# Patient Record
Sex: FEMALE | Race: WHITE | NOT HISPANIC OR LATINO | Employment: FULL TIME | URBAN - METROPOLITAN AREA
[De-identification: names, ages, dates, MRNs, and addresses within clinical notes are randomized per-mention and may not be internally consistent; named-entity substitution may affect disease eponyms.]

---
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
## 2021-01-05 ENCOUNTER — TELEMEDICINE (OUTPATIENT)
Dept: FAMILY MEDICINE CLINIC | Facility: CLINIC | Age: 21
End: 2021-01-05
Payer: COMMERCIAL

## 2021-01-05 DIAGNOSIS — B34.9 VIRAL INFECTION, UNSPECIFIED: Primary | ICD-10-CM

## 2021-01-05 PROCEDURE — 99214 OFFICE O/P EST MOD 30 MIN: CPT | Performed by: FAMILY MEDICINE

## 2021-01-05 PROCEDURE — 3725F SCREEN DEPRESSION PERFORMED: CPT | Performed by: FAMILY MEDICINE

## 2021-01-05 PROCEDURE — U0003 INFECTIOUS AGENT DETECTION BY NUCLEIC ACID (DNA OR RNA); SEVERE ACUTE RESPIRATORY SYNDROME CORONAVIRUS 2 (SARS-COV-2) (CORONAVIRUS DISEASE [COVID-19]), AMPLIFIED PROBE TECHNIQUE, MAKING USE OF HIGH THROUGHPUT TECHNOLOGIES AS DESCRIBED BY CMS-2020-01-R: HCPCS | Performed by: FAMILY MEDICINE

## 2021-01-05 PROCEDURE — 1036F TOBACCO NON-USER: CPT | Performed by: FAMILY MEDICINE

## 2021-01-05 RX ORDER — MONTELUKAST SODIUM 10 MG/1
10 TABLET ORAL
COMMUNITY

## 2021-01-05 RX ORDER — FLUTICASONE PROPIONATE 50 MCG
1 SPRAY, SUSPENSION (ML) NASAL DAILY
COMMUNITY

## 2021-01-05 NOTE — PROGRESS NOTES
COVID-19 Virtual Visit     Assessment/Plan:    Problem List Items Addressed This Visit     None      Visit Diagnoses     Viral infection, unspecified    -  Primary    Relevant Orders    Novel Coronavirus (COVID-19), PCR [facility] - Collected in Office         Disposition:     I recommended the patient to come to our office to perform PCR testing for COVID-19  I have spent 6 minutes directly with the patient  Encounter provider David Guzman DO    Provider located at [facility] [address] [facility]  [address] [facility]  [facility] [address] [place] [address]  [phone]    Recent Visits  No visits were found meeting these conditions  Showing recent visits within past 7 days and meeting all other requirements     Today's Visits  Date Type Provider Dept   01/05/21 Telemedicine David Guzman DO [facility] [address] [facility]   Showing today's visits and meeting all other requirements     Future Appointments  No visits were found meeting these conditions  Showing future appointments within next 150 days and meeting all other requirements      This virtual check-in was done via for[MD] and patient was informed that this is not a secure, HIPAA-compliant platform  She agrees to proceed  Patient agrees to participate in a virtual check in via telephone or video visit instead of presenting to the office to address urgent/immediate medical needs  Patient is aware this is a billable service  After connecting through [facility], the patient was identified by name and date of birth  Carmen Sanchez was informed that this was a telemedicine visit and that the exam was being conducted confidentially over secure lines  My office door was closed  No one else was in the room  Carmen Sanchez acknowledged consent and understanding of privacy and security of the telemedicine visit   I informed the patient that I have reviewed her record in Epic and presented the opportunity for her to ask any questions regarding the visit today  The patient agreed to participate  Subjective:   Joseph Pierre is a 24 y o  female who is concerned about COVID-19  Date of symptom onset: 1/3/2021    Patient's symptoms include sore throat, anosmia, loss of taste, cough, diarrhea, myalgias and headache  Patient denies fever, chills, fatigue, malaise, congestion, rhinorrhea, shortness of breath, chest tightness, abdominal pain, nausea and vomiting  Exposure:   Contact with a person who is under investigation (PUI) for or who is positive for COVID-19 within the last 14 days?: No    Hospitalized recently for fever and/or lower respiratory symptoms?: No      Currently a healthcare worker that is involved in direct patient care?: No      Works in a special setting where the risk of COVID-19 transmission may be high? (this may include long-term care, correctional and shelter facilities; homeless shelters; assisted-living facilities and group homes ): No      Resident in a special setting where the risk of COVID-19 transmission may be high? (this may include long-term care, correctional and shelter facilities; homeless shelters; assisted-living facilities and group homes ): No      Patient received her flu shot this year  No results found for: DALE Peres Mardee Pence  Past Medical History:   Diagnosis Date    Asthma     Ear infection      History reviewed  No pertinent surgical history    Current Outpatient Medications   Medication Sig Dispense Refill    diphenhydrAMINE (BENADRYL) 25 mg capsule Take 1 capsule (25 mg total) by mouth every 6 (six) hours as needed for itching 20 capsule 0    fluticasone (FLONASE) 50 mcg/act nasal spray 1 spray into each nostril daily      montelukast (Singulair) 10 mg tablet Take 10 mg by mouth daily at bedtime      naproxen (NAPROSYN) 500 mg tablet Take 1 tablet by mouth every 12 (twelve) hours as needed for mild pain or moderate pain 20 tablet 0    ibuprofen (MOTRIN) 400 mg tablet Take 1 tablet (400 mg total) by mouth every 6 (six) hours as needed for mild pain (Patient not taking: Reported on 1/5/2021) 30 tablet 0    ondansetron (ZOFRAN-ODT) 4 mg disintegrating tablet Take 1 tablet by mouth every 6 (six) hours as needed for nausea or vomiting (Patient not taking: Reported on 1/5/2021) 14 tablet 0     No current facility-administered medications for this visit  Allergies   Allergen Reactions    Pollen Extract     Shrimp Extract Allergy Skin Test Hives       Review of Systems   Constitutional: Negative for chills, fatigue and fever  HENT: Positive for sore throat  Negative for congestion and rhinorrhea  Respiratory: Positive for cough  Negative for chest tightness and shortness of breath  Gastrointestinal: Positive for diarrhea  Negative for abdominal pain, nausea and vomiting  Musculoskeletal: Positive for myalgias  Neurological: Positive for headaches  Objective:    Vitals:       Physical Exam  Vitals signs reviewed  Constitutional:       General: She is not in acute distress  Appearance: Normal appearance  HENT:      Head: Normocephalic and atraumatic  Right Ear: External ear normal       Left Ear: External ear normal       Nose: Congestion present  Mouth/Throat:      Mouth: Mucous membranes are moist       Pharynx: No oropharyngeal exudate  Eyes:      Extraocular Movements: Extraocular movements intact  Conjunctiva/sclera: Conjunctivae normal    Pulmonary:      Effort: Pulmonary effort is normal  No respiratory distress  Breath sounds: No wheezing  Neurological:      General: No focal deficit present  Mental Status: She is alert and oriented to person, place, and time  Mental status is at baseline  VIRTUAL VISIT DISCLAIMER    Jennifer Moore acknowledges that she has consented to an online visit or consultation   She understands that the online visit is based solely on information provided by her, and that, in the absence of a face-to-face physical evaluation by the physician, the diagnosis she receives is both limited and provisional in terms of accuracy and completeness  This is not intended to replace a full medical face-to-face evaluation by the physician  Gil Pickett understands and accepts these terms      Callie Nicole DO  [facility]  1/5/2021 2:26 PM

## 2021-01-06 DIAGNOSIS — U07.1 COVID-19: Primary | ICD-10-CM

## 2021-01-06 LAB — SARS-COV-2 RNA RESP QL NAA+PROBE: NEGATIVE

## 2021-02-03 ENCOUNTER — TELEPHONE (OUTPATIENT)
Dept: FAMILY MEDICINE CLINIC | Facility: CLINIC | Age: 21
End: 2021-02-03

## 2021-02-03 NOTE — TELEPHONE ENCOUNTER
Pt's mom called to [phone] requesting pt to be tested for Civid-19  Cristina notified us and jason pt's mother requested a call back to schedule an appt since pt was exposed to Covid

## 2021-02-04 ENCOUNTER — TELEMEDICINE (OUTPATIENT)
Dept: FAMILY MEDICINE CLINIC | Facility: CLINIC | Age: 21
End: 2021-02-04
Payer: COMMERCIAL

## 2021-02-04 DIAGNOSIS — Z20.822 EXPOSURE TO COVID-19 VIRUS: ICD-10-CM

## 2021-02-04 DIAGNOSIS — B34.9 VIRAL INFECTION, UNSPECIFIED: ICD-10-CM

## 2021-02-04 PROCEDURE — U0003 INFECTIOUS AGENT DETECTION BY NUCLEIC ACID (DNA OR RNA); SEVERE ACUTE RESPIRATORY SYNDROME CORONAVIRUS 2 (SARS-COV-2) (CORONAVIRUS DISEASE [COVID-19]), AMPLIFIED PROBE TECHNIQUE, MAKING USE OF HIGH THROUGHPUT TECHNOLOGIES AS DESCRIBED BY CMS-2020-01-R: HCPCS | Performed by: FAMILY MEDICINE

## 2021-02-04 PROCEDURE — 99213 OFFICE O/P EST LOW 20 MIN: CPT | Performed by: FAMILY MEDICINE

## 2021-02-04 PROCEDURE — U0005 INFEC AGEN DETEC AMPLI PROBE: HCPCS | Performed by: FAMILY MEDICINE

## 2021-02-04 NOTE — PROGRESS NOTES
COVID-19 Virtual Visit     Assessment/Plan:    Problem List Items Addressed This Visit     None      Visit Diagnoses     Exposure to COVID-19 virus        Relevant Orders    Novel Coronavirus (Covid-19),PCR [facility] - Collected in Office    Viral infection, unspecified        Relevant Orders    Novel Coronavirus (Covid-19),PCR [facility] - Collected in Office         Disposition:     I recommended the patient to come to our office to perform PCR testing for COVID-19  I have spent 7 minutes directly with the patient  Encounter provider Rodrigo Saleem DO    Provider located at [facility] [address]  [address] [facility] [address]  [facility] [address]  [phone]    Recent Visits  Date Type Provider Dept   02/03/21 Telephone Delisa Summers [facility] [phone] [place] [address] recent visits within past 7 days and meeting all other requirements     Today's Visits  Date Type Provider Dept   02/04/21 Telemedicine DO Ramiro Peng [facility] [phone] [facility]   Showing today's visits and meeting all other requirements     Future Appointments  No visits were found meeting these conditions  Showing future appointments within next 150 days and meeting all other requirements      This virtual check-in was done via [employer] and patient was informed that this is not a secure, HIPAA-compliant platform  She agrees to proceed  Patient agrees to participate in a virtual check in via telephone or video visit instead of presenting to the office to address urgent/immediate medical needs  Patient is aware this is a billable service  After connecting through [facility], the patient was identified by name and date of birth  Maribel Briseno was informed that this was a telemedicine visit and that the exam was being conducted confidentially over secure lines  My office door was closed  No one else was in the room   Maribel Briseno acknowledged consent and understanding of privacy and security of the telemedicine visit  I informed the patient that I have reviewed her record in Epic and presented the opportunity for her to ask any questions regarding the visit today  The patient agreed to participate  Subjective:   Canelo Redman is a 24 y o  female who is concerned about COVID-19  Patient's symptoms include fatigue, nasal congestion, sore throat, cough, chest tightness, myalgias and headache  Patient denies fever, chills, malaise, rhinorrhea, anosmia, loss of taste, shortness of breath, abdominal pain, nausea, vomiting and diarrhea  Date of symptom onset: 2/1/2021  Date of exposure: 1/30/2021    Exposure:   Contact with a person who is under investigation (PUI) for or who is positive for COVID-19 within the last 14 days?: Yes    Hospitalized recently for fever and/or lower respiratory symptoms?: No      Currently a healthcare worker that is involved in direct patient care?: No      Works in a special setting where the risk of COVID-19 transmission may be high? (this may include long-term care, correctional and [facility] facilities; homeless shelters; assisted-living facilities and group homes ): No      Resident in a special setting where the risk of COVID-19 transmission may be high? (this may include long-term care, correctional and [facility] facilities; homeless shelters; assisted-living facilities and group homes ): No      Ibuprofen OTC for body aches  Flonase nasal spray for congestion  Lab Results   Component Value Date    SARSCOV2 Negative 01/05/2021     Past Medical History:   Diagnosis Date    Asthma     Ear infection      History reviewed  No pertinent surgical history    Current Outpatient Medications   Medication Sig Dispense Refill    diphenhydrAMINE (BENADRYL) 25 mg capsule Take 1 capsule (25 mg total) by mouth every 6 (six) hours as needed for itching 20 capsule 0    fluticasone (FLONASE) 50 mcg/act nasal spray 1 spray into each nostril daily      ibuprofen (MOTRIN) 400 mg tablet Take 1 tablet (400 mg total) by mouth every 6 (six) hours as needed for mild pain 30 tablet 0    montelukast (Singulair) 10 mg tablet Take 10 mg by mouth daily at bedtime      naproxen (NAPROSYN) 500 mg tablet Take 1 tablet by mouth every 12 (twelve) hours as needed for mild pain or moderate pain 20 tablet 0    ondansetron (ZOFRAN-ODT) 4 mg disintegrating tablet Take 1 tablet by mouth every 6 (six) hours as needed for nausea or vomiting 14 tablet 0     No current facility-administered medications for this visit  Allergies   Allergen Reactions    Pollen Extract     Shrimp Extract Allergy Skin Test Hives       Review of Systems   Constitutional: Positive for fatigue  Negative for chills and fever  HENT: Positive for congestion and sore throat  Negative for rhinorrhea  Respiratory: Positive for cough and chest tightness  Negative for shortness of breath  Gastrointestinal: Negative for abdominal pain, diarrhea, nausea and vomiting  Musculoskeletal: Positive for myalgias  Neurological: Positive for headaches  Objective: There were no vitals filed for this visit  Physical Exam  Vitals signs reviewed  Constitutional:       General: She is not in acute distress  Appearance: Normal appearance  She is ill-appearing  HENT:      Head: Normocephalic and atraumatic  Right Ear: External ear normal       Left Ear: External ear normal       Nose: Congestion present  Mouth/Throat:      Mouth: Mucous membranes are moist    Eyes:      Extraocular Movements: Extraocular movements intact  Conjunctiva/sclera: Conjunctivae normal    Pulmonary:      Effort: Pulmonary effort is normal  No respiratory distress  Breath sounds: No wheezing  Neurological:      General: No focal deficit present  Mental Status: She is alert and oriented to person, place, and time  Mental status is at baseline         VIRTUAL VISIT DISCLAIMER    Catymaeve Shaffer acknowledges that she has consented to an online visit or consultation  She understands that the online visit is based solely on information provided by her, and that, in the absence of a face-to-face physical evaluation by the physician, the diagnosis she receives is both limited and provisional in terms of accuracy and completeness  This is not intended to replace a full medical face-to-face evaluation by the physician  Caty Shaffer understands and accepts these terms      Frances Pablo DO  [facility]  2/4/2021 9:09 AM

## 2021-02-05 LAB — SARS-COV-2 RNA RESP QL NAA+PROBE: POSITIVE

## 2021-02-15 ENCOUNTER — TELEMEDICINE (OUTPATIENT)
Dept: FAMILY MEDICINE CLINIC | Facility: CLINIC | Age: 21
End: 2021-02-15
Payer: COMMERCIAL

## 2021-02-15 ENCOUNTER — TELEPHONE (OUTPATIENT)
Dept: FAMILY MEDICINE CLINIC | Facility: CLINIC | Age: 21
End: 2021-02-15

## 2021-02-15 DIAGNOSIS — U07.1 COVID-19: Primary | ICD-10-CM

## 2021-02-15 PROCEDURE — 99212 OFFICE O/P EST SF 10 MIN: CPT | Performed by: FAMILY MEDICINE

## 2021-02-15 NOTE — LETTER
Dear Dr Mini Del Toro is interested in participating in the [facility] COVID-19 convalescent plasma collection program  Please see my attached note verifying eligibility  Sincerely,      Florence Deleon DO    Diaz Vickers 24 y o  female   MRN: [id number]  2000    COVID-19 Convalescent Plasma Donor Attestation Verification of Eligibility     Diaz Vickers is a 24 y o  female who was diagnosed with COVID-19 infection, has recovered from the illness and wishes to participate in the [facility] convalescent plasma donation program to treat critically ill COVID-19 patients  Donor understands that they will be screened by [facility] [address] and if deemed a suitable candidate, donation appointment time will be arranged directly through [facility] [address]  They were also informed that their plasma will be in a donor pool and cannot be directed to a specific patient  Patient tested positive for 2019 Novel Coronavirus 2019 (SARS-CoV-2 RNA) on 2/4/2021  Patient is now recovered from COVID-19 and has been or will be symptom free for at least 28 days, effective 3/15/2021  To facilitate donation, eligibility form sent directly to [address] [address]  Patients preferred contact number: [phone]    All of the patient's questions were answered via telephone  She is aware to call our office with any further questions or concerns as needed

## 2021-02-15 NOTE — PROGRESS NOTES
COVID-19 Virtual Visit     Assessment/Plan:    Problem List Items Addressed This Visit     None      Visit Diagnoses     COVID-19    -  Primary         Disposition:     Patient has completed 10 day quarantine, is feeling well  She is interested in donating plasma, letter sent to [facility]  Advised patient to follow up for her annual physical as she is due  Discussed plasma donation program with patient and they are interested  Verification of eligibility was sent to [facility]  I have spent 4 minutes directly with the patient  Encounter provider Cedric Padilla DO    Provider located at [facility] [address]  [address] [facility]  [address]  [facility] [id number] [place] [address]  [phone]    Recent Visits  No visits were found meeting these conditions  Showing recent visits within past 7 days and meeting all other requirements     Today's Visits  Date Type Provider Dept   02/15/21 Telemedicine DO Ramiro Steele [facility] [address] [facility]   Showing today's visits and meeting all other requirements     Future Appointments  No visits were found meeting these conditions  Showing future appointments within next 150 days and meeting all other requirements        Patient agrees to participate in a virtual check in via telephone or video visit instead of presenting to the office to address urgent/immediate medical needs  Patient is aware this is a billable service  After connecting through [facility], the patient was identified by name and date of birth  Jennifer Moore was informed that this was a telemedicine visit and that the exam was being conducted confidentially over secure lines  My office door was closed  No one else was in the room  Jennifer Moore acknowledged consent and understanding of privacy and security of the telemedicine visit   I informed the patient that I have reviewed her record in Epic and presented the opportunity for her to ask any questions regarding the visit today  The patient agreed to participate  Subjective:   Tam Brown is a 24 y o  female who has been screened for COVID-19  Symptom change since last report: resolving  Patient's symptoms include anosmia and loss of taste  Patient denies fever, chills, fatigue, malaise, congestion, rhinorrhea, sore throat, cough, shortness of breath, chest tightness, abdominal pain, nausea, vomiting, diarrhea, myalgias and headaches  Sidra Wilks has been staying home and has isolated themselves in her home  She is taking care to not share personal items and is cleaning all surfaces that are touched often, like counters, tabletops, and doorknobs using household cleaning sprays or wipes  She is wearing a mask when she leaves her room  Date of symptom onset: 2/1/2021  Date of exposure: 1/30/2021  Date of positive COVID-19 PCR: 2/4/2021    Brother tested positive too  Lab Results   Component Value Date    SARSCOV2 Positive (A) 02/04/2021     Past Medical History:   Diagnosis Date    Asthma     Ear infection      History reviewed  No pertinent surgical history    Current Outpatient Medications   Medication Sig Dispense Refill    diphenhydrAMINE (BENADRYL) 25 mg capsule Take 1 capsule (25 mg total) by mouth every 6 (six) hours as needed for itching 20 capsule 0    fluticasone (FLONASE) 50 mcg/act nasal spray 1 spray into each nostril daily      ibuprofen (MOTRIN) 400 mg tablet Take 1 tablet (400 mg total) by mouth every 6 (six) hours as needed for mild pain 30 tablet 0    montelukast (Singulair) 10 mg tablet Take 10 mg by mouth daily at bedtime      naproxen (NAPROSYN) 500 mg tablet Take 1 tablet by mouth every 12 (twelve) hours as needed for mild pain or moderate pain 20 tablet 0    ondansetron (ZOFRAN-ODT) 4 mg disintegrating tablet Take 1 tablet by mouth every 6 (six) hours as needed for nausea or vomiting 14 tablet 0     No current facility-administered medications for this visit  Allergies   Allergen Reactions    Pollen Extract     Shrimp Extract Allergy Skin Test Hives       Review of Systems   Constitutional: Negative for chills, fatigue and fever  HENT: Negative for congestion, rhinorrhea and sore throat  Respiratory: Negative for cough, chest tightness and shortness of breath  Gastrointestinal: Negative for abdominal pain, diarrhea, nausea and vomiting  Musculoskeletal: Negative for myalgias  Neurological: Negative for headaches  Objective: There were no vitals filed for this visit  Physical Exam  Vitals signs reviewed  Constitutional:       General: She is not in acute distress  Appearance: Normal appearance  She is not ill-appearing  HENT:      Head: Normocephalic and atraumatic  Right Ear: External ear normal       Left Ear: External ear normal       Nose: Nose normal       Mouth/Throat:      Mouth: Mucous membranes are moist    Eyes:      Extraocular Movements: Extraocular movements intact  Conjunctiva/sclera: Conjunctivae normal    Pulmonary:      Effort: Pulmonary effort is normal  No respiratory distress  Breath sounds: No wheezing  Skin:     Findings: No rash  Neurological:      General: No focal deficit present  Mental Status: She is alert and oriented to person, place, and time  Mental status is at baseline  VIRTUAL VISIT DISCLAIMER    Tam Brown acknowledges that she has consented to an online visit or consultation  She understands that the online visit is based solely on information provided by her, and that, in the absence of a face-to-face physical evaluation by the physician, the diagnosis she receives is both limited and provisional in terms of accuracy and completeness  This is not intended to replace a full medical face-to-face evaluation by the physician  Tam Brown understands and accepts these terms        Adali Perales DO  [facility]  2/15/2021 1:33 PM

## 2021-02-15 NOTE — TELEPHONE ENCOUNTER
Pt called back and needs a work note from 02/04/21 to 02/18/21  Please advise if ok to give note  [phone]

## 2021-02-16 NOTE — TELEPHONE ENCOUNTER
Patient called requesting note  Advised note completed  Emailed work note to patient at [email or web address] per patient request

## 2021-07-16 ENCOUNTER — IMMUNIZATIONS (OUTPATIENT)
Dept: FAMILY MEDICINE CLINIC | Facility: HOSPITAL | Age: 21
End: 2021-07-16

## 2021-07-16 DIAGNOSIS — Z23 ENCOUNTER FOR IMMUNIZATION: Primary | ICD-10-CM

## 2021-07-16 PROCEDURE — 91300 SARS-COV-2 / COVID-19 MRNA VACCINE (PFIZER-BIONTECH) 30 MCG: CPT

## 2021-07-16 PROCEDURE — 0001A SARS-COV-2 / COVID-19 MRNA VACCINE (PFIZER-BIONTECH) 30 MCG: CPT

## 2021-08-06 ENCOUNTER — IMMUNIZATIONS (OUTPATIENT)
Dept: FAMILY MEDICINE CLINIC | Facility: HOSPITAL | Age: 21
End: 2021-08-06

## 2021-08-06 DIAGNOSIS — Z23 ENCOUNTER FOR IMMUNIZATION: Primary | ICD-10-CM

## 2021-08-06 PROCEDURE — 0002A SARS-COV-2 / COVID-19 MRNA VACCINE (PFIZER-BIONTECH) 30 MCG: CPT

## 2021-08-06 PROCEDURE — 91300 SARS-COV-2 / COVID-19 MRNA VACCINE (PFIZER-BIONTECH) 30 MCG: CPT

## 2021-08-25 ENCOUNTER — OFFICE VISIT (OUTPATIENT)
Dept: FAMILY MEDICINE CLINIC | Facility: CLINIC | Age: 21
End: 2021-08-25
Payer: COMMERCIAL

## 2021-08-25 VITALS
WEIGHT: 236 LBS | SYSTOLIC BLOOD PRESSURE: 118 MMHG | OXYGEN SATURATION: 98 % | DIASTOLIC BLOOD PRESSURE: 82 MMHG | HEIGHT: 63 IN | TEMPERATURE: 96.6 F | BODY MASS INDEX: 41.82 KG/M2 | HEART RATE: 92 BPM

## 2021-08-25 DIAGNOSIS — Z00.00 ANNUAL PHYSICAL EXAM: Primary | ICD-10-CM

## 2021-08-25 DIAGNOSIS — N94.6 PAINFUL MENSTRUAL PERIODS: ICD-10-CM

## 2021-08-25 DIAGNOSIS — E66.01 MORBID OBESITY WITH BMI OF 40.0-44.9, ADULT (HCC): ICD-10-CM

## 2021-08-25 DIAGNOSIS — K52.9 GASTROENTERITIS: ICD-10-CM

## 2021-08-25 PROCEDURE — 99395 PREV VISIT EST AGE 18-39: CPT | Performed by: STUDENT IN AN ORGANIZED HEALTH CARE EDUCATION/TRAINING PROGRAM

## 2021-08-25 NOTE — PROGRESS NOTES
[place] [address] [address]    NAME: Adelaida Russell  AGE: 22 y o  SEX: female  : 2000     DATE: 2021     Assessment and Plan:     Problem List Items Addressed This Visit     None      Visit Diagnoses     Annual physical exam    -  Primary    Morbid obesity with BMI of 40 0-44 9, adult ([facility] [address] )        Gastroenteritis        Painful menstrual periods            Recommend nsaids for her periods  May return to work as gastroenteritis has resolved    Immunizations and preventive care screenings were discussed with patient today  Appropriate education was printed on patient's after visit summary  Counseling:  Sexual health: discussed sexually transmitted diseases, partner selection, use of condoms, avoidance of unintended pregnancy, and contraceptive alternatives  · Exercise: the importance of regular exercise/physical activity was discussed  Recommend exercise 3-5 times per week for at least 30 minutes  BMI Counseling: Body mass index is 41 81 kg/m²  The BMI is above normal  Nutrition recommendations include decreasing portion sizes, encouraging healthy choices of fruits and vegetables, decreasing fast food intake, consuming healthier snacks, limiting drinks that contain sugar and moderation in carbohydrate intake  Exercise recommendations include moderate physical activity 150 minutes/week, exercising 3-5 times per week and strength training exercises  No pharmacotherapy was ordered  No follow-ups on file  Chief Complaint:     Chief Complaint   Patient presents with    Diarrhea     Diarhea 2 days ago  Resolved    Abdominal Pain     Onset 2 days ago  Resolved    Annual Exam     Annual Physical Due and pt would like to complete this today  History of Present Illness:     Adult Annual Physical   Patient here for a comprehensive physical exam  The patient reports no problems   Needs a note for work    Diet and Physical Activity  · Diet/Nutrition: well balanced diet  · Exercise: no formal exercise  Depression Screening  PHQ-9 Depression Screening    PHQ-9:   Frequency of the following problems over the past two weeks:      Little interest or pleasure in doing things: 0 - not at all  Feeling down, depressed, or hopeless: 0 - not at all  PHQ-2 Score: 0       General Health  · Sleep: sleeps well  · Hearing: normal - bilateral   · Vision: no vision problems  · Dental: regular dental visits  /GYN Health  · Last menstrual period: 2 weeks  · Contraceptive method: barrier methods  · History of STDs?: no      Review of Systems:     Review of Systems   Constitutional: Negative for chills, fatigue and fever  HENT: Negative for rhinorrhea and sore throat  Eyes: Negative for visual disturbance  Respiratory: Negative for cough and shortness of breath  Cardiovascular: Negative for chest pain and palpitations  Gastrointestinal: Negative for abdominal pain, constipation, diarrhea, nausea and vomiting  Genitourinary: Positive for menstrual problem (periods can be painful for the first and second day)  Negative for difficulty urinating, dysuria and frequency  Musculoskeletal: Negative for arthralgias and myalgias  Skin: Negative for color change and rash  Neurological: Negative for weakness and headaches  Past Medical History:     Past Medical History:   Diagnosis Date    Asthma     Ear infection       Past Surgical History:     History reviewed  No pertinent surgical history     Social History:     Social History     Socioeconomic History    Marital status: Single     Spouse name: None    Number of children: None    Years of education: None    Highest education level: None   Occupational History    None   Tobacco Use    Smoking status: Never Smoker    Smokeless tobacco: Never Used   Vaping Use    Vaping Use: Never used   Substance and Sexual Activity    Alcohol use: No    Drug use: No    Sexual activity: None   Other Topics Concern    None   Social History Narrative    None     Social Determinants of Health     Financial Resource Strain:     Difficulty of Paying Living Expenses:    Food Insecurity:     Worried About Running Out of Food in the Last Year:     [address] in the Last Year:    Transportation Needs:     Lack of Transportation (Medical):  Lack of Transportation (Non-Medical):    Physical Activity:     Days of Exercise per Week:     Minutes of Exercise per Session:    Stress:     Feeling of Stress :    Social Connections:     Frequency of Communication with Friends and Family:     Frequency of Social Gatherings with Friends and Family:     Attends Synagogue Services:     Active Member of Clubs or Organizations:     Attends Club or Organization Meetings:     Marital Status:    Intimate Partner Violence:     Fear of Current or Ex-Partner:     Emotionally Abused:     Physically Abused:     Sexually Abused:       Family History:     History reviewed  No pertinent family history  Current Medications:     Current Outpatient Medications   Medication Sig Dispense Refill    diphenhydrAMINE (BENADRYL) 25 mg capsule Take 1 capsule (25 mg total) by mouth every 6 (six) hours as needed for itching 20 capsule 0    fluticasone (FLONASE) 50 mcg/act nasal spray 1 spray into each nostril daily      ibuprofen (MOTRIN) 400 mg tablet Take 1 tablet (400 mg total) by mouth every 6 (six) hours as needed for mild pain 30 tablet 0    montelukast (Singulair) 10 mg tablet Take 10 mg by mouth daily at bedtime      naproxen (NAPROSYN) 500 mg tablet Take 1 tablet by mouth every 12 (twelve) hours as needed for mild pain or moderate pain 20 tablet 0    ondansetron (ZOFRAN-ODT) 4 mg disintegrating tablet Take 1 tablet by mouth every 6 (six) hours as needed for nausea or vomiting 14 tablet 0     No current facility-administered medications for this visit  Allergies: Allergies   Allergen Reactions    Pollen Extract     Shrimp Extract Allergy Skin Test - Food Allergy Hives      Physical Exam:     /82 (BP Location: Left arm, Patient Position: Sitting, Cuff Size: Adult)   Pulse 92   Temp (!) 96 6 °F (35 9 °C) (Tympanic)   Ht 5' 3" (1 6 m)   Wt 107 kg (236 lb)   SpO2 98%   BMI 41 81 kg/m²     Physical Exam  Constitutional:       General: She is not in acute distress  Appearance: Normal appearance  She is well-developed  She is obese  She is not ill-appearing  HENT:      Head: Normocephalic and atraumatic  Right Ear: Tympanic membrane, ear canal and external ear normal       Left Ear: Tympanic membrane, ear canal and external ear normal       Nose: Nose normal       Mouth/Throat:      Mouth: Mucous membranes are moist       Pharynx: Oropharynx is clear  No oropharyngeal exudate or posterior oropharyngeal erythema  Eyes:      General: No scleral icterus  Right eye: No discharge  Left eye: No discharge  Extraocular Movements: Extraocular movements intact  Conjunctiva/sclera: Conjunctivae normal       Pupils: Pupils are equal, round, and reactive to light  Cardiovascular:      Rate and Rhythm: Normal rate and regular rhythm  Pulses: Normal pulses  Heart sounds: Normal heart sounds  No murmur heard  Pulmonary:      Effort: Pulmonary effort is normal  No respiratory distress  Breath sounds: Normal breath sounds  Abdominal:      General: Abdomen is flat  Bowel sounds are normal  There is no distension or abdominal bruit  There are no signs of injury  Palpations: Abdomen is soft  Tenderness: There is no abdominal tenderness  Musculoskeletal:         General: Normal range of motion  Cervical back: Normal range of motion and neck supple  Lymphadenopathy:      Cervical: No cervical adenopathy  Skin:     General: Skin is warm and dry        Capillary Refill: Capillary refill takes less than 2 seconds  Neurological:      General: No focal deficit present  Mental Status: She is alert and oriented to person, place, and time  Mental status is at baseline  Cranial Nerves: No cranial nerve deficit  Psychiatric:         Mood and Affect: Mood normal           Jess Winston MD   [address]  BMI Counseling: Body mass index is 41 81 kg/m²  The BMI is above normal  Nutrition recommendations include reducing portion sizes, decreasing overall calorie intake, consuming healthier snacks and decreasing soda and/or juice intake  Exercise recommendations include moderate aerobic physical activity for 150 minutes/week, exercising 3-5 times per week and strength training exercises

## 2021-08-25 NOTE — PATIENT INSTRUCTIONS
Wellness Visit for Adults   AMBULATORY CARE:   A wellness visit  is when you see your healthcare provider to get screened for health problems  Your healthcare provider will also give you advice on how to stay healthy  Write down your questions so you remember to ask them  Ask your healthcare provider how often you should have a wellness visit  What happens at a wellness visit:  Your healthcare provider will ask about your health, and your family history of health problems  This includes high blood pressure, heart disease, and cancer  He or she will ask if you have symptoms that concern you, if you smoke, and about your mood  You may also be asked about your intake of medicines, supplements, food, and alcohol  Any of the following may be done:  · Your weight  will be checked  Your height may also be checked so your body mass index (BMI) can be calculated  Your BMI shows if you are at a healthy weight  · Your blood pressure  and heart rate will be checked  Your temperature may also be checked  · Blood and urine tests  may be done  Blood tests may be done to check your cholesterol levels  Abnormal cholesterol levels increase your risk for heart disease and stroke  You may also need a blood or urine test to check for diabetes if you are at increased risk  Urine tests may be done to look for signs of an infection or kidney disease  · A physical exam  includes checking your heartbeat and lungs with a stethoscope  Your healthcare provider may also check your skin to look for sun damage  · Screening tests  may be recommended  A screening test is done to check for diseases that may not cause symptoms  The screening tests you may need depend on your age, gender, family history, and lifestyle habits  For example, colorectal screening may be recommended if you are 48years old or older  Screening tests you need if you are a woman:   · A Pap smear  is used to screen for cervical cancer   Pap smears are usually done every 3 to 5 years depending on your age  You may need them more often if you have had abnormal Pap smear test results in the past  Ask your healthcare provider how often you should have a Pap smear  · A mammogram  is an x-ray of your breasts to screen for breast cancer  Experts recommend mammograms every 2 years starting at age 48 years  You may need a mammogram at age 52 years or younger if you have an increased risk for breast cancer  Talk to your healthcare provider about when you should start having mammograms and how often you need them  Vaccines you may need:   · Get an influenza vaccine  every year  The influenza vaccine protects you from the flu  Several types of viruses cause the flu  The viruses change over time, so new vaccines are made each year  · Get a tetanus-diphtheria (Td) booster vaccine  every 10 years  This vaccine protects you against tetanus and diphtheria  Tetanus is a severe infection that may cause painful muscle spasms and lockjaw  Diphtheria is a severe bacterial infection that causes a thick covering in the back of your mouth and throat  · Get a human papillomavirus (HPV) vaccine  if you are female and aged 23 to 32 or male 23 to 24 and never received it  This vaccine protects you from HPV infection  HPV is the most common infection spread by sexual contact  HPV may also cause vaginal, penile, and anal cancers  · Get a pneumococcal vaccine  if you are aged 72 years or older  The pneumococcal vaccine is an injection given to protect you from pneumococcal disease  Pneumococcal disease is an infection caused by pneumococcal bacteria  The infection may cause pneumonia, meningitis, or an ear infection  · Get a shingles vaccine  if you are 60 or older, even if you have had shingles before  The shingles vaccine is an injection to protect you from the varicella-zoster virus  This is the same virus that causes chickenpox   Shingles is a painful rash that develops in people who had chickenpox or have been exposed to the virus  How to eat healthy:  My Plate is a model for planning healthy meals  It shows the types and amounts of foods that should go on your plate  Fruits and vegetables make up about half of your plate, and grains and protein make up the other half  A serving of dairy is included on the side of your plate  The amount of calories and serving sizes you need depends on your age, gender, weight, and height  Examples of healthy foods are listed below:  · Eat a variety of vegetables  such as dark green, red, and orange vegetables  You can also include canned vegetables low in sodium (salt) and frozen vegetables without added butter or sauces  · Eat a variety of fresh fruits , canned fruit in 100% juice, frozen fruit, and dried fruit  · Include whole grains  At least half of the grains you eat should be whole grains  Examples include whole-wheat bread, wheat pasta, brown rice, and whole-grain cereals such as oatmeal     · Eat a variety of protein foods such as seafood (fish and shellfish), lean meat, and poultry without skin (turkey and chicken)  Examples of lean meats include pork leg, shoulder, or tenderloin, and beef round, sirloin, tenderloin, and extra lean ground beef  Other protein foods include eggs and egg substitutes, beans, peas, soy products, nuts, and seeds  · Choose low-fat dairy products such as skim or 1% milk or low-fat yogurt, cheese, and cottage cheese  · Limit unhealthy fats  such as butter, hard margarine, and shortening  Exercise:  Exercise at least 30 minutes per day on most days of the week  Some examples of exercise include walking, biking, dancing, and swimming  You can also fit in more physical activity by taking the stairs instead of the elevator or parking farther away from stores  Include muscle strengthening activities 2 days each week  Regular exercise provides many health benefits   It helps you manage your weight, and decreases your risk for type 2 diabetes, heart disease, stroke, and high blood pressure  Exercise can also help improve your mood  Ask your healthcare provider about the best exercise plan for you  General health and safety guidelines:   · Do not smoke  Nicotine and other chemicals in cigarettes and cigars can cause lung damage  Ask your healthcare provider for information if you currently smoke and need help to quit  E-cigarettes or smokeless tobacco still contain nicotine  Talk to your healthcare provider before you use these products  · Limit alcohol  A drink of alcohol is 12 ounces of beer, 5 ounces of wine, or 1½ ounces of liquor  · Lose weight, if needed  Being overweight increases your risk of certain health conditions  These include heart disease, high blood pressure, type 2 diabetes, and certain types of cancer  · Protect your skin  Do not sunbathe or use tanning beds  Use sunscreen with a SPF 15 or higher  Apply sunscreen at least 15 minutes before you go outside  Reapply sunscreen every 2 hours  Wear protective clothing, hats, and sunglasses when you are outside  · Drive safely  Always wear your seatbelt  Make sure everyone in your car wears a seatbelt  A seatbelt can save your life if you are in an accident  Do not use your cell phone when you are driving  This could distract you and cause an accident  Pull over if you need to make a call or send a text message  · Practice safe sex  Use latex condoms if are sexually active and have more than one partner  Your healthcare provider may recommend screening tests for sexually transmitted infections (STIs)  · Wear helmets, lifejackets, and protective gear  Always wear a helmet when you ride a bike or motorcycle, go skiing, or play sports that could cause a head injury  Wear protective equipment when you play sports  Wear a lifejacket when you are on a boat or doing water sports      © Copyright [employer] 2021 Information is for End User's use only and may not be sold, redistributed or otherwise used for commercial purposes  All illustrations and images included in CareNotes® are the copyrighted property of [employer] D A M [employer] Inc  or Jackie Boston  The above information is an  only  It is not intended as medical advice for individual conditions or treatments  Talk to your doctor, nurse or pharmacist before following any medical regimen to see if it is safe and effective for you  Cholesterol and Your Health   AMBULATORY CARE:   Cholesterol  is a waxy, fat-like substance  Your body uses cholesterol to make hormones and new cells, and to protect nerves  Cholesterol is made by your body  It also comes from certain foods you eat, such as meat and dairy products  Your healthcare provider can help you set goals for your cholesterol levels  He or she can help you create a plan to meet your goals  Cholesterol level goals: Your cholesterol level goals depend on your risk for heart disease, your age, and your other health conditions  The following are general guidelines:  · Total cholesterol  includes low-density lipoprotein (LDL), high-density lipoprotein (HDL), and triglyceride levels  The total cholesterol level should be lower than 200 mg/dL and is best at about 150 mg/dL  · LDL cholesterol  is called bad cholesterol  because it forms plaque in your arteries  As plaque builds up, your arteries become narrow, and less blood flows through  When plaque decreases blood flow to your heart, you may have chest pain  If plaque completely blocks an artery that brings blood to your heart, you may have a heart attack  Plaque can break off and form blood clots  Blood clots may block arteries in your brain and cause a stroke  The level should be less than 130 mg/dL and is best at about 100 mg/dL  · HDL cholesterol  is called good cholesterol  because it helps remove LDL cholesterol from your arteries   It does this by attaching to LDL cholesterol and carrying it to your liver  Your liver breaks down LDL cholesterol so your body can get rid of it  High levels of HDL cholesterol can help prevent a heart attack and stroke  Low levels of HDL cholesterol can increase your risk for heart disease, heart attack, and stroke  The level should be 60 mg/dL or higher  · Triglycerides  are a type of fat that store energy from foods you eat  High levels of triglycerides also cause plaque buildup  This can increase your risk for a heart attack or stroke  If your triglyceride level is high, your LDL cholesterol level may also be high  The level should be less than 150 mg/dL  Any of the following can increase your risk for high cholesterol:   · Smoking cigarettes    · Being overweight or obese, or not getting enough exercise    · Drinking large amounts of alcohol    · A medical condition such as hypertension (high blood pressure) or diabetes    · Certain genes passed from your parents to you    · Age older than 65 years    What you need to know about having your cholesterol levels checked: Adults 21to 39years of age should have their cholesterol levels checked every 4 to 6 years  Adults 45 years or older should have their cholesterol checked every 1 to 2 years  You may need your cholesterol checked more often, or at a younger age, if you have risk factors for heart disease  You may also need to have your cholesterol checked more often if you have other health conditions, such as diabetes  Blood tests are used to check cholesterol levels  Blood tests measure your levels of triglycerides, LDL cholesterol, and HDL cholesterol  How healthy fats affect your cholesterol levels:  Healthy fats, also called unsaturated fats, help lower LDL cholesterol and triglyceride levels  Healthy fats include the following:  · Monounsaturated fats  are found in foods such as olive oil, canola oil, avocado, nuts, and olives      · Polyunsaturated fats,  such as omega 3 fats, are found in fish, such as salmon, trout, and tuna  They can also be found in plant foods such as flaxseed, walnuts, and soybeans  How unhealthy fats affect your cholesterol levels:  Unhealthy fats increase LDL cholesterol and triglyceride levels  They are found in foods high in cholesterol, saturated fat, and trans fat:  · Cholesterol  is found in eggs, dairy, and meat  · Saturated fat  is found in butter, cheese, ice cream, whole milk, and coconut oil  Saturated fat is also found in meat, such as sausage, hot dogs, and bologna  · Trans fat  is found in liquid oils and is used in fried and baked foods  Foods that contain trans fats include chips, crackers, muffins, sweet rolls, microwave popcorn, and cookies  Treatment  for high cholesterol will also decrease your risk of heart disease, heart attack, and stroke  Treatment may include any of the following:  · Lifestyle changes  may include food, exercise, weight loss, and quitting smoking  You may also need to decrease the amount of alcohol you drink  Your healthcare provider will want you to start with lifestyle changes  Other treatment may be added if lifestyle changes are not enough  Your healthcare provider may recommend you work with a team to manage hyperlipidemia  The team may include medical experts such as a dietitian, an exercise or physical therapist, and a behavior therapist  Your family members may be included in helping you create lifestyle changes  · Medicines  may be given to lower your LDL cholesterol, triglyceride levels, or total cholesterol level  You may need medicines to lower your cholesterol if any of the following is true:    ? You have a history of stroke, TIA, unstable angina, or a heart attack  ? Your LDL cholesterol level is 190 mg/dL or higher  ? You are age 36 to 76 years, have diabetes or heart disease risk factors, and your LDL cholesterol is 70 mg/dL or higher      · Supplements  include fish oil, red yeast rice, and garlic  Fish oil may help lower your triglyceride and LDL cholesterol levels  It may also increase your HDL cholesterol level  Red yeast rice may help decrease your total cholesterol level and LDL cholesterol level  Garlic may help lower your total cholesterol level  Do not take any supplements without talking to your healthcare provider  Food changes you can make to lower your cholesterol levels:  A dietitian can help you create a healthy eating plan  He or she can show you how to read food labels and choose foods low in saturated fat, trans fats, and cholesterol  · Decrease the total amount of fat you eat  Choose lean meats, fat-free or 1% fat milk, and low-fat dairy products, such as yogurt and cheese  Try to limit or avoid red meats  Limit or do not eat fried foods or baked goods, such as cookies  · Replace unhealthy fats with healthy fats  Cook foods in olive oil or canola oil  Choose soft margarines that are low in saturated fat and trans fat  Seeds, nuts, and avocados are other examples of healthy fats  · Eat foods with omega-3 fats  Examples include salmon, tuna, mackerel, walnuts, and flaxseed  Eat fish 2 times per week  Pregnant women should not eat fish that have high levels of mercury, such as shark, swordfish, and angie mackerel  · Increase the amount of high-fiber foods you eat  High-fiber foods can help lower your LDL cholesterol  Aim to get between 20 and 30 grams of fiber each day  Fruits and vegetables are high in fiber  Eat at least 5 servings each day  Other high-fiber foods are whole-grain or whole-wheat breads, pastas, or cereals, and brown rice  Eat 3 ounces of whole-grain foods each day  Increase fiber slowly  You may have abdominal discomfort, bloating, and gas if you add fiber to your diet too quickly  · Eat healthy protein foods  Examples include low-fat dairy products, skinless chicken and turkey, fish, and nuts      · Limit foods and drinks that are high in sugar  Your dietitian or healthcare provider can help you create daily limits for high-sugar foods and drinks  The limit may be lower if you have diabetes or another health condition  Limits can also help you lose weight if needed  Lifestyle changes you can make to lower your cholesterol levels:   · Maintain a healthy weight  Ask your healthcare provider what a healthy weight is for you  Ask him or her to help you create a weight loss plan if needed  Weight loss can decrease your total cholesterol and triglyceride levels  Weight loss may also help keep your blood pressure at a healthy level  · Be physically active throughout the day  Physical activity, such as exercise, can help lower your total cholesterol level and maintain a healthy weight  Physical activity can also help increase your HDL cholesterol level  Work with your healthcare provider to create an program that is right for you  Get at least 30 to 40 minutes of moderate physical activity most days of the week  Examples of exercise include brisk walking, swimming, or biking  Also include strength training at least 2 times each week  Your healthcare providers can help you create a physical activity plan  · Do not smoke  Nicotine and other chemicals in cigarettes and cigars can raise your cholesterol levels  Ask your healthcare provider for information if you currently smoke and need help to quit  E-cigarettes or smokeless tobacco still contain nicotine  Talk to your healthcare provider before you use these products  · Limit or do not drink alcohol  Alcohol can increase your triglyceride levels  Ask your healthcare provider before you drink alcohol  Ask how much is okay for you to drink in 24 hours or 1 week  Follow up with your doctor as directed:  Write down your questions so you remember to ask them during your visits    © Copyright [employer] 2021 Information is for End User's use only and may not be sold, redistributed or otherwise used for commercial purposes  All illustrations and images included in CareNotes® are the copyrighted property of A D A M [employer] Inc  or Jackie Carcamo   The above information is an  only  It is not intended as medical advice for individual conditions or treatments  Talk to your doctor, nurse or pharmacist before following any medical regimen to see if it is safe and effective for you  Calorie Counting Diet   WHAT YOU NEED TO KNOW:   What is a calorie counting diet? It is a meal plan based on counting calories each day to reach a healthy body weight  You will need to eat fewer calories if you are trying to lose weight  Weight loss may decrease your risk for certain health problems or improve your health if you have health problems  Some of these health problems include heart disease, high blood pressure, and diabetes  What foods should I avoid? Your dietitian will tell you if you need to avoid certain foods based on your body weight and health condition  You may need to avoid high-fat foods if you are at risk for or have heart disease  You may need to eat fewer foods from the breads and starches food group if you have diabetes  How many calories are in foods? The following is a list of foods and drinks with the approximate number of calories in each  Check the food label to find the exact number of calories  A dietitian can tell you how many calories you should have from each food group each day  · Carbohydrate:      ? ½ of a 3-inch bagel, 1 slice of bread, or ½ of a hamburger bun or hot dog bun (80)    ? 1 (8-inch) flour tortilla or ½ cup of cooked rice (100)    ? 1 (6-inch) corn tortilla (80)    ? 1 (6-inch) pancake or 1 cup of bran flakes cereal (110)    ? ½ cup of cooked cereal (80)    ? ½ cup of cooked pasta (85)    ? 1 ounce of pretzels (100)    ? 3 cups of air-popped popcorn without butter or oil (80)    · Dairy:      ? 1 cup of skim or 1% milk (90)    ?  1 cup of 2% milk (120)    ? 1 cup of whole milk (160)    ? 1 cup of 2% chocolate milk (220)    ? 1 ounce of low-fat cheese with 3 grams of fat per ounce (70)    ? 1 ounce of cheddar cheese (114)    ? ½ cup of 1% fat cottage cheese (80)    ? 1 cup of plain or sugar-free, fat-free yogurt (90)    · Protein foods:      ? 3 ounces of fish (not breaded or fried) (95)    ? 3 ounces of breaded, fried fish (195)    ? ¾ cup of tuna canned in water (105)    ? 3 ounces of chicken breast without skin (105)    ? 1 fried chicken breast with skin (350)    ? ¼ cup of fat free egg substitute (40)    ? 1 large egg (75)    ? 3 ounces of lean beef or pork (165)    ? 3 ounces of fried pork chop or ham (185)    ? ½ cup of cooked dried beans, such as kidney, bhatia, lentils, or navy (115)    ? 3 ounces of bologna or lunch meat (225)    ? 2 links of breakfast sausage (140)    · Vegetables:      ? ½ cup of sliced mushrooms (10)    ? 1 cup of salad greens, such as lettuce, spinach, or christina (15)    ? ½ cup of steamed asparagus (20)    ? ½ cup of cooked summer squash, zucchini squash, or green or wax beans (25)    ? 1 cup of broccoli or cauliflower florets, or 1 medium tomato (25)    ? 1 large raw carrot or ½ cup of cooked carrots (40)    ? ? of a medium cucumber or 1 stalk of celery (5)    ? 1 small baked potato (160)    ? 1 cup of breaded, fried vegetables (230)    · Fruit:      ? 1 (6-inch) banana (55)     ? ½ of a 4-inch grapefruit (55)    ? 15 grapes (60)    ? 1 medium orange or apple (70)    ? 1 large peach (65)    ? 1 cup of fresh pineapple chunks (75)    ? 1 cup of melon cubes (50)    ? 1¼ cups of whole strawberries (45)    ? ½ cup of fruit canned in juice (55)    ? ½ cup of fruit canned in heavy syrup (110)    ? ? cup of raisins (130)    ? ½ cup of unsweetened fruit juice (60)    ? ½ cup of grape, cranberry, or prune juice (90)    · Fat:      ? 10 peanuts or 2 teaspoons of peanut butter (55)    ?  2 tablespoons of avocado or 1 tablespoon of regular salad dressing (45)    ? 2 slices of valladares (90)    ? 1 teaspoon of oil, such as safflower, canola, corn, or olive oil (45)    ? 2 teaspoons of low-fat margarine, or 1 tablespoon of low-fat mayonnaise (50)    ? 1 teaspoon of regular margarine (40)    ? 1 tablespoon of regular mayonnaise (135)    ? 1 tablespoon of cream cheese or 2 tablespoons of low-fat cream cheese (45)    ? 2 tablespoons of vegetable shortening (215)    · Dessert and sweets:      ? 8 animal crackers or 5 vanilla wafers (80)    ? 1 frozen fruit juice bar (80)    ? ½ cup of ice milk or low-fat frozen yogurt (90)    ? ½ cup of sherbet or sorbet (125)    ? ½ cup of sugar-free pudding or custard (60)    ? ½ cup of ice cream (140)    ? ½ cup of pudding or custard (175)    ? 1 (2-inch) square chocolate brownie (185)    · Combination foods:      ? Bean burrito made with an 8-inch tortilla, without cheese (275)    ? Chicken breast sandwich with lettuce and tomato (325)    ? 1 cup of chicken noodle soup (60)    ? 1 beef taco (175)    ? Regular hamburger with lettuce and tomato (310)    ? Regular cheeseburger with lettuce and tomato (410)     ? ¼ of a 12-inch cheese pizza (280)    ? Fried fish sandwich with lettuce and tomato (425)    ? Hot dog and bun (275)    ? 1½ cups of macaroni and cheese (310)    ? Taco salad with a fried tortilla shell (870)    · Low-calorie foods:      ? 1 tablespoon of ketchup or 1 tablespoon of fat free sour cream (15)    ? 1 teaspoon of mustard (5)    ? ¼ cup of salsa (20)    ? 1 large dill pickle (15)    ? 1 tablespoon of fat free salad dressing (10)    ? 2 teaspoons of low-sugar, light jam or jelly, or 1 tablespoon of sugar-free syrup (15)    ? 1 sugar-free popsicle (15)    ? 1 cup of club soda, seltzer water, or diet soda (0)    CARE AGREEMENT:   You have the right to help plan your care  Discuss treatment options with your healthcare provider to decide what care you want to receive   You always have the right to refuse treatment  The above information is an  only  It is not intended as medical advice for individual conditions or treatments  Talk to your doctor, nurse or pharmacist before following any medical regimen to see if it is safe and effective for you  © Copyright [employer] 2021 Information is for End User's use only and may not be sold, redistributed or otherwise used for commercial purposes  All illustrations and images included in CareNotes® are the copyrighted property of A D A M , Inc  or [facility] Jostin Carcamo     Obesity   AMBULATORY CARE:   Obesity  is when your body mass index (BMI) is greater than 30  Your healthcare provider will use your height and weight to measure your BMI  The risks of obesity include  many health problems, such as injuries or physical disability  You may need tests to check for the following:  · Diabetes    · High blood pressure or high cholesterol    · Heart disease    · Gallbladder or liver disease    · Cancer of the colon, breast, prostate, liver, or kidney    · Sleep apnea    · Arthritis or gout    Seek care immediately if:   · You have a severe headache, confusion, or difficulty speaking  · You have weakness on one side of your body  · You have chest pain, sweating, or shortness of breath  Contact your healthcare provider if:   · You have symptoms of gallbladder or liver disease, such as pain in your upper abdomen  · You have knee or hip pain and discomfort while walking  · You have symptoms of diabetes, such as intense hunger and thirst, and frequent urination  · You have symptoms of sleep apnea, such as snoring or daytime sleepiness  · You have questions or concerns about your condition or care  Treatment for obesity  focuses on helping you lose weight to improve your health  Even a small decrease in BMI can reduce the risk for many health problems   Your healthcare provider will help you set a weight-loss goal   · Lifestyle changes  are the first step in treating obesity  These include making healthy food choices and getting regular physical activity  Your healthcare provider may suggest a weight-loss program that involves coaching, education, and therapy  · Medicine  may help you lose weight when it is used with a healthy diet and physical activity  · Surgery  can help you lose weight if you are very obese and have other health problems  There are several types of weight-loss surgery  Ask your healthcare provider for more information  Be successful losing weight:   · Set small, realistic goals  An example of a small goal is to walk for 20 minutes 5 days a week  Anther goal is to lose 5% of your body weight  · Tell friends, family members, and coworkers about your goals  and ask for their support  Ask a friend to lose weight with you, or join a weight-loss support group  · Identify foods or triggers that may cause you to overeat , and find ways to avoid them  Remove tempting high-calorie foods from your home and workplace  Place a bowl of fresh fruit on your kitchen counter  If stress causes you to eat, then find other ways to cope with stress  · Keep a diary to track what you eat and drink  Also write down how many minutes of physical activity you do each day  Weigh yourself once a week and record it in your diary  Eating changes: You will need to eat 500 to 1,000 fewer calories each day than you currently eat to lose 1 to 2 pounds a week  The following changes will help you cut calories:  · Eat smaller portions  Use small plates, no larger than 9 inches in diameter  Fill your plate half full of fruits and vegetables  Measure your food using measuring cups until you know what a serving size looks like  · Eat 3 meals and 1 or 2 snacks each day  Plan your meals in advance  Radha Duggan and eat at home most of the time  Eat slowly  Do not skip meals  Skipping meals can lead to overeating later in the day   This can make it harder for you to lose weight  Talk with a dietitian to help you make a meal plan and schedule that is right for you  · Eat fruits and vegetables at every meal   They are low in calories and high in fiber, which makes you feel full  Do not add butter, margarine, or cream sauce to vegetables  Use herbs to season steamed vegetables  · Eat less fat and fewer fried foods  Eat more baked or grilled chicken and fish  These protein sources are lower in calories and fat than red meat  Limit fast food  Dress your salads with olive oil and vinegar instead of bottled dressing  · Limit the amount of sugar you eat  Do not drink sugary beverages  Limit alcohol  Activity changes:  Physical activity is good for your body in many ways  It helps you burn calories and build strong muscles  It decreases stress and depression, and improves your mood  It can also help you sleep better  Talk to your healthcare provider before you begin an exercise program   · Exercise for at least 30 minutes 5 days a week  Start slowly  Set aside time each day for physical activity that you enjoy and that is convenient for you  It is best to do both weight training and an activity that increases your heart rate, such as walking, bicycling, or swimming  · Find ways to be more active  Do yard work and housecleaning  Walk up the stairs instead of using elevators  Spend your leisure time going to events that require walking, such as outdoor festivals or fairs  This extra physical activity can help you lose weight and keep it off  Follow up with your healthcare provider as directed: You may need to meet with a dietitian  Write down your questions so you remember to ask them during your visits  © Copyright [employer] 2021 Information is for End User's use only and may not be sold, redistributed or otherwise used for commercial purposes   All illustrations and images included in CareNotes® are the copyrighted property of [employer]  or Jackie Boston  The above information is an  only  It is not intended as medical advice for individual conditions or treatments  Talk to your doctor, nurse or pharmacist before following any medical regimen to see if it is safe and effective for you

## 2021-08-25 NOTE — LETTER
August 25, 2021     Patient: Lazarus Nash   YOB: 2000   Date of Visit: 8/25/2021       To Whom it May Concern:    Lazarus Nash is under my professional care  She was seen in my office on 8/25/2021  She may return to work on 8/26/2021       If you have any questions or concerns, please don't hesitate to call           Sincerely,        Bong Dale MD

## 2021-10-05 ENCOUNTER — TELEPHONE (OUTPATIENT)
Dept: FAMILY MEDICINE CLINIC | Facility: CLINIC | Age: 21
End: 2021-10-05

## 2021-10-14 ENCOUNTER — TELEPHONE (OUTPATIENT)
Dept: FAMILY MEDICINE CLINIC | Facility: CLINIC | Age: 21
End: 2021-10-14

## 2021-10-14 DIAGNOSIS — B34.9 VIRAL INFECTION: Primary | ICD-10-CM

## 2021-10-14 PROCEDURE — U0003 INFECTIOUS AGENT DETECTION BY NUCLEIC ACID (DNA OR RNA); SEVERE ACUTE RESPIRATORY SYNDROME CORONAVIRUS 2 (SARS-COV-2) (CORONAVIRUS DISEASE [COVID-19]), AMPLIFIED PROBE TECHNIQUE, MAKING USE OF HIGH THROUGHPUT TECHNOLOGIES AS DESCRIBED BY CMS-2020-01-R: HCPCS | Performed by: FAMILY MEDICINE

## 2021-10-14 PROCEDURE — U0005 INFEC AGEN DETEC AMPLI PROBE: HCPCS | Performed by: FAMILY MEDICINE

## 2021-10-15 ENCOUNTER — TELEMEDICINE (OUTPATIENT)
Dept: FAMILY MEDICINE CLINIC | Facility: CLINIC | Age: 21
End: 2021-10-15
Payer: COMMERCIAL

## 2021-10-15 VITALS — WEIGHT: 235.89 LBS | BODY MASS INDEX: 41.8 KG/M2 | HEIGHT: 63 IN

## 2021-10-15 DIAGNOSIS — B34.9 VIRAL INFECTION, UNSPECIFIED: Primary | ICD-10-CM

## 2021-10-15 LAB — SARS-COV-2 RNA RESP QL NAA+PROBE: NEGATIVE

## 2021-10-15 PROCEDURE — 99213 OFFICE O/P EST LOW 20 MIN: CPT | Performed by: FAMILY MEDICINE

## 2021-11-04 ENCOUNTER — TELEMEDICINE (OUTPATIENT)
Dept: FAMILY MEDICINE CLINIC | Facility: CLINIC | Age: 21
End: 2021-11-04
Payer: COMMERCIAL

## 2021-11-04 DIAGNOSIS — Z91.89 AT INCREASED RISK OF EXPOSURE TO COVID-19 VIRUS: Primary | ICD-10-CM

## 2021-11-04 DIAGNOSIS — B34.9 VIRAL SYNDROME: ICD-10-CM

## 2021-11-04 PROCEDURE — U0003 INFECTIOUS AGENT DETECTION BY NUCLEIC ACID (DNA OR RNA); SEVERE ACUTE RESPIRATORY SYNDROME CORONAVIRUS 2 (SARS-COV-2) (CORONAVIRUS DISEASE [COVID-19]), AMPLIFIED PROBE TECHNIQUE, MAKING USE OF HIGH THROUGHPUT TECHNOLOGIES AS DESCRIBED BY CMS-2020-01-R: HCPCS | Performed by: STUDENT IN AN ORGANIZED HEALTH CARE EDUCATION/TRAINING PROGRAM

## 2021-11-04 PROCEDURE — U0005 INFEC AGEN DETEC AMPLI PROBE: HCPCS | Performed by: STUDENT IN AN ORGANIZED HEALTH CARE EDUCATION/TRAINING PROGRAM

## 2021-11-04 PROCEDURE — 99213 OFFICE O/P EST LOW 20 MIN: CPT | Performed by: STUDENT IN AN ORGANIZED HEALTH CARE EDUCATION/TRAINING PROGRAM

## 2021-11-05 LAB — SARS-COV-2 RNA RESP QL NAA+PROBE: NEGATIVE

## 2021-11-07 ENCOUNTER — NURSE TRIAGE (OUTPATIENT)
Dept: OTHER | Facility: OTHER | Age: 21
End: 2021-11-07

## 2022-01-04 ENCOUNTER — TELEMEDICINE (OUTPATIENT)
Dept: FAMILY MEDICINE CLINIC | Facility: CLINIC | Age: 22
End: 2022-01-04
Payer: COMMERCIAL

## 2022-01-04 DIAGNOSIS — B34.9 VIRAL INFECTION, UNSPECIFIED: ICD-10-CM

## 2022-01-04 DIAGNOSIS — Z20.822 EXPOSURE TO COVID-19 VIRUS: ICD-10-CM

## 2022-01-04 PROCEDURE — 99213 OFFICE O/P EST LOW 20 MIN: CPT | Performed by: FAMILY MEDICINE

## 2022-01-04 PROCEDURE — 87636 SARSCOV2 & INF A&B AMP PRB: CPT | Performed by: FAMILY MEDICINE

## 2022-01-04 NOTE — PROGRESS NOTES
COVID-19 Outpatient Progress Note    Assessment/Plan:    Problem List Items Addressed This Visit     None      Visit Diagnoses     Exposure to COVID-19 virus        Relevant Orders    COVID/FLU-Office Collect    Viral infection, unspecified        Relevant Orders    COVID/FLU-Office Collect         Disposition:     Recommended patient to come to the office to test for COVID-19/Influenza  I have spent 7 minutes directly with the patient  Encounter provider River Sutton DO    Provider located at [facility] [address] [facility]  [address]  [phone]    Recent Visits  No visits were found meeting these conditions  Showing recent visits within past 7 days and meeting all other requirements  Today's Visits  Date Type Provider Dept   01/04/22 Telemedicine River Sutton DO [facility] [address] [facility]   Showing today's visits and meeting all other requirements  Future Appointments  No visits were found meeting these conditions  Showing future appointments within next 150 days and meeting all other requirements     This virtual check-in was done via [employer] and patient was informed that this is a secure, HIPAA-compliant platform  She agrees to proceed  Patient agrees to participate in a virtual check in via telephone or video visit instead of presenting to the office to address urgent/immediate medical needs  Patient is aware this is a billable service  After connecting through [facility], the patient was identified by name and date of birth  Diane Jessica was informed that this was a telemedicine visit and that the exam was being conducted confidentially over secure lines  My office door was closed  No one else was in the room  Diane Jessica acknowledged consent and understanding of privacy and security of the telemedicine visit   I informed the patient that I have reviewed her record in Epic and presented the opportunity for her to ask any questions regarding the visit today  The patient agreed to participate  Verification of patient location:  Patient is located in the following state in which I hold an active license: [place]    Subjective:   Sam Ndiaye is a 22 y o  female who is concerned about COVID-19  Patient's symptoms include chills, fatigue, nasal congestion, sore throat, anosmia, loss of taste, cough, shortness of breath, myalgias and headache  Patient denies fever, malaise, rhinorrhea, chest tightness, abdominal pain, nausea, vomiting and diarrhea  Date of symptom onset: 12/31/2021  Date of exposure: 12/29/2021  COVID-19 vaccination status: Fully vaccinated (primary series)    Exposure:   Contact with a person who is under investigation (PUI) for or who is positive for COVID-19 within the last 14 days?: Yes    Hospitalized recently for fever and/or lower respiratory symptoms?: No      Currently a healthcare worker that is involved in direct patient care?: No      Works in a special setting where the risk of COVID-19 transmission may be high? (this may include long-term care, correctional and long-term facilities; homeless shelters; assisted-living facilities and group homes ): No      Resident in a special setting where the risk of COVID-19 transmission may be high? (this may include long-term care, correctional and long-term facilities; homeless shelters; assisted-living facilities and group homes ): No      Lab Results   Component Value Date    SARSCOV2 Negative 11/04/2021    [address] [facility] [facility] [address] [facility] Not Detected 11/08/2021     Past Medical History:   Diagnosis Date    Asthma     Ear infection      History reviewed  No pertinent surgical history    Current Outpatient Medications   Medication Sig Dispense Refill    montelukast (Singulair) 10 mg tablet Take 10 mg by mouth daily at bedtime      diphenhydrAMINE (BENADRYL) 25 mg capsule Take 1 capsule (25 mg total) by mouth every 6 (six) hours as needed for itching (Patient not taking: Reported on 1/4/2022 ) 20 capsule 0    fluticasone (FLONASE) 50 mcg/act nasal spray 1 spray into each nostril daily      ibuprofen (MOTRIN) 400 mg tablet Take 1 tablet (400 mg total) by mouth every 6 (six) hours as needed for mild pain (Patient not taking: Reported on 1/4/2022 ) 30 tablet 0    naproxen (NAPROSYN) 500 mg tablet Take 1 tablet by mouth every 12 (twelve) hours as needed for mild pain or moderate pain (Patient not taking: Reported on 1/4/2022 ) 20 tablet 0    ondansetron (ZOFRAN-ODT) 4 mg disintegrating tablet Take 1 tablet by mouth every 6 (six) hours as needed for nausea or vomiting (Patient not taking: Reported on 1/4/2022 ) 14 tablet 0     No current facility-administered medications for this visit  Allergies   Allergen Reactions    Pollen Extract     Shrimp Extract Allergy Skin Test - Food Allergy Hives       Review of Systems   Constitutional: Positive for chills and fatigue  Negative for fever  HENT: Positive for congestion and sore throat  Negative for rhinorrhea  Respiratory: Positive for cough and shortness of breath  Negative for chest tightness  Gastrointestinal: Negative for abdominal pain, diarrhea, nausea and vomiting  Musculoskeletal: Positive for myalgias  Neurological: Positive for headaches  Objective: There were no vitals filed for this visit  Physical Exam  Vitals reviewed  Constitutional:       General: She is not in acute distress  Appearance: Normal appearance  She is obese  She is ill-appearing  HENT:      Head: Normocephalic and atraumatic  Right Ear: External ear normal       Left Ear: External ear normal       Nose: Congestion present  Eyes:      Extraocular Movements: Extraocular movements intact  Conjunctiva/sclera: Conjunctivae normal    Pulmonary:      Effort: Pulmonary effort is normal  No respiratory distress  Neurological:      Mental Status: She is alert   Mental status is at baseline  VIRTUAL VISIT [facility] verbally agrees to participate in [employer]  Pt is aware that [employer] could be limited without vital signs or the ability to perform a full hands-on physical Cy Needham understands she or the provider may request at any time to terminate the video visit and request the patient to seek care or treatment in person      Stefan Andrade DO  [facility]  1/4/2022 10:47 AM

## 2022-01-08 LAB
FLUAV RNA RESP QL NAA+PROBE: NEGATIVE
FLUBV RNA RESP QL NAA+PROBE: NEGATIVE
SARS-COV-2 RNA RESP QL NAA+PROBE: POSITIVE

## 2022-03-10 ENCOUNTER — TELEMEDICINE (OUTPATIENT)
Dept: FAMILY MEDICINE CLINIC | Facility: CLINIC | Age: 22
End: 2022-03-10
Payer: COMMERCIAL

## 2022-03-10 VITALS
WEIGHT: 250 LBS | HEART RATE: 100 BPM | DIASTOLIC BLOOD PRESSURE: 70 MMHG | TEMPERATURE: 101.3 F | BODY MASS INDEX: 44.3 KG/M2 | SYSTOLIC BLOOD PRESSURE: 122 MMHG | HEIGHT: 63 IN | OXYGEN SATURATION: 98 %

## 2022-03-10 DIAGNOSIS — J02.9 SORE THROAT: ICD-10-CM

## 2022-03-10 DIAGNOSIS — B34.9 VIRAL INFECTION, UNSPECIFIED: Primary | ICD-10-CM

## 2022-03-10 LAB — S PYO AG THROAT QL: NEGATIVE

## 2022-03-10 PROCEDURE — 87880 STREP A ASSAY W/OPTIC: CPT | Performed by: FAMILY MEDICINE

## 2022-03-10 PROCEDURE — 99214 OFFICE O/P EST MOD 30 MIN: CPT | Performed by: FAMILY MEDICINE

## 2022-03-10 PROCEDURE — 87070 CULTURE OTHR SPECIMN AEROBIC: CPT | Performed by: FAMILY MEDICINE

## 2022-03-10 NOTE — PROGRESS NOTES
COVID-19 Outpatient Progress Note    Assessment/Plan:    Problem List Items Addressed This Visit     None      Visit Diagnoses     Viral infection, unspecified    -  Primary    Sore throat        Relevant Orders    POCT rapid strepA (Completed)    Throat culture         Disposition:     COVID 64 days ago  Rapid strep negative, will send throat culture  Centor score of 2  Discussed OTC symptom management  I have spent 15 minutes directly with the patient  Encounter provider Ariadne Vora DO    Provider located at [facility] [address] [facility]  [address] [address] [facility]  [phone]    Recent Visits  No visits were found meeting these conditions  Showing recent visits within past 7 days and meeting all other requirements  Today's Visits  Date Type Provider Dept   03/10/22 Telemedicine Ariadne Vora DO [facility] [address] [facility]   Showing today's visits and meeting all other requirements  Future Appointments  No visits were found meeting these conditions  Showing future appointments within next 150 days and meeting all other requirements     My office door was closed  No one else was in the room  Sam Ndiaye acknowledged consent and understanding of privacy and security of the telemedicine visit  I informed the patient that I have reviewed her record in Epic and presented the opportunity for her to ask any questions regarding the visit today  The patient agreed to participate  Subjective:   Sam Ndiaye is a 22 y o  female who is concerned about COVID-19  Patient's symptoms include fever (101 3 in office), fatigue, sore throat, cough, shortness of breath and myalgias  Patient denies chills, malaise, congestion, rhinorrhea, anosmia, loss of taste, chest tightness, abdominal pain, nausea, vomiting, diarrhea and headaches       - Date of symptom onset: 3/9/2022      COVID-19 vaccination status: Fully vaccinated (primary series)    Exposure:   Contact with a person who is under investigation (PUI) for or who is positive for COVID-19 within the last 14 days?: Yes    Hospitalized recently for fever and/or lower respiratory symptoms?: No      Currently a healthcare worker that is involved in direct patient care?: No      Works in a special setting where the risk of COVID-19 transmission may be high? (this may include long-term care, correctional and correction facilities; homeless shelters; assisted-living facilities and group homes ): No      Resident in a special setting where the risk of COVID-19 transmission may be high? (this may include long-term care, correctional and correction facilities; homeless shelters; assisted-living facilities and group homes ): No      Boyfriend sick, started about 1 week ago  Was not COVID tested  Patient has used [facility] but nothing else  Lab Results   Component Value Date    SARSCOV2 Positive (A) 01/04/2022    [address] [facility] [address] [facility] Not Detected 11/08/2021     Past Medical History:   Diagnosis Date    Asthma     Ear infection      History reviewed  No pertinent surgical history    Current Outpatient Medications   Medication Sig Dispense Refill    fluticasone (FLONASE) 50 mcg/act nasal spray 1 spray into each nostril daily      montelukast (Singulair) 10 mg tablet Take 10 mg by mouth daily at bedtime      diphenhydrAMINE (BENADRYL) 25 mg capsule Take 1 capsule (25 mg total) by mouth every 6 (six) hours as needed for itching (Patient not taking: Reported on 1/4/2022 ) 20 capsule 0    ibuprofen (MOTRIN) 400 mg tablet Take 1 tablet (400 mg total) by mouth every 6 (six) hours as needed for mild pain (Patient not taking: Reported on 1/4/2022 ) 30 tablet 0    naproxen (NAPROSYN) 500 mg tablet Take 1 tablet by mouth every 12 (twelve) hours as needed for mild pain or moderate pain (Patient not taking: Reported on 1/4/2022 ) 20 tablet 0    ondansetron (ZOFRAN-ODT) 4 mg disintegrating tablet Take 1 tablet by mouth every 6 (six) hours as needed for nausea or vomiting (Patient not taking: Reported on 1/4/2022 ) 14 tablet 0     No current facility-administered medications for this visit  Allergies   Allergen Reactions    Pollen Extract     Shrimp Extract Allergy Skin Test - Food Allergy Hives     Review of Systems   Constitutional: Positive for fatigue and fever (101 3 in office)  Negative for chills  HENT: Positive for sore throat  Negative for congestion, ear pain, rhinorrhea, sinus pressure and sinus pain  Respiratory: Positive for cough and shortness of breath  Negative for chest tightness  Gastrointestinal: Negative for abdominal pain, diarrhea, nausea and vomiting  Musculoskeletal: Positive for myalgias  Neurological: Negative for headaches  Objective:  Vitals:    03/10/22 1051   BP: 122/70   BP Location: Left arm   Patient Position: Sitting   Cuff Size: Large   Pulse: 100   Temp: (!) 101 3 °F (38 5 °C)   SpO2: 98%   Weight: 113 kg (250 lb)   Height: 5' 3" (1 6 m)     Physical Exam  Vitals reviewed  Constitutional:       General: She is not in acute distress  Appearance: Normal appearance  HENT:      Head: Normocephalic and atraumatic  Right Ear: External ear normal       Left Ear: External ear normal       Nose: Nose normal       Mouth/Throat:      Mouth: Mucous membranes are moist       Tonsils: No tonsillar exudate  Eyes:      Extraocular Movements: Extraocular movements intact  Conjunctiva/sclera: Conjunctivae normal       Pupils: Pupils are equal, round, and reactive to light  Cardiovascular:      Rate and Rhythm: Normal rate and regular rhythm  Heart sounds: Normal heart sounds  Pulmonary:      Effort: Pulmonary effort is normal       Breath sounds: Normal breath sounds  Abdominal:      General: Bowel sounds are normal  There is no distension  Palpations: Abdomen is soft  Tenderness: There is no abdominal tenderness     Musculoskeletal: Cervical back: Neck supple  Right lower leg: No edema  Left lower leg: No edema  Lymphadenopathy:      Cervical: Cervical adenopathy present  Skin:     General: Skin is warm  Capillary Refill: Capillary refill takes less than 2 seconds  Findings: No rash  Neurological:      Mental Status: She is alert  Mental status is at baseline         Berny Quinones DO  [facility]  3/10/2022 3:19 PM

## 2022-03-10 NOTE — PATIENT INSTRUCTIONS
Cold Symptoms   AMBULATORY CARE:   Cold symptoms  include sneezing, dry throat, a stuffy nose, headache, watery eyes, and a cough  Your cough may be dry, or you may cough up mucus  You may also have muscle aches, joint pain, and tiredness  Rarely, you may have a fever  Cold symptoms occur from inflammation in your upper respiratory system caused by a virus  Most colds go away without treatment  Seek care immediately if:   · You have increased tiredness and weakness  · You are unable to eat  · Your heart is beating much faster than usual for you  · You see white spots in the back of your throat and your neck is swollen and sore to the touch  · You see pinpoint or larger reddish-purple dots on your skin  Contact your healthcare provider if:   · You have a fever higher than 102°F (38 9°C)  · You have new or worsening shortness of breath  · You have thick nasal drainage for more than 2 days  · Your symptoms do not improve or get worse within 5 days  · You have questions or concerns about your condition or care  Treatment for cold symptoms  may include NSAIDS to decrease muscle aches and fever  Cold medicines may also be given to decrease coughing, nasal stuffiness, sneezing, and a runny nose  Manage your cold symptoms: The following may help relieve cold symptoms, such as a dry throat and congestion:  · Gargle with mouthwash or warm salt water as directed  · Suck on throat lozenges or hard candy  · Use a cold or warm vaporizer or humidifier to ease your breathing  · Rest for at least 2 days and then as needed to decrease tiredness and weakness  · Use petroleum based jelly around your nostrils to decrease irritation from blowing your nose  · Drink plenty of liquids  Liquids will help thin and loosen thick mucus so you can cough it up  Liquids will also keep you hydrated   Ask your healthcare provider which liquids are best for you and how much to drink each day     Prevent the spread of germs  by washing your hands often  You can spread your cold germs to others for at least 3 days after your symptoms start  Do not share items, such as eating utensils  Cover your nose and mouth when you cough or sneeze using the crook of your elbow instead of your hands  Throw used tissues in the garbage  Do not smoke:  Smoking may worsen your symptoms and increase the length of time you feel sick  Talk with your healthcare provider if you need help to stop smoking  Follow up with your doctor as directed:  Write down your questions so you remember to ask them during your visits  © Copyright [employer] 2022 Information is for End User's use only and may not be sold, redistributed or otherwise used for commercial purposes  All illustrations and images included in CareNotes® are the copyrighted property of [employer]  or Jackie Boston  The above information is an  only  It is not intended as medical advice for individual conditions or treatments  Talk to your doctor, nurse or pharmacist before following any medical regimen to see if it is safe and effective for you

## 2022-03-12 LAB — BACTERIA THROAT CULT: NORMAL

## 2022-03-15 ENCOUNTER — OFFICE VISIT (OUTPATIENT)
Dept: FAMILY MEDICINE CLINIC | Facility: CLINIC | Age: 22
End: 2022-03-15
Payer: COMMERCIAL

## 2022-03-15 VITALS
OXYGEN SATURATION: 98 % | BODY MASS INDEX: 45 KG/M2 | TEMPERATURE: 100.4 F | HEART RATE: 100 BPM | DIASTOLIC BLOOD PRESSURE: 86 MMHG | HEIGHT: 63 IN | WEIGHT: 254 LBS | SYSTOLIC BLOOD PRESSURE: 118 MMHG

## 2022-03-15 DIAGNOSIS — J06.9 UPPER RESPIRATORY TRACT INFECTION, UNSPECIFIED TYPE: Primary | ICD-10-CM

## 2022-03-15 PROCEDURE — 99213 OFFICE O/P EST LOW 20 MIN: CPT | Performed by: FAMILY MEDICINE

## 2022-03-15 RX ORDER — AZITHROMYCIN 250 MG/1
TABLET, FILM COATED ORAL
Qty: 6 TABLET | Refills: 0 | Status: SHIPPED | OUTPATIENT
Start: 2022-03-15 | End: 2022-03-19

## 2022-03-15 RX ORDER — DEXTROMETHORPHAN HYDROBROMIDE AND PROMETHAZINE HYDROCHLORIDE 15; 6.25 MG/5ML; MG/5ML
5 SOLUTION ORAL 4 TIMES DAILY PRN
Qty: 180 ML | Refills: 0 | Status: SHIPPED | OUTPATIENT
Start: 2022-03-15

## 2022-03-15 NOTE — PROGRESS NOTES
COVID-19 Outpatient Progress Note    Assessment/Plan:    Problem List Items Addressed This Visit     None      Visit Diagnoses     Upper respiratory tract infection, unspecified type    -  Primary    Relevant Medications    Promethazine-DM (PHENERGAN-DM) 6 25-15 mg/5 mL oral syrup    azithromycin (ZITHROMAX) 250 mg tablet         Disposition:     I have spent 12 minutes directly with the patient  Encounter provider Tiffany Alexandre DO    [facility] [address] [place] [address]  [address]  [phone]    Recent Visits  Date Type Provider Dept   03/10/22 Telemedicine Tiffany Alexandre DO [facility] [address]   Showing recent visits within past 7 days and meeting all other requirements  Today's Visits  Date Type Provider Dept   03/15/22 Office Visit Tiffany Alexandre DO [facility] [address]   Showing today's visits and meeting all other requirements  Future Appointments  No visits were found meeting these conditions  Showing future appointments within next 150 days and meeting all other requirements     Subjective:   Malorie Esparza is a 22 y o  female who has been screened for COVID-19  Symptom change since last report: unchanged  Patient's symptoms include fever, fatigue, nasal congestion, rhinorrhea, sore throat, cough (coughing up sputum) and headache  Patient denies chills, malaise, anosmia, loss of taste, shortness of breath, chest tightness, abdominal pain, nausea, vomiting, diarrhea and myalgias  - Date of symptom onset: 3/9/2022      COVID-19 vaccination status: Fully vaccinated (primary series)    Amarilis Walter has been staying home and has isolated themselves in her home  She is taking care to not share personal items and is cleaning all surfaces that are touched often, like counters, tabletops, and doorknobs using household cleaning sprays or wipes   She is wearing a mask when she leaves her room  Using Tylenol and medicated cough drops  Lab Results   Component Value Date    SARSCOV2 Positive (A) 01/04/2022    [address] [facility] Not Detected 11/08/2021     Past Medical History:   Diagnosis Date    Asthma     Ear infection      History reviewed  No pertinent surgical history  Current Outpatient Medications   Medication Sig Dispense Refill    diphenhydrAMINE (BENADRYL) 25 mg capsule Take 1 capsule (25 mg total) by mouth every 6 (six) hours as needed for itching 20 capsule 0    fluticasone (FLONASE) 50 mcg/act nasal spray 1 spray into each nostril daily      ibuprofen (MOTRIN) 400 mg tablet Take 1 tablet (400 mg total) by mouth every 6 (six) hours as needed for mild pain 30 tablet 0    montelukast (Singulair) 10 mg tablet Take 10 mg by mouth daily at bedtime      naproxen (NAPROSYN) 500 mg tablet Take 1 tablet by mouth every 12 (twelve) hours as needed for mild pain or moderate pain 20 tablet 0    azithromycin (ZITHROMAX) 250 mg tablet Take 2 tablets today then 1 tablet daily x 4 days 6 tablet 0    ondansetron (ZOFRAN-ODT) 4 mg disintegrating tablet Take 1 tablet by mouth every 6 (six) hours as needed for nausea or vomiting (Patient not taking: Reported on 1/4/2022 ) 14 tablet 0    Promethazine-DM (PHENERGAN-DM) 6 25-15 mg/5 mL oral syrup Take 5 mL by mouth 4 (four) times a day as needed for cough 180 mL 0     No current facility-administered medications for this visit  Allergies   Allergen Reactions    Pollen Extract     Shrimp Extract Allergy Skin Test - Food Allergy Hives     Review of Systems   Constitutional: Positive for fatigue and fever  Negative for chills  HENT: Positive for congestion, postnasal drip, rhinorrhea and sore throat  Respiratory: Positive for cough (coughing up sputum)  Negative for chest tightness and shortness of breath  Gastrointestinal: Negative for abdominal pain, diarrhea, nausea and vomiting     Musculoskeletal: Negative for myalgias  Neurological: Positive for headaches  Objective:    Vitals:    03/15/22 0856   BP: 118/86   BP Location: Left arm   Patient Position: Sitting   Cuff Size: Large   Pulse: 100   Temp: 100 4 °F (38 °C)   SpO2: 98%   Weight: 115 kg (254 lb)   Height: 5' 3" (1 6 m)     Physical Exam  Vitals reviewed  Constitutional:       General: She is not in acute distress  Appearance: Normal appearance  She is obese  HENT:      Head: Normocephalic and atraumatic  Right Ear: Tympanic membrane, ear canal and external ear normal       Left Ear: Tympanic membrane, ear canal and external ear normal       Nose: Congestion present  Mouth/Throat:      Mouth: Mucous membranes are moist       Pharynx: No oropharyngeal exudate  Comments: Visible PND  Eyes:      Extraocular Movements: Extraocular movements intact  Conjunctiva/sclera: Conjunctivae normal       Pupils: Pupils are equal, round, and reactive to light  Cardiovascular:      Rate and Rhythm: Normal rate and regular rhythm  Heart sounds: Normal heart sounds  Pulmonary:      Effort: Pulmonary effort is normal       Breath sounds: Normal breath sounds  Abdominal:      General: Bowel sounds are normal  There is no distension  Palpations: Abdomen is soft  Tenderness: There is no abdominal tenderness  Musculoskeletal:      Cervical back: Neck supple  Right lower leg: No edema  Left lower leg: No edema  Lymphadenopathy:      Cervical: No cervical adenopathy  Skin:     General: Skin is warm  Capillary Refill: Capillary refill takes less than 2 seconds  Findings: No rash  Neurological:      Mental Status: She is alert  Mental status is at baseline           Christiano Woods DO  [facility]  3/15/2022 10:27 AM

## 2022-03-16 ENCOUNTER — TELEPHONE (OUTPATIENT)
Dept: FAMILY MEDICINE CLINIC | Facility: CLINIC | Age: 22
End: 2022-03-16

## 2022-03-16 NOTE — TELEPHONE ENCOUNTER
Ok to write note      Cynthia Washburn DO  [facility]  3/16/2022 4:35 PM

## 2022-03-16 NOTE — TELEPHONE ENCOUNTER
Patient called stating still with fever and asking for a work note to be out tomorrow, return on Friday, 3/18/22

## 2022-08-02 ENCOUNTER — APPOINTMENT (EMERGENCY)
Dept: RADIOLOGY | Facility: HOSPITAL | Age: 22
End: 2022-08-02
Payer: COMMERCIAL

## 2022-08-02 ENCOUNTER — HOSPITAL ENCOUNTER (EMERGENCY)
Facility: HOSPITAL | Age: 22
Discharge: HOME | End: 2022-08-02
Attending: EMERGENCY MEDICINE
Payer: COMMERCIAL

## 2022-08-02 VITALS
RESPIRATION RATE: 20 BRPM | SYSTOLIC BLOOD PRESSURE: 158 MMHG | TEMPERATURE: 99.2 F | OXYGEN SATURATION: 96 % | HEART RATE: 99 BPM | DIASTOLIC BLOOD PRESSURE: 100 MMHG

## 2022-08-02 DIAGNOSIS — S39.012A LOW BACK STRAIN, INITIAL ENCOUNTER: Primary | ICD-10-CM

## 2022-08-02 LAB
EXT PREG TEST URINE: NEGATIVE
EXT. CONTROL ED NAV: NORMAL

## 2022-08-02 PROCEDURE — 72100 X-RAY EXAM L-S SPINE 2/3 VWS: CPT

## 2022-08-02 PROCEDURE — 99283 EMERGENCY DEPT VISIT LOW MDM: CPT

## 2022-08-02 PROCEDURE — 96372 THER/PROPH/DIAG INJ SC/IM: CPT

## 2022-08-02 PROCEDURE — 81025 URINE PREGNANCY TEST: CPT | Performed by: PHYSICIAN ASSISTANT

## 2022-08-02 PROCEDURE — 99284 EMERGENCY DEPT VISIT MOD MDM: CPT | Performed by: PHYSICIAN ASSISTANT

## 2022-08-02 RX ORDER — KETOROLAC TROMETHAMINE 30 MG/ML
30 INJECTION, SOLUTION INTRAMUSCULAR; INTRAVENOUS ONCE
Status: COMPLETED | OUTPATIENT
Start: 2022-08-02 | End: 2022-08-02

## 2022-08-02 RX ORDER — LIDOCAINE 50 MG/G
2 PATCH TOPICAL ONCE
Status: DISCONTINUED | OUTPATIENT
Start: 2022-08-02 | End: 2022-08-02 | Stop reason: HOSPADM

## 2022-08-02 RX ORDER — DIAZEPAM 5 MG/ML
5 INJECTION, SOLUTION INTRAMUSCULAR; INTRAVENOUS ONCE
Status: COMPLETED | OUTPATIENT
Start: 2022-08-02 | End: 2022-08-02

## 2022-08-02 RX ORDER — ACETAMINOPHEN 325 MG/1
975 TABLET ORAL ONCE
Status: COMPLETED | OUTPATIENT
Start: 2022-08-02 | End: 2022-08-02

## 2022-08-02 RX ORDER — METHOCARBAMOL 500 MG/1
500 TABLET, FILM COATED ORAL 3 TIMES DAILY PRN
Qty: 20 TABLET | Refills: 0 | Status: SHIPPED | OUTPATIENT
Start: 2022-08-02

## 2022-08-02 RX ADMIN — LIDOCAINE 5% 2 PATCH: 700 PATCH TOPICAL at 15:06

## 2022-08-02 RX ADMIN — DIAZEPAM 5 MG: 10 INJECTION, SOLUTION INTRAMUSCULAR; INTRAVENOUS at 15:07

## 2022-08-02 RX ADMIN — KETOROLAC TROMETHAMINE 30 MG: 30 INJECTION, SOLUTION INTRAMUSCULAR at 15:06

## 2022-08-02 RX ADMIN — ACETAMINOPHEN 975 MG: 325 TABLET ORAL at 15:07

## 2022-08-02 NOTE — Clinical Note
Malorie Esparza was seen and treated in our emergency department on 8/2/2022  No restrictions            Diagnosis: Low back strain    [place]  may return to work on return date  She may return on this date: 08/05/2022         If you have any questions or concerns, please don't hesitate to call        Cole Salamanca PA-C    ______________________________           _______________          _______________  Hospital Representative                              Date                                Time

## 2022-08-02 NOTE — DISCHARGE INSTRUCTIONS
Take Tylenol 650mg and ibuprofen 600mg every 6 hours as needed for pain  Use lidocaine patches daily (12 hours on, 12 hours off)  Apply heating pad to affected area  Take muscle relaxer as needed for spasms      Please follow-up with your family doctor if symptoms persist

## 2022-08-02 NOTE — ED PROVIDER NOTES
History  Chief Complaint   Patient presents with    Back Pain     Patient states"she was sitting putting together a tv stand when she felt like she was sitting too long and now she is having lower back pain"  No fall or injury  20yo female with a history of obesity and asthma presenting with her mother for evaluation of back pain x 1 day  She was putting together furniture yesterday and admits to lifting heavy objects  Her pain started shortly after this  Patient has pain throughout her low back which is nonradiating  She describes her pain as severe  She took 800 mg of ibuprofen about 8 hours ago without much improvement  The patient is otherwise asymptomatic and denies any fevers, abdominal pain, dysuria, incontinence, saddle anesthesia  No pain, weakness, or paresthesias of the lower extremities  No prior history of back injuries or surgeries  History provided by:  Patient and parent   used: No    Back Pain  Location:  Lumbar spine  Quality:  Aching  Radiates to:  Does not radiate  Pain severity:  Severe  Onset quality:  Gradual  Duration:  1 day  Timing:  Constant  Progression:  Worsening  Chronicity:  New  Relieved by:  Nothing  Worsened by:  Bending and movement  Ineffective treatments:  NSAIDs  Associated symptoms: no abdominal pain, no bladder incontinence, no bowel incontinence, no dysuria, no fever, no leg pain, no numbness, no paresthesias, no perianal numbness, no tingling and no weakness    Risk factors: obesity        Prior to Admission Medications   Prescriptions Last Dose Informant Patient Reported? Taking?    Promethazine-DM (PHENERGAN-DM) 6 25-15 mg/5 mL oral syrup   No No   Sig: Take 5 mL by mouth 4 (four) times a day as needed for cough   diphenhydrAMINE (BENADRYL) 25 mg capsule   No No   Sig: Take 1 capsule (25 mg total) by mouth every 6 (six) hours as needed for itching   fluticasone (FLONASE) 50 mcg/act nasal spray  Self Yes No   Si spray into each nostril daily   ibuprofen (MOTRIN) 400 mg tablet   No No   Sig: Take 1 tablet (400 mg total) by mouth every 6 (six) hours as needed for mild pain   montelukast (Singulair) 10 mg tablet  Self Yes No   Sig: Take 10 mg by mouth daily at bedtime   naproxen (NAPROSYN) 500 mg tablet   No No   Sig: Take 1 tablet by mouth every 12 (twelve) hours as needed for mild pain or moderate pain   ondansetron (ZOFRAN-ODT) 4 mg disintegrating tablet   No No   Sig: Take 1 tablet by mouth every 6 (six) hours as needed for nausea or vomiting   Patient not taking: Reported on 1/4/2022       Facility-Administered Medications: None       Past Medical History:   Diagnosis Date    Asthma     Ear infection        History reviewed  No pertinent surgical history  History reviewed  No pertinent family history  I have reviewed and agree with the history as documented  E-Cigarette/Vaping    E-Cigarette Use Never User      E-Cigarette/Vaping Substances    Nicotine No     THC No     CBD No     Flavoring No     Other No     Unknown No      Social History     Tobacco Use    Smoking status: Never Smoker    Smokeless tobacco: Never Used   Vaping Use    Vaping Use: Never used   Substance Use Topics    Alcohol use: No    Drug use: No       Review of Systems   Constitutional: Negative for chills and fever  Eyes: Negative for discharge and redness  Gastrointestinal: Negative for abdominal distention, abdominal pain and bowel incontinence  Genitourinary: Negative for bladder incontinence, difficulty urinating and dysuria  Musculoskeletal: Positive for back pain  Negative for neck pain  Skin: Negative for color change and rash  Neurological: Negative for tingling, weakness, numbness and paresthesias  Psychiatric/Behavioral: Negative for confusion  The patient is not nervous/anxious  All other systems reviewed and are negative  Physical Exam  Physical Exam  Vitals and nursing note reviewed     Constitutional: Appearance: Normal appearance  She is not toxic-appearing  Comments: Patient crying in acute pain   HENT:      Head: Normocephalic and atraumatic  Right Ear: External ear normal       Left Ear: External ear normal    Eyes:      General: No scleral icterus  Right eye: No discharge  Left eye: No discharge  Conjunctiva/sclera: Conjunctivae normal    Cardiovascular:      Rate and Rhythm: Normal rate  Pulses:           Posterior tibial pulses are 2+ on the right side and 2+ on the left side  Pulmonary:      Effort: Pulmonary effort is normal  No respiratory distress  Breath sounds: No stridor  Abdominal:      General: There is no distension  Tenderness: There is no abdominal tenderness  There is no right CVA tenderness or left CVA tenderness  Musculoskeletal:         General: No deformity  Normal range of motion  Cervical back: Normal range of motion  Lumbar back: Tenderness present  No swelling, edema or deformity  Negative right straight leg raise test and negative left straight leg raise test       Comments: There is diffuse tenderness in the lumbar region  Majority of pain is in the paravertebral musculature but she does have midline tenderness as well  No step-offs or skin changes  Negative straight leg raise b/l  Skin:     General: Skin is warm and dry  Neurological:      General: No focal deficit present  Mental Status: She is alert  Mental status is at baseline  Psychiatric:         Mood and Affect: Mood is anxious           Behavior: Behavior normal          Vital Signs  ED Triage Vitals [08/02/22 1255]   Temperature Pulse Respirations Blood Pressure SpO2   99 2 °F (37 3 °C) 99 20 158/100 96 %      Temp src Heart Rate Source Patient Position - Orthostatic VS BP Location FiO2 (%)   -- -- -- -- --      Pain Score       --           Vitals:    08/02/22 1255   BP: 158/100   Pulse: 99         Visual Acuity      ED Medications  Medications ketorolac (TORADOL) injection 30 mg (30 mg Intramuscular Given 8/2/22 1506)   acetaminophen (TYLENOL) tablet 975 mg (975 mg Oral Given 8/2/22 1507)   diazepam (VALIUM) injection 5 mg (5 mg Intramuscular Given 8/2/22 1507)       Diagnostic Studies  Results Reviewed     Procedure Component Value Units Date/Time    POCT pregnancy, urine [[id number]]  (Normal) Resulted: 08/02/22 1506    Lab Status: Final result Updated: 08/02/22 1506     EXT PREG TEST UR (Ref: Negative) negative     Control valid                 XR lumbar spine 2 or 3 views   ED Interpretation by Mary Vargas PA-C (08/02 1529)   No acute fracture      Final Result by Elsi Mackenzie MD (08/03 0740)      No acute lumbar spinal abnormalities      Consistent with prior study         Workstation performed: [id number]                    Procedures  Procedures         ED Course                 SBIRT 20yo+    Flowsheet Row Most Recent Value   SBIRT (25 yo +)    In order to provide better care to our patients, we are screening all of our patients for alcohol and drug use  Would it be okay to ask you these screening questions? Yes Filed at: 08/02/2022 1507   Initial Alcohol Screen: US AUDIT-C     1  How often do you have a drink containing alcohol? 0 Filed at: 08/02/2022 1507   2  How many drinks containing alcohol do you have on a typical day you are drinking? 0 Filed at: 08/02/2022 1507   3a  Male UNDER 65: How often do you have five or more drinks on one occasion? 0 Filed at: 08/02/2022 1507   3b  FEMALE Any Age, or MALE 65+: How often do you have 4 or more drinks on one occassion? 0 Filed at: 08/02/2022 1507   Audit-C Score 0 Filed at: 08/02/2022 1507   DINAH: How many times in the past year have you    Used an illegal drug or used a prescription medication for non-medical reasons?  Never Filed at: 08/02/2022 1507                    MDM  Number of Diagnoses or Management Options  Low back strain, initial encounter: new and requires workup  Diagnosis management comments: 20yo female presenting for low back pain x 1 day after putting together furniture  Pain worse with movement  No red flags in history including no fevers, saddle anesthesia, incontinence  She is afebrile and hemodynamically stable  Patient is crying on exam  She has reproducible tenderness present  Lumbar x-rays obtained which appear normal  Presentation consistent with low back strain  She is feeling improved after receiving IM Valium, Toradol, Tylenol, and lidoderm patch  Supportive care discussed including ibuprofen, Tylenol, lidocaine patches, and heat  Script provided for Robaxin  Advised close PCP follow-up  ED return precautions discussed  Patient expressed understanding and is agreeable to plan  Patient discharged in stable condition  Amount and/or Complexity of Data Reviewed  Tests in the radiology section of CPT®: ordered and reviewed  Independent visualization of images, tracings, or specimens: yes    Risk of Complications, Morbidity, and/or Mortality  Presenting problems: low  Diagnostic procedures: low  Management options: low    Patient Progress  Patient progress: improved      Disposition  Final diagnoses:   Low back strain, initial encounter     Time reflects when diagnosis was documented in both MDM as applicable and the Disposition within this note     Time User Action Codes Description Comment    8/2/2022  3:44  [facility] Sangita Clark Low back strain, initial encounter       ED Disposition     ED Disposition   Discharge    Condition   Stable    Date/Time   Tue Aug 2, 2022  3:44 PM    Comment   Real Mcdonald discharge to home/self care                 Follow-up Information     Follow up With Specialties Details Why Contact Info Additional Information    Idris Winchester DO Family Medicine Schedule an appointment as soon as possible for a visit   [address]   [address] [address]  [address] [id number]       [address] [facility] Emergency Department Emergency Medicine  If symptoms worsen [facility] [address] [place] [address] Emergency Department, [address] [facility], [place], [place], [address]          Discharge Medication List as of 8/2/2022  3:45 PM      START taking these medications    Details   methocarbamol (ROBAXIN) 500 mg tablet Take 1 tablet (500 mg total) by mouth 3 (three) times a day as needed for muscle spasms, Starting Tue 8/2/2022, Normal         CONTINUE these medications which have NOT CHANGED    Details   diphenhydrAMINE (BENADRYL) 25 mg capsule Take 1 capsule (25 mg total) by mouth every 6 (six) hours as needed for itching, Starting Mon 11/19/2018, Print      fluticasone (FLONASE) 50 mcg/act nasal spray 1 spray into each nostril daily, Historical Med      ibuprofen (MOTRIN) 400 mg tablet Take 1 tablet (400 mg total) by mouth every 6 (six) hours as needed for mild pain, Starting Tue 4/16/2019, Print      montelukast (Singulair) 10 mg tablet Take 10 mg by mouth daily at bedtime, Historical Med      naproxen (NAPROSYN) 500 mg tablet Take 1 tablet by mouth every 12 (twelve) hours as needed for mild pain or moderate pain, Starting Sat 8/12/2017, Print      ondansetron (ZOFRAN-ODT) 4 mg disintegrating tablet Take 1 tablet by mouth every 6 (six) hours as needed for nausea or vomiting, Starting Sat 8/12/2017, Print      Promethazine-DM (PHENERGAN-DM) 6 25-15 mg/5 mL oral syrup Take 5 mL by mouth 4 (four) times a day as needed for cough, Starting Tue 3/15/2022, Normal             No discharge procedures on file      PDMP Review     None          ED Provider  Electronically Signed by           Castillo Deng PA-C  08/03/22 2051

## 2022-10-13 ENCOUNTER — VBI (OUTPATIENT)
Dept: ADMINISTRATIVE | Facility: OTHER | Age: 22
End: 2022-10-13

## 2022-12-10 ENCOUNTER — HOSPITAL ENCOUNTER (EMERGENCY)
Facility: HOSPITAL | Age: 22
Discharge: HOME | End: 2022-12-10
Attending: EMERGENCY MEDICINE

## 2022-12-10 ENCOUNTER — APPOINTMENT (EMERGENCY)
Dept: CT IMAGING | Facility: HOSPITAL | Age: 22
End: 2022-12-10

## 2022-12-10 ENCOUNTER — APPOINTMENT (EMERGENCY)
Dept: ULTRASOUND IMAGING | Facility: HOSPITAL | Age: 22
End: 2022-12-10

## 2022-12-10 VITALS
HEART RATE: 88 BPM | RESPIRATION RATE: 20 BRPM | SYSTOLIC BLOOD PRESSURE: 136 MMHG | TEMPERATURE: 97.2 F | DIASTOLIC BLOOD PRESSURE: 98 MMHG | OXYGEN SATURATION: 96 %

## 2022-12-10 DIAGNOSIS — N93.9 VAGINAL BLEEDING: Primary | ICD-10-CM

## 2022-12-10 DIAGNOSIS — R10.9 ABDOMINAL CRAMPS: ICD-10-CM

## 2022-12-10 LAB
ALBUMIN SERPL BCP-MCNC: 3.7 G/DL (ref 3.5–5)
ALP SERPL-CCNC: 78 U/L (ref 46–116)
ALT SERPL W P-5'-P-CCNC: 17 U/L (ref 12–78)
ANION GAP SERPL CALCULATED.3IONS-SCNC: 9 MMOL/L (ref 4–13)
AST SERPL W P-5'-P-CCNC: 15 U/L (ref 5–45)
BACTERIA UR QL AUTO: ABNORMAL /HPF
BASOPHILS # BLD AUTO: 0.04 THOUSANDS/ÂΜL (ref 0–0.1)
BASOPHILS NFR BLD AUTO: 0 % (ref 0–1)
BILIRUB SERPL-MCNC: 0.27 MG/DL (ref 0.2–1)
BILIRUB UR QL STRIP: NEGATIVE
BUN SERPL-MCNC: 14 MG/DL (ref 5–25)
CALCIUM SERPL-MCNC: 9 MG/DL (ref 8.3–10.1)
CAOX CRY URNS QL MICRO: ABNORMAL /HPF
CHLORIDE SERPL-SCNC: 104 MMOL/L (ref 96–108)
CLARITY UR: CLEAR
CO2 SERPL-SCNC: 26 MMOL/L (ref 21–32)
COLOR UR: YELLOW
CREAT SERPL-MCNC: 0.78 MG/DL (ref 0.6–1.3)
EOSINOPHIL # BLD AUTO: 0.3 THOUSAND/ÂΜL (ref 0–0.61)
EOSINOPHIL NFR BLD AUTO: 3 % (ref 0–6)
ERYTHROCYTE [DISTWIDTH] IN BLOOD BY AUTOMATED COUNT: 12.8 % (ref 11.6–15.1)
GFR SERPL CREATININE-BSD FRML MDRD: 109 ML/MIN/1.73SQ M
GLUCOSE SERPL-MCNC: 103 MG/DL (ref 65–140)
GLUCOSE UR STRIP-MCNC: NEGATIVE MG/DL
HCG SERPL QL: NEGATIVE
HCT VFR BLD AUTO: 41.2 % (ref 34.8–46.1)
HGB BLD-MCNC: 13.3 G/DL (ref 11.5–15.4)
HGB UR QL STRIP.AUTO: ABNORMAL
IMM GRANULOCYTES # BLD AUTO: 0.04 THOUSAND/UL (ref 0–0.2)
IMM GRANULOCYTES NFR BLD AUTO: 0 % (ref 0–2)
KETONES UR STRIP-MCNC: NEGATIVE MG/DL
LEUKOCYTE ESTERASE UR QL STRIP: NEGATIVE
LYMPHOCYTES # BLD AUTO: 1.87 THOUSANDS/ÂΜL (ref 0.6–4.47)
LYMPHOCYTES NFR BLD AUTO: 20 % (ref 14–44)
MCH RBC QN AUTO: 28.4 PG (ref 26.8–34.3)
MCHC RBC AUTO-ENTMCNC: 32.3 G/DL (ref 31.4–37.4)
MCV RBC AUTO: 88 FL (ref 82–98)
MONOCYTES # BLD AUTO: 0.49 THOUSAND/ÂΜL (ref 0.17–1.22)
MONOCYTES NFR BLD AUTO: 5 % (ref 4–12)
MUCOUS THREADS UR QL AUTO: ABNORMAL
NEUTROPHILS # BLD AUTO: 6.7 THOUSANDS/ÂΜL (ref 1.85–7.62)
NEUTS SEG NFR BLD AUTO: 72 % (ref 43–75)
NITRITE UR QL STRIP: NEGATIVE
NON-SQ EPI CELLS URNS QL MICRO: ABNORMAL /HPF
NRBC BLD AUTO-RTO: 0 /100 WBCS
PH UR STRIP.AUTO: 5.5 [PH]
PLATELET # BLD AUTO: 368 THOUSANDS/UL (ref 149–390)
PMV BLD AUTO: 9.7 FL (ref 8.9–12.7)
POTASSIUM SERPL-SCNC: 4 MMOL/L (ref 3.5–5.3)
PROT SERPL-MCNC: 8.1 G/DL (ref 6.4–8.4)
PROT UR STRIP-MCNC: ABNORMAL MG/DL
RBC # BLD AUTO: 4.68 MILLION/UL (ref 3.81–5.12)
RBC #/AREA URNS AUTO: ABNORMAL /HPF
SODIUM SERPL-SCNC: 139 MMOL/L (ref 135–147)
SP GR UR STRIP.AUTO: 1.03 (ref 1–1.03)
UROBILINOGEN UR STRIP-ACNC: <2 MG/DL
WBC # BLD AUTO: 9.44 THOUSAND/UL (ref 4.31–10.16)
WBC #/AREA URNS AUTO: ABNORMAL /HPF

## 2022-12-10 RX ORDER — KETOROLAC TROMETHAMINE 30 MG/ML
15 INJECTION, SOLUTION INTRAMUSCULAR; INTRAVENOUS ONCE
Status: COMPLETED | OUTPATIENT
Start: 2022-12-10 | End: 2022-12-10

## 2022-12-10 RX ADMIN — KETOROLAC TROMETHAMINE 15 MG: 30 INJECTION, SOLUTION INTRAMUSCULAR; INTRAVENOUS at 16:38

## 2022-12-10 RX ADMIN — IOHEXOL 100 ML: 350 INJECTION, SOLUTION INTRAVENOUS at 17:06

## 2022-12-10 NOTE — ED PROVIDER NOTES
History  Chief Complaint   Patient presents with   • Abdominal Cramping     Pt reports severe menstrual cramps and pain since last night  Patient is a male with a past medical history of asthma presenting to the emergency department for abdominal cramping  Reports last night she began having vaginal bleeding like her menstrual period  Reports noticing abdominal cramping today  Reports 2 weeks ago she did have her normal menstrual period  Reports she is sexually active and is not on birth control  Reports noticing darker blood today  Reports she has gone through 4 pads today, reporting heavier bleeding than normal   Reports this feels like her normal menstrual period cramps but worse  Ports taking Tylenol today for the pain but vomited 15 minutes later  Has history of abdominal surgeries  Denies history of pregnancy  Denies fevers, chills, rash, headache, weakness, dizziness, visual changes, nausea, diarrhea, constipation, chest pain, shortness of breath or difficulty breathing  Does not offer any other concerns or complaints        History provided by:  Patient, parent and significant other   used: No    Abdominal Cramping  Pain location:  Suprapubic  Pain quality: cramping    Duration:  1 day  Timing:  Intermittent  Progression:  Waxing and waning  Relieved by:  Nothing  Worsened by:  Nothing  Ineffective treatments:  None tried  Associated symptoms: vaginal bleeding and vomiting (resolved)    Associated symptoms: no anorexia, no belching, no chest pain, no chills, no constipation, no cough, no diarrhea, no dysuria, no fatigue, no fever, no flatus, no hematemesis, no hematochezia, no hematuria, no melena, no nausea, no shortness of breath, no sore throat and no vaginal discharge    Vaginal bleeding:     Quality:  Heavier than menses and dark red    Number of pads used:  4    Duration:  1 day    Progression:  Unchanged  Vomiting:     Quality:  Stomach contents    Progression: Resolved      Prior to Admission Medications   Prescriptions Last Dose Informant Patient Reported? Taking? Promethazine-DM (PHENERGAN-DM) 6 25-15 mg/5 mL oral syrup   No No   Sig: Take 5 mL by mouth 4 (four) times a day as needed for cough   diphenhydrAMINE (BENADRYL) 25 mg capsule   No No   Sig: Take 1 capsule (25 mg total) by mouth every 6 (six) hours as needed for itching   fluticasone (FLONASE) 50 mcg/act nasal spray   Yes No   Si spray into each nostril daily   ibuprofen (MOTRIN) 400 mg tablet   No No   Sig: Take 1 tablet (400 mg total) by mouth every 6 (six) hours as needed for mild pain   methocarbamol (ROBAXIN) 500 mg tablet   No No   Sig: Take 1 tablet (500 mg total) by mouth 3 (three) times a day as needed for muscle spasms   montelukast (Singulair) 10 mg tablet   Yes No   Sig: Take 10 mg by mouth daily at bedtime   naproxen (NAPROSYN) 500 mg tablet   No No   Sig: Take 1 tablet by mouth every 12 (twelve) hours as needed for mild pain or moderate pain   ondansetron (ZOFRAN-ODT) 4 mg disintegrating tablet   No No   Sig: Take 1 tablet by mouth every 6 (six) hours as needed for nausea or vomiting   Patient not taking: Reported on 2022       Facility-Administered Medications: None       Past Medical History:   Diagnosis Date   • Asthma    • Ear infection        History reviewed  No pertinent surgical history  History reviewed  No pertinent family history  I have reviewed and agree with the history as documented  E-Cigarette/Vaping   • E-Cigarette Use Never User      E-Cigarette/Vaping Substances   • Nicotine No    • THC No    • CBD No    • Flavoring No    • Other No    • Unknown No      Social History     Tobacco Use   • Smoking status: Never   • Smokeless tobacco: Never   Vaping Use   • Vaping Use: Never used   Substance Use Topics   • Alcohol use: No   • Drug use: No       Review of Systems   Constitutional: Negative for chills, fatigue and fever     HENT: Negative for ear pain and sore throat  Eyes: Negative for pain and visual disturbance  Respiratory: Negative for cough and shortness of breath  Cardiovascular: Negative for chest pain and palpitations  Gastrointestinal: Positive for abdominal pain and vomiting (resolved)  Negative for anorexia, blood in stool, constipation, diarrhea, flatus, hematemesis, hematochezia, melena and nausea  Genitourinary: Positive for vaginal bleeding  Negative for difficulty urinating, dyspareunia, dysuria, flank pain, frequency, hematuria, pelvic pain, urgency and vaginal discharge  Musculoskeletal: Negative for arthralgias and back pain  Skin: Negative for color change and rash  Neurological: Negative for seizures and syncope  All other systems reviewed and are negative  Physical Exam  Physical Exam  Vitals and nursing note reviewed  Constitutional:       General: She is not in acute distress  Appearance: Normal appearance  She is well-developed  She is not ill-appearing, toxic-appearing or diaphoretic  HENT:      Head: Normocephalic and atraumatic  Right Ear: External ear normal       Left Ear: External ear normal       Nose: Nose normal       Mouth/Throat:      Mouth: Mucous membranes are moist    Eyes:      General: No scleral icterus  Right eye: No discharge  Left eye: No discharge  Conjunctiva/sclera: Conjunctivae normal    Cardiovascular:      Rate and Rhythm: Normal rate and regular rhythm  Heart sounds: No murmur heard  Pulmonary:      Effort: Pulmonary effort is normal  No respiratory distress  Breath sounds: Normal breath sounds  Abdominal:      Palpations: Abdomen is soft  Tenderness: There is abdominal tenderness in the suprapubic area  Musculoskeletal:         General: No swelling, deformity or signs of injury  Normal range of motion  Cervical back: Normal range of motion and neck supple  No rigidity  Skin:     General: Skin is warm and dry        Capillary Refill: Capillary refill takes less than 2 seconds  Coloration: Skin is not jaundiced  Findings: No erythema or rash  Neurological:      General: No focal deficit present  Mental Status: She is alert and oriented to person, place, and time  Mental status is at baseline  Cranial Nerves: No cranial nerve deficit  Gait: Gait normal    Psychiatric:         Mood and Affect: Mood normal          Behavior: Behavior normal          Thought Content:  Thought content normal          Judgment: Judgment normal          Vital Signs  ED Triage Vitals   Temperature Pulse Respirations Blood Pressure SpO2   12/10/22 1443 12/10/22 1443 12/10/22 1443 12/10/22 1443 12/10/22 1443   (!) 97 2 °F (36 2 °C) 97 20 (!) 174/105 95 %      Temp Source Heart Rate Source Patient Position - Orthostatic VS BP Location FiO2 (%)   12/10/22 1443 12/10/22 1443 12/10/22 1443 12/10/22 1443 --   Temporal Monitor Sitting Left arm       Pain Score       12/10/22 1638       4           Vitals:    12/10/22 1443 12/10/22 1820   BP: (!) 174/105 136/98   Pulse: 97 88   Patient Position - Orthostatic VS: Sitting Sitting         Visual Acuity      ED Medications  Medications   ketorolac (TORADOL) injection 15 mg (15 mg Intravenous Given 12/10/22 1638)   iohexol (OMNIPAQUE) 350 MG/ML injection (MULTI-DOSE) 100 mL (100 mL Intravenous Given 12/10/22 1706)       Diagnostic Studies  Results Reviewed     Procedure Component Value Units Date/Time    hCG, qualitative pregnancy [[id number]]  (Normal) Collected: 12/10/22 1520    Lab Status: Final result Specimen: Blood from Arm, Right Updated: 12/10/22 1554     Preg, Serum Negative    Urine Microscopic [[id number]]  (Abnormal) Collected: 12/10/22 1522    Lab Status: Final result Specimen: Urine, Clean Catch Updated: 12/10/22 1554     RBC, UA Innumerable /hpf      WBC, UA 1-2 /hpf      Epithelial Cells Occasional /hpf      Bacteria, UA None Seen /hpf      MUCUS THREADS Occasional     Ca Oxalate Angela, UA Moderate /hpf     Comprehensive metabolic panel [[id number]] Collected: 12/10/22 1520    Lab Status: Final result Specimen: Blood from Arm, Right Updated: 12/10/22 1549     Sodium 139 mmol/L      Potassium 4 0 mmol/L      Chloride 104 mmol/L      CO2 26 mmol/L      ANION GAP 9 mmol/L      BUN 14 mg/dL      Creatinine 0 78 mg/dL      Glucose 103 mg/dL      Calcium 9 0 mg/dL      AST 15 U/L      ALT 17 U/L      Alkaline Phosphatase 78 U/L      Total Protein 8 1 g/dL      Albumin 3 7 g/dL      Total Bilirubin 0 27 mg/dL      eGFR 109 ml/min/1 73sq m     Narrative:      [place] guidelines for Chronic Kidney Disease (CKD):   •  Stage 1 with normal or high GFR (GFR > 90 mL/min/1 73 square meters)  •  Stage 2 Mild CKD (GFR = 60-89 mL/min/1 73 square meters)  •  Stage 3A Moderate CKD (GFR = 45-59 mL/min/1 73 square meters)  •  Stage 3B Moderate CKD (GFR = 30-44 mL/min/1 73 square meters)  •  Stage 4 Severe CKD (GFR = 15-29 mL/min/1 73 square meters)  •  Stage 5 End Stage CKD (GFR <15 mL/min/1 73 square meters)  Note: GFR calculation is accurate only with a steady state creatinine    UA w Reflex to Microscopic w Reflex to Culture [[id number]]  (Abnormal) Collected: 12/10/22 1522    Lab Status: Final result Specimen: Urine, Clean Catch Updated: 12/10/22 1544     Color, UA Yellow     Clarity, UA Clear     Specific Gravity, UA 1 031     pH, UA 5 5     Leukocytes, UA Negative     Nitrite, UA Negative     Protein, UA Trace mg/dl      Glucose, UA Negative mg/dl      Ketones, UA Negative mg/dl      Urobilinogen, UA <2 0 mg/dl      Bilirubin, UA Negative     Occult Blood, UA Large    CBC and differential [[id number]] Collected: 12/10/22 1520    Lab Status: Final result Specimen: Blood from Arm, Right Updated: 12/10/22 1527     WBC 9 44 Thousand/uL      RBC 4 68 Million/uL      Hemoglobin 13 3 g/dL      Hematocrit 41 2 %      MCV 88 fL      MCH 28 4 pg      MCHC 32 3 g/dL      RDW 12 8 %      MPV 9 7 fL Platelets 423 Thousands/uL      nRBC 0 /100 WBCs      Neutrophils Relative 72 %      Immat GRANS % 0 %      Lymphocytes Relative 20 %      Monocytes Relative 5 %      Eosinophils Relative 3 %      Basophils Relative 0 %      Neutrophils Absolute 6 70 Thousands/µL      Immature Grans Absolute 0 04 Thousand/uL      Lymphocytes Absolute 1 87 Thousands/µL      Monocytes Absolute 0 49 Thousand/µL      Eosinophils Absolute 0 30 Thousand/µL      Basophils Absolute 0 04 Thousands/µL                  CT abdomen pelvis with contrast   Final Result by Kathryn Adkins MD (12/10 1801)      No acute CT findings  Workstation performed: [id number]         US pelvis complete w transvaginal   Final Result by Samira Carpio MD (12/10 1424)       Normal                            Workstation performed: [id number]                    Procedures  Procedures         ED Course                       MDM  Number of Diagnoses or Management Options  Abdominal cramps: new and requires workup  Vaginal bleeding: new and requires workup  Diagnosis management comments: This is a 20-year-old female presenting to the emergency department for evaluation of suprapubic abdominal cramping  Reports having vaginal bleeding that began last night  Reports abdominal cramping this morning  Reports noticing heavier than typical menstrual bleeding  Reports noticing dark red blood throughout the day  Reports going through 4 pads today  Denies current abdominal cramping on initial evaluation  Differential diagnosis to include but is not limited to: IUP, ectopic pregnancy, spontaneous , menstrual cycle, mittelschmerz, nephrolithiasis, pyelonephritis, UTI    Initial ED Plan: CBC, CMP, qualitative pregnancy, UA, nonOB ultrasound, UA, CT abdomen pelvis    ED results: Normal US, no acute intraabdominal abnormality on CT scan  -reports her pain is alleviated after medication    Final ED assessment: Patient is stable and well appearing  Discussed radiologic studies and laboratory results  Discussed follow-up with PCP and OB/GYN  Discussed Tylenol and Motrin as needed for pain  Strict return precautions were discussed including but not limited to worse pain, vaginal bleeding, new symptoms, dizziness, weakness  Patient verbalized understanding and is agreeable with the plan for discharge  Amount and/or Complexity of Data Reviewed  Clinical lab tests: reviewed and ordered  Tests in the radiology section of CPT®: reviewed and ordered  Independent visualization of images, tracings, or specimens: yes        Disposition  Final diagnoses:   Vaginal bleeding   Abdominal cramps     Time reflects when diagnosis was documented in both MDM as applicable and the Disposition within this note     Time User Action Codes Description Comment    12/10/2022  6:52 PM Deepak Painting Add [N93 9] Vaginal bleeding     12/10/2022  6:52 PM Deepak Painting Add [R10 9] Abdominal cramps       ED Disposition     ED Disposition   Discharge    Condition   Stable    Date/Time   Sat Dec 10, 2022  6:52 PM    [address] discharge to home/self care                 Follow-up Information     Follow up With Specialties Details Why Contact Info Additional Information    Derek Anne DO Family Medicine Call in 3 days For follow up [address] [facility] Emergency Department Emergency Medicine Go to  If symptoms worsen [address] Emergency Department, [address] [facility] Call in 3 days For follow [place] [address] [id number] [phone] [facility] [address], [facility], [address] [place] [address]   [phone] Discharge Medication List as of 12/10/2022  6:53 PM      CONTINUE these medications which have NOT CHANGED    Details   diphenhydrAMINE (BENADRYL) 25 mg capsule Take 1 capsule (25 mg total) by mouth every 6 (six) hours as needed for itching, Starting Mon 11/19/2018, Print      fluticasone (FLONASE) 50 mcg/act nasal spray 1 spray into each nostril daily, Historical Med      ibuprofen (MOTRIN) 400 mg tablet Take 1 tablet (400 mg total) by mouth every 6 (six) hours as needed for mild pain, Starting Tue 4/16/2019, Print      methocarbamol (ROBAXIN) 500 mg tablet Take 1 tablet (500 mg total) by mouth 3 (three) times a day as needed for muscle spasms, Starting Tue 8/2/2022, Normal      montelukast (Singulair) 10 mg tablet Take 10 mg by mouth daily at bedtime, Historical Med      naproxen (NAPROSYN) 500 mg tablet Take 1 tablet by mouth every 12 (twelve) hours as needed for mild pain or moderate pain, Starting Sat 8/12/2017, Print      ondansetron (ZOFRAN-ODT) 4 mg disintegrating tablet Take 1 tablet by mouth every 6 (six) hours as needed for nausea or vomiting, Starting Sat 8/12/2017, Print      Promethazine-DM (PHENERGAN-DM) 6 25-15 mg/5 mL oral syrup Take 5 mL by mouth 4 (four) times a day as needed for cough, Starting Tue 3/15/2022, Normal             No discharge procedures on file      PDMP Review     None          ED Provider  Electronically Signed by           Real Phillips PA-C  12/11/22 8232

## 2022-12-10 NOTE — DISCHARGE INSTRUCTIONS
Follow up with PCP  Follow up with OBGYN  Tylenol/motrin as needed  Return to the ED with new or worsening symptoms including but not limited to worsening pain, worsening bleeding, weakness, dizziness, urinary symptoms

## 2022-12-10 NOTE — Clinical Note
Malorie Gooding was seen and treated in our emergency department on 12/10/2022  Diagnosis:     Amarilis Walter  may return to work on return date  She may return on this date: 12/12/2022         If you have any questions or concerns, please don't hesitate to call        Jessi Leal RN    ______________________________           _______________          _______________  Hospital Representative                              Date                                Time

## 2023-01-19 ENCOUNTER — OFFICE VISIT (OUTPATIENT)
Dept: FAMILY MEDICINE CLINIC | Facility: CLINIC | Age: 23
End: 2023-01-19

## 2023-01-19 VITALS
BODY MASS INDEX: 48.02 KG/M2 | OXYGEN SATURATION: 99 % | WEIGHT: 271 LBS | SYSTOLIC BLOOD PRESSURE: 122 MMHG | TEMPERATURE: 98.6 F | DIASTOLIC BLOOD PRESSURE: 86 MMHG | HEIGHT: 63 IN | HEART RATE: 88 BPM

## 2023-01-19 DIAGNOSIS — Z00.00 ANNUAL PHYSICAL EXAM: Primary | ICD-10-CM

## 2023-01-19 DIAGNOSIS — E66.01 MORBID OBESITY WITH BMI OF 45.0-49.9, ADULT (HCC): ICD-10-CM

## 2023-01-19 DIAGNOSIS — J32.9 CHRONIC SINUSITIS, UNSPECIFIED LOCATION: ICD-10-CM

## 2023-01-19 PROBLEM — J30.1 SEASONAL ALLERGIC RHINITIS DUE TO POLLEN: Status: ACTIVE | Noted: 2017-11-29

## 2023-01-19 PROBLEM — J45.20 MILD INTERMITTENT ASTHMA WITHOUT COMPLICATION: Status: ACTIVE | Noted: 2017-11-29

## 2023-01-19 PROBLEM — Z91.013 SHRIMP ALLERGY: Status: ACTIVE | Noted: 2017-11-29

## 2023-01-19 RX ORDER — PREDNISONE 20 MG/1
40 TABLET ORAL DAILY
Qty: 10 TABLET | Refills: 0 | Status: SHIPPED | OUTPATIENT
Start: 2023-01-19 | End: 2023-01-24

## 2023-01-19 RX ORDER — FLUTICASONE PROPIONATE 50 MCG
1 SPRAY, SUSPENSION (ML) NASAL DAILY
Qty: 11.1 ML | Refills: 2 | Status: SHIPPED | OUTPATIENT
Start: 2023-01-19

## 2023-01-19 RX ORDER — AZITHROMYCIN 250 MG/1
TABLET, FILM COATED ORAL
Qty: 6 TABLET | Refills: 0 | Status: SHIPPED | OUTPATIENT
Start: 2023-01-19 | End: 2023-01-23

## 2023-01-19 NOTE — PATIENT INSTRUCTIONS
Wellness Visit for Adults   AMBULATORY CARE:   A wellness visit  is when you see your healthcare provider to get screened for health problems  Your healthcare provider will also give you advice on how to stay healthy  Write down your questions so you remember to ask them  Ask your healthcare provider how often you should have a wellness visit  What happens at a wellness visit:  Your healthcare provider will ask about your health, and your family history of health problems  This includes high blood pressure, heart disease, and cancer  He or she will ask if you have symptoms that concern you, if you smoke, and about your mood  You may also be asked about your intake of medicines, supplements, food, and alcohol  Any of the following may be done:  · Your weight  will be checked  Your height may also be checked so your body mass index (BMI) can be calculated  Your BMI shows if you are at a healthy weight  · Your blood pressure  and heart rate will be checked  Your temperature may also be checked  · Blood and urine tests  may be done  Blood tests may be done to check your cholesterol levels  Abnormal cholesterol levels increase your risk for heart disease and stroke  You may also need a blood or urine test to check for diabetes if you are at increased risk  Urine tests may be done to look for signs of an infection or kidney disease  · A physical exam  includes checking your heartbeat and lungs with a stethoscope  Your healthcare provider may also check your skin to look for sun damage  · Screening tests  may be recommended  A screening test is done to check for diseases that may not cause symptoms  The screening tests you may need depend on your age, gender, family history, and lifestyle habits  For example, colorectal screening may be recommended if you are 48years old or older  Screening tests you need if you are a woman:   · A Pap smear  is used to screen for cervical cancer   Pap smears are usually done every 3 to 5 years depending on your age  You may need them more often if you have had abnormal Pap smear test results in the past  Ask your healthcare provider how often you should have a Pap smear  · A mammogram  is an x-ray of your breasts to screen for breast cancer  Experts recommend mammograms every 2 years starting at age 48 years  You may need a mammogram at age 52 years or younger if you have an increased risk for breast cancer  Talk to your healthcare provider about when you should start having mammograms and how often you need them  Vaccines you may need:   · Get an influenza vaccine  every year  The influenza vaccine protects you from the flu  Several types of viruses cause the flu  The viruses change over time, so new vaccines are made each year  · Get a tetanus-diphtheria (Td) booster vaccine  every 10 years  This vaccine protects you against tetanus and diphtheria  Tetanus is a severe infection that may cause painful muscle spasms and lockjaw  Diphtheria is a severe bacterial infection that causes a thick covering in the back of your mouth and throat  · Get a human papillomavirus (HPV) vaccine  if you are female and aged 23 to 32 or male 23 to 24 and never received it  This vaccine protects you from HPV infection  HPV is the most common infection spread by sexual contact  HPV may also cause vaginal, penile, and anal cancers  · Get a pneumococcal vaccine  if you are aged 72 years or older  The pneumococcal vaccine is an injection given to protect you from pneumococcal disease  Pneumococcal disease is an infection caused by pneumococcal bacteria  The infection may cause pneumonia, meningitis, or an ear infection  · Get a shingles vaccine  if you are 60 or older, even if you have had shingles before  The shingles vaccine is an injection to protect you from the varicella-zoster virus  This is the same virus that causes chickenpox   Shingles is a painful rash that develops in people who had chickenpox or have been exposed to the virus  How to eat healthy:  My Plate is a model for planning healthy meals  It shows the types and amounts of foods that should go on your plate  Fruits and vegetables make up about half of your plate, and grains and protein make up the other half  A serving of dairy is included on the side of your plate  The amount of calories and serving sizes you need depends on your age, gender, weight, and height  Examples of healthy foods are listed below:  · Eat a variety of vegetables  such as dark green, red, and orange vegetables  You can also include canned vegetables low in sodium (salt) and frozen vegetables without added butter or sauces  · Eat a variety of fresh fruits , canned fruit in 100% juice, frozen fruit, and dried fruit  · Include whole grains  At least half of the grains you eat should be whole grains  Examples include whole-wheat bread, wheat pasta, brown rice, and whole-grain cereals such as oatmeal     · Eat a variety of protein foods such as seafood (fish and shellfish), lean meat, and poultry without skin (turkey and chicken)  Examples of lean meats include pork leg, shoulder, or tenderloin, and beef round, sirloin, tenderloin, and extra lean ground beef  Other protein foods include eggs and egg substitutes, beans, peas, soy products, nuts, and seeds  · Choose low-fat dairy products such as skim or 1% milk or low-fat yogurt, cheese, and cottage cheese  · Limit unhealthy fats  such as butter, hard margarine, and shortening  Exercise:  Exercise at least 30 minutes per day on most days of the week  Some examples of exercise include walking, biking, dancing, and swimming  You can also fit in more physical activity by taking the stairs instead of the elevator or parking farther away from stores  Include muscle strengthening activities 2 days each week  Regular exercise provides many health benefits   It helps you manage your weight, and decreases your risk for type 2 diabetes, heart disease, stroke, and high blood pressure  Exercise can also help improve your mood  Ask your healthcare provider about the best exercise plan for you  General health and safety guidelines:   · Do not smoke  Nicotine and other chemicals in cigarettes and cigars can cause lung damage  Ask your healthcare provider for information if you currently smoke and need help to quit  E-cigarettes or smokeless tobacco still contain nicotine  Talk to your healthcare provider before you use these products  · Limit alcohol  A drink of alcohol is 12 ounces of beer, 5 ounces of wine, or 1½ ounces of liquor  · Lose weight, if needed  Being overweight increases your risk of certain health conditions  These include heart disease, high blood pressure, type 2 diabetes, and certain types of cancer  · Protect your skin  Do not sunbathe or use tanning beds  Use sunscreen with a SPF 15 or higher  Apply sunscreen at least 15 minutes before you go outside  Reapply sunscreen every 2 hours  Wear protective clothing, hats, and sunglasses when you are outside  · Drive safely  Always wear your seatbelt  Make sure everyone in your car wears a seatbelt  A seatbelt can save your life if you are in an accident  Do not use your cell phone when you are driving  This could distract you and cause an accident  Pull over if you need to make a call or send a text message  · Practice safe sex  Use latex condoms if are sexually active and have more than one partner  Your healthcare provider may recommend screening tests for sexually transmitted infections (STIs)  · Wear helmets, lifejackets, and protective gear  Always wear a helmet when you ride a bike or motorcycle, go skiing, or play sports that could cause a head injury  Wear protective equipment when you play sports  Wear a lifejacket when you are on a boat or doing water sports      © Copyright [employer] 2022 Information is for End User's use only and may not be sold, redistributed or otherwise used for commercial purposes  All illustrations and images included in CareNotes® are the copyrighted property of [employer]  or Jackie Boston  The above information is an  only  It is not intended as medical advice for individual conditions or treatments  Talk to your doctor, nurse or pharmacist before following any medical regimen to see if it is safe and effective for you  Weight Management   AMBULATORY CARE:   Why it is important to manage your weight:  Being overweight increases your risk of health conditions such as heart disease, high blood pressure, type 2 diabetes, and certain types of cancer  It can also increase your risk for osteoarthritis, sleep apnea, and other respiratory problems  Aim for a slow, steady weight loss  Even a small amount of weight loss can lower your risk of health problems  Risks of being overweight:  Extra weight can cause many health problems, including the following:  · Diabetes (high blood sugar level)    · High blood pressure or high cholesterol    · Heart disease    · Stroke    · Gallbladder or liver disease    · Cancer of the colon, breast, prostate, liver, or kidney    · Sleep apnea    · Arthritis or gout    Screening  is done to check for health conditions before you have signs or symptoms  If you are 28to 79years old, your blood sugar level may be checked every 3 years for signs of prediabetes or diabetes  Your healthcare provider will check your blood pressure at each visit  High blood pressure can lead to a stroke or other problems  Your provider may check for signs of heart disease, cancer, or other health problems  How to lose weight safely:  A safe and healthy way to lose weight is to eat fewer calories and get regular exercise  · You can lose up about 1 pound a week by decreasing the number of calories you eat by 500 calories each day   You can decrease calories by eating smaller portion sizes or by cutting out high-calorie foods  Read labels to find out how many calories are in the foods you eat  · You can also burn calories with exercise such as walking, swimming, or biking  You will be more likely to keep weight off if you make these changes part of your lifestyle  Exercise at least 30 minutes per day on most days of the week  You can also fit in more physical activity by taking the stairs instead of the elevator or parking farther away from stores  Ask your healthcare provider about the best exercise plan for you  Healthy meal plan for weight management:  A healthy meal plan includes a variety of foods, contains fewer calories, and helps you stay healthy  A healthy meal plan includes the following:     · Eat whole-grain foods more often  A healthy meal plan should contain fiber  Fiber is the part of grains, fruits, and vegetables that is not broken down by your body  Whole-grain foods are healthy and provide extra fiber in your diet  Some examples of whole-grain foods are whole-wheat breads and pastas, oatmeal, brown rice, and bulgur  · Eat a variety of vegetables every day  Include dark, leafy greens such as spinach, kale, law greens, and mustard greens  Eat yellow and orange vegetables such as carrots, sweet potatoes, and winter squash  · Eat a variety of fruits every day  Choose fresh or canned fruit (canned in its own juice or light syrup) instead of juice  Fruit juice has very little or no fiber  · Eat low-fat dairy foods  Drink fat-free (skim) milk or 1% milk  Eat fat-free yogurt and low-fat cottage cheese  Try low-fat cheeses such as mozzarella and other reduced-fat cheeses  · Choose meat and other protein foods that are low in fat  Choose beans or other legumes such as split peas or lentils  Choose fish, skinless poultry (chicken or turkey), or lean cuts of red meat (beef or pork)   Before you cook meat or poultry, cut off any visible fat  · Use less fat and oil  Try baking foods instead of frying them  Add less fat, such as margarine, sour cream, regular salad dressing and mayonnaise to foods  Eat fewer high-fat foods  Some examples of high-fat foods include french fries, doughnuts, ice cream, and cakes  · Eat fewer sweets  Limit foods and drinks that are high in sugar  This includes candy, cookies, regular soda, and sweetened drinks  Ways to decrease calories:   · Eat smaller portions  ? Use a small plate with smaller servings  ? Do not eat second helpings  ? When you eat at a restaurant, ask for a box and place half of your meal in the box before you eat  ? Share an entrée with someone else  · Replace high-calorie snacks with healthy, low-calorie snacks  ? Choose fresh fruit, vegetables, fat-free rice cakes, or air-popped popcorn instead of potato chips, nuts, or chocolate  ? Choose water or calorie-free drinks instead of soda or sweetened drinks  · Do not shop for groceries when you are hungry  You may be more likely to make unhealthy food choices  Take a grocery list of healthy foods and shop after you have eaten  · Eat regular meals  Do not skip meals  Skipping meals can lead to overeating later in the day  This can make it harder for you to lose weight  Eat a healthy snack in place of a meal if you do not have time to eat a regular meal  Talk with a dietitian to help you create a meal plan and schedule that is right for you  Other things to consider as you try to lose weight:   · Be aware of situations that may give you the urge to overeat, such as eating while watching television  Find ways to avoid these situations  For example, read a book, go for a walk, or do crafts  · Meet with a weight loss support group or friends who are also trying to lose weight  This may help you stay motivated to continue working on your weight loss goals      © Copyright [address] 2022 Information is for End User's use only and may not be sold, redistributed or otherwise used for commercial purposes  All illustrations and images included in CareNotes® are the copyrighted property of [employer] M [employer]  or Jackie Boston  The above information is an  only  It is not intended as medical advice for individual conditions or treatments  Talk to your doctor, nurse or pharmacist before following any medical regimen to see if it is safe and effective for you

## 2023-01-19 NOTE — PROGRESS NOTES
[address]     NAME: Art Meyers  AGE: 25 y o  SEX: female  : 2000     DATE: 2023     Assessment and Plan:     Problem List Items Addressed This Visit    None  Visit Diagnoses     Annual physical exam    -  Primary    Morbid obesity with BMI of 45 0-49 9, adult (HCC)        Chronic sinusitis, unspecified location        Relevant Medications    predniSONE 20 mg tablet    azithromycin (ZITHROMAX) 250 mg tablet    fluticasone (FLONASE) 50 mcg/act nasal spray        Immunizations and preventive care screenings were discussed with patient today  Appropriate education was printed on patient's after visit summary  Counseling:  Alcohol/drug use: discussed moderation in alcohol intake, the recommendations for healthy alcohol use, and avoidance of illicit drug use  Dental Health: discussed importance of regular tooth brushing, flossing, and dental visits  Sexual health: discussed sexually transmitted diseases, partner selection, use of condoms, avoidance of unintended pregnancy, and contraceptive alternatives  Exercise: the importance of regular exercise/physical activity was discussed  Recommend exercise 3-5 times per week for at least 30 minutes  BMI Counseling: Body mass index is 48 01 kg/m²  The BMI is above normal  Nutrition recommendations include decreasing portion sizes, encouraging healthy choices of fruits and vegetables, consuming healthier snacks, limiting drinks that contain sugar, moderation in carbohydrate intake, increasing intake of lean protein and reducing intake of cholesterol  Exercise recommendations include moderate physical activity 150 minutes/week and exercising 3-5 times per week  No pharmacotherapy was ordered  Rationale for BMI follow-up plan is due to patient being overweight or obese       Depression Screening and Follow-up Plan: Patient was screened for depression during today's encounter  They screened negative with a PHQ-2 score of 0  No follow-ups on file  Chief Complaint:     Chief Complaint   Patient presents with   • GI Problem     Pt upset stomach reports she has a lot of nasal congestion and post nasal drip and it makes her nauseous  She vomited twice two days ago  • Physical Exam      History of Present Illness:     Adult Annual Physical   Patient here for a comprehensive physical exam  The patient reports problems - as documented below  Since she had COVID in 9/2022 she has ongoing nasal congestion  States that this has been constant  Notes significant PND, states that this is making her nauseous at times  States that she has thrown up 2 times recently  Denies diarrhea or abdominal pain today  Notes mild pain after vomiting  Denies fever or chills  States that she is coughing  Notes decreased appetite  Has had no ABX  Denies sinus pressure or pain  Diet and Physical Activity  Diet/Nutrition: poor diet  Exercise: no formal exercise  Notes that she has a physical job  Depression Screening  PHQ-2/9 Depression Screening    Little interest or pleasure in doing things: 0 - not at all  Feeling down, depressed, or hopeless: 0 - not at all  PHQ-2 Score: 0  PHQ-2 Interpretation: Negative depression screen       General Health  Sleep: sleeps well, gets 7-8 hours of sleep on average and notes that she has a hard time falling asleep most night  has used melatonin PRN and this is helpful  [place] Hearing: normal - bilateral   Vision: no vision problems  Dental: no dental visits for >1 year, brushes teeth once daily and does not floss  /GYN Health  Last menstrual period: 1/9/23  Contraceptive method: barrier methods  History of STDs?: no      Review of Systems:     Review of Systems   Constitutional: Positive for appetite change (decreased)  Negative for activity change, chills, fatigue and fever     HENT: Positive for congestion and postnasal drip  Negative for ear pain, rhinorrhea and sore throat  Eyes: Negative for pain  Respiratory: Positive for cough  Negative for shortness of breath  Cardiovascular: Negative for chest pain and leg swelling  Gastrointestinal: Positive for nausea  Negative for abdominal distention, abdominal pain, constipation, diarrhea and vomiting  Genitourinary: Negative for dysuria, frequency and urgency  Musculoskeletal: Negative for gait problem  Skin: Negative for rash  Neurological: Negative for dizziness, light-headedness and headaches  Past Medical History:     Past Medical History:   Diagnosis Date   • Asthma    • Ear infection       Past Surgical History:     History reviewed  No pertinent surgical history  Social History:     Social History     Socioeconomic History   • Marital status: Single     Spouse name: None   • Number of children: None   • Years of education: None   • Highest education level: None   Occupational History   • None   Tobacco Use   • Smoking status: Never   • Smokeless tobacco: Never   Vaping Use   • Vaping Use: Never used   Substance and Sexual Activity   • Alcohol use: Yes     Alcohol/week: 1 0 standard drink     Types: 1 Standard drinks or equivalent per week   • Drug use: Never   • Sexual activity: Yes     Partners: Male     Birth control/protection: Condom Male   Other Topics Concern   • None   Social History Narrative   • None     Social Determinants of Health     Financial Resource Strain: Not on file   Food Insecurity: Not on file   Transportation Needs: Not on file   Physical Activity: Not on file   Stress: Not on file   Social Connections: Not on file   Intimate Partner Violence: Not on file   Housing Stability: Not on file      Family History:     History reviewed  No pertinent family history     Current Medications:     Current Outpatient Medications   Medication Sig Dispense Refill   • azithromycin (ZITHROMAX) 250 mg tablet Take 2 tablets today then 1 tablet daily x 4 days 6 tablet 0   • diphenhydrAMINE (BENADRYL) 25 mg capsule Take 1 capsule (25 mg total) by mouth every 6 (six) hours as needed for itching 20 capsule 0   • fluticasone (FLONASE) 50 mcg/act nasal spray 1 spray into each nostril daily 11 1 mL 2   • ibuprofen (MOTRIN) 400 mg tablet Take 1 tablet (400 mg total) by mouth every 6 (six) hours as needed for mild pain 30 tablet 0   • methocarbamol (ROBAXIN) 500 mg tablet Take 1 tablet (500 mg total) by mouth 3 (three) times a day as needed for muscle spasms 20 tablet 0   • montelukast (SINGULAIR) 10 mg tablet Take 10 mg by mouth daily at bedtime     • naproxen (NAPROSYN) 500 mg tablet Take 1 tablet by mouth every 12 (twelve) hours as needed for mild pain or moderate pain 20 tablet 0   • predniSONE 20 mg tablet Take 2 tablets (40 mg total) by mouth daily for 5 days 10 tablet 0   • ondansetron (ZOFRAN-ODT) 4 mg disintegrating tablet Take 1 tablet by mouth every 6 (six) hours as needed for nausea or vomiting (Patient not taking: Reported on 1/4/2022) 14 tablet 0     No current facility-administered medications for this visit  Allergies: Allergies   Allergen Reactions   • Pollen Extract    • Shrimp Extract Allergy Skin Test - Food Allergy Hives      Physical Exam:     /86 (BP Location: Left arm, Patient Position: Sitting, Cuff Size: Large)   Pulse 88   Temp 98 6 °F (37 °C)   Ht 5' 3" (1 6 m)   Wt 123 kg (271 lb)   SpO2 99%   BMI 48 01 kg/m²     Physical Exam  Vitals reviewed  Constitutional:       General: She is not in acute distress  Appearance: Normal appearance  She is obese  HENT:      Head: Normocephalic and atraumatic  Right Ear: Tympanic membrane, ear canal and external ear normal       Left Ear: Tympanic membrane, ear canal and external ear normal       Nose: Mucosal edema and congestion present  Right Turbinates: Swollen  Left Turbinates: Swollen  Not pale        Right Sinus: No maxillary sinus tenderness or frontal sinus tenderness  Left Sinus: No maxillary sinus tenderness or frontal sinus tenderness  Mouth/Throat:      Mouth: Mucous membranes are moist    Eyes:      Extraocular Movements: Extraocular movements intact  Conjunctiva/sclera: Conjunctivae normal       Pupils: Pupils are equal, round, and reactive to light  Cardiovascular:      Rate and Rhythm: Normal rate and regular rhythm  Heart sounds: Normal heart sounds  Pulmonary:      Effort: Pulmonary effort is normal       Breath sounds: Normal breath sounds  Abdominal:      General: Bowel sounds are normal  There is no distension  Palpations: Abdomen is soft  Tenderness: There is no abdominal tenderness  Musculoskeletal:      Cervical back: Neck supple  Right lower leg: No edema  Left lower leg: No edema  Lymphadenopathy:      Cervical: No cervical adenopathy  Skin:     General: Skin is warm  Capillary Refill: Capillary refill takes less than 2 seconds  Findings: No rash  Neurological:      Mental Status: She is alert  Mental status is at baseline            DO Hazel CarrilloMercy Medical Centerjordan [id number]

## 2023-02-12 ENCOUNTER — HOSPITAL ENCOUNTER (EMERGENCY)
Facility: HOSPITAL | Age: 23
Discharge: HOME | End: 2023-02-12
Attending: EMERGENCY MEDICINE

## 2023-02-12 VITALS
OXYGEN SATURATION: 96 % | SYSTOLIC BLOOD PRESSURE: 114 MMHG | WEIGHT: 271 LBS | BODY MASS INDEX: 48.02 KG/M2 | HEIGHT: 63 IN | RESPIRATION RATE: 18 BRPM | TEMPERATURE: 98.4 F | DIASTOLIC BLOOD PRESSURE: 77 MMHG | HEART RATE: 101 BPM

## 2023-02-12 DIAGNOSIS — K52.9 GASTROENTERITIS: Primary | ICD-10-CM

## 2023-02-12 LAB
ALBUMIN SERPL BCP-MCNC: 3.7 G/DL (ref 3.5–5)
ALP SERPL-CCNC: 77 U/L (ref 46–116)
ALT SERPL W P-5'-P-CCNC: 9 U/L (ref 12–78)
ANION GAP SERPL CALCULATED.3IONS-SCNC: 13 MMOL/L (ref 4–13)
AST SERPL W P-5'-P-CCNC: 19 U/L (ref 5–45)
B-HCG SERPL-ACNC: <2 MIU/ML
BASOPHILS # BLD AUTO: 0.01 THOUSANDS/ÂΜL (ref 0–0.1)
BASOPHILS NFR BLD AUTO: 0 % (ref 0–1)
BILIRUB SERPL-MCNC: 0.51 MG/DL (ref 0.2–1)
BUN SERPL-MCNC: 16 MG/DL (ref 5–25)
CALCIUM SERPL-MCNC: 9.1 MG/DL (ref 8.3–10.1)
CHLORIDE SERPL-SCNC: 104 MMOL/L (ref 96–108)
CO2 SERPL-SCNC: 23 MMOL/L (ref 21–32)
CREAT SERPL-MCNC: 0.81 MG/DL (ref 0.6–1.3)
EOSINOPHIL # BLD AUTO: 0.18 THOUSAND/ÂΜL (ref 0–0.61)
EOSINOPHIL NFR BLD AUTO: 3 % (ref 0–6)
ERYTHROCYTE [DISTWIDTH] IN BLOOD BY AUTOMATED COUNT: 12.7 % (ref 11.6–15.1)
FLUAV RNA RESP QL NAA+PROBE: NEGATIVE
FLUBV RNA RESP QL NAA+PROBE: NEGATIVE
GFR SERPL CREATININE-BSD FRML MDRD: 104 ML/MIN/1.73SQ M
GLUCOSE SERPL-MCNC: 92 MG/DL (ref 65–140)
HCT VFR BLD AUTO: 40.4 % (ref 34.8–46.1)
HGB BLD-MCNC: 13.1 G/DL (ref 11.5–15.4)
IMM GRANULOCYTES # BLD AUTO: 0.01 THOUSAND/UL (ref 0–0.2)
IMM GRANULOCYTES NFR BLD AUTO: 0 % (ref 0–2)
LIPASE SERPL-CCNC: 100 U/L (ref 73–393)
LYMPHOCYTES # BLD AUTO: 1.28 THOUSANDS/ÂΜL (ref 0.6–4.47)
LYMPHOCYTES NFR BLD AUTO: 24 % (ref 14–44)
MCH RBC QN AUTO: 28.1 PG (ref 26.8–34.3)
MCHC RBC AUTO-ENTMCNC: 32.4 G/DL (ref 31.4–37.4)
MCV RBC AUTO: 87 FL (ref 82–98)
MONOCYTES # BLD AUTO: 0.47 THOUSAND/ÂΜL (ref 0.17–1.22)
MONOCYTES NFR BLD AUTO: 9 % (ref 4–12)
NEUTROPHILS # BLD AUTO: 3.29 THOUSANDS/ÂΜL (ref 1.85–7.62)
NEUTS SEG NFR BLD AUTO: 64 % (ref 43–75)
NRBC BLD AUTO-RTO: 0 /100 WBCS
PLATELET # BLD AUTO: 320 THOUSANDS/UL (ref 149–390)
PMV BLD AUTO: 10 FL (ref 8.9–12.7)
POTASSIUM SERPL-SCNC: 3.9 MMOL/L (ref 3.5–5.3)
PROT SERPL-MCNC: 7.6 G/DL (ref 6.4–8.4)
RBC # BLD AUTO: 4.67 MILLION/UL (ref 3.81–5.12)
RSV RNA RESP QL NAA+PROBE: NEGATIVE
SARS-COV-2 RNA RESP QL NAA+PROBE: NEGATIVE
SODIUM SERPL-SCNC: 140 MMOL/L (ref 135–147)
WBC # BLD AUTO: 5.24 THOUSAND/UL (ref 4.31–10.16)

## 2023-02-12 RX ORDER — KETOROLAC TROMETHAMINE 30 MG/ML
15 INJECTION, SOLUTION INTRAMUSCULAR; INTRAVENOUS ONCE
Status: DISCONTINUED | OUTPATIENT
Start: 2023-02-12 | End: 2023-02-12

## 2023-02-12 RX ORDER — PROMETHAZINE HYDROCHLORIDE 25 MG/ML
25 INJECTION, SOLUTION INTRAMUSCULAR; INTRAVENOUS ONCE
Status: COMPLETED | OUTPATIENT
Start: 2023-02-12 | End: 2023-02-12

## 2023-02-12 RX ORDER — ONDANSETRON 2 MG/ML
4 INJECTION INTRAMUSCULAR; INTRAVENOUS ONCE
Status: DISCONTINUED | OUTPATIENT
Start: 2023-02-12 | End: 2023-02-12

## 2023-02-12 RX ORDER — DIPHENOXYLATE HYDROCHLORIDE AND ATROPINE SULFATE 2.5; .025 MG/1; MG/1
1 TABLET ORAL 4 TIMES DAILY PRN
Qty: 30 TABLET | Refills: 0 | Status: SHIPPED | OUTPATIENT
Start: 2023-02-12 | End: 2023-02-22

## 2023-02-12 RX ORDER — ONDANSETRON 4 MG/1
4 TABLET, FILM COATED ORAL EVERY 6 HOURS
Qty: 12 TABLET | Refills: 0 | Status: SHIPPED | OUTPATIENT
Start: 2023-02-12

## 2023-02-12 RX ORDER — DIPHENOXYLATE HYDROCHLORIDE AND ATROPINE SULFATE 2.5; .025 MG/1; MG/1
1 TABLET ORAL ONCE
Status: COMPLETED | OUTPATIENT
Start: 2023-02-12 | End: 2023-02-12

## 2023-02-12 RX ADMIN — PROMETHAZINE HYDROCHLORIDE 25 MG: 25 INJECTION INTRAMUSCULAR; INTRAVENOUS at 17:22

## 2023-02-12 RX ADMIN — DIPHENOXYLATE HYDROCHLORIDE AND ATROPINE SULFATE 1 TABLET: 2.5; .025 TABLET ORAL at 17:22

## 2023-02-12 NOTE — DISCHARGE INSTRUCTIONS
A  personal message from Dr Walker Heads,  Thank you so much for allowing me to care for you today  I pride myself in the care and attention I give all my patients  I hope you were a witness to this tonight  If for any reason your condition does not improve, worsens, or you have a question that was not answered during your visit you can feel free to text me on my personal phone   # [phone]  I will answer to your message and continue your care past your emergency room visit

## 2023-02-12 NOTE — ED PROVIDER NOTES
History  Chief Complaint   Patient presents with   • Abdominal Pain     Patient co abdominal pain, N/V, and diarrhea that started 2 days ago  This is a 46-year-old female who comes emergency department for nausea vomiting diarrhea and a recent exposure to COVID at home  However she had a negative home test   She has been sick for about 2 days with persistent vomiting and diarrhea  She has had contacts at home that also been sick with similar presentation  She does not smoke she does not drink  No history of surgical procedure      History provided by:  Patient   used: No    Abdominal Pain  Pain location:  Generalized  Pain severity:  Moderate  Associated symptoms: diarrhea, nausea and vomiting    Associated symptoms: no chest pain, no chills, no cough, no dysuria, no fever, no hematuria, no shortness of breath and no sore throat        Prior to Admission Medications   Prescriptions Last Dose Informant Patient Reported? Taking?    diphenhydrAMINE (BENADRYL) 25 mg capsule   No No   Sig: Take 1 capsule (25 mg total) by mouth every 6 (six) hours as needed for itching   fluticasone (FLONASE) 50 mcg/act nasal spray   No No   Si spray into each nostril daily   ibuprofen (MOTRIN) 400 mg tablet   No No   Sig: Take 1 tablet (400 mg total) by mouth every 6 (six) hours as needed for mild pain   methocarbamol (ROBAXIN) 500 mg tablet   No No   Sig: Take 1 tablet (500 mg total) by mouth 3 (three) times a day as needed for muscle spasms   montelukast (SINGULAIR) 10 mg tablet   Yes No   Sig: Take 10 mg by mouth daily at bedtime   naproxen (NAPROSYN) 500 mg tablet   No No   Sig: Take 1 tablet by mouth every 12 (twelve) hours as needed for mild pain or moderate pain   ondansetron (ZOFRAN-ODT) 4 mg disintegrating tablet   No No   Sig: Take 1 tablet by mouth every 6 (six) hours as needed for nausea or vomiting   Patient not taking: Reported on 2022      Facility-Administered Medications: None Past Medical History:   Diagnosis Date   • Asthma    • Ear infection        History reviewed  No pertinent surgical history  History reviewed  No pertinent family history  I have reviewed and agree with the history as documented  E-Cigarette/Vaping   • E-Cigarette Use Never User      E-Cigarette/Vaping Substances   • Nicotine No    • THC No    • CBD No    • Flavoring No    • Other No    • Unknown No      Social History     Tobacco Use   • Smoking status: Never   • Smokeless tobacco: Never   Vaping Use   • Vaping Use: Never used   Substance Use Topics   • Alcohol use: Yes     Alcohol/week: 1 0 standard drink     Types: 1 Standard drinks or equivalent per week   • Drug use: Never       Review of Systems   Constitutional: Negative for chills and fever  HENT: Negative for ear pain and sore throat  Eyes: Negative for pain and visual disturbance  Respiratory: Negative for cough and shortness of breath  Cardiovascular: Negative for chest pain and palpitations  Gastrointestinal: Positive for abdominal pain, diarrhea, nausea and vomiting  Genitourinary: Negative for dysuria and hematuria  Musculoskeletal: Negative for arthralgias and back pain  Skin: Negative for color change and rash  Neurological: Positive for weakness  Negative for seizures and syncope  All other systems reviewed and are negative  Physical Exam  Physical Exam  Vitals and nursing note reviewed  Constitutional:       General: She is not in acute distress  Appearance: She is well-developed  She is obese  HENT:      Head: Normocephalic and atraumatic  Mouth/Throat:      Mouth: Mucous membranes are moist    Eyes:      Extraocular Movements: Extraocular movements intact  Conjunctiva/sclera: Conjunctivae normal    Cardiovascular:      Rate and Rhythm: Normal rate and regular rhythm  Heart sounds: Normal heart sounds  No murmur heard    Pulmonary:      Effort: Pulmonary effort is normal  No respiratory distress  Breath sounds: Normal breath sounds  Abdominal:      General: Abdomen is flat  Bowel sounds are normal  There is no distension or abdominal bruit  Palpations: Abdomen is soft  Tenderness: There is generalized abdominal tenderness  There is no guarding or rebound  Negative signs include Ayala's sign and McBurney's sign  Hernia: No hernia is present  Musculoskeletal:         General: No swelling  Cervical back: Neck supple  Skin:     General: Skin is warm and dry  Capillary Refill: Capillary refill takes less than 2 seconds  Neurological:      General: No focal deficit present  Mental Status: She is alert and oriented to person, place, and time     Psychiatric:         Mood and Affect: Mood normal          Vital Signs  ED Triage Vitals [02/12/23 1413]   Temperature Pulse Respirations Blood Pressure SpO2   98 4 °F (36 9 °C) 101 18 114/77 96 %      Temp Source Heart Rate Source Patient Position - Orthostatic VS BP Location FiO2 (%)   Tympanic Monitor Sitting Left arm --      Pain Score       --           Vitals:    02/12/23 1413   BP: 114/77   Pulse: 101   Patient Position - Orthostatic VS: Sitting         Visual Acuity      ED Medications  Medications   sodium chloride 0 9 % bolus 1,000 mL (1,000 mL Intravenous Not Given 2/12/23 1758)   diphenoxylate-atropine (LOMOTIL) 2 5-0 025 mg per tablet 1 tablet (1 tablet Oral Given 2/12/23 1722)   promethazine (PHENERGAN) injection 25 mg (25 mg Intramuscular Given 2/12/23 1722)       Diagnostic Studies  Results Reviewed     Procedure Component Value Units Date/Time    FLU/RSV/COVID - if FLU/RSV clinically relevant [[id number]]  (Normal) Collected: 02/12/23 1638    Lab Status: Final result Specimen: Nares from Nose Updated: 02/12/23 1812     SARS-CoV-2 Negative     INFLUENZA A PCR Negative     INFLUENZA B PCR Negative     RSV PCR Negative    Narrative:      FOR PEDIATRIC PATIENTS - copy/paste COVID Guidelines URL to browser: [email or web address]    SARS-CoV-2 assay is a Nucleic Acid Amplification assay intended for the  qualitative detection of nucleic acid from SARS-CoV-2 in nasopharyngeal  swabs  Results are for the presumptive identification of SARS-CoV-2 RNA  Positive results are indicative of infection with SARS-CoV-2, the virus  causing COVID-19, but do not rule out bacterial infection or co-infection  with other viruses  Laboratories within the [place] and its  territories are required to report all positive results to the appropriate  public health authorities  Negative results do not preclude SARS-CoV-2  infection and should not be used as the sole basis for treatment or other  patient management decisions  Negative results must be combined with  clinical observations, patient history, and epidemiological information  This test has not been FDA cleared or approved  This test has been authorized by FDA under an Emergency Use Authorization  (EUA)  This test is only authorized for the duration of time the  declaration that circumstances exist justifying the authorization of the  emergency use of an in vitro diagnostic tests for detection of SARS-CoV-2  virus and/or diagnosis of COVID-19 infection under section 564(b)(1) of  the Act, 21 U  S C  587ZNB-0(P)(0), unless the authorization is terminated  or revoked sooner  The test has been validated but independent review by FDA  and CLIA is pending  Test performed using [employer] Gene[employer]pert: This RT-PCR assay targets N2,  a region unique to SARS-CoV-2  A conserved region in the E-gene was chosen  for pan-Sarbecovirus detection which includes SARS-CoV-2  According to CMS-2020-01-R, this platform meets the definition of high-throughput technology      Comprehensive metabolic panel [[id number]]  (Abnormal) Collected: 02/12/23 1548    Lab Status: Final result Specimen: Blood from Arm, Left Updated: 02/12/23 1803     Sodium 140 mmol/L      Potassium 3 9 mmol/L      Chloride 104 mmol/L      CO2 23 mmol/L      ANION GAP 13 mmol/L      BUN 16 mg/dL      Creatinine 0 81 mg/dL      Glucose 92 mg/dL      Calcium 9 1 mg/dL      AST 19 U/L      ALT 9 U/L      Alkaline Phosphatase 77 U/L      Total Protein 7 6 g/dL      Albumin 3 7 g/dL      Total Bilirubin 0 51 mg/dL      eGFR 104 ml/min/1 73sq m     Narrative:      National Kidney Disease Foundation guidelines for Chronic Kidney Disease (CKD):   •  Stage 1 with normal or high GFR (GFR > 90 mL/min/1 73 square meters)  •  Stage 2 Mild CKD (GFR = 60-89 mL/min/1 73 square meters)  •  Stage 3A Moderate CKD (GFR = 45-59 mL/min/1 73 square meters)  •  Stage 3B Moderate CKD (GFR = 30-44 mL/min/1 73 square meters)  •  Stage 4 Severe CKD (GFR = 15-29 mL/min/1 73 square meters)  •  Stage 5 End Stage CKD (GFR <15 mL/min/1 73 square meters)  Note: GFR calculation is accurate only with a steady state creatinine    CBC and differential [[id number]] Collected: 02/12/23 1548    Lab Status: Final result Specimen: Blood from Arm, Left Updated: 02/12/23 1610     WBC 5 24 Thousand/uL      RBC 4 67 Million/uL      Hemoglobin 13 1 g/dL      Hematocrit 40 4 %      MCV 87 fL      MCH 28 1 pg      MCHC 32 4 g/dL      RDW 12 7 %      MPV 10 0 fL      Platelets 085 Thousands/uL      nRBC 0 /100 WBCs      Neutrophils Relative 64 %      Immat GRANS % 0 %      Lymphocytes Relative 24 %      Monocytes Relative 9 %      Eosinophils Relative 3 %      Basophils Relative 0 %      Neutrophils Absolute 3 29 Thousands/µL      Immature Grans Absolute 0 01 Thousand/uL      Lymphocytes Absolute 1 28 Thousands/µL      Monocytes Absolute 0 47 Thousand/µL      Eosinophils Absolute 0 18 Thousand/µL      Basophils Absolute 0 01 Thousands/µL     Lipase [[id number]] Collected: 02/12/23 1548    Lab Status:  In process Specimen: Blood from Arm, Left Updated: 02/12/23 1605    Pregnancy, hCG, quantitative [[id number]] Collected: 02/12/23 1548 Lab Status: In process Specimen: Blood from Arm, Left Updated: 02/12/23 1605    UA w Reflex to Microscopic w Reflex to Culture [[id number]]     Lab Status: No result Specimen: Urine, Clean Catch                  No orders to display              Procedures  Procedures         ED Course  ED Course as of 02/12/23 1819   Sun Feb 12, 2023   1720 WBC: 5 24   1720 Hemoglobin: 13 1   1720 HCT: 40 4   1754 WBC: 5 24   1754 Hemoglobin: 13 1   1754 HCT: 40 4   1815 Potassium: 3 9   1815 BUN: 16   1815 Creatinine: 0 81                                             Medical Decision Making  Patient with acute gastroenteritis, this could be viral, bacterial versus food poisoning  Clinically she looks well with stable and normal vital signs  Her abdominal exam is benign except for diffuse abdominal pain without rebound or guarding  Obtain CBC which includes white count, H&H and platelets    Patient has normal white blood cell count, normal hemoglobin normal platelets  CMP is essentially normal   Negative flu and COVID  Patient received an IM injection of Phenergan and has not vomited in the emergency department    Amount and/or Complexity of Data Reviewed  Labs: ordered  Decision-making details documented in ED Course  Risk  OTC drugs  Prescription drug management  Disposition  Final diagnoses:   Gastroenteritis     Time reflects when diagnosis was documented in both MDM as applicable and the Disposition within this note     Time User Action Codes Description Comment    2/12/2023  6:18 PM Sumaya Bernal Add [K52 9] Gastroenteritis       ED Disposition     ED Disposition   Discharge    Condition   Stable    Date/Time   Sun Feb 12, 2023  6:17 PM    [address] discharge to home/self care                 Follow-up Information     Follow up With Specialties Details Why Contact Hernando Peres,  Family Medicine In 3 days If symptoms worsen [address]   [address]  [facility] [address]  [phone]            Patient's Medications   Discharge Prescriptions    DIPHENOXYLATE-ATROPINE (LOMOTIL) 2 5-0 025 MG PER TABLET    Take 1 tablet by mouth 4 (four) times a day as needed for diarrhea for up to 10 days       Start Date: 2/12/2023 End Date: 2/22/2023       Order Dose: 1 tablet       Quantity: 30 tablet    Refills: 0    ONDANSETRON (ZOFRAN) 4 MG TABLET    Take 1 tablet (4 mg total) by mouth every 6 (six) hours       Start Date: 2/12/2023 End Date: --       Order Dose: 4 mg       Quantity: 12 tablet    Refills: 0       No discharge procedures on file      PDMP Review     None          ED Provider  Electronically Signed by           Naomy Brewster MD  02/12/23 9084

## 2023-02-12 NOTE — Clinical Note
Sulaiman Fisher was seen and treated in our emergency department on 2/12/2023  Diagnosis: gastroenteritis    Thea Mcknight  may return to work on return date  She may return on this date: 02/14/2023         If you have any questions or concerns, please don't hesitate to call        Art Mac MD    ______________________________           _______________          _______________  Hospital Representative                              Date                                Time

## 2023-04-05 ENCOUNTER — TELEPHONE (OUTPATIENT)
Dept: FAMILY MEDICINE CLINIC | Facility: CLINIC | Age: 23
End: 2023-04-05

## 2023-04-05 NOTE — TELEPHONE ENCOUNTER
"Pt calling -     Reports she is feeling very sick and \"horrible\",  has severe URI, nasal congestion, chest pain, SOB, sore throat, high fever, possible covid exposure - unknown  It started yesterday and it got worse overnight - 1619 & 1581 no openings as we speak - for the rest of the day sofar  Pt aware with described s/s pt was advised to go immediately to an urgent care or to the ED, pt has someone at home who can drive her  Pt agrees to go to the the hospital / ED / U/C to have a visit completed  Pt expressed verbal understanding             "

## 2023-04-06 ENCOUNTER — TELEMEDICINE (OUTPATIENT)
Dept: FAMILY MEDICINE CLINIC | Facility: CLINIC | Age: 23
End: 2023-04-06

## 2023-04-06 VITALS — BODY MASS INDEX: 48.02 KG/M2 | HEIGHT: 63 IN | WEIGHT: 271 LBS

## 2023-04-06 DIAGNOSIS — J45.20 MILD INTERMITTENT ASTHMA WITHOUT COMPLICATION: ICD-10-CM

## 2023-04-06 DIAGNOSIS — B34.9 VIRAL INFECTION, UNSPECIFIED: Primary | ICD-10-CM

## 2023-04-06 DIAGNOSIS — R05.1 ACUTE COUGH: ICD-10-CM

## 2023-04-06 RX ORDER — BENZONATATE 200 MG/1
200 CAPSULE ORAL 3 TIMES DAILY PRN
Qty: 20 CAPSULE | Refills: 0 | Status: SHIPPED | OUTPATIENT
Start: 2023-04-06

## 2023-04-06 RX ORDER — ALBUTEROL SULFATE 90 UG/1
2 AEROSOL, METERED RESPIRATORY (INHALATION) EVERY 6 HOURS PRN
Qty: 6.7 G | Refills: 5 | Status: SHIPPED | OUTPATIENT
Start: 2023-04-06

## 2023-04-06 NOTE — PROGRESS NOTES
COVID-19 Outpatient Progress Note    Assessment/Plan:    Problem List Items Addressed This Visit        Respiratory    Mild intermittent asthma without complication    Relevant Medications    albuterol (Proventil HFA) 90 mcg/act inhaler   Other Visit Diagnoses     Viral infection, unspecified    -  Primary    Relevant Medications    benzonatate (TESSALON) 200 MG capsule    Acute cough        Relevant Medications    benzonatate (TESSALON) 200 MG capsule         Disposition:     Recommended patient to come to the office to test for COVID-19  Discussed symptom directed medication options with patient  Discussed vitamin D, vitamin C, and/or zinc supplementation with patient  I have spent a total time of 12 minutes on the day of the encounter for this patient including     Encounter provider: Pili Mckeon DO     Provider located at: [facility] [address]  [address] [facility] [facility] [address] 2  [address]  [phone]     Recent Visits  Date Type Provider Dept   04/05/23 Telephone Mikki Hampton [facility] recent visits within past 7 days and meeting all other requirements  Today's Visits  Date Type Provider Dept   04/06/23 Ana 170, [address] [facility] today's visits and meeting all other requirements  Future Appointments  No visits were found meeting these conditions  Showing future appointments within next 150 days and meeting all other requirements     This virtual check-in was done via [address] and patient was informed that this is a secure, HIPAA-compliant platform  She agrees to proceed  Patient agrees to participate in a virtual check in via telephone or video visit instead of presenting to the office to address urgent/immediate medical needs  Patient is aware this is a billable service   She acknowledged consent and understanding of privacy and security of the video platform  The patient has agreed to participate and understands they can discontinue the visit at any time  After connecting through [facility], the patient was identified by name and date of birth  Melinda Burris was informed that this was a telemedicine visit and that the exam was being conducted confidentially over secure lines  My office door was closed  No one else was in the room  Melinda Burris acknowledged consent and understanding of privacy and security of the telemedicine visit  I informed the patient that I have reviewed her record in Epic and presented the opportunity for her to ask any questions regarding the visit today  The patient agreed to participate  Verification of patient location:  Patient is located in the following state in which I hold an active license: [place]    Subjective:   Melinda Burris is a 25 y o  female who is concerned about COVID-19  Patient's symptoms include fever (subjective), fatigue, nasal congestion, rhinorrhea, sore throat, cough, chest tightness, myalgias and headache  Patient denies chills, malaise, anosmia, loss of taste, shortness of breath, abdominal pain, nausea, vomiting and diarrhea       - Date of symptom onset: 4/4/2023      COVID-19 vaccination status: Fully vaccinated (primary series)    Exposure:   Contact with a person who is under investigation (PUI) for or who is positive for COVID-19 within the last 14 days?: No    Hospitalized recently for fever and/or lower respiratory symptoms?: No      Currently a healthcare worker that is involved in direct patient care?: No      Works in a special setting where the risk of COVID-19 transmission may be high? (this may include long-term care, correctional and intermediate facilities; homeless shelters; assisted-living facilities and group homes ): No      Resident in a special setting where the risk of COVID-19 transmission may be high? (this may include long-term care, correctional and "shelter facilities; homeless shelters; assisted-living facilities and group homes ): No      Notes that her BF had strep about 3-4 weeks ago  Does not have similar symptoms to how he felt  Home COVID test was negative  Has been using Dayquil and Chloraseptic spray  Ibuprofen PRN  States that these help and then they wear off and symptoms return  Lab Results   Component Value Date    SARSCOV2 Negative 02/12/2023    [address] [facility] [address] Not Detected 11/08/2021     Review of Systems   Constitutional: Positive for fatigue and fever (subjective)  Negative for chills  HENT: Positive for congestion, rhinorrhea and sore throat  Respiratory: Positive for cough and chest tightness  Negative for shortness of breath  Gastrointestinal: Negative for abdominal pain, diarrhea, nausea and vomiting  Musculoskeletal: Positive for myalgias  Neurological: Positive for headaches       Current Outpatient Medications on File Prior to Visit   Medication Sig   • diphenhydrAMINE (BENADRYL) 25 mg capsule Take 1 capsule (25 mg total) by mouth every 6 (six) hours as needed for itching   • fluticasone (FLONASE) 50 mcg/act nasal spray 1 spray into each nostril daily   • ibuprofen (MOTRIN) 400 mg tablet Take 1 tablet (400 mg total) by mouth every 6 (six) hours as needed for mild pain   • montelukast (SINGULAIR) 10 mg tablet Take 10 mg by mouth daily at bedtime   • naproxen (NAPROSYN) 500 mg tablet Take 1 tablet by mouth every 12 (twelve) hours as needed for mild pain or moderate pain   • ondansetron (ZOFRAN-ODT) 4 mg disintegrating tablet Take 1 tablet by mouth every 6 (six) hours as needed for nausea or vomiting   • methocarbamol (ROBAXIN) 500 mg tablet Take 1 tablet (500 mg total) by mouth 3 (three) times a day as needed for muscle spasms (Patient not taking: Reported on 4/6/2023)   • ondansetron (ZOFRAN) 4 mg tablet Take 1 tablet (4 mg total) by mouth every 6 (six) hours       Objective:    Ht 5' 3\" (1 6 m) Comment: pulled from " last OV  Wt 123 kg (271 lb) Comment: pulled from last OV  BMI 48 01 kg/m²      Physical Exam  Vitals reviewed  Constitutional:       General: She is not in acute distress  Appearance: Normal appearance  HENT:      Head: Normocephalic and atraumatic  Right Ear: External ear normal       Left Ear: External ear normal       Nose: Congestion present  Eyes:      Extraocular Movements: Extraocular movements intact  Conjunctiva/sclera: Conjunctivae normal    Pulmonary:      Effort: Pulmonary effort is normal  No respiratory distress  Comments: Dry cough  Neurological:      Mental Status: She is alert  Mental status is at baseline         Francisco Ojeda DO

## 2023-06-01 ENCOUNTER — APPOINTMENT (EMERGENCY)
Dept: CT IMAGING | Facility: HOSPITAL | Age: 23
End: 2023-06-01

## 2023-06-01 ENCOUNTER — APPOINTMENT (EMERGENCY)
Dept: ULTRASOUND IMAGING | Facility: HOSPITAL | Age: 23
End: 2023-06-01

## 2023-06-01 ENCOUNTER — HOSPITAL ENCOUNTER (EMERGENCY)
Facility: HOSPITAL | Age: 23
Discharge: HOME | End: 2023-06-01
Attending: EMERGENCY MEDICINE

## 2023-06-01 VITALS
DIASTOLIC BLOOD PRESSURE: 97 MMHG | OXYGEN SATURATION: 98 % | SYSTOLIC BLOOD PRESSURE: 133 MMHG | RESPIRATION RATE: 18 BRPM | TEMPERATURE: 98.1 F | HEART RATE: 95 BPM

## 2023-06-01 DIAGNOSIS — E06.9 THYROIDITIS: Primary | ICD-10-CM

## 2023-06-01 LAB
ANION GAP SERPL CALCULATED.3IONS-SCNC: 7 MMOL/L (ref 4–13)
BASOPHILS # BLD AUTO: 0.04 THOUSANDS/ÂΜL (ref 0–0.1)
BASOPHILS NFR BLD AUTO: 1 % (ref 0–1)
BUN SERPL-MCNC: 15 MG/DL (ref 5–25)
CALCIUM SERPL-MCNC: 9.1 MG/DL (ref 8.4–10.2)
CHLORIDE SERPL-SCNC: 103 MMOL/L (ref 96–108)
CO2 SERPL-SCNC: 26 MMOL/L (ref 21–32)
CREAT SERPL-MCNC: 0.74 MG/DL (ref 0.6–1.3)
EOSINOPHIL # BLD AUTO: 0.29 THOUSAND/ÂΜL (ref 0–0.61)
EOSINOPHIL NFR BLD AUTO: 4 % (ref 0–6)
ERYTHROCYTE [DISTWIDTH] IN BLOOD BY AUTOMATED COUNT: 12.6 % (ref 11.6–15.1)
GFR SERPL CREATININE-BSD FRML MDRD: 116 ML/MIN/1.73SQ M
GLUCOSE SERPL-MCNC: 101 MG/DL (ref 65–140)
HCT VFR BLD AUTO: 38.4 % (ref 34.8–46.1)
HGB BLD-MCNC: 12.5 G/DL (ref 11.5–15.4)
IMM GRANULOCYTES # BLD AUTO: 0.02 THOUSAND/UL (ref 0–0.2)
IMM GRANULOCYTES NFR BLD AUTO: 0 % (ref 0–2)
LYMPHOCYTES # BLD AUTO: 1.66 THOUSANDS/ÂΜL (ref 0.6–4.47)
LYMPHOCYTES NFR BLD AUTO: 22 % (ref 14–44)
MCH RBC QN AUTO: 28 PG (ref 26.8–34.3)
MCHC RBC AUTO-ENTMCNC: 32.6 G/DL (ref 31.4–37.4)
MCV RBC AUTO: 86 FL (ref 82–98)
MONOCYTES # BLD AUTO: 0.52 THOUSAND/ÂΜL (ref 0.17–1.22)
MONOCYTES NFR BLD AUTO: 7 % (ref 4–12)
NEUTROPHILS # BLD AUTO: 5.09 THOUSANDS/ÂΜL (ref 1.85–7.62)
NEUTS SEG NFR BLD AUTO: 66 % (ref 43–75)
NRBC BLD AUTO-RTO: 0 /100 WBCS
PLATELET # BLD AUTO: 327 THOUSANDS/UL (ref 149–390)
PMV BLD AUTO: 9.5 FL (ref 8.9–12.7)
POTASSIUM SERPL-SCNC: 3.9 MMOL/L (ref 3.5–5.3)
RBC # BLD AUTO: 4.46 MILLION/UL (ref 3.81–5.12)
S PYO DNA THROAT QL NAA+PROBE: NOT DETECTED
SODIUM SERPL-SCNC: 136 MMOL/L (ref 135–147)
T4 FREE SERPL-MCNC: 0.68 NG/DL (ref 0.61–1.12)
TSH SERPL DL<=0.05 MIU/L-ACNC: 15.52 UIU/ML (ref 0.45–4.5)
WBC # BLD AUTO: 7.62 THOUSAND/UL (ref 4.31–10.16)

## 2023-06-01 RX ADMIN — IOHEXOL 100 ML: 350 INJECTION, SOLUTION INTRAVENOUS at 11:25

## 2023-06-01 NOTE — DISCHARGE INSTRUCTIONS
Please follow-up with endocrinology  Return to the ER with any worsening symptoms, difficulty swallowing, difficulty breathing

## 2023-06-01 NOTE — ED PROVIDER NOTES
History  Chief Complaint   Patient presents with   • Mass     Pt reports a large lump on the right side of her throat that she noticed last night  Pt denies any trouble breathing or swallowing but report mild pain and has a hoarse voice like she feels like she is getting  a cold      18yo female with no significant past medical history presenting for evaluation of neck swelling  She noticed a large lump on her right anterior neck yesterday that is mildly tender  She is unsure if the lump is an enlarged lymph node so she decided to come to the ED  She also has a mild sore throat and congestion  No fevers, dysphagia, difficulty breathing, voice change  History provided by:  Patient   used: No    Medical Problem  Location:  Right anterior neck  Quality:  Swelling  Severity:  Moderate  Duration:  1 day  Timing:  Constant  Progression:  Unchanged  Chronicity:  New  Context:  URI symptoms  Relieved by:  Nothing  Worsened by:  Nothing  Associated symptoms: congestion, rhinorrhea and sore throat    Associated symptoms: no chest pain, no fever, no nausea, no rash, no shortness of breath and no vomiting        Prior to Admission Medications   Prescriptions Last Dose Informant Patient Reported? Taking?    albuterol (Proventil HFA) 90 mcg/act inhaler   No No   Sig: Inhale 2 puffs every 6 (six) hours as needed for wheezing   benzonatate (TESSALON) 200 MG capsule   No No   Sig: Take 1 capsule (200 mg total) by mouth 3 (three) times a day as needed for cough   diphenhydrAMINE (BENADRYL) 25 mg capsule   No No   Sig: Take 1 capsule (25 mg total) by mouth every 6 (six) hours as needed for itching   fluticasone (FLONASE) 50 mcg/act nasal spray   No No   Si spray into each nostril daily   ibuprofen (MOTRIN) 400 mg tablet   No No   Sig: Take 1 tablet (400 mg total) by mouth every 6 (six) hours as needed for mild pain   methocarbamol (ROBAXIN) 500 mg tablet   No No   Sig: Take 1 tablet (500 mg total) by mouth 3 (three) times a day as needed for muscle spasms   Patient not taking: Reported on 4/6/2023   montelukast (SINGULAIR) 10 mg tablet  Self Yes No   Sig: Take 10 mg by mouth daily at bedtime   naproxen (NAPROSYN) 500 mg tablet   No No   Sig: Take 1 tablet by mouth every 12 (twelve) hours as needed for mild pain or moderate pain   ondansetron (ZOFRAN) 4 mg tablet   No No   Sig: Take 1 tablet (4 mg total) by mouth every 6 (six) hours   ondansetron (ZOFRAN-ODT) 4 mg disintegrating tablet   No No   Sig: Take 1 tablet by mouth every 6 (six) hours as needed for nausea or vomiting      Facility-Administered Medications: None       Past Medical History:   Diagnosis Date   • Asthma    • Ear infection        History reviewed  No pertinent surgical history  History reviewed  No pertinent family history  I have reviewed and agree with the history as documented  E-Cigarette/Vaping   • E-Cigarette Use Never User      E-Cigarette/Vaping Substances   • Nicotine No    • THC No    • CBD No    • Flavoring No    • Other No    • Unknown No      Social History     Tobacco Use   • Smoking status: Never   • Smokeless tobacco: Never   Vaping Use   • Vaping Use: Never used   Substance Use Topics   • Alcohol use: Yes     Alcohol/week: 1 0 standard drink of alcohol     Types: 1 Standard drinks or equivalent per week   • Drug use: Never       Review of Systems   Constitutional: Negative for chills and fever  HENT: Positive for congestion, rhinorrhea and sore throat  Negative for drooling, trouble swallowing and voice change  +Neck swelling   Eyes: Negative for discharge and redness  Respiratory: Negative for shortness of breath and stridor  Cardiovascular: Negative for chest pain and leg swelling  Gastrointestinal: Negative for nausea and vomiting  Musculoskeletal: Negative for neck pain and neck stiffness  Skin: Negative for color change and rash  Neurological: Negative for seizures and syncope  Psychiatric/Behavioral: Negative for confusion  The patient is not nervous/anxious  All other systems reviewed and are negative  Physical Exam  Physical Exam  Vitals and nursing note reviewed  Constitutional:       General: She is not in acute distress  Appearance: Normal appearance  She is not toxic-appearing  HENT:      Head: Normocephalic and atraumatic  Right Ear: External ear normal       Left Ear: External ear normal       Mouth/Throat:      Mouth: Mucous membranes are moist       Pharynx: Posterior oropharyngeal erythema present  No oropharyngeal exudate  Comments: Airway patent  Normal phonation  Handling oral secretions without difficulty  Eyes:      General: No scleral icterus  Right eye: No discharge  Left eye: No discharge  Conjunctiva/sclera: Conjunctivae normal    Neck:      Thyroid: Thyroid mass present  Cardiovascular:      Rate and Rhythm: Normal rate  Pulmonary:      Effort: Pulmonary effort is normal  No respiratory distress  Breath sounds: No stridor  Musculoskeletal:         General: No deformity  Normal range of motion  Cervical back: Normal range of motion and neck supple  Lymphadenopathy:      Cervical: No cervical adenopathy  Skin:     General: Skin is warm and dry  Neurological:      General: No focal deficit present  Mental Status: She is alert  Mental status is at baseline     Psychiatric:         Mood and Affect: Mood normal          Behavior: Behavior normal          Vital Signs  ED Triage Vitals [06/01/23 0959]   Temperature Pulse Respirations Blood Pressure SpO2   98 1 °F (36 7 °C) 80 16 (!) 157/103 98 %      Temp Source Heart Rate Source Patient Position - Orthostatic VS BP Location FiO2 (%)   Tympanic Monitor Sitting Left arm --      Pain Score       --           Vitals:    06/01/23 1300 06/01/23 1400 06/01/23 1415 06/01/23 1430   BP: 133/97      Pulse: 82 92 87 95   Patient Position - Orthostatic VS: Lying            Visual Acuity      ED Medications  Medications   iohexol (OMNIPAQUE) 350 MG/ML injection (SINGLE-DOSE) 100 mL (100 mL Intravenous Given 6/1/23 1125)       Diagnostic Studies  Results Reviewed     Procedure Component Value Units Date/Time    T4, free [[id number]]  (Normal) Collected: 06/01/23 1033    Lab Status: Final result Specimen: Blood from Arm, Right Updated: 06/01/23 2126     Free T4 0 68 ng/dL     TSH, 3rd generation with Free T4 reflex [[id number]]  (Abnormal) Collected: 06/01/23 1033    Lab Status: Final result Specimen: Blood from Arm, Right Updated: 06/01/23 1332     TSH 3RD GENERATON 15 515 uIU/mL     Strep A PCR [[id number]]  (Normal) Collected: 06/01/23 1034    Lab Status: Final result Specimen: Throat Updated: 06/01/23 1106     STREP A PCR Not Detected    Basic metabolic panel [[id number]] Collected: 06/01/23 1033    Lab Status: Final result Specimen: Blood from Arm, Right Updated: 06/01/23 1058     Sodium 136 mmol/L      Potassium 3 9 mmol/L      Chloride 103 mmol/L      CO2 26 mmol/L      ANION GAP 7 mmol/L      BUN 15 mg/dL      Creatinine 0 74 mg/dL      Glucose 101 mg/dL      Calcium 9 1 mg/dL      eGFR 116 ml/min/1 73sq m     Narrative:      [place] guidelines for Chronic Kidney Disease (CKD):   •  Stage 1 with normal or high GFR (GFR > 90 mL/min/1 73 square meters)  •  Stage 2 Mild CKD (GFR = 60-89 mL/min/1 73 square meters)  •  Stage 3A Moderate CKD (GFR = 45-59 mL/min/1 73 square meters)  •  Stage 3B Moderate CKD (GFR = 30-44 mL/min/1 73 square meters)  •  Stage 4 Severe CKD (GFR = 15-29 mL/min/1 73 square meters)  •  Stage 5 End Stage CKD (GFR <15 mL/min/1 73 square meters)  Note: GFR calculation is accurate only with a steady state creatinine    CBC and differential [[id number]] Collected: 06/01/23 1033    Lab Status: Final result Specimen: Blood from Arm, Right Updated: 06/01/23 1041     WBC 7 62 Thousand/uL      RBC 4 46 Million/uL      Hemoglobin 12 5 g/dL      Hematocrit 38 4 %      MCV 86 fL      MCH 28 0 pg      MCHC 32 6 g/dL      RDW 12 6 %      MPV 9 5 fL      Platelets 190 Thousands/uL      nRBC 0 /100 WBCs      Neutrophils Relative 66 %      Immat GRANS % 0 %      Lymphocytes Relative 22 %      Monocytes Relative 7 %      Eosinophils Relative 4 %      Basophils Relative 1 %      Neutrophils Absolute 5 09 Thousands/µL      Immature Grans Absolute 0 02 Thousand/uL      Lymphocytes Absolute 1 66 Thousands/µL      Monocytes Absolute 0 52 Thousand/µL      Eosinophils Absolute 0 29 Thousand/µL      Basophils Absolute 0 04 Thousands/µL                  US thyroid   Final Result by Brett Thurman MD (06/01 1448)      Enlarged hypoechoic thyroid with heterogeneity and diminished Doppler flow, findings most consistent with acute de Quervain thyroiditis given the presentation and onset  Reference: ACR Thyroid Imaging, Reporting and Data System (TI-RADS): White Paper of the [employer]  J AM Krystal Radiol 1303;83:164-763  (additional recommendations based on American Thyroid Association 2015 guidelines )      The study was marked in [facility] for immediate notification  Workstation performed: [id number]         CT soft tissue neck with contrast   Final Result by Shelby Oliver MD (06/01 1318)      Large low-attenuation right greater than left thyroid lobe lesions with associated luminal narrowing of the trachea  Findings are new in the interval since 1/31/2018 and should be correlated with ultrasound  No pathologically enlarged cervical lymphadenopathy by CT size criteria  Workstation performed: [id number]                    Procedures  Procedures         ED Course               SBIRT 20yo+    Flowsheet Row Most Recent Value   Initial Alcohol Screen: US AUDIT-C     1  How often do you have a drink containing alcohol? 1 Filed at: 06/01/2023 1034   2  How many drinks containing alcohol do you have on a typical day you are drinking?   1 Filed at: 06/01/2023 1034   3a  Male UNDER 65: How often do you have five or more drinks on one occasion? 1 Filed at: 06/01/2023 1034   3b  FEMALE Any Age, or MALE 65+: How often do you have 4 or more drinks on one occassion? 1 Filed at: 06/01/2023 1034   Audit-C Score 4 Filed at: 06/01/2023 1034   DINAH: How many times in the past year have you    Used an illegal drug or used a prescription medication for non-medical reasons? Never Filed at: 06/01/2023 1034                    Medical Decision Making  22yoF presenting for neck swelling that she first noticed yesterday  Also having URI symptoms including sore throat and congestion  On exam, a large mass is palpated on the right neck that is mildly tender  Airway is patent and phonation is normal  There is mild erythema to posterior oropharynx  Initial ED plan: Check CBC, BMP, strep PCR, and CT soft tissue neck  Final assessment: Labs unremarkable including normal white count  Strep negative  CT shows large R>L thyroid lobe lesions with mild narrowing of trachea  Discussed with radiology who recommends thyroid ultrasound in the ED  TSH and thyroid ultrasound added  Ultrasound findings most consistent with acute de Quervain's thyroiditis and TSH elevated at 15  No indication for admission at this time  Will refer to endocrinology for f/u  ED return precautions discussed  Patient expressed understanding and is agreeable to plan  Patient discharged in stable condition  Thyroiditis: acute illness or injury  Amount and/or Complexity of Data Reviewed  Labs: ordered  Radiology: ordered  Risk  Prescription drug management            Disposition  Final diagnoses:   Thyroiditis     Time reflects when diagnosis was documented in both MDM as applicable and the Disposition within this note     Time User Action Codes Description Comment    6/1/2023  3:[address]  [address] [E06 9] Thyroiditis       ED Disposition     ED Disposition   Discharge    Condition Stable    Date/Time   Thu Jun 1, 2023  3:11 PM    [facility] discharge to home/self care                 Follow-up Information     Follow up With Specialties Details Why Contact Info Additional [address] [facility] Schedule an appointment as soon as possible for a visit   [address]  [address] [address]  [address] [facility] F[facility] Mariano, [address] [address],[facility] [address], [place], [address], [address] [place] [address] Emergency Department Emergency Medicine  If symptoms worsen [facility] Emergency Department, [address] [place], [place], [facility]          Discharge Medication List as of 6/1/2023  3:13 PM      CONTINUE these medications which have NOT CHANGED    Details   albuterol (Proventil HFA) 90 mcg/act inhaler Inhale 2 puffs every 6 (six) hours as needed for wheezing, Starting Thu 4/6/2023, Normal      benzonatate (TESSALON) 200 MG capsule Take 1 capsule (200 mg total) by mouth 3 (three) times a day as needed for cough, Starting Thu 4/6/2023, Normal      diphenhydrAMINE (BENADRYL) 25 mg capsule Take 1 capsule (25 mg total) by mouth every 6 (six) hours as needed for itching, Starting Mon 11/19/2018, Print      fluticasone (FLONASE) 50 mcg/act nasal spray 1 spray into each nostril daily, Starting Thu 1/19/2023, Normal      ibuprofen (MOTRIN) 400 mg tablet Take 1 tablet (400 mg total) by mouth every 6 (six) hours as needed for mild pain, Starting Tue 4/16/2019, Print      methocarbamol (ROBAXIN) 500 mg tablet Take 1 tablet (500 mg total) by mouth 3 (three) times a day as needed for muscle spasms, Starting Tue 8/2/2022, Normal      montelukast (SINGULAIR) 10 mg tablet Take 10 mg by mouth daily at bedtime, Historical Med      naproxen (NAPROSYN) 500 mg tablet Take 1 tablet by mouth every 12 (twelve) hours as needed for mild pain or moderate pain, Starting Sat 8/12/2017, Print      ondansetron (ZOFRAN) 4 mg tablet Take 1 tablet (4 mg total) by mouth every 6 (six) hours, Starting Sun 2/12/2023, Normal      ondansetron (ZOFRAN-ODT) 4 mg disintegrating tablet Take 1 tablet by mouth every 6 (six) hours as needed for nausea or vomiting, Starting Sat 8/12/2017, Print             No discharge procedures on file      PDMP Review     None          ED Provider  Electronically Signed by           Geeta Milner PA-C  06/02/23 1384

## 2023-06-19 ENCOUNTER — OFFICE VISIT (OUTPATIENT)
Dept: ENDOCRINOLOGY | Facility: CLINIC | Age: 23
End: 2023-06-19
Payer: COMMERCIAL

## 2023-06-19 ENCOUNTER — TELEPHONE (OUTPATIENT)
Dept: HEMATOLOGY ONCOLOGY | Facility: CLINIC | Age: 23
End: 2023-06-19

## 2023-06-19 ENCOUNTER — APPOINTMENT (OUTPATIENT)
Dept: LAB | Facility: CLINIC | Age: 23
End: 2023-06-19
Payer: COMMERCIAL

## 2023-06-19 VITALS
BODY MASS INDEX: 48.34 KG/M2 | TEMPERATURE: 98.1 F | DIASTOLIC BLOOD PRESSURE: 86 MMHG | SYSTOLIC BLOOD PRESSURE: 144 MMHG | WEIGHT: 272.8 LBS | HEART RATE: 115 BPM | RESPIRATION RATE: 20 BRPM | OXYGEN SATURATION: 97 % | HEIGHT: 63 IN

## 2023-06-19 DIAGNOSIS — E03.9 ACQUIRED HYPOTHYROIDISM: ICD-10-CM

## 2023-06-19 DIAGNOSIS — E06.3 HYPOTHYROIDISM DUE TO HASHIMOTO'S THYROIDITIS: Primary | ICD-10-CM

## 2023-06-19 DIAGNOSIS — E06.9 GOITER DUE TO THYROIDITIS: ICD-10-CM

## 2023-06-19 DIAGNOSIS — E06.1 SUBACUTE THYROIDITIS: ICD-10-CM

## 2023-06-19 DIAGNOSIS — E03.8 HYPOTHYROIDISM DUE TO HASHIMOTO'S THYROIDITIS: Primary | ICD-10-CM

## 2023-06-19 DIAGNOSIS — E04.9 GOITER DUE TO THYROIDITIS: ICD-10-CM

## 2023-06-19 PROCEDURE — 84439 ASSAY OF FREE THYROXINE: CPT

## 2023-06-19 PROCEDURE — 36415 COLL VENOUS BLD VENIPUNCTURE: CPT

## 2023-06-19 PROCEDURE — 84443 ASSAY THYROID STIM HORMONE: CPT

## 2023-06-19 PROCEDURE — 99244 OFF/OP CNSLTJ NEW/EST MOD 40: CPT | Performed by: STUDENT IN AN ORGANIZED HEALTH CARE EDUCATION/TRAINING PROGRAM

## 2023-06-19 PROCEDURE — 86376 MICROSOMAL ANTIBODY EACH: CPT

## 2023-06-19 PROCEDURE — 86800 THYROGLOBULIN ANTIBODY: CPT

## 2023-06-19 RX ORDER — LEVOTHYROXINE SODIUM 13 UG/1
CAPSULE ORAL
Qty: 150 CAPSULE | Refills: 0 | Status: SHIPPED | OUTPATIENT
Start: 2023-06-19

## 2023-06-19 NOTE — TELEPHONE ENCOUNTER
I called Kristen Sullivan in response to a referral that was received for patient to establish care with Surgical Oncology  Outreach was made to schedule a consultation       I left a voicemail explaining the reason for my call and advised patient to call \[facility] [place] [facility]\"" at [phone]  Another attempt will be made to contact patient

## 2023-06-19 NOTE — PROGRESS NOTES
Fitz Solis is a 26 y o  female presents for evaluation of neck swelling  Patient noticed right sided neck swelling and neck discomfort early this month  She reports a large lump on right side of her neck, which was tender to touch  She reports sore throat  she started having difficulty swallowing especially with solid food  She denies difficulty breathing, however she reports occasional noisy breathing  She has an ED visit in June 1st, a CT neck and showed large low-attenuation lesions involving the right greater than left thyroid lobes  This measures up to 3 8 x 4 3 cm, subsequently a thyroid ultrasound was done and revealed enlarged hypoechoic thyroid with heterogeneity and diminished Doppler flow  Thyroid function test showed TSH of 15 515 and free T4 of 0 68 ng/dl  Associated signs/symptoms:     No  weight loss/Yes weight gain  No  Change in appetite  No heat intolerance/No  cold intolerance  No  diarrhea/No constipation  No sweating/No  Tremor/ No palpitation  No  difficulty sleeping  No hair loss/ No dry skin  Yes  fatigue  Mood changes: No     Family history of thyroid disease: Yes, mother with hypothyroidism       All other systems were reviewed and are negative  Review of Systems   Constitutional: Positive for fatigue  Negative for appetite change and unexpected weight change  HENT: Negative for trouble swallowing and voice change  Eyes: Negative for visual disturbance  Respiratory: Negative for cough, shortness of breath and wheezing  Cardiovascular: Negative for palpitations and leg swelling  Gastrointestinal: Negative for abdominal pain, constipation, diarrhea, nausea and vomiting  Endocrine: Negative for cold intolerance, heat intolerance, polyphagia and polyuria  Musculoskeletal: Negative for arthralgias  Skin: Negative for color change, rash and wound  Neurological: Negative for dizziness, tremors, weakness, light-headedness, numbness and headaches  "  Psychiatric/Behavioral: Negative for agitation and sleep disturbance  The patient is not nervous/anxious  Past medical/surgical history  Past Medical History:   Diagnosis Date   • Asthma    • Ear infection            History reviewed  No pertinent surgical history  Family History:  No family history on file  Social History:  Social History     Substance and Sexual Activity   Alcohol Use Yes   • Alcohol/week: 1 0 standard drink of alcohol   • Types: 1 Standard drinks or equivalent per week     Social History     Tobacco Use   Smoking Status Never   Smokeless Tobacco Never         Physical Examination:  Vitals:    06/19/23 0945   BP: 144/86   Pulse: (!) 115   Resp: 20   Temp: 98 1 °F (36 7 °C)   SpO2: 97%       General appearance - alert, well appearing, and in no distress, obese   Mental status - normal mood, behavior, speech, dress, motor activity, and thought processes  Head/Eyes:  NC/AT EOMI; no staring gaze/ lid lag  mucous membranes moist, pharynx normal without lesions  Neck - enlarged thyroid bilaterally, especially right sided, with mild tenderness    Chest - clear to auscultation, no wheezes, rales or rhonchi, symmetric air entry  Heart - normal rate, regular rhythm, normal S1, S2, no murmurs       Labs:    Lab Results   Component Value Date    [id number] 15 515 (H) 06/01/2023       Component      Latest Ref Rng & Units 6/1/2023 6/19/2023   TSH 3RD GENERATON      0 450 - 4 500 uIU/mL 15 515 (H) 26 654 (H)   Free T4      0 61 - 1 12 ng/dL 0 68 0 64   THYROID MICROSOMAL ANTIBODY      0 - 34 IU/mL  115 (H)   THYROGLOBULIN AB      0 0 - 0 9 IU/mL  1 0 (H)        No components found for: \"THYGLOB\", \"THYGLOBAB\"        Lab Results   Component Value Date    BUN 15 06/01/2023    K 3 9 06/01/2023     06/01/2023    CO2 26 06/01/2023        Lab Results   Component Value Date    CALCIUM 9 1 06/01/2023        Imaging:     THYROID ULTRASOUND     INDICATION:    abnormal CT, thyroid lesions, neck " swelling      COMPARISON: CT soft tissue neck 6/1/2023      TECHNIQUE:   Ultrasound of the thyroid was performed with a high frequency linear transducer in transverse and sagittal planes including volumetric imaging sweeps as well as traditional still imaging technique      FINDINGS: The thyroid is enlarged with rounded margins , right lobe greater than left, and has large areas of heterogeneous hypoechogenicity with diminished Doppler flow  No focal solid mass      Right lobe: 7 4 x 4 2 x 4 7 cm  Volume 70 5 mL  Left lobe:  6 5 x 3 3 x 3 5 cm  Volume 35 7 mL  Isthmus: 1 4  cm      IMPRESSION:     Enlarged hypoechoic thyroid with heterogeneity and diminished Doppler flow, findings most consistent with acute de Quervain thyroiditis given the presentation and onset      CT NECK WITH CONTRAST     INDICATION:   Right sided neck pain/swelling      COMPARISON: 1/31/2018     TECHNIQUE:  Axial, sagittal, and coronal 2D reformatted images were created from the axial source data and submitted for interpretation      Radiation dose length product (DLP) for this visit:  606 mGy-cm     This examination, like all CT scans performed in the [facility], was performed utilizing techniques to minimize radiation dose exposure, including the use of iterative   reconstruction and automated exposure control      IV Contrast:  100 mL of iohexol (OMNIPAQUE)     IMAGE QUALITY:  Diagnostic      FINDINGS:     VISUALIZED BRAIN PARENCHYMA:  No acute intracranial pathology of the visualized brain parenchyma      VISUALIZED ORBITS: Normal visualized orbits      PARANASAL SINUSES: Minimal mucosal thickening of the maxillary sinuses      NASAL CAVITY AND NASOPHARYNX:  Normal      SUPRAHYOID NECK:  Normal oral cavity, tongue base, tonsillar fossa and epiglottis      INFRAHYOID NECK:  Aryepiglottic folds and piriform sinuses are normal   Normal glottis and subglottic airway      THYROID GLAND: There are large low-attenuation lesions involving the right greater than left thyroid lobes  This measures up to 3 8 x 4 3 cm on series 2, image 85 along the right thyroid gland  Large conglomerate lesion in the left thyroid lobe measures   3 3 x 3 0 cm  There is mild narrowing of the trachea      PAROTID AND SUBMANDIBULAR GLANDS:  Normal      LYMPH NODES:  No pathologic or enlarged adenopathy      VASCULAR STRUCTURES:  Normal enhancement of the cervical vasculature      THORACIC INLET:  Lung apices and upper mediastinum are unremarkable      BONY STRUCTURES: No acute fracture or destructive osseous lesion      IMPRESSION:     Large low-attenuation right greater than left thyroid lobe lesions with associated luminal narrowing of the trachea  Findings are new in the interval since 1/31/2018 and should be correlated with ultrasound      No pathologically enlarged cervical lymphadenopathy by CT size criteria      Workstation performed: [id number]       Previous records including pertinent laboratory and radiographic results were reviewed and are summarized in the HPI and plan below  ASSESSMENT/PLAN:                1  Acquired hypothyroidism    - Levothyroxine Sodium 150 MCG CAPS; 150 mcg once daily  Dispense: 150 capsule; Refill: 0  - Thyroid Antibodies Panel Lab Collect; Future  - T4, free Lab Collect; Future  - TSH, 3rd generation Lab Collect; Future    2  Diffuse goiter due to thyroiditis    - Ambulatory Referral to Interventional Radiology; Future    3  Subacute thyroiditis    - Ambulatory Referral to Interventional Radiology; Future     patient has diffuse goiter with pain associated with compressive symptoms including difficulty swallowing and mild occasional stridor  Ultrasound showed Enlarged hypoechoic thyroid with heterogeneity and diminished Doppler flow and evidence of trachea narrowing in CT  Patient clinically is euthyroid however TFT showed TSH of 15 515 and free T4 of 0 68    Physical exam demonstrated large thyroid, firm, not tender,  I "reviewed with her differential diagnosis, given her presentation of symptoms, physical exam findings,   TFT results and imaging findings likely diagnosis is subacute thyroiditis, currently at hypothyroidism phase, I reviewed with her given level of TSH , would consider levothyroxine therapy, 150 mcg daily, I did repeated TFT along with thyroid autoantibody as was checked few weeks ago  Acute infectious thyroiditis is unlikely, thyroid ultrasound findings are not in favor of abscess or any collections  Given firmness of thyroid and compressive symptoms Reidel's thyroiditis should be ruled out  I referred her to IR for thyroid core biopsy  Given her symptoms steroid is warranted, we may start 40 mg daily for 1 week and then will slowly taper off in 6 weeks  Discussed and reviewed with radiologist     Portions of the record may have been created with voice recognition software  Occasional wrong word or \"sound a like\" substitutions may have occurred due to the inherent limitations of voice recognition software  Read the chart carefully and recognize, using context, where substitutions have occurred  I have spent a total time of 50 minutes on 06/20/23 in caring for this patient including Diagnostic results, Prognosis, Risks and benefits of tx options, Instructions for management, Patient and family education, Impressions, Counseling / Coordination of care, Documenting in the medical record, Reviewing / ordering tests, medicine, procedures   and Obtaining or reviewing history          "

## 2023-06-20 LAB
T4 FREE SERPL-MCNC: 0.64 NG/DL (ref 0.61–1.12)
THYROGLOB AB SERPL-ACNC: 1 IU/ML (ref 0–0.9)
THYROPEROXIDASE AB SERPL-ACNC: 115 IU/ML (ref 0–34)
TSH SERPL DL<=0.05 MIU/L-ACNC: 26.65 UIU/ML (ref 0.45–4.5)

## 2023-06-21 ENCOUNTER — TELEPHONE (OUTPATIENT)
Dept: ENDOCRINOLOGY | Facility: CLINIC | Age: 23
End: 2023-06-21

## 2023-06-21 DIAGNOSIS — E06.1 SUBACUTE THYROIDITIS: Primary | ICD-10-CM

## 2023-06-21 RX ORDER — PREDNISONE 20 MG/1
20 TABLET ORAL DAILY
Qty: 30 TABLET | Refills: 0 | Status: SHIPPED | OUTPATIENT
Start: 2023-06-21 | End: 2023-06-29

## 2023-06-23 ENCOUNTER — APPOINTMENT (EMERGENCY)
Dept: CT IMAGING | Facility: HOSPITAL | Age: 23
End: 2023-06-23
Payer: COMMERCIAL

## 2023-06-23 ENCOUNTER — HOSPITAL ENCOUNTER (EMERGENCY)
Facility: HOSPITAL | Age: 23
Discharge: HOME | End: 2023-06-24
Attending: EMERGENCY MEDICINE
Payer: COMMERCIAL

## 2023-06-23 VITALS
DIASTOLIC BLOOD PRESSURE: 97 MMHG | RESPIRATION RATE: 16 BRPM | BODY MASS INDEX: 47.84 KG/M2 | HEIGHT: 63 IN | WEIGHT: 270 LBS | TEMPERATURE: 98 F | HEART RATE: 88 BPM | OXYGEN SATURATION: 99 % | SYSTOLIC BLOOD PRESSURE: 170 MMHG

## 2023-06-23 DIAGNOSIS — R11.2 NAUSEA AND VOMITING: Primary | ICD-10-CM

## 2023-06-23 DIAGNOSIS — N83.201 CYSTS OF BOTH OVARIES: ICD-10-CM

## 2023-06-23 DIAGNOSIS — N83.202 CYSTS OF BOTH OVARIES: ICD-10-CM

## 2023-06-23 DIAGNOSIS — R10.9 ABDOMINAL PAIN: ICD-10-CM

## 2023-06-23 LAB
ALBUMIN SERPL BCP-MCNC: 4.4 G/DL (ref 3.5–5)
ALP SERPL-CCNC: 71 U/L (ref 34–104)
ALT SERPL W P-5'-P-CCNC: 13 U/L (ref 7–52)
ANION GAP SERPL CALCULATED.3IONS-SCNC: 9 MMOL/L
AST SERPL W P-5'-P-CCNC: 15 U/L (ref 13–39)
BASOPHILS # BLD AUTO: 0.03 THOUSANDS/ÂΜL (ref 0–0.1)
BASOPHILS NFR BLD AUTO: 0 % (ref 0–1)
BILIRUB SERPL-MCNC: 0.37 MG/DL (ref 0.2–1)
BUN SERPL-MCNC: 10 MG/DL (ref 5–25)
CALCIUM SERPL-MCNC: 9.2 MG/DL (ref 8.4–10.2)
CHLORIDE SERPL-SCNC: 104 MMOL/L (ref 96–108)
CO2 SERPL-SCNC: 22 MMOL/L (ref 21–32)
CREAT SERPL-MCNC: 0.58 MG/DL (ref 0.6–1.3)
EOSINOPHIL # BLD AUTO: 0.04 THOUSAND/ÂΜL (ref 0–0.61)
EOSINOPHIL NFR BLD AUTO: 0 % (ref 0–6)
ERYTHROCYTE [DISTWIDTH] IN BLOOD BY AUTOMATED COUNT: 12.6 % (ref 11.6–15.1)
GFR SERPL CREATININE-BSD FRML MDRD: 131 ML/MIN/1.73SQ M
GLUCOSE SERPL-MCNC: 105 MG/DL (ref 65–140)
HCG SERPL QL: NEGATIVE
HCT VFR BLD AUTO: 39.3 % (ref 34.8–46.1)
HGB BLD-MCNC: 12.9 G/DL (ref 11.5–15.4)
IMM GRANULOCYTES # BLD AUTO: 0.06 THOUSAND/UL (ref 0–0.2)
IMM GRANULOCYTES NFR BLD AUTO: 1 % (ref 0–2)
LACTATE SERPL-SCNC: 0.9 MMOL/L (ref 0.5–2)
LYMPHOCYTES # BLD AUTO: 1.86 THOUSANDS/ÂΜL (ref 0.6–4.47)
LYMPHOCYTES NFR BLD AUTO: 16 % (ref 14–44)
MCH RBC QN AUTO: 27.9 PG (ref 26.8–34.3)
MCHC RBC AUTO-ENTMCNC: 32.8 G/DL (ref 31.4–37.4)
MCV RBC AUTO: 85 FL (ref 82–98)
MONOCYTES # BLD AUTO: 0.33 THOUSAND/ÂΜL (ref 0.17–1.22)
MONOCYTES NFR BLD AUTO: 3 % (ref 4–12)
NEUTROPHILS # BLD AUTO: 9.28 THOUSANDS/ÂΜL (ref 1.85–7.62)
NEUTS SEG NFR BLD AUTO: 80 % (ref 43–75)
NRBC BLD AUTO-RTO: 0 /100 WBCS
PLATELET # BLD AUTO: 440 THOUSANDS/UL (ref 149–390)
PMV BLD AUTO: 9.2 FL (ref 8.9–12.7)
POTASSIUM SERPL-SCNC: 4.1 MMOL/L (ref 3.5–5.3)
PROT SERPL-MCNC: 8.4 G/DL (ref 6.4–8.4)
RBC # BLD AUTO: 4.62 MILLION/UL (ref 3.81–5.12)
SODIUM SERPL-SCNC: 135 MMOL/L (ref 135–147)
TSH SERPL DL<=0.05 MIU/L-ACNC: 8.15 UIU/ML (ref 0.45–4.5)
WBC # BLD AUTO: 11.6 THOUSAND/UL (ref 4.31–10.16)

## 2023-06-23 PROCEDURE — 84443 ASSAY THYROID STIM HORMONE: CPT | Performed by: EMERGENCY MEDICINE

## 2023-06-23 PROCEDURE — 99284 EMERGENCY DEPT VISIT MOD MDM: CPT

## 2023-06-23 PROCEDURE — 96361 HYDRATE IV INFUSION ADD-ON: CPT

## 2023-06-23 PROCEDURE — 84439 ASSAY OF FREE THYROXINE: CPT | Performed by: EMERGENCY MEDICINE

## 2023-06-23 PROCEDURE — 83605 ASSAY OF LACTIC ACID: CPT | Performed by: EMERGENCY MEDICINE

## 2023-06-23 PROCEDURE — 84703 CHORIONIC GONADOTROPIN ASSAY: CPT | Performed by: EMERGENCY MEDICINE

## 2023-06-23 PROCEDURE — 74177 CT ABD & PELVIS W/CONTRAST: CPT

## 2023-06-23 PROCEDURE — 96374 THER/PROPH/DIAG INJ IV PUSH: CPT

## 2023-06-23 PROCEDURE — 36415 COLL VENOUS BLD VENIPUNCTURE: CPT | Performed by: EMERGENCY MEDICINE

## 2023-06-23 PROCEDURE — 96375 TX/PRO/DX INJ NEW DRUG ADDON: CPT

## 2023-06-23 PROCEDURE — 85025 COMPLETE CBC W/AUTO DIFF WBC: CPT | Performed by: EMERGENCY MEDICINE

## 2023-06-23 PROCEDURE — 80053 COMPREHEN METABOLIC PANEL: CPT | Performed by: EMERGENCY MEDICINE

## 2023-06-23 RX ORDER — ONDANSETRON 2 MG/ML
4 INJECTION INTRAMUSCULAR; INTRAVENOUS ONCE
Status: COMPLETED | OUTPATIENT
Start: 2023-06-23 | End: 2023-06-23

## 2023-06-23 RX ORDER — KETOROLAC TROMETHAMINE 30 MG/ML
15 INJECTION, SOLUTION INTRAMUSCULAR; INTRAVENOUS ONCE
Status: COMPLETED | OUTPATIENT
Start: 2023-06-23 | End: 2023-06-23

## 2023-06-23 RX ADMIN — LEVOTHYROXINE SODIUM 150 MCG: 25 TABLET ORAL at 23:55

## 2023-06-23 RX ADMIN — KETOROLAC TROMETHAMINE 15 MG: 30 INJECTION, SOLUTION INTRAMUSCULAR at 22:14

## 2023-06-23 RX ADMIN — SODIUM CHLORIDE 1000 ML: 0.9 INJECTION, SOLUTION INTRAVENOUS at 22:15

## 2023-06-23 RX ADMIN — ONDANSETRON 4 MG: 2 INJECTION INTRAMUSCULAR; INTRAVENOUS at 22:14

## 2023-06-23 RX ADMIN — IOHEXOL 100 ML: 350 INJECTION, SOLUTION INTRAVENOUS at 23:04

## 2023-06-23 NOTE — Clinical Note
Massiel Kwong was seen and treated in our emergency department on 6/23/2023  Diagnosis: nausea and vomiting    Coral    She may return on this date: 06/26/2023         If you have any questions or concerns, please don't hesitate to call        Kylie Horta MD    ______________________________           _______________          _______________  Hospital Representative                              Date                                Time

## 2023-06-23 NOTE — Clinical Note
Charisse George was seen and treated in our emergency department on 6/23/2023  Diagnosis: nausea and vomiting    Coral    She may return on this date: 06/26/2023         If you have any questions or concerns, please don't hesitate to call        Latricia Liu MD    ______________________________           _______________          _______________  Hospital Representative                              Date                                Time

## 2023-06-24 ENCOUNTER — APPOINTMENT (EMERGENCY)
Dept: ULTRASOUND IMAGING | Facility: HOSPITAL | Age: 23
End: 2023-06-24
Payer: COMMERCIAL

## 2023-06-24 LAB — T4 FREE SERPL-MCNC: 0.51 NG/DL (ref 0.61–1.12)

## 2023-06-24 PROCEDURE — 76856 US EXAM PELVIC COMPLETE: CPT

## 2023-06-24 PROCEDURE — 76830 TRANSVAGINAL US NON-OB: CPT

## 2023-06-24 RX ORDER — ONDANSETRON 4 MG/1
4 TABLET, ORALLY DISINTEGRATING ORAL EVERY 8 HOURS PRN
Qty: 20 TABLET | Refills: 0 | Status: SHIPPED | OUTPATIENT
Start: 2023-06-24

## 2023-06-24 RX ORDER — DICYCLOMINE HCL 20 MG
20 TABLET ORAL 2 TIMES DAILY
Qty: 20 TABLET | Refills: 0 | Status: SHIPPED | OUTPATIENT
Start: 2023-06-24

## 2023-06-24 NOTE — ED PROVIDER NOTES
History  Chief Complaint   Patient presents with   • Vomiting     Pt has been seen here before for a lump in her throat and has been to seen a endocrinologist and is scheduled for a biopsy  Pt started today with vomiting and fevers, chills and body aches and headaches      HPI  28-year-old female with recent diagnosis of thyroiditis, seen in the ED on , follow-up with endocrinology she presents with vomiting, abdominal pain, subjective fevers and chills  She reports she is taking prednisone but has not yet been able to get her levothyroxine due to insurance issues  Pain is diffuse in abdomen, cramping and constant  Prior to Admission Medications   Prescriptions Last Dose Informant Patient Reported? Taking?    Levothyroxine Sodium 150 MCG CAPS   No No   Si mcg once daily   albuterol (Proventil HFA) 90 mcg/act inhaler   No No   Sig: Inhale 2 puffs every 6 (six) hours as needed for wheezing   benzonatate (TESSALON) 200 MG capsule   No No   Sig: Take 1 capsule (200 mg total) by mouth 3 (three) times a day as needed for cough   diphenhydrAMINE (BENADRYL) 25 mg capsule   No No   Sig: Take 1 capsule (25 mg total) by mouth every 6 (six) hours as needed for itching   fluticasone (FLONASE) 50 mcg/act nasal spray   No No   Si spray into each nostril daily   ibuprofen (MOTRIN) 400 mg tablet   No No   Sig: Take 1 tablet (400 mg total) by mouth every 6 (six) hours as needed for mild pain   methocarbamol (ROBAXIN) 500 mg tablet   No No   Sig: Take 1 tablet (500 mg total) by mouth 3 (three) times a day as needed for muscle spasms   Patient not taking: Reported on 2023   montelukast (SINGULAIR) 10 mg tablet  Self Yes No   Sig: Take 10 mg by mouth daily at bedtime   naproxen (NAPROSYN) 500 mg tablet   No No   Sig: Take 1 tablet by mouth every 12 (twelve) hours as needed for mild pain or moderate pain   ondansetron (ZOFRAN) 4 mg tablet   No No   Sig: Take 1 tablet (4 mg total) by mouth every 6 (six) hours ondansetron (ZOFRAN-ODT) 4 mg disintegrating tablet   No No   Sig: Take 1 tablet by mouth every 6 (six) hours as needed for nausea or vomiting   predniSONE 20 mg tablet   No No   Sig: Take 1 tablet (20 mg total) by mouth daily      Facility-Administered Medications: None       Past Medical History:   Diagnosis Date   • Asthma    • Ear infection        History reviewed  No pertinent surgical history  History reviewed  No pertinent family history  I have reviewed and agree with the history as documented  E-Cigarette/Vaping   • E-Cigarette Use Never User      E-Cigarette/Vaping Substances   • Nicotine No    • THC No    • CBD No    • Flavoring No    • Other No    • Unknown No      Social History     Tobacco Use   • Smoking status: Never   • Smokeless tobacco: Never   Vaping Use   • Vaping Use: Never used   Substance Use Topics   • Alcohol use: Yes     Alcohol/week: 1 0 standard drink of alcohol     Types: 1 Standard drinks or equivalent per week   • Drug use: Never       Review of Systems   Constitutional: Positive for chills and fever  HENT: Negative for dental problem and ear pain  Eyes: Negative for pain and redness  Respiratory: Negative for cough and shortness of breath  Cardiovascular: Negative for chest pain and palpitations  Gastrointestinal: Positive for abdominal pain, nausea and vomiting  Endocrine: Negative for polydipsia and polyphagia  Genitourinary: Negative for dysuria and frequency  Musculoskeletal: Negative for arthralgias and joint swelling  Skin: Negative for color change and rash  Neurological: Negative for dizziness and headaches  Psychiatric/Behavioral: Negative for behavioral problems and confusion  All other systems reviewed and are negative  Physical Exam  Physical Exam  Vitals and nursing note reviewed  Constitutional:       General: She is not in acute distress  Appearance: She is well-developed  She is not diaphoretic     HENT:      Head: Atraumatic  Right Ear: External ear normal       Left Ear: External ear normal       Nose: Nose normal    Eyes:      Conjunctiva/sclera: Conjunctivae normal       Pupils: Pupils are equal, round, and reactive to light  Neck:      Vascular: No JVD  Cardiovascular:      Rate and Rhythm: Normal rate and regular rhythm  Heart sounds: Normal heart sounds  No murmur heard  Pulmonary:      Effort: Pulmonary effort is normal  No respiratory distress  Breath sounds: Normal breath sounds  No wheezing  Abdominal:      General: Bowel sounds are normal  There is no distension  Palpations: Abdomen is soft  Tenderness: There is abdominal tenderness  Musculoskeletal:         General: Normal range of motion  Cervical back: Normal range of motion and neck supple  Skin:     General: Skin is warm and dry  Capillary Refill: Capillary refill takes less than 2 seconds  Neurological:      Mental Status: She is alert and oriented to person, place, and time  Cranial Nerves: No cranial nerve deficit     Psychiatric:         Behavior: Behavior normal          Vital Signs  ED Triage Vitals   Temperature Pulse Respirations Blood Pressure SpO2   06/23/23 2151 06/23/23 2151 06/23/23 2151 06/23/23 2151 06/23/23 2151   98 °F (36 7 °C) 104 18 170/97 95 %      Temp Source Heart Rate Source Patient Position - Orthostatic VS BP Location FiO2 (%)   06/23/23 2151 06/23/23 2151 06/23/23 2151 06/23/23 2151 --   Tympanic Monitor Sitting Left arm       Pain Score       06/23/23 2214       7           Vitals:    06/23/23 2151 06/23/23 2315   BP: 170/97    Pulse: 104 88   Patient Position - Orthostatic VS: Sitting Sitting         Visual Acuity      ED Medications  Medications   sodium chloride 0 9 % bolus 1,000 mL (0 mL Intravenous Stopped 6/23/23 2315)   ketorolac (TORADOL) injection 15 mg (15 mg Intravenous Given 6/23/23 2214)   ondansetron (ZOFRAN) injection 4 mg (4 mg Intravenous Given 6/23/23 2214) iohexol (OMNIPAQUE) 350 MG/ML injection (SINGLE-DOSE) 100 mL (100 mL Intravenous Given 6/23/23 2304)   levothyroxine tablet 150 mcg (150 mcg Oral Given 6/23/23 5755)       Diagnostic Studies  Results Reviewed     Procedure Component Value Units Date/Time    Pregnancy Test (HCG Qualitative) [[id number]]  (Normal) Collected: 06/23/23 2212    Lab Status: Final result Specimen: Blood from Arm, Left Updated: 06/23/23 2256     Preg, Serum Negative    TSH, 3rd generation with Free T4 reflex [[id number]]  (Abnormal) Collected: 06/23/23 2212    Lab Status: Final result Specimen: Blood from Arm, Left Updated: 06/23/23 2256     TSH 3RD GENERATON 8 147 uIU/mL     T4, free [[id number]] Collected: 06/23/23 2212    Lab Status:  In process Specimen: Blood from Arm, Left Updated: 06/23/23 2256    Comprehensive metabolic panel [[id number]]  (Abnormal) Collected: 06/23/23 2212    Lab Status: Final result Specimen: Blood from Arm, Left Updated: 06/23/23 2237     Sodium 135 mmol/L      Potassium 4 1 mmol/L      Chloride 104 mmol/L      CO2 22 mmol/L      ANION GAP 9 mmol/L      BUN 10 mg/dL      Creatinine 0 58 mg/dL      Glucose 105 mg/dL      Calcium 9 2 mg/dL      AST 15 U/L      ALT 13 U/L      Alkaline Phosphatase 71 U/L      Total Protein 8 4 g/dL      Albumin 4 4 g/dL      Total Bilirubin 0 37 mg/dL      eGFR 131 ml/min/1 73sq m     Narrative:      Marjorie guidelines for Chronic Kidney Disease (CKD):   •  Stage 1 with normal or high GFR (GFR > 90 mL/min/1 73 square meters)  •  Stage 2 Mild CKD (GFR = 60-89 mL/min/1 73 square meters)  •  Stage 3A Moderate CKD (GFR = 45-59 mL/min/1 73 square meters)  •  Stage 3B Moderate CKD (GFR = 30-44 mL/min/1 73 square meters)  •  Stage 4 Severe CKD (GFR = 15-29 mL/min/1 73 square meters)  •  Stage 5 End Stage CKD (GFR <15 mL/min/1 73 square meters)  Note: GFR calculation is accurate only with a steady state creatinine    Lactic acid, plasma (w/reflex if result > 2 0) [[id number]]  (Normal) Collected: 06/23/23 2212    Lab Status: Final result Specimen: Blood from Arm, Left Updated: 06/23/23 2236     LACTIC ACID 0 9 mmol/L     Narrative:      Result may be elevated if tourniquet was used during collection  CBC and differential [[id number]]  (Abnormal) Collected: 06/23/23 2212    Lab Status: Final result Specimen: Blood from Arm, Left Updated: 06/23/23 2222     WBC 11 60 Thousand/uL      RBC 4 62 Million/uL      Hemoglobin 12 9 g/dL      Hematocrit 39 3 %      MCV 85 fL      MCH 27 9 pg      MCHC 32 8 g/dL      RDW 12 6 %      MPV 9 2 fL      Platelets 291 Thousands/uL      nRBC 0 /100 WBCs      Neutrophils Relative 80 %      Immat GRANS % 1 %      Lymphocytes Relative 16 %      Monocytes Relative 3 %      Eosinophils Relative 0 %      Basophils Relative 0 %      Neutrophils Absolute 9 28 Thousands/µL      Immature Grans Absolute 0 06 Thousand/uL      Lymphocytes Absolute 1 86 Thousands/µL      Monocytes Absolute 0 33 Thousand/µL      Eosinophils Absolute 0 04 Thousand/µL      Basophils Absolute 0 03 Thousands/µL     UA w Reflex to Microscopic w Reflex to Culture [[id number]]     Lab Status: No result Specimen: Urine                  US pelvis complete w transvaginal   Final Result by Heladio Giron DO (06/24 0148)      No evidence of ovarian torsion at the time of examination  Probable bilateral hemorrhagic cysts  Recommend 12-week follow-up ultrasound to assess stability/resolution  The study was marked in [facility] for immediate notification  Workstation performed: [id number]         CT abdomen pelvis with contrast   Final Result by Heladio Giron DO (06/23 8807)      No acute abnormality  0 2 cm nonobstructing left lower pole renal calculus      5 1 cm left ovarian cyst  2 7 cm right adnexal cyst  Recommend outpatient pelvic ultrasound for complete evaluation   However, if there is concern for ovarian torsion, recommend emergent ultrasound The study was marked in [facility] for immediate notification  Workstation performed: [id number]                    Procedures  Procedures         ED Course                               SBIRT 20yo+    Flowsheet Row Most Recent Value   Initial Alcohol Screen: US AUDIT-C     1  How often do you have a drink containing alcohol? 0 Filed at: 06/23/2023 2155   2  How many drinks containing alcohol do you have on a typical day you are drinking? 0 Filed at: 06/23/2023 2155   3b  FEMALE Any Age, or MALE 65+: How often do you have 4 or more drinks on one occassion? 0 Filed at: 06/23/2023 2155   Audit-C Score 0 Filed at: 06/23/2023 2155   DINAH: How many times in the past year have you    Used an illegal drug or used a prescription medication for non-medical reasons? Never Filed at: 06/23/2023 2155                    [facility]  Patient presents with nausea, vomiting, fevers and abdominal pain  Recent diagnosis of thyroiditis  TSH is elevated but improved from prior  CT shows ovarian cysts, US no torsion  Patient feels much improved  Discussed follow up with PCP and return precautions given  Disposition  Final diagnoses:   Nausea and vomiting   Abdominal pain   Cysts of both ovaries     Time reflects when diagnosis was documented in both MDM as applicable and the Disposition within this note     Time User Action Codes Description Comment    6/24/2023  1:27 AM James Andrew Add [R11 2] Nausea and vomiting     6/24/2023  1:27 AM James Andrew Add [R10 9] Abdominal pain     6/24/2023  1:27 AM James Majestic Add [N83 201,  N83 202] Cysts of both ovaries       ED Disposition     ED Disposition   Discharge    Condition   Stable    Date/Time   Sat Jun 24, 2023  1:36 AM    Comment   Debbie Comfort discharge to home/self care                 Follow-up Information     Follow up With Specialties Details Why [address], DO Family Medicine Call   [address]   [address] [place] [address]  [address]  [phone] Rahel Guerra MD Obstetrics and Gynecology, Obstetrics, Gynecology Call  for gyn [address] [address]  [phone]            Patient's Medications   Discharge Prescriptions    DICYCLOMINE (BENTYL) 20 MG TABLET    Take 1 tablet (20 mg total) by mouth 2 (two) times a day       Start Date: 6/24/2023 End Date: --       Order Dose: 20 mg       Quantity: 20 tablet    Refills: 0    ONDANSETRON (ZOFRAN-ODT) 4 MG DISINTEGRATING TABLET    Take 1 tablet (4 mg total) by mouth every 8 (eight) hours as needed for nausea       Start Date: 6/24/2023 End Date: --       Order Dose: 4 mg       Quantity: 20 tablet    Refills: 0       No discharge procedures on file      PDMP Review     None          ED Provider  Electronically Signed by           Ngoc Pimentel MD  06/24/23 9673

## 2023-06-29 DIAGNOSIS — E06.1 SUBACUTE THYROIDITIS: ICD-10-CM

## 2023-06-29 DIAGNOSIS — J32.9 CHRONIC SINUSITIS, UNSPECIFIED LOCATION: ICD-10-CM

## 2023-06-29 RX ORDER — FLUTICASONE PROPIONATE 50 MCG
SPRAY, SUSPENSION (ML) NASAL
Qty: 16 ML | Refills: 2 | Status: SHIPPED | OUTPATIENT
Start: 2023-06-29

## 2023-06-29 RX ORDER — PREDNISONE 20 MG/1
TABLET ORAL
Qty: 30 TABLET | Refills: 0 | Status: SHIPPED | OUTPATIENT
Start: 2023-06-29

## 2023-07-06 ENCOUNTER — HOSPITAL ENCOUNTER (OUTPATIENT)
Dept: ULTRASOUND IMAGING | Facility: HOSPITAL | Age: 23
Discharge: HOME | End: 2023-07-06
Attending: STUDENT IN AN ORGANIZED HEALTH CARE EDUCATION/TRAINING PROGRAM

## 2023-07-06 ENCOUNTER — PREP FOR PROCEDURE (OUTPATIENT)
Dept: INTERVENTIONAL RADIOLOGY/VASCULAR | Facility: CLINIC | Age: 23
End: 2023-07-06

## 2023-07-06 ENCOUNTER — TELEPHONE (OUTPATIENT)
Dept: ENDOCRINOLOGY | Facility: CLINIC | Age: 23
End: 2023-07-06

## 2023-07-06 DIAGNOSIS — E06.9 GOITER DUE TO THYROIDITIS: ICD-10-CM

## 2023-07-06 DIAGNOSIS — E04.9 GOITER DUE TO THYROIDITIS: ICD-10-CM

## 2023-07-06 DIAGNOSIS — E06.1 SUBACUTE THYROIDITIS: ICD-10-CM

## 2023-07-06 DIAGNOSIS — E04.9 ENLARGED THYROID: Primary | ICD-10-CM

## 2023-07-06 RX ORDER — LIDOCAINE HYDROCHLORIDE 10 MG/ML
5 INJECTION, SOLUTION EPIDURAL; INFILTRATION; INTRACAUDAL; PERINEURAL ONCE
Status: DISCONTINUED | OUTPATIENT
Start: 2023-07-06 | End: 2023-07-10 | Stop reason: HOSPADM

## 2023-07-06 RX ORDER — SODIUM CHLORIDE 9 MG/ML
30 INJECTION, SOLUTION INTRAVENOUS CONTINUOUS
OUTPATIENT
Start: 2023-07-06

## 2023-07-06 NOTE — TELEPHONE ENCOUNTER
Cindy Whittington from scheduling called regarding this patient new order in for the [address] She asked if we could give her a call back at [phone] to discuss the changes/ new order. Radiology did call earlier regarding the [address] and spoke with Dr. Isa Breen and cleared up what kind of thyroid us patient was supposed to be getting.

## 2023-07-11 ENCOUNTER — TELEPHONE (OUTPATIENT)
Dept: INTERVENTIONAL RADIOLOGY/VASCULAR | Facility: HOSPITAL | Age: 23
End: 2023-07-11

## 2023-07-11 ENCOUNTER — TELEPHONE (OUTPATIENT)
Dept: ENDOCRINOLOGY | Facility: CLINIC | Age: 23
End: 2023-07-11

## 2023-07-11 NOTE — TELEPHONE ENCOUNTER
Patients mother left voicemail regarding patients medication, levothyroxine. Mother stated that the pharmacy said that they could not fill the medication as the insurance will not cover it. If the levothyroxine can be changed from capsules to tablets, they can cover it then. Please advise.

## 2023-07-12 DIAGNOSIS — E03.9 ACQUIRED HYPOTHYROIDISM: Primary | ICD-10-CM

## 2023-07-12 DIAGNOSIS — E04.0 DIFFUSE GOITER: ICD-10-CM

## 2023-07-12 RX ORDER — LEVOTHYROXINE SODIUM 0.15 MG/1
150 TABLET ORAL DAILY
Qty: 90 TABLET | Refills: 0 | Status: ON HOLD | OUTPATIENT
Start: 2023-07-12 | End: 2023-10-10

## 2023-07-13 ENCOUNTER — TELEPHONE (OUTPATIENT)
Dept: SURGICAL ONCOLOGY | Facility: CLINIC | Age: 23
End: 2023-07-13

## 2023-07-13 ENCOUNTER — TELEPHONE (OUTPATIENT)
Dept: HEMATOLOGY ONCOLOGY | Facility: CLINIC | Age: 23
End: 2023-07-13

## 2023-07-13 PROBLEM — E04.9 ENLARGED THYROID: Status: ACTIVE | Noted: 2023-07-13

## 2023-07-13 NOTE — TELEPHONE ENCOUNTER
Provider sent over to the pharmacy yesterday the request for levothyroxine tablets instead of capsules. Spoke to mother she was aware.

## 2023-07-13 NOTE — TELEPHONE ENCOUNTER
I called Iris Rosales in response to a referral that was received for patient to establish care with Surgical Oncology. Outreach was made to schedule a consultation. .    I left a voicemail explaining the reason for my call and advised patient to call [facility] at [phone]. Another attempt will be made to contact patient.

## 2023-07-14 ENCOUNTER — OFFICE VISIT (OUTPATIENT)
Dept: ENDOCRINOLOGY | Facility: CLINIC | Age: 23
End: 2023-07-14
Payer: COMMERCIAL

## 2023-07-14 ENCOUNTER — LAB (OUTPATIENT)
Dept: LAB | Facility: CLINIC | Age: 23
End: 2023-07-14
Payer: COMMERCIAL

## 2023-07-14 VITALS
BODY MASS INDEX: 47.66 KG/M2 | OXYGEN SATURATION: 98 % | HEART RATE: 105 BPM | RESPIRATION RATE: 18 BRPM | HEIGHT: 63 IN | SYSTOLIC BLOOD PRESSURE: 150 MMHG | WEIGHT: 269 LBS | DIASTOLIC BLOOD PRESSURE: 86 MMHG

## 2023-07-14 DIAGNOSIS — E06.3 HYPOTHYROIDISM DUE TO HASHIMOTO'S THYROIDITIS: ICD-10-CM

## 2023-07-14 DIAGNOSIS — E04.9 ENLARGED THYROID GLAND: Primary | ICD-10-CM

## 2023-07-14 DIAGNOSIS — E03.8 HYPOTHYROIDISM DUE TO HASHIMOTO'S THYROIDITIS: ICD-10-CM

## 2023-07-14 DIAGNOSIS — E04.0 DIFFUSE GOITER: ICD-10-CM

## 2023-07-14 DIAGNOSIS — E06.1 SUBACUTE THYROIDITIS: ICD-10-CM

## 2023-07-14 LAB
25(OH)D3 SERPL-MCNC: 18.3 NG/ML (ref 30–100)
CRP SERPL QL: 32.6 MG/L
ERYTHROCYTE [SEDIMENTATION RATE] IN BLOOD: 46 MM/HOUR (ref 0–19)
T3 SERPL-MCNC: 1.3 NG/ML
T4 FREE SERPL-MCNC: 0.91 NG/DL (ref 0.61–1.12)
TSH SERPL DL<=0.05 MIU/L-ACNC: 1.18 UIU/ML (ref 0.45–4.5)

## 2023-07-14 PROCEDURE — 86800 THYROGLOBULIN ANTIBODY: CPT

## 2023-07-14 PROCEDURE — 99214 OFFICE O/P EST MOD 30 MIN: CPT | Performed by: STUDENT IN AN ORGANIZED HEALTH CARE EDUCATION/TRAINING PROGRAM

## 2023-07-14 PROCEDURE — 84432 ASSAY OF THYROGLOBULIN: CPT

## 2023-07-14 PROCEDURE — 36415 COLL VENOUS BLD VENIPUNCTURE: CPT

## 2023-07-14 PROCEDURE — 84439 ASSAY OF FREE THYROXINE: CPT

## 2023-07-14 PROCEDURE — 82306 VITAMIN D 25 HYDROXY: CPT

## 2023-07-14 PROCEDURE — 84480 ASSAY TRIIODOTHYRONINE (T3): CPT

## 2023-07-14 PROCEDURE — 84443 ASSAY THYROID STIM HORMONE: CPT

## 2023-07-14 PROCEDURE — 86140 C-REACTIVE PROTEIN: CPT

## 2023-07-14 PROCEDURE — 85652 RBC SED RATE AUTOMATED: CPT

## 2023-07-14 NOTE — PROGRESS NOTES
Iris Macedo 26 y.o. female MRN: [id number]    Encounter: [id number]      Assessment:      1. Diffuse goiter, progressive, sudden:    Plan:  Diffuse bilateral thyroid enlargement right more than left with tenderness and history of congestion and sore throat prior to thyroid enlargement, with ultrasound evidence of subacute thyroiditis( Enlarged hypoechoic thyroid with heterogeneity and diminished Doppler flow), initial impression was subacute thyroiditis for which she was started on prednisone 20 mg once daily, then was increased to 40 mg.  as presentation was not typical for thyroiditis, firm goiter, sudden progressive thyroid enlargement and compressive symptoms including dysphagia and mild occasional stridor plus evidence of compression on trachea in CT of neck, I suspected other differentials including Riedel's thyroiditis, thyroid lymphoma and Anaplastic thyroid cancer, so thyroid core biopsy was ordered which finally will be done next week. ( order was placed for IR consult with core thyroid biopsy, which was changed to FNA by radiology team, I spoke with the team to perform core biopsy)  Case was discussed with surgical oncology team, she has appointment with them, however I would like to wait for biopsy result prior to any procedure. She is currently on levothyroxine 150 mcg daily due to Hashimoto's thyroiditis and possible hypothyroidism phase of thyroiditis. We will monitor TFT overtime, I ordered TSH, Free T4, Total T3, Tg and ESR, CRP,  We will taper steroid slowly to discontinue in few more weeks,  She will return back in 4 weeks,    - T4, free Lab Collect; Future  - T3 Lab Collect; Future  - TSH, 3rd generation Lab Collect; Future  - ESR-Francisco+CRP; Future  - Vitamin D 25 hydroxy Lab Collect; Future  - Thyroglobulin; Future     2. Hypothyroidism due to Hashimoto's thyroiditis    - T4, free Lab Collect; Future  - T3 Lab Collect; Future  - TSH, 3rd generation Lab Collect;  Future  - ESR-Francisco+CRP; Future  - Vitamin D 25 hydroxy Lab Collect; Future  - Thyroglobulin; Future    3. Enlarged thyroid gland  - T4, free Lab Collect; Future  - T3 Lab Collect; Future  - TSH, 3rd generation Lab Collect; Future  - ESR-Francisco+CRP; Future  - Vitamin D 25 hydroxy Lab Collect; Future  - Thyroglobulin; Future    4. Questionable qubacute thyroiditis    - T4, free Lab Collect; Future  - T3 Lab Collect; Future  - TSH, 3rd generation Lab Collect; Future  - ESR-Francisco+CRP; Future  - Vitamin D 25 hydroxy Lab Collect; Future  - Thyroglobulin; Future        CC:     goiter    History of Present Illness     HPI:  Niles Cortez is a 26year-old female who presented for follow up for diffuse goiter and thyroiditis,  She was seen and evaluated in June 2023 for neck swelling and as thyroid ultrasound showed diffuse bilateral especially right sided thyroid enlargement in favor of subacute thyroiditis. Luis Alberto Waldron states that she was sitting infront of mirror, and she noticed a lump in her neck,  She reported sore throat and congestion prior to that. She does not report pain but more discomfort and burning sensation in her neck,  Initially neck not tender however became tender one week after she noticed the lump, and later on she noticed difficulty swallowing more to solid food. She reports Lack of energy, fatigue, constipation, dry skin,  She denies palpitation, tremor, excessive sweating, feeling anxious, nervious. mensturation are heavy and painful but no change recently. Thyroid function test showed below results:    Component      Latest Ref Rng 6/1/2023 6/19/2023 6/23/2023   TSH 3RD GENERATON      0.450 - 4.500 uIU/mL 15.515 (H)  26.654 (H)  8.147 (H)    Free T4      0.61 - 1.12 ng/dL 0.68  0.64  0.51 (L)               Review of Systems   Constitutional: Positive for appetite change and fatigue. Endocrine: Positive for cold intolerance. Psychiatric/Behavioral: Positive for sleep disturbance.        Historical Information   Past Medical History:   Diagnosis Date   • Asthma    • Ear infection      No past surgical history on file. Social History   Social History     Substance and Sexual Activity   Alcohol Use Yes   • Alcohol/week: 1.0 standard drink of alcohol   • Types: 1 Standard drinks or equivalent per week     Social History     Substance and Sexual Activity   Drug Use Never     Social History     Tobacco Use   Smoking Status Never   Smokeless Tobacco Never     Family History: History reviewed. No pertinent family history.     Meds/Allergies   Current Outpatient Medications   Medication Sig Dispense Refill   • albuterol (Proventil HFA) 90 mcg/act inhaler Inhale 2 puffs every 6 (six) hours as needed for wheezing 6.7 g 5   • benzonatate (TESSALON) 200 MG capsule Take 1 capsule (200 mg total) by mouth 3 (three) times a day as needed for cough 20 capsule 0   • dicyclomine (BENTYL) 20 mg tablet Take 1 tablet (20 mg total) by mouth 2 (two) times a day 20 tablet 0   • diphenhydrAMINE (BENADRYL) 25 mg capsule Take 1 capsule (25 mg total) by mouth every 6 (six) hours as needed for itching 20 capsule 0   • fluticasone (FLONASE) 50 mcg/act nasal spray SPRAY 1 SPRAY INTO EACH NOSTRIL EVERY DAY 16 mL 2   • ibuprofen (MOTRIN) 400 mg tablet Take 1 tablet (400 mg total) by mouth every 6 (six) hours as needed for mild pain 30 tablet 0   • levothyroxine 150 mcg tablet Take 1 tablet (150 mcg total) by mouth daily 90 tablet 0   • montelukast (SINGULAIR) 10 mg tablet Take 10 mg by mouth daily at bedtime     • naproxen (NAPROSYN) 500 mg tablet Take 1 tablet by mouth every 12 (twelve) hours as needed for mild pain or moderate pain 20 tablet 0   • ondansetron (ZOFRAN) 4 mg tablet Take 1 tablet (4 mg total) by mouth every 6 (six) hours 12 tablet 0   • ondansetron (ZOFRAN-ODT) 4 mg disintegrating tablet Take 1 tablet by mouth every 6 (six) hours as needed for nausea or vomiting 14 tablet 0   • ondansetron (ZOFRAN-ODT) 4 mg disintegrating tablet Take 1 tablet (4 mg total) by mouth every 8 (eight) hours as needed for nausea 20 tablet 0   • predniSONE 20 mg tablet TAKE 1 TABLET BY MOUTH EVERY DAY 30 tablet 0   • methocarbamol (ROBAXIN) 500 mg tablet Take 1 tablet (500 mg total) by mouth 3 (three) times a day as needed for muscle spasms (Patient not taking: Reported on 4/6/2023) 20 tablet 0     No current facility-administered medications for this visit. Allergies   Allergen Reactions   • Pollen Extract    • Shrimp Extract Allergy Skin Test - Food Allergy Hives       Objective   Vitals: Blood pressure 150/86, pulse 105, resp. rate 18, height 5' 3" (1.6 m), weight 122 kg (269 lb), SpO2 98 %. Physical Exam  Constitutional:       Appearance: She is well-developed. She is obese. HENT:      Head: Normocephalic and atraumatic. Nose: Nose normal.   Eyes:      Pupils: Pupils are equal, round, and reactive to light. Neck:      Thyroid: Thyromegaly and thyroid tenderness present. Vascular: No JVD. Comments: Diffuse bilateral thyroid enlargement, right more than left, extended to mandibular angle, with mild tenderness,  Cardiovascular:      Rate and Rhythm: Normal rate and regular rhythm. Heart sounds: Normal heart sounds. No murmur heard. No friction rub. No gallop. Pulmonary:      Effort: Pulmonary effort is normal. No respiratory distress. Breath sounds: Normal breath sounds. No stridor. No wheezing or rales. Chest:      Chest wall: No tenderness. Abdominal:      General: Bowel sounds are normal. There is no distension. Palpations: Abdomen is soft. There is no mass. Tenderness: There is no abdominal tenderness. There is no guarding. Musculoskeletal:         General: No deformity. Normal range of motion. Cervical back: Normal range of motion. Skin:     General: Skin is warm. Neurological:      Mental Status: She is alert and oriented to person, place, and time.          The history was obtained from the review of the chart, patient. Lab Results:   Lab Results   Component Value Date/Time    TSH 3RD GENERATON 8.147 (H) 06/23/2023 10:12 PM    TSH 3RD GENERATON 26.654 (H) 06/19/2023 11:04 AM    TSH 3RD GENERATON 15.515 (H) 06/01/2023 10:33 AM    Free T4 0.51 (L) 06/23/2023 10:12 PM    Free T4 0.64 06/19/2023 11:04 AM    Free T4 0.68 06/01/2023 10:33 AM       Imaging Studies:   Results for orders placed during the hospital encounter of 06/01/23    US thyroid    Impression  Enlarged hypoechoic thyroid with heterogeneity and diminished Doppler flow, findings most consistent with acute de Quervain thyroiditis given the presentation and onset. Reference: ACR Thyroid Imaging, Reporting and Data System (TI-RADS): White Paper of the [employer]. J AM Krystal Radiol 5725;43:172-842. (additional recommendations based on American Thyroid Association 2015 guidelines.)    The study was marked in [facility] for immediate notification. Workstation performed: [id number]    CT NECK WITH CONTRAST     INDICATION:   Right sided neck pain/swelling.     COMPARISON: 1/31/2018     TECHNIQUE:  Axial, sagittal, and coronal 2D reformatted images were created from the axial source data and submitted for interpretation.     Radiation dose length product (DLP) for this visit:  606 mGy-cm .   This examination, like all CT scans performed in the [facility], was performed utilizing techniques to minimize radiation dose exposure, including the use of iterative   reconstruction and automated exposure control.     IV Contrast:  100 mL of iohexol (OMNIPAQUE)     IMAGE QUALITY:  Diagnostic.     FINDINGS:     VISUALIZED BRAIN PARENCHYMA:  No acute intracranial pathology of the visualized brain parenchyma.     VISUALIZED ORBITS: Normal visualized orbits.     PARANASAL SINUSES: Minimal mucosal thickening of the maxillary sinuses.     NASAL CAVITY AND NASOPHARYNX:  Normal.     SUPRAHYOID NECK:  Normal oral cavity, tongue base, tonsillar fossa and epiglottis.     INFRAHYOID NECK:  Aryepiglottic folds and piriform sinuses are normal.  Normal glottis and subglottic airway.     THYROID GLAND: There are large low-attenuation lesions involving the right greater than left thyroid lobes. This measures up to 3.8 x 4.3 cm on series 2, image 85 along the right thyroid gland. Large conglomerate lesion in the left thyroid lobe measures   3.3 x 3.0 cm. There is mild narrowing of the trachea.     PAROTID AND SUBMANDIBULAR GLANDS:  Normal.     LYMPH NODES:  No pathologic or enlarged adenopathy.     VASCULAR STRUCTURES:  Normal enhancement of the cervical vasculature.     THORACIC INLET:  Lung apices and upper mediastinum are unremarkable.     BONY STRUCTURES: No acute fracture or destructive osseous lesion.     IMPRESSION:     Large low-attenuation right greater than left thyroid lobe lesions with associated luminal narrowing of the trachea. Findings are new in the interval since 1/31/2018 and should be correlated with ultrasound.     No pathologically enlarged cervical lymphadenopathy by CT size criteria. I have personally reviewed pertinent reports. Portions of the record may have been created with voice recognition software. Occasional wrong word or "sound a like" substitutions may have occurred due to the inherent limitations of voice recognition software. Read the chart carefully and recognize, using context, where substitutions have occurred.

## 2023-07-15 LAB
THYROGLOB AB SERPL-ACNC: <1 IU/ML (ref 0–0.9)
THYROGLOB SERPL-MCNC: 22.1 NG/ML (ref 1.5–38.5)

## 2023-07-17 ENCOUNTER — TELEPHONE (OUTPATIENT)
Dept: HEMATOLOGY ONCOLOGY | Facility: CLINIC | Age: 23
End: 2023-07-17

## 2023-07-17 NOTE — TELEPHONE ENCOUNTER
Appointment Change  Cancel, Reschedule, Change to Virtual      Who are you speaking with? self   If it is not the patient, are they listed on an active communication consent form? self   Which provider is the appointment scheduled with? Carmen Mireles   When is the appointment scheduled? Please list date and time 7/17   At which location is the appointment scheduled to take place? [facility]   Was the appointment rescheduled or changed from an in person visit to a virtual visit? If so, please list the details of the change. Yes, 7/26 2:15pm   What is the reason for the appointment change? sick   Was [facility] transport scheduled for this visit? no   Does [facility] transport need to be scheduled for the new visit (if applicable) no   Does the patient need an infusion appointment rescheduled? no   Does the patient have an infusion appointment scheduled? If so, when? no   Is the patient undergoing chemotherapy? no   Was the no-show policy reviewed for appointments being changed with less then 24 hours of notice?  yes

## 2023-07-17 NOTE — TELEPHONE ENCOUNTER
Patient mother calling to reschedule as she can not get a ride to appt. Her mother fell and can not take her to the appt. She hung up and wanted a new appt. But she hung up while looking for an appt.

## 2023-07-20 ENCOUNTER — HOSPITAL ENCOUNTER (OUTPATIENT)
Dept: NON INVASIVE DIAGNOSTICS | Facility: HOSPITAL | Age: 23
Discharge: HOME | End: 2023-07-20
Attending: RADIOLOGY
Payer: COMMERCIAL

## 2023-07-20 VITALS
HEIGHT: 63 IN | BODY MASS INDEX: 47.97 KG/M2 | DIASTOLIC BLOOD PRESSURE: 97 MMHG | OXYGEN SATURATION: 99 % | SYSTOLIC BLOOD PRESSURE: 161 MMHG | HEART RATE: 89 BPM | TEMPERATURE: 97.3 F | RESPIRATION RATE: 16 BRPM | WEIGHT: 270.73 LBS

## 2023-07-20 DIAGNOSIS — E04.9 ENLARGED THYROID: ICD-10-CM

## 2023-07-20 LAB
BASOPHILS # BLD AUTO: 0.04 THOUSANDS/ÂΜL (ref 0–0.1)
BASOPHILS NFR BLD AUTO: 1 % (ref 0–1)
EOSINOPHIL # BLD AUTO: 0.31 THOUSAND/ÂΜL (ref 0–0.61)
EOSINOPHIL NFR BLD AUTO: 4 % (ref 0–6)
ERYTHROCYTE [DISTWIDTH] IN BLOOD BY AUTOMATED COUNT: 12.9 % (ref 11.6–15.1)
EXT PREGNANCY TEST URINE: NEGATIVE
EXT. CONTROL: NORMAL
HCT VFR BLD AUTO: 36.2 % (ref 34.8–46.1)
HGB BLD-MCNC: 11.9 G/DL (ref 11.5–15.4)
IMM GRANULOCYTES # BLD AUTO: 0.02 THOUSAND/UL (ref 0–0.2)
IMM GRANULOCYTES NFR BLD AUTO: 0 % (ref 0–2)
INR PPP: 0.99 (ref 0.84–1.19)
LYMPHOCYTES # BLD AUTO: 2.15 THOUSANDS/ÂΜL (ref 0.6–4.47)
LYMPHOCYTES NFR BLD AUTO: 25 % (ref 14–44)
MCH RBC QN AUTO: 27.9 PG (ref 26.8–34.3)
MCHC RBC AUTO-ENTMCNC: 32.9 G/DL (ref 31.4–37.4)
MCV RBC AUTO: 85 FL (ref 82–98)
MONOCYTES # BLD AUTO: 0.61 THOUSAND/ÂΜL (ref 0.17–1.22)
MONOCYTES NFR BLD AUTO: 7 % (ref 4–12)
NEUTROPHILS # BLD AUTO: 5.51 THOUSANDS/ÂΜL (ref 1.85–7.62)
NEUTS SEG NFR BLD AUTO: 63 % (ref 43–75)
NRBC BLD AUTO-RTO: 0 /100 WBCS
PLATELET # BLD AUTO: 323 THOUSANDS/UL (ref 149–390)
PMV BLD AUTO: 9.1 FL (ref 8.9–12.7)
PROTHROMBIN TIME: 12.9 SECONDS (ref 11.6–14.5)
RBC # BLD AUTO: 4.27 MILLION/UL (ref 3.81–5.12)
WBC # BLD AUTO: 8.64 THOUSAND/UL (ref 4.31–10.16)

## 2023-07-20 PROCEDURE — 88184 FLOWCYTOMETRY/ TC 1 MARKER: CPT | Performed by: RADIOLOGY

## 2023-07-20 PROCEDURE — 88185 FLOWCYTOMETRY/TC ADD-ON: CPT

## 2023-07-20 PROCEDURE — 85610 PROTHROMBIN TIME: CPT | Performed by: RADIOLOGY

## 2023-07-20 PROCEDURE — 85025 COMPLETE CBC W/AUTO DIFF WBC: CPT | Performed by: RADIOLOGY

## 2023-07-20 PROCEDURE — 81025 URINE PREGNANCY TEST: CPT | Performed by: RADIOLOGY

## 2023-07-20 RX ORDER — FENTANYL CITRATE 50 UG/ML
INJECTION, SOLUTION INTRAMUSCULAR; INTRAVENOUS AS NEEDED
Status: COMPLETED | OUTPATIENT
Start: 2023-07-20 | End: 2023-07-20

## 2023-07-20 RX ORDER — ACETAMINOPHEN 325 MG/1
650 TABLET ORAL EVERY 4 HOURS PRN
Status: DISCONTINUED | OUTPATIENT
Start: 2023-07-20 | End: 2023-07-21 | Stop reason: HOSPADM

## 2023-07-20 RX ORDER — SODIUM CHLORIDE 9 MG/ML
30 INJECTION, SOLUTION INTRAVENOUS CONTINUOUS
Status: DISCONTINUED | OUTPATIENT
Start: 2023-07-20 | End: 2023-07-21 | Stop reason: HOSPADM

## 2023-07-20 RX ORDER — MIDAZOLAM HYDROCHLORIDE 2 MG/2ML
INJECTION, SOLUTION INTRAMUSCULAR; INTRAVENOUS AS NEEDED
Status: COMPLETED | OUTPATIENT
Start: 2023-07-20 | End: 2023-07-20

## 2023-07-20 RX ORDER — LIDOCAINE HYDROCHLORIDE 10 MG/ML
INJECTION, SOLUTION EPIDURAL; INFILTRATION; INTRACAUDAL; PERINEURAL AS NEEDED
Status: COMPLETED | OUTPATIENT
Start: 2023-07-20 | End: 2023-07-20

## 2023-07-20 RX ADMIN — MIDAZOLAM HYDROCHLORIDE 1 MG: 1 INJECTION, SOLUTION INTRAMUSCULAR; INTRAVENOUS at 11:44

## 2023-07-20 RX ADMIN — MIDAZOLAM HYDROCHLORIDE 1 MG: 1 INJECTION, SOLUTION INTRAMUSCULAR; INTRAVENOUS at 11:34

## 2023-07-20 RX ADMIN — FENTANYL CITRATE 50 MCG: 50 INJECTION, SOLUTION INTRAMUSCULAR; INTRAVENOUS at 11:40

## 2023-07-20 RX ADMIN — SODIUM CHLORIDE 30 ML/HR: 0.9 INJECTION, SOLUTION INTRAVENOUS at 10:42

## 2023-07-20 RX ADMIN — LIDOCAINE HYDROCHLORIDE 3 ML: 10 INJECTION, SOLUTION EPIDURAL; INFILTRATION; INTRACAUDAL; PERINEURAL at 11:41

## 2023-07-20 RX ADMIN — ACETAMINOPHEN 650 MG: 325 TABLET, FILM COATED ORAL at 13:45

## 2023-07-20 RX ADMIN — MIDAZOLAM HYDROCHLORIDE 1 MG: 1 INJECTION, SOLUTION INTRAMUSCULAR; INTRAVENOUS at 11:40

## 2023-07-20 RX ADMIN — FENTANYL CITRATE 50 MCG: 50 INJECTION, SOLUTION INTRAMUSCULAR; INTRAVENOUS at 11:34

## 2023-07-20 NOTE — LETTER
[id number] [facility]  [address] [address]  Dept: [phone]    July 20, 2023     Patient: Reji Villar   YOB: 2000   Date of Visit: 7/20/2023       To Whom it May Concern:    Reji Villar is under my professional care. She was seen in the hospital from 7/20/2023 to 07/20/23. She may return to work on 7/21/2023 without limitations. If you have any questions or concerns, please don't hesitate to call.          Sincerely,          Laron Fernandez MD

## 2023-07-20 NOTE — DISCHARGE INSTRUCTIONS
POST BIOPSY    Care after your procedure:    1. Limit your activities for 24 hours after your biopsy. 2. No driving day of biopsy. 3. Return to your normal diet. Small sips of flat soda will help with mild nausea. 4. Remove band-aid or dressing 24 hours after procedure. Contact Interventional Radiology at [phone] Basil PATIENTS: Contact Interventional Radiology at [phone]) Jamilah King PATIENTS: Contact Interventional Radiology at [phone]) if:    1. Difficulty breathing, nausea or vomiting. 2. Chills or fever above 101 degrees F.     3. Pain at biopsy site not relieved by medication. 4. Develop any redness, swelling, heat, unusual drainage, heavy bruising or bleeding from biopsy site.

## 2023-07-20 NOTE — H&P
Interventional Radiology Preprocedure Note    History/Indication for procedure:   Eva Rush is a 26 y.o. female with thyroiditis    Relevant past medical history:    Past Medical History:   Diagnosis Date   • Asthma    • Disease of thyroid gland    • Ear infection      Patient Active Problem List   Diagnosis   • Mild intermittent asthma without complication   • Seasonal allergic rhinitis due to pollen   • Shrimp allergy   • Enlarged thyroid       /87   Pulse 104   Temp 97.6 °F (36.4 °C) (Temporal)   Resp 16   Ht 5' 3" (1.6 m)   Wt 123 kg (270 lb 11.6 oz)   LMP 06/29/2023 (Approximate)   SpO2 97%   BMI 47.96 kg/m²     Medications:    Inpatient Medications:     Scheduled Medications:  Current Facility-Administered Medications   Medication Dose Route Frequency Provider Last Rate   • sodium chloride  30 mL/hr Intravenous Continuous Leonardo Goetz MD 30 mL/hr (07/20/23 1042)       Infusions:  sodium chloride, 30 mL/hr, Last Rate: 30 mL/hr (07/20/23 1042)        PRN:      Outpatient Medications:  Current Outpatient Medications on File Prior to Encounter   Medication Sig Dispense Refill   • dicyclomine (BENTYL) 20 mg tablet Take 1 tablet (20 mg total) by mouth 2 (two) times a day 20 tablet 0   • ibuprofen (MOTRIN) 400 mg tablet Take 1 tablet (400 mg total) by mouth every 6 (six) hours as needed for mild pain 30 tablet 0   • levothyroxine 150 mcg tablet Take 1 tablet (150 mcg total) by mouth daily 90 tablet 0   • naproxen (NAPROSYN) 500 mg tablet Take 1 tablet by mouth every 12 (twelve) hours as needed for mild pain or moderate pain 20 tablet 0   • predniSONE 20 mg tablet TAKE 1 TABLET BY MOUTH EVERY DAY 30 tablet 0   • albuterol (Proventil HFA) 90 mcg/act inhaler Inhale 2 puffs every 6 (six) hours as needed for wheezing 6.7 g 5   • benzonatate (TESSALON) 200 MG capsule Take 1 capsule (200 mg total) by mouth 3 (three) times a day as needed for cough 20 capsule 0   • diphenhydrAMINE (BENADRYL) 25 mg capsule Take 1 capsule (25 mg total) by mouth every 6 (six) hours as needed for itching 20 capsule 0   • fluticasone (FLONASE) 50 mcg/act nasal spray SPRAY 1 SPRAY INTO EACH NOSTRIL EVERY DAY 16 mL 2   • methocarbamol (ROBAXIN) 500 mg tablet Take 1 tablet (500 mg total) by mouth 3 (three) times a day as needed for muscle spasms (Patient not taking: Reported on 4/6/2023) 20 tablet 0   • montelukast (SINGULAIR) 10 mg tablet Take 10 mg by mouth daily at bedtime     • ondansetron (ZOFRAN) 4 mg tablet Take 1 tablet (4 mg total) by mouth every 6 (six) hours 12 tablet 0   • ondansetron (ZOFRAN-ODT) 4 mg disintegrating tablet Take 1 tablet by mouth every 6 (six) hours as needed for nausea or vomiting 14 tablet 0   • ondansetron (ZOFRAN-ODT) 4 mg disintegrating tablet Take 1 tablet (4 mg total) by mouth every 8 (eight) hours as needed for nausea 20 tablet 0     No current facility-administered medications on file prior to encounter. Allergies   Allergen Reactions   • Pollen Extract    • Shrimp Extract Allergy Skin Test - Food Allergy Hives       Anticoagulants: none    ASA classification: ASA 2 - Patient with mild systemic disease with no functional limitations    Airway Assessment: II (hard and soft palate, upper portion of tonsils anduvula visible)    Relevant family history: None    Relevant review of systems: None    Prior sedation/anesthesia: yes    Can the patient lie flat? Yes     Does patient have sleep apnea?  No    If yes, does patient use CPAP? not applicable    NPO Status: yes    Labs:   CBC with diff:   Lab Results   Component Value Date    WBC 8.64 07/20/2023    HGB 11.9 07/20/2023    HCT 36.2 07/20/2023    MCV 85 07/20/2023     07/20/2023    RBC 4.27 07/20/2023    MCH 27.9 07/20/2023    MCHC 32.9 07/20/2023    RDW 12.9 07/20/2023    MPV 9.1 07/20/2023    NRBC 0 07/20/2023     BMP/CMP:  Lab Results   Component Value Date    K 4.1 06/23/2023     06/23/2023    CO2 22 06/23/2023    BUN 10 06/23/2023 CREATININE 0.58 (L) 06/23/2023    CALCIUM 9.2 06/23/2023    AST 15 06/23/2023    ALT 13 06/23/2023    ALKPHOS 71 06/23/2023    EGFR 131 06/23/2023   ,     Coags:   Lab Results   Component Value Date    INR 0.99 07/20/2023   ,   Results from last 7 days   Lab Units 07/20/23  1047   INR  0.99        Relevant imaging studies:   Ct neck reviewed. Directed physical examination:  nad  aao x 3  rrr  Normal respiratory effort    Assessment/Plan:   Thyroid biopsy    Sedation/Anesthesia plan: Moderate sedation will be used as needed for procedure.     Consent with alternatives to the procedure, risks and benefits have been explained and discussed with the patient/patient's family: yes

## 2023-07-21 ENCOUNTER — TELEPHONE (OUTPATIENT)
Dept: ENDOCRINOLOGY | Facility: CLINIC | Age: 23
End: 2023-07-21

## 2023-07-21 DIAGNOSIS — C85.99 LYMPHOMA OF THYROID GLAND (HCC): Primary | ICD-10-CM

## 2023-07-21 LAB — SCAN RESULT: NORMAL

## 2023-07-22 ENCOUNTER — APPOINTMENT (EMERGENCY)
Dept: RADIOLOGY | Facility: HOSPITAL | Age: 23
DRG: 691 | End: 2023-07-22
Payer: COMMERCIAL

## 2023-07-22 ENCOUNTER — HOSPITAL ENCOUNTER (INPATIENT)
Facility: HOSPITAL | Age: 23
LOS: 16 days | Discharge: HOME | DRG: 691 | End: 2023-08-07
Attending: EMERGENCY MEDICINE | Admitting: FAMILY MEDICINE
Payer: COMMERCIAL

## 2023-07-22 DIAGNOSIS — R51.9 HEADACHE: ICD-10-CM

## 2023-07-22 DIAGNOSIS — I10 HYPERTENSION: ICD-10-CM

## 2023-07-22 DIAGNOSIS — C85.99 LYMPHOMA OF THYROID GLAND (HCC): ICD-10-CM

## 2023-07-22 DIAGNOSIS — F41.9 ANXIETY: ICD-10-CM

## 2023-07-22 DIAGNOSIS — C73 THYROID CANCER (HCC): Primary | ICD-10-CM

## 2023-07-22 DIAGNOSIS — H57.13 PAIN OF BOTH EYES: ICD-10-CM

## 2023-07-22 DIAGNOSIS — K59.00 CONSTIPATION: ICD-10-CM

## 2023-07-22 DIAGNOSIS — R13.10 DYSPHAGIA: ICD-10-CM

## 2023-07-22 PROBLEM — E06.1 SUBACUTE THYROIDITIS: Status: ACTIVE | Noted: 2023-07-22

## 2023-07-22 PROBLEM — E66.9 OBESITY: Status: ACTIVE | Noted: 2023-07-22

## 2023-07-22 LAB
ALBUMIN SERPL BCP-MCNC: 3.2 G/DL (ref 3.5–5)
ALP SERPL-CCNC: 68 U/L (ref 46–116)
ALT SERPL W P-5'-P-CCNC: 16 U/L (ref 12–78)
ANION GAP SERPL CALCULATED.3IONS-SCNC: 4 MMOL/L
AST SERPL W P-5'-P-CCNC: 9 U/L (ref 5–45)
BASOPHILS # BLD AUTO: 0.04 THOUSANDS/ÂΜL (ref 0–0.1)
BASOPHILS NFR BLD AUTO: 0 % (ref 0–1)
BILIRUB SERPL-MCNC: 0.19 MG/DL (ref 0.2–1)
BUN SERPL-MCNC: 13 MG/DL (ref 5–25)
CALCIUM ALBUM COR SERPL-MCNC: 9.5 MG/DL (ref 8.3–10.1)
CALCIUM SERPL-MCNC: 8.9 MG/DL (ref 8.3–10.1)
CHLORIDE SERPL-SCNC: 112 MMOL/L (ref 96–108)
CO2 SERPL-SCNC: 26 MMOL/L (ref 21–32)
CREAT SERPL-MCNC: 0.76 MG/DL (ref 0.6–1.3)
D DIMER PPP FEU-MCNC: 1.06 UG/ML FEU
EOSINOPHIL # BLD AUTO: 0.2 THOUSAND/ÂΜL (ref 0–0.61)
EOSINOPHIL NFR BLD AUTO: 2 % (ref 0–6)
ERYTHROCYTE [DISTWIDTH] IN BLOOD BY AUTOMATED COUNT: 12.9 % (ref 11.6–15.1)
GFR SERPL CREATININE-BSD FRML MDRD: 111 ML/MIN/1.73SQ M
GLUCOSE SERPL-MCNC: 104 MG/DL (ref 65–140)
HCG SERPL QL: NEGATIVE
HCT VFR BLD AUTO: 37.5 % (ref 34.8–46.1)
HGB BLD-MCNC: 12 G/DL (ref 11.5–15.4)
IMM GRANULOCYTES # BLD AUTO: 0.03 THOUSAND/UL (ref 0–0.2)
IMM GRANULOCYTES NFR BLD AUTO: 0 % (ref 0–2)
LYMPHOCYTES # BLD AUTO: 2.11 THOUSANDS/ÂΜL (ref 0.6–4.47)
LYMPHOCYTES NFR BLD AUTO: 21 % (ref 14–44)
MCH RBC QN AUTO: 27.4 PG (ref 26.8–34.3)
MCHC RBC AUTO-ENTMCNC: 32 G/DL (ref 31.4–37.4)
MCV RBC AUTO: 86 FL (ref 82–98)
MONOCYTES # BLD AUTO: 0.7 THOUSAND/ÂΜL (ref 0.17–1.22)
MONOCYTES NFR BLD AUTO: 7 % (ref 4–12)
NEUTROPHILS # BLD AUTO: 7.04 THOUSANDS/ÂΜL (ref 1.85–7.62)
NEUTS SEG NFR BLD AUTO: 70 % (ref 43–75)
NRBC BLD AUTO-RTO: 0 /100 WBCS
PLATELET # BLD AUTO: 344 THOUSANDS/UL (ref 149–390)
PMV BLD AUTO: 9.3 FL (ref 8.9–12.7)
POTASSIUM SERPL-SCNC: 4.1 MMOL/L (ref 3.5–5.3)
PROT SERPL-MCNC: 7.5 G/DL (ref 6.4–8.4)
RBC # BLD AUTO: 4.38 MILLION/UL (ref 3.81–5.12)
SODIUM SERPL-SCNC: 142 MMOL/L (ref 135–147)
TSH SERPL DL<=0.05 MIU/L-ACNC: 0.97 UIU/ML (ref 0.45–4.5)
WBC # BLD AUTO: 10.12 THOUSAND/UL (ref 4.31–10.16)

## 2023-07-22 PROCEDURE — 80053 COMPREHEN METABOLIC PANEL: CPT

## 2023-07-22 PROCEDURE — 93005 ELECTROCARDIOGRAM TRACING: CPT

## 2023-07-22 PROCEDURE — 36415 COLL VENOUS BLD VENIPUNCTURE: CPT

## 2023-07-22 PROCEDURE — 84703 CHORIONIC GONADOTROPIN ASSAY: CPT

## 2023-07-22 PROCEDURE — 70491 CT SOFT TISSUE NECK W/DYE: CPT

## 2023-07-22 PROCEDURE — 99284 EMERGENCY DEPT VISIT MOD MDM: CPT

## 2023-07-22 PROCEDURE — 85025 COMPLETE CBC W/AUTO DIFF WBC: CPT

## 2023-07-22 PROCEDURE — 31575 DIAGNOSTIC LARYNGOSCOPY: CPT | Performed by: OTOLARYNGOLOGY

## 2023-07-22 PROCEDURE — 71275 CT ANGIOGRAPHY CHEST: CPT

## 2023-07-22 PROCEDURE — 0CJS8ZZ INSPECTION OF LARYNX, VIA NATURAL OR ARTIFICIAL OPENING ENDOSCOPIC: ICD-10-PCS | Performed by: OTOLARYNGOLOGY

## 2023-07-22 PROCEDURE — 84443 ASSAY THYROID STIM HORMONE: CPT

## 2023-07-22 PROCEDURE — 99221 1ST HOSP IP/OBS SF/LOW 40: CPT | Performed by: OTOLARYNGOLOGY

## 2023-07-22 PROCEDURE — 85379 FIBRIN DEGRADATION QUANT: CPT

## 2023-07-22 PROCEDURE — 99223 1ST HOSP IP/OBS HIGH 75: CPT | Performed by: FAMILY MEDICINE

## 2023-07-22 PROCEDURE — 99285 EMERGENCY DEPT VISIT HI MDM: CPT | Performed by: EMERGENCY MEDICINE

## 2023-07-22 PROCEDURE — G1004 CDSM NDSC: HCPCS

## 2023-07-22 RX ORDER — MAGNESIUM HYDROXIDE/ALUMINUM HYDROXICE/SIMETHICONE 120; 1200; 1200 MG/30ML; MG/30ML; MG/30ML
30 SUSPENSION ORAL EVERY 6 HOURS PRN
Status: DISCONTINUED | OUTPATIENT
Start: 2023-07-22 | End: 2023-08-07 | Stop reason: HOSPADM

## 2023-07-22 RX ORDER — ACETAMINOPHEN 325 MG/1
650 TABLET ORAL EVERY 6 HOURS PRN
Status: DISCONTINUED | OUTPATIENT
Start: 2023-07-22 | End: 2023-08-04

## 2023-07-22 RX ORDER — ONDANSETRON 2 MG/ML
4 INJECTION INTRAMUSCULAR; INTRAVENOUS EVERY 6 HOURS PRN
Status: DISCONTINUED | OUTPATIENT
Start: 2023-07-22 | End: 2023-08-07 | Stop reason: HOSPADM

## 2023-07-22 RX ORDER — ALBUTEROL SULFATE 90 UG/1
2 AEROSOL, METERED RESPIRATORY (INHALATION) EVERY 6 HOURS PRN
Status: DISCONTINUED | OUTPATIENT
Start: 2023-07-22 | End: 2023-08-07 | Stop reason: HOSPADM

## 2023-07-22 RX ORDER — LEVOTHYROXINE SODIUM 0.07 MG/1
150 TABLET ORAL
Status: DISCONTINUED | OUTPATIENT
Start: 2023-07-23 | End: 2023-08-07 | Stop reason: HOSPADM

## 2023-07-22 RX ORDER — DICYCLOMINE HCL 20 MG
20 TABLET ORAL 2 TIMES DAILY
Status: DISCONTINUED | OUTPATIENT
Start: 2023-07-22 | End: 2023-08-07 | Stop reason: HOSPADM

## 2023-07-22 RX ORDER — MONTELUKAST SODIUM 10 MG/1
10 TABLET ORAL
Status: DISCONTINUED | OUTPATIENT
Start: 2023-07-22 | End: 2023-08-07 | Stop reason: HOSPADM

## 2023-07-22 RX ORDER — PREDNISONE 10 MG/1
10 TABLET ORAL DAILY
Status: COMPLETED | OUTPATIENT
Start: 2023-07-22 | End: 2023-07-28

## 2023-07-22 RX ADMIN — DICYCLOMINE HYDROCHLORIDE 20 MG: 20 TABLET ORAL at 19:51

## 2023-07-22 RX ADMIN — ACETAMINOPHEN 650 MG: 325 TABLET, FILM COATED ORAL at 20:08

## 2023-07-22 RX ADMIN — IOHEXOL 100 ML: 350 INJECTION, SOLUTION INTRAVENOUS at 17:06

## 2023-07-22 RX ADMIN — PREDNISONE 10 MG: 10 TABLET ORAL at 19:51

## 2023-07-22 RX ADMIN — MONTELUKAST 10 MG: 10 TABLET, FILM COATED ORAL at 22:04

## 2023-07-22 NOTE — TELEPHONE ENCOUNTER
I received a message from pathology team in regards to prelim thyroid core biopsy results which showed thyroid lymphoma,   I discussed the case with Dr Cynthia Linn who recommended that patient to report to [facility] ED given she is experiencing compressive symptoms to evaluate her and get her started on treatment if her symptoms are getting worse or the mass is enlarging quickly. I called the patient, spoke with her and her mother, I discussed in detail regarding the result, they are agreeable to report to ED for further evaluation.

## 2023-07-22 NOTE — ASSESSMENT & PLAN NOTE
72-year-old female with recent diagnosis of with diffuse bilateral thyroid enlargement, more on the right, tissue biopsy reveals thyroid lymphoma, causing dysphagia, and occasional dyspnea was sent from endocrinologist office for evaluation and airway monitoring. Fortunately patient is maintaining airway, not reporting any dyspnea. CTA chest obtained: Severe enlargement of the thyroid gland with recent biopsy showing lymphoma, with increased tracheal narrowing compared with the neck CT from 6/1/2023. Consult oncology and endocrinology. ENT consult: May need surgical intervention during this hospital stay.   Continue home medication levothyroxine and prednisone

## 2023-07-22 NOTE — H&P
[address] [facility]  H&P  Name: Jitendra Man 26 y.o. female I MRN: [id number]  Unit/Bed#: [facility] I Date of Admission: 7/22/2023   Date of Service: 7/22/2023 I Hospital Day: 0      Assessment/Plan   * Lymphoma of thyroid gland [facility])  Assessment & Plan  23-year-old female with recent diagnosis of with diffuse bilateral thyroid enlargement, more on the right, tissue biopsy reveals thyroid lymphoma, causing dysphagia, and occasional dyspnea was sent from endocrinologist office for evaluation and airway monitoring. Fortunately patient is maintaining airway, not reporting any dyspnea. CTA chest obtained: Severe enlargement of the thyroid gland with recent biopsy showing lymphoma, with increased tracheal narrowing compared with the neck CT from 6/1/2023. Consult oncology and endocrinology. ENT consult: May need surgical intervention during this hospital stay. Continue home medication levothyroxine and prednisone    Obesity  Assessment & Plan  Lifestyle modification counseling on discharge    Dysphagia  Assessment & Plan  Patient reports of intermittent dysphagia with solid food due to thyroid lymphoma. Speech therapy consult    Subacute thyroiditis  Assessment & Plan  Prior to diagnosis of lymphoma, there is a concern for subacute thyroiditis, therefore patient was started on levothyroxine and tapering dose of prednisone by endocrinologist.  Continue current dose of levothyroxine of 125 mcg   Continue tapering dose of prednisone 10 mg daily  Follow-up endocrinology consult    Mild intermittent asthma without complication  Assessment & Plan  Currently not in any exacerbation  Continue albuterol.        VTE Prophylaxis: Pharmacologic VTE Prophylaxis contraindicated due to Low risk  / sequential compression device   Code Status: Full code    Discussion with family: Mother present at bedside    Anticipated Length of Stay:  Patient will be admitted on an Inpatient basis with an anticipated length of stay of  > 2 midnights. Justification for Hospital Stay: Bilateral thyroid enlargement, thyroid lymphoma, airway monitoring    Total Time for Visit, including Counseling / Coordination of Care: 60 minutes. Greater than 50% of this total time spent on direct patient counseling and coordination of care. Chief Complaint:       History of Present Illness:    Laurance Sandifer is a 26 y.o. female with PMH of mild asthma, obesity, bilateral thyroid enlargement, recent diagnosis of thyroid lymphoma by tissue biopsy was sent to ED by endocrinologist office due to concern for airway compromise. Patient reports she has occasional dysphagia with solid food and occasional dyspnea. On ER, patient is hemodynamically stable, remains in room air. On exam, lungs are clear to auscultation, breathing comfortably on room air. However due to concern for rapidly enlarging thyroid gland, patient will be admitted for airway monitoring, oncology consult, and possibly ENT consult. Review of Systems:    Review of Systems   Constitutional: Negative for chills and fever. HENT: Positive for trouble swallowing. Negative for ear pain and sore throat. Eyes: Negative for pain and visual disturbance. Respiratory: Positive for shortness of breath. Negative for cough. Cardiovascular: Negative for chest pain and palpitations. Gastrointestinal: Negative for abdominal pain and vomiting. Genitourinary: Negative for dysuria and hematuria. Musculoskeletal: Positive for neck pain. Negative for arthralgias and back pain. Skin: Negative for color change and rash. Neurological: Negative for seizures and syncope. All other systems reviewed and are negative.       Past Medical and Surgical History:     Past Medical History:   Diagnosis Date   • Asthma    • Disease of thyroid gland    • Ear infection        Past Surgical History:   Procedure Laterality Date   • IR BIOPSY NECK  7/20/2023       Meds/Allergies:    Prior to Admission medications    Medication Sig Start Date End Date Taking?  Authorizing Provider   albuterol (Proventil HFA) 90 mcg/act inhaler Inhale 2 puffs every 6 (six) hours as needed for wheezing 4/6/23   Rafaela Qiu DO   benzonatate (TESSALON) 200 MG capsule Take 1 capsule (200 mg total) by mouth 3 (three) times a day as needed for cough 4/6/23   Rafaela Qiu DO   dicyclomine (BENTYL) 20 mg tablet Take 1 tablet (20 mg total) by mouth 2 (two) times a day 6/24/23   Eugene Grijalva MD   diphenhydrAMINE (BENADRYL) 25 mg capsule Take 1 capsule (25 mg total) by mouth every 6 (six) hours as needed for itching 11/19/18   Harvinder Edgar MD   fluticasone [facility]) 50 mcg/act nasal spray SPRAY 1 SPRAY INTO EACH NOSTRIL EVERY DAY 6/29/23   Rafaela Qiu DO   ibuprofen (MOTRIN) 400 mg tablet Take 1 tablet (400 mg total) by mouth every 6 (six) hours as needed for mild pain 4/16/19   Uriah Thorpe MD   levothyroxine 150 mcg tablet Take 1 tablet (150 mcg total) by mouth daily 7/12/23 10/10/23  Lionel Rodriguez MD   methocarbamol (ROBAXIN) 500 mg tablet Take 1 tablet (500 mg total) by mouth 3 (three) times a day as needed for muscle spasms  Patient not taking: Reported on 4/6/2023 8/2/22   Tanisha New PA-C   montelukast (SINGULAIR) 10 mg tablet Take 10 mg by mouth daily at bedtime    Historical Provider, MD   naproxen (NAPROSYN) 500 mg tablet Take 1 tablet by mouth every 12 (twelve) hours as needed for mild pain or moderate pain 8/12/17   Sharon Jones DO   ondansetron (ZOFRAN) 4 mg tablet Take 1 tablet (4 mg total) by mouth every 6 (six) hours 2/12/23   Milton Vivas MD   ondansetron (ZOFRAN-ODT) 4 mg disintegrating tablet Take 1 tablet by mouth every 6 (six) hours as needed for nausea or vomiting 8/12/17   Sharon Jones DO   ondansetron (ZOFRAN-ODT) 4 mg disintegrating tablet Take 1 tablet (4 mg total) by mouth every 8 (eight) hours as needed for nausea 6/24/23   Eugene Grijalva MD predniSONE 20 mg tablet TAKE 1 TABLET BY MOUTH EVERY DAY 6/29/23   Jeanna Clifford MD     I have reviewed home medications using allscripts. Allergies: Allergies   Allergen Reactions   • Pollen Extract    • Shrimp Extract Allergy Skin Test - Food Allergy Hives       Social History:     Marital Status: Single      Substance Use History:   Social History     Substance and Sexual Activity   Alcohol Use Yes   • Alcohol/week: 1.0 standard drink of alcohol   • Types: 1 Standard drinks or equivalent per week    Comment: socially     Social History     Tobacco Use   Smoking Status Never   Smokeless Tobacco Never     Social History     Substance and Sexual Activity   Drug Use Never       Family History:    History reviewed. No pertinent family history. Physical Exam:     Vitals:   Blood Pressure: 165/89 (07/22/23 1730)  Pulse: (!) 107 (07/22/23 1730)  Temperature: 99.3 °F (37.4 °C) (07/22/23 1218)  Temp Source: Temporal (07/22/23 1218)  Respirations: 20 (07/22/23 1730)  SpO2: 98 % (07/22/23 1730)    Physical Exam  Vitals and nursing note reviewed. Constitutional:       General: She is not in acute distress. Appearance: She is well-developed. She is obese. HENT:      Head: Normocephalic and atraumatic. Mouth/Throat:      Comments: Diffuse bilateral thyroid enlargement, more so on the right. Eyes:      Conjunctiva/sclera: Conjunctivae normal.   Cardiovascular:      Rate and Rhythm: Normal rate and regular rhythm. Heart sounds: No murmur heard. Pulmonary:      Effort: Pulmonary effort is normal. No respiratory distress. Breath sounds: Normal breath sounds. Abdominal:      Palpations: Abdomen is soft. Tenderness: There is no abdominal tenderness. Musculoskeletal:         General: No swelling. Cervical back: Neck supple. Skin:     General: Skin is warm and dry. Capillary Refill: Capillary refill takes less than 2 seconds. Neurological:      Mental Status: She is alert. Psychiatric:         Mood and Affect: Mood normal.           Additional Data:     Lab Results: I have personally reviewed pertinent reports. Results from last 7 days   Lab Units 07/22/23  1336   WBC Thousand/uL 10.12   HEMOGLOBIN g/dL 12.0   HEMATOCRIT % 37.5   PLATELETS Thousands/uL 344   NEUTROS PCT % 70   LYMPHS PCT % 21   MONOS PCT % 7   EOS PCT % 2     Results from last 7 days   Lab Units 07/22/23  1336   SODIUM mmol/L 142   POTASSIUM mmol/L 4.1   CHLORIDE mmol/L 112*   CO2 mmol/L 26   BUN mg/dL 13   CREATININE mg/dL 0.76   ANION GAP mmol/L 4   CALCIUM mg/dL 8.9   ALBUMIN g/dL 3.2*   TOTAL BILIRUBIN mg/dL 0.19*   ALK PHOS U/L 68   ALT U/L 16   AST U/L 9   GLUCOSE RANDOM mg/dL 104     Results from last 7 days   Lab Units 07/20/23  1047   INR  0.99                   Imaging: I have personally reviewed pertinent reports. CTA ED chest PE study   Final Result by Chaz Flores MD (07/22 1734)      No pulmonary embolus. No acute pulmonary disease. Severe enlargement of the thyroid gland with recent biopsy showing lymphoma, with increased tracheal narrowing compared with the neck CT from 6/1/2023 with no obvious postprocedural hemorrhage. See neck CT from today. Hepatic steatosis. Workstation performed: [id number]         CT soft tissue neck with contrast    (Results Pending)         ** Please Note: This note has been constructed using a voice recognition system.  ** negative

## 2023-07-22 NOTE — ASSESSMENT & PLAN NOTE
Patient reports of intermittent dysphagia with solid food due to thyroid lymphoma.   Speech therapy consult

## 2023-07-22 NOTE — CONSULTS
OTOLARYNGOLOGY CONSULT    Date of Service: 7/22/2023    Reason for consult: thyroid cancer     ASSESSMENT/PLAN:  Padmaja Cisneros is a 26 y.o. female who we are consulted on for thyroid cancer    - no acute airway concerns  - no acute ENT interventions required at this time  - FU oncology recommendations for lymphoma management  - rest of care per primary    HPI  Pt is a 19yo F w/ PMH of mild asthma and obesity who felt a lump at her R thyroid 1.5m/a. Pt was dx w/ thyroiditis by ED w/ referral to endocrine. ED CT and US at that time demonstrated b/l thyroid enlargement. Biopsy dx lymphoma 1d/a and pt presented to [facility] ED on 7/22 for dysphagia w/ larger solid and mild intermittent dyspnea at end of breath post prolonged talking. Pt was placed on levo 125 and prednisone 10 QD since 1m/a.     CURRENT HOSPITAL MEDICATIONS  Current Facility-Administered Medications   Medication Dose Route Frequency Provider Last Rate Last Admin   • acetaminophen (TYLENOL) tablet 650 mg  650 mg Oral Q6H PRN Anabella Mata MD       • albuterol (PROVENTIL HFA,VENTOLIN HFA) inhaler 2 puff  2 puff Inhalation Q6H PRN Anabella Mata MD       • aluminum-magnesium hydroxide-simethicone (MAALOX) oral suspension 30 mL  30 mL Oral Q6H PRN Anabella Mata MD       • dicyclomine (BENTYL) tablet 20 mg  20 mg Oral BID Anabella Mata MD   20 mg at 07/22/23 1951   • [START ON 7/23/2023] levothyroxine tablet 150 mcg  150 mcg Oral Daily Anabella Mata MD       • montelukast (SINGULAIR) tablet 10 mg  10 mg Oral HS Anabella Mata MD       • ondansetron (ZOFRAN) injection 4 mg  4 mg Intravenous Q6H PRN Anabella Mata MD       • predniSONE tablet 10 mg  10 mg Oral Daily Anabella Mata MD   10 mg at 07/22/23 1951     Current Outpatient Medications   Medication Sig Dispense Refill   • albuterol (Proventil HFA) 90 mcg/act inhaler Inhale 2 puffs every 6 (six) hours as needed for wheezing 6.7 g 5   • benzonatate (TESSALON) 200 MG capsule Take 1 capsule (200 mg total) by mouth 3 (three) times a day as needed for cough 20 capsule 0   • dicyclomine (BENTYL) 20 mg tablet Take 1 tablet (20 mg total) by mouth 2 (two) times a day 20 tablet 0   • diphenhydrAMINE (BENADRYL) 25 mg capsule Take 1 capsule (25 mg total) by mouth every 6 (six) hours as needed for itching 20 capsule 0   • fluticasone (FLONASE) 50 mcg/act nasal spray SPRAY 1 SPRAY INTO EACH NOSTRIL EVERY DAY 16 mL 2   • ibuprofen (MOTRIN) 400 mg tablet Take 1 tablet (400 mg total) by mouth every 6 (six) hours as needed for mild pain 30 tablet 0   • levothyroxine 150 mcg tablet Take 1 tablet (150 mcg total) by mouth daily 90 tablet 0   • methocarbamol (ROBAXIN) 500 mg tablet Take 1 tablet (500 mg total) by mouth 3 (three) times a day as needed for muscle spasms (Patient not taking: Reported on 4/6/2023) 20 tablet 0   • montelukast (SINGULAIR) 10 mg tablet Take 10 mg by mouth daily at bedtime     • naproxen (NAPROSYN) 500 mg tablet Take 1 tablet by mouth every 12 (twelve) hours as needed for mild pain or moderate pain 20 tablet 0   • ondansetron (ZOFRAN) 4 mg tablet Take 1 tablet (4 mg total) by mouth every 6 (six) hours 12 tablet 0   • ondansetron (ZOFRAN-ODT) 4 mg disintegrating tablet Take 1 tablet by mouth every 6 (six) hours as needed for nausea or vomiting 14 tablet 0   • ondansetron (ZOFRAN-ODT) 4 mg disintegrating tablet Take 1 tablet (4 mg total) by mouth every 8 (eight) hours as needed for nausea 20 tablet 0   • predniSONE 20 mg tablet TAKE 1 TABLET BY MOUTH EVERY DAY 30 tablet 0       REVIEW OF SYSTEMS  As above    HISTORIES  PMH:  Past Medical History:   Diagnosis Date   • Asthma    • Disease of thyroid gland    • Ear infection        PSH:  Past Surgical History:   Procedure Laterality Date   • IR BIOPSY NECK  7/20/2023       SocHx:  Social History     Tobacco Use   • Smoking status: Never   • Smokeless tobacco: Never   Vaping Use   • Vaping Use: Never used   Substance Use Topics   • Alcohol use:  Yes     Alcohol/week: 1.0 standard drink of alcohol     Types: 1 Standard drinks or equivalent per week     Comment: socially   • Drug use: Never       FH:  History reviewed. No pertinent family history. ALLERGIES:  Allergies   Allergen Reactions   • Pollen Extract    • Shrimp Extract Allergy Skin Test - Food Allergy Hives       PHYSICAL EXAM  Visit Vitals  /89 (BP Location: Right arm)   Pulse (!) 107   Temp 99.3 °F (37.4 °C) (Temporal)   Resp 20   LMP 06/29/2023 (Approximate)   SpO2 98%   OB Status Unknown   Smoking Status Never       General: NAD, AOx4  Eyes:  EOMI, PERRL  Ears: External ears normal in appearance  Nose:  External appearance normal, no septal deviation  Oral cavity:  No trismus, no mass/lesions, tonsils 1+ b/l  Neck: Trachea is midline; b/l thyroid enlargement palpated (R > L), sig TTP  Lymph:  No cervical lymphadenopathy  Skin:  No obvious facial lesions  Neuro: Motor and sensory grossly intact. Face symmetrical, no obvious cranial nerve palsies,motor and sensory grossly intact, no focal deficits. Lungs:  Normal work of breathing, symmetrical chest expansion  Vascular: Well perfused      LABORATORY  Flow cytometry (7/20/23): a monotypic-kappa, CD10-positive B-cell population (48-49%) with variable side scatter, in a background of mixed T-cell subsets (32.28%)    PROCEDURES   Laryngoscopy    Due to the patient's complaints related to dysphagia, intermittent dyspnea, I recommended flexible laryngoscopy to completely evaluate the airway due to inability to be visualized with a mirror. Patient was in agreement and gave verbal consent               Performed by Jonathon Sinclair MD      Authorized by Jewels De Oliveira MD       Universal Protocol Risks and benefits: risks, benefits and alternatives were discussed  Consent given by: patient  Patient understanding: patient states understanding of the procedure being performed                 Anesthesia    Local anesthesia used: yes  Local anesthetic: 4% lidocaine/Afrin. Procedure Details    Procedure Notes: Scope passed through the L nasal cavity      Nasopharyngeal soft tissues normal  Eustachian tubes clear bilaterally   Base of tongue normal lingual tonsillar tissue  Valleculae clear without cysts  Posterior pharyngeal wall normal      Larynx    Epiglottis normal  Supraglottic larynx normal without mucosal lesions  Including false cords and ventricle  Right aryepiglottic fold normal  Left aryepiglottic fold normal   Right arytenoid normal  Left arytenoid normal  Right vocal cord normal  Left vocal cord normal  Normal vocal cord mobility  Subglottis normal  Inter arytenoid and post cricoid regions clear without inflammation or mucosal lesions    Hypopharynx  Pyriorm sinuses clear without secretions or lesions    Patient tolerance: Patient tolerated the procedure well with no immediate complications            RADIOLOGY  CT neck (7/22/23): enlargement of b/l thyroid tissue (R > L), compared to prior CT (6/1/23), mild L shifting of larynx, some evidence of b/l lateral tracheal compression, patent airway    US thyroid (6/1/23):  Enlarged hypoechoic thyroid with heterogeneity and diminished Doppler flow, findings most consistent with acute de Quervain thyroiditis     Patient Active Problem List    Diagnosis Date Noted   • Lymphoma of thyroid gland ([address]) 07/22/2023   • Subacute thyroiditis 07/22/2023   • Dysphagia 07/22/2023   • Obesity 07/22/2023   • Enlarged thyroid 07/13/2023   • Mild intermittent asthma without complication 82/74/2876   • Seasonal allergic rhinitis due to pollen 11/29/2017   • Shrimp allergy 11/29/2017         Leena Garcia MD  Otolaryngology--Head and Neck Surgery  [employer] [employer] [employer]  7/22/2023 7:56 PM

## 2023-07-22 NOTE — ASSESSMENT & PLAN NOTE
Prior to diagnosis of lymphoma, there is a concern for subacute thyroiditis, therefore patient was started on levothyroxine and tapering dose of prednisone by endocrinologist.  Continue current dose of levothyroxine of 125 mcg   Continue tapering dose of prednisone 10 mg daily  Follow-up endocrinology consult

## 2023-07-22 NOTE — LETTER
[address] [place] [address] 9  [address] [facility]  [place] [id number]  Dept: [phone]    July 27, 2023     Patient: Padmaja Cisneros   YOB: 2000   Date of Visit: 7/22/2023       To Whom it May Concern:    Padmaja Cisneros is under my professional care. She was seen in the hospital since 7/22/2023. She currently is requiring hospitalization for cancer treatment. She will have her initial session for chemotherapy in the hospital this hospitalization and will need close outpatient follow-up for treatment with chemotherapy. If you have any questions or concerns, please don't hesitate to call.          Sincerely,          Arielle Luevano, DO

## 2023-07-23 ENCOUNTER — APPOINTMENT (INPATIENT)
Dept: NON INVASIVE DIAGNOSTICS | Facility: HOSPITAL | Age: 23
DRG: 691 | End: 2023-07-23
Payer: COMMERCIAL

## 2023-07-23 PROBLEM — E66.01 MORBID OBESITY (HCC): Status: ACTIVE | Noted: 2023-07-22

## 2023-07-23 LAB
AORTIC ROOT: 3.1 CM
APICAL FOUR CHAMBER EJECTION FRACTION: 54 %
ASCENDING AORTA: 3.3 CM
ATRIAL RATE: 104 BPM
ATRIAL RATE: 106 BPM
E WAVE DECELERATION TIME: 134 MS
FRACTIONAL SHORTENING: 38 (ref 28–44)
INTERVENTRICULAR SEPTUM IN DIASTOLE (PARASTERNAL SHORT AXIS VIEW): 1 CM
INTERVENTRICULAR SEPTUM: 1 CM (ref 0.6–1.1)
LAAS-AP2: 18.2 CM2
LAAS-AP4: 15.5 CM2
LEFT ATRIUM SIZE: 3.3 CM
LEFT ATRIUM VOLUME (MOD BIPLANE): 43 ML
LEFT INTERNAL DIMENSION IN SYSTOLE: 3.2 CM (ref 2.1–4)
LEFT VENTRICULAR INTERNAL DIMENSION IN DIASTOLE: 5.2 CM (ref 3.5–6)
LEFT VENTRICULAR POSTERIOR WALL IN END DIASTOLE: 0.9 CM
LEFT VENTRICULAR STROKE VOLUME: 91 ML
LVSV (TEICH): 91 ML
MV E'TISSUE VEL-LAT: 12 CM/S
MV E'TISSUE VEL-SEP: 12 CM/S
MV PEAK A VEL: 0.81 M/S
MV PEAK E VEL: 97 CM/S
MV STENOSIS PRESSURE HALF TIME: 39 MS
MV VALVE AREA P 1/2 METHOD: 5.64
P AXIS: 63 DEGREES
P AXIS: 65 DEGREES
PR INTERVAL: 134 MS
PR INTERVAL: 134 MS
QRS AXIS: 71 DEGREES
QRS AXIS: 72 DEGREES
QRSD INTERVAL: 80 MS
QRSD INTERVAL: 80 MS
QT INTERVAL: 338 MS
QT INTERVAL: 338 MS
QTC INTERVAL: 444 MS
QTC INTERVAL: 448 MS
RIGHT ATRIUM AREA SYSTOLE A4C: 14.8 CM2
RIGHT VENTRICLE ID DIMENSION: 3.4 CM
SL CV LEFT ATRIUM LENGTH A2C: 5.2 CM
SL CV LV EF: 65
SL CV PED ECHO LEFT VENTRICLE DIASTOLIC VOLUME (MOD BIPLANE) 2D: 131 ML
SL CV PED ECHO LEFT VENTRICLE SYSTOLIC VOLUME (MOD BIPLANE) 2D: 40 ML
T WAVE AXIS: 35 DEGREES
T WAVE AXIS: 35 DEGREES
TR MAX PG: 4 MMHG
TR PEAK VELOCITY: 1 M/S
TRICUSPID ANNULAR PLANE SYSTOLIC EXCURSION: 2.9 CM
TRICUSPID VALVE PEAK REGURGITATION VELOCITY: 0.99 M/S
VENTRICULAR RATE: 104 BPM
VENTRICULAR RATE: 106 BPM

## 2023-07-23 PROCEDURE — 93306 TTE W/DOPPLER COMPLETE: CPT

## 2023-07-23 PROCEDURE — 99254 IP/OBS CNSLTJ NEW/EST MOD 60: CPT | Performed by: INTERNAL MEDICINE

## 2023-07-23 PROCEDURE — 99232 SBSQ HOSP IP/OBS MODERATE 35: CPT | Performed by: INTERNAL MEDICINE

## 2023-07-23 PROCEDURE — 93010 ELECTROCARDIOGRAM REPORT: CPT | Performed by: INTERNAL MEDICINE

## 2023-07-23 PROCEDURE — 92610 EVALUATE SWALLOWING FUNCTION: CPT

## 2023-07-23 PROCEDURE — 93306 TTE W/DOPPLER COMPLETE: CPT | Performed by: INTERNAL MEDICINE

## 2023-07-23 PROCEDURE — 93356 MYOCRD STRAIN IMG SPCKL TRCK: CPT | Performed by: INTERNAL MEDICINE

## 2023-07-23 PROCEDURE — 93356 MYOCRD STRAIN IMG SPCKL TRCK: CPT

## 2023-07-23 PROCEDURE — 99255 IP/OBS CONSLTJ NEW/EST HI 80: CPT | Performed by: INTERNAL MEDICINE

## 2023-07-23 RX ADMIN — DICYCLOMINE HYDROCHLORIDE 20 MG: 20 TABLET ORAL at 17:07

## 2023-07-23 RX ADMIN — LEVOTHYROXINE SODIUM 75 MCG: 75 TABLET ORAL at 06:14

## 2023-07-23 RX ADMIN — MONTELUKAST 10 MG: 10 TABLET, FILM COATED ORAL at 21:16

## 2023-07-23 RX ADMIN — DICYCLOMINE HYDROCHLORIDE 20 MG: 20 TABLET ORAL at 08:21

## 2023-07-23 RX ADMIN — PREDNISONE 10 MG: 10 TABLET ORAL at 08:21

## 2023-07-23 NOTE — CONSULTS
Consultation - Hayden Phelps 26 y.o. 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 Plan:  1. 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 Hashimoto's thyroiditis  - Will continue patient's home levothyroxine 150 mcg daily   - Continue home Prednisone 10 mg daily for the next 5 days then discontinue   - Endocrinology will continue to follow and provide recommendations pending her clinical course     History of Present Illness    Hayden Phelps is a 76-year-old female with Hashimoto's thyroiditis and suspected subacute thyroiditis who was advised to present to the ED for concern for airway compromise in the setting of her recently-diagnosed CD-10 positive thyroid gland lymphoma. Patient reports that around 1.5 months ago, she was drying her hair when she brushed her hand against her neck and felt a lump on the right side. She had presented to an ED at that time for an evaluation and was told her thyroid gland was enlarged and discharged home. She did not notice improvement in the size of her gland and therefore presented to the ED again and was again discharged home with instructions to follow-up with an endocrinologist. She established care with Dr. Jesse Dailey and he suspected she had subacute thyroiditis given her age and initiated her on a course of Prednisone and Levothyroxine 150 mcg daily for a TSH level of 15.515 and free T4 of 0.68. She did not notice any improvement in her neck swelling with these medications.  She was also advised to complete a core biopsy of the right thyroid lobe with flow cytometry resulting positive for monotypic-kappa, CD-10-positive B-cell population with variable side scatter in a background of mixed T-cell subsets. Stains and ancillary studies are pending for further characterization. She endorses symptoms of dysphagia to solids, compression of her trachea when laying supine, hoarse voice, sensation of post-nasal drip improved with drinking water in the morning, and lightheadedness. She also endorses some fluctuating weight gain and loss and some night sweats. She has had some brittle nails and facial swelling. She endorses pain with palpation of her thyroid. She has a strong family history of hypothyroidism on her maternal side and a maternal aunt who passed from liver cancer as a child. She otherwise denies any previous exposure to thyrotoxic drugs such as amiodarone, lithium, or interferon. She did receive contrast on 7/22/23 for CT studies of the soft tissue neck and CT chest; and 6/23/23 for a CT abdomen and pelvis; 6/1/23 for a CT soft tissue neck. She denies any history of radiation to her head, neck, or chest. She denies any recent infections. Her most recent outpatient lab work was significant for a TSH of 1.77, free T4 of 0.91, free T3 of 1.3, TgAb <1.0, Tg WILLIS of 22.1, and CRP of 32.6. Previous imaging as follows:   CT soft tissue neck w/contrast (6/1/23): large low-attenuation lesions involving the right > left thyroid lobes measuring up to 3.8 x 4.3 cm along the right thyroid gland; large conglomerate in left thyroid lobe measures 3.3 x 3.0; mild narrowing of trachea; no pathologic or enlarged adenopathy    Thyroid ultrasound (6/1/23):  Right lobe 7.4 x 4.2 x 4.7 cm, left lobe 6.5 x 3.3 x 3.5 cm, isthmus 1.4 cm; overall thyroid is enlarged with rounded margins, right > left lobe, with large areas of heterogeneous hypoechogenicity with diminished Doppler flow without focal solid mass    CT abdomen and pelvis w/contrast (6/23/23): no abnormal lymphadenopathy noted    CTA chest PE study (7/22/23): severe enlargement of the thyroid gland with recent biopsy showing lymphoma with increased tracheal narrowing compared with neck CT from 6/1/23 with no obvious postprocedural hemorrhage    CT soft tissue neck w/contrast (7/22/23): significantly increased size of the thyroid tissue compared to the prior exam compressing the airway/trachea, consistent with known history of thyroid cancer      Historical Information   Past Medical History:   Diagnosis Date   • Asthma    • Disease of thyroid gland    • Ear infection      Past Surgical History:   Procedure Laterality Date   • IR BIOPSY NECK  7/20/2023     Social History   Social History     Substance and Sexual Activity   Alcohol Use Yes   • Alcohol/week: 1.0 standard drink of alcohol   • Types: 1 Standard drinks or equivalent per week    Comment: socially     Social History     Substance and Sexual Activity   Drug Use Never     Social History     Tobacco Use   Smoking Status Never   Smokeless Tobacco Never     Family History: History reviewed. No pertinent family history.     Meds/Allergies   Current Facility-Administered Medications   Medication Dose Route Frequency Provider Last Rate Last Admin   • acetaminophen (TYLENOL) tablet 650 mg  650 mg Oral Q6H PRN Joanna Mayfield MD   650 mg at 07/22/23 2008   • albuterol (PROVENTIL HFA,VENTOLIN HFA) inhaler 2 puff  2 puff Inhalation Q6H PRN Joanna Mayfield MD       • aluminum-magnesium hydroxide-simethicone (MAALOX) oral suspension 30 mL  30 mL Oral Q6H PRN Joanna Mayfield MD       • dicyclomine (BENTYL) tablet 20 mg  20 mg Oral BID Joanna Mayfield MD   20 mg at 07/23/23 1707   • levothyroxine tablet 150 mcg  150 mcg Oral Early Morning Joanna Mayfield MD   75 mcg at 07/23/23 0614   • montelukast (SINGULAIR) tablet 10 mg  10 mg Oral HS Joanna Mayfield MD   10 mg at 07/22/23 2204   • ondansetron (ZOFRAN) injection 4 mg  4 mg Intravenous Q6H PRN Joanna Mayfield MD       • predniSONE tablet 10 mg 10 mg Oral Daily Esteban Zamora MD   10 mg at 07/23/23 4878     Allergies   Allergen Reactions   • Pollen Extract    • Shrimp Extract Allergy Skin Test - Food Allergy Hives     Objective   Vitals: Blood pressure 151/97, pulse 90, temperature 98.4 °F (36.9 °C), resp. rate 17, height 5' 3" (1.6 m), weight 123 kg (271 lb), last menstrual period 06/29/2023, SpO2 96 %. No intake or output data in the 24 hours ending 07/23/23 1834  Invasive Devices     Peripheral Intravenous Line  Duration           Peripheral IV 07/22/23 Left Antecubital 1 day              Physical Exam  Constitutional:       Appearance: Normal appearance. HENT:      Head: Normocephalic and atraumatic. Mouth/Throat:      Mouth: Mucous membranes are moist.      Pharynx: Oropharynx is clear. Eyes:      Extraocular Movements: Extraocular movements intact. Pupils: Pupils are equal, round, and reactive to light. Neck:      Thyroid: Thyroid mass, thyromegaly (R>L) and thyroid tenderness present. Cardiovascular:      Rate and Rhythm: Normal rate and regular rhythm. Pulses: Normal pulses. Heart sounds: Normal heart sounds. Pulmonary:      Effort: Pulmonary effort is normal. No respiratory distress. Breath sounds: Normal breath sounds. No wheezing, rhonchi or rales. Abdominal:      General: There is no distension. Tenderness: There is no abdominal tenderness. There is no guarding or rebound. Musculoskeletal:         General: No swelling. Normal range of motion. Cervical back: Normal range of motion and neck supple. Right lower leg: No edema. Left lower leg: No edema. Lymphadenopathy:      Cervical: No cervical adenopathy. Upper Body:      Right upper body: No supraclavicular adenopathy. Left upper body: No supraclavicular adenopathy. Skin:     General: Skin is warm and dry. Findings: No abrasion or wound. Neurological:      General: No focal deficit present.       Mental Status: She is alert and oriented to person, place, and time. Psychiatric:         Mood and Affect: Mood normal.         Behavior: Behavior normal.       Lab Results:       Lab Results   Component Value Date    WBC 10.12 07/22/2023    HGB 12.0 07/22/2023    HCT 37.5 07/22/2023    MCV 86 07/22/2023     07/22/2023     Lab Results   Component Value Date/Time    BUN 13 07/22/2023 01:36 PM    K 4.1 07/22/2023 01:36 PM     (H) 07/22/2023 01:36 PM    CO2 26 07/22/2023 01:36 PM    CREATININE 0.76 07/22/2023 01:36 PM    AST 9 07/22/2023 01:36 PM    ALT 16 07/22/2023 01:36 PM    TP 7.5 07/22/2023 01:36 PM    ALB 3.2 (L) 07/22/2023 01:36 PM       Imaging Studies: I have personally reviewed pertinent reports. Portions of the record may have been created with voice recognition software.

## 2023-07-23 NOTE — ASSESSMENT & PLAN NOTE
History of asthma recently found to have thyroid enlargement thought to have subacute thyroiditis and started on prednisone/levothyroxine eventually biopsy found to have thyroid lymphoma  · Admitted for multidisciplinary evaluation including oncology ENT and endocrinology  · Oncology has ordered hepatitis panel and echocardiogram anticipation of need for chemotherapy  · Awaiting final pathology for treatment options  · Currently maintaining airway.

## 2023-07-23 NOTE — CONSULTS
Oncology Consult Note  Shelby Walker 26 y.o. female MRN: [id number]  Unit/Bed#: [facility] 913-01 Encounter: [id number]      Presenting Complaint: Thyroid lymphoma. History of Presenting Illness: A 70-year-old premenopausal woman who has noticed enlarged thyroid in early June 2023. 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 This is only preliminarily pathology report. Exact subtype of lymphoma is not yet known. Her CT scan of chest, abdomen pelvis showed no evidence of lymphadenopathy. She was sent to the hospital for airway monitoring. She has no constitutional symptoms such as fever, chills or night sweats. She has no weight loss. She has no complaint of pain. She denied any shortness of breath. She is fully ambulatory. Her performance status is 0-1 out of 4 on ECOG scale. Review of Systems - As stated in the HPI otherwise the fourteen point review of systems was negative. Past Medical History:   Diagnosis Date   • Asthma    • Disease of thyroid gland    • Ear infection        Social History     Socioeconomic History   • Marital status: Single     Spouse name: None   • Number of children: None   • Years of education: None   • Highest education level: None   Occupational History   • None   Tobacco Use   • Smoking status: Never   • Smokeless tobacco: Never   Vaping Use   • Vaping Use: Never used   Substance and Sexual Activity   • Alcohol use:  Yes     Alcohol/week: 1.0 standard drink of alcohol     Types: 1 Standard drinks or equivalent per week     Comment: socially   • Drug use: Never   • Sexual activity: Yes     Partners: Male     Birth control/protection: Condom Male   Other Topics Concern   • None   Social History Narrative   • None     Social Determinants of Health     Financial Resource Strain: Not on file   Food Insecurity: Not on file   Transportation Needs: Not on file   Physical Activity: Not on file   Stress: Not on file   Social Connections: Not on file   Intimate Partner Violence: Not on file   Housing Stability: Not on file       History reviewed. No pertinent family history. Allergies   Allergen Reactions   • Pollen Extract    • Shrimp Extract Allergy Skin Test - Food Allergy Hives         Current Facility-Administered Medications:   •  acetaminophen (TYLENOL) tablet 650 mg, 650 mg, Oral, Q6H PRN, Melinda Nj MD, 650 mg at 07/22/23 2008  •  albuterol (PROVENTIL HFA,VENTOLIN HFA) inhaler 2 puff, 2 puff, Inhalation, Q6H PRN, Melinda Nj MD  •  aluminum-magnesium hydroxide-simethicone (MAALOX) oral suspension 30 mL, 30 mL, Oral, Q6H PRN, Melinda Nj MD  •  dicyclomine (BENTYL) tablet 20 mg, 20 mg, Oral, BID, Melinda Nj MD, 20 mg at 07/23/23 3619  •  levothyroxine tablet 150 mcg, 150 mcg, Oral, Early Morning, Melinda Nj MD, 75 mcg at 07/23/23 5646  •  montelukast (SINGULAIR) tablet 10 mg, 10 mg, Oral, HS, Melinda Nj MD, 10 mg at 07/22/23 2204  •  ondansetron (ZOFRAN) injection 4 mg, 4 mg, Intravenous, Q6H PRN, Melinda Nj MD  •  predniSONE tablet 10 mg, 10 mg, Oral, Daily, Perry Lucas MD, 10 mg at 07/23/23 0821      /84   Pulse 85   Temp 98.4 °F (36.9 °C)   Resp 17   Ht 5' 3" (1.6 m)   Wt 123 kg (271 lb 2.7 oz)   LMP 06/29/2023 (Approximate)   SpO2 96%   BMI 48.03 kg/m²     General Appearance:    Alert, oriented        Eyes:    PERRL   Ears:    Normal external ear canals, both ears   Nose:   Nares normal, septum midline   Throat:   Mucosa moist. Pharynx without injection. Neck:  Large thyroid mass measuring 8 to 9 cm. Lungs:     Clear to auscultation bilaterally   Chest Wall:    No tenderness or deformity    Heart:    Regular rate and rhythm       Abdomen:     Soft, non-tender, bowel sounds +, no organomegaly           Extremities:   Extremities no cyanosis or edema       Skin:   no rash or icterus.     Lymph nodes:   Cervical, supraclavicular, and axillary nodes normal   Neurologic:   CNII-XII intact, normal strength, sensation and reflexes     Throughout               Recent Results (from the past 48 hour(s))   CBC and differential    Collection Time: 07/22/23  1:36 PM   Result Value Ref Range    WBC 10.12 4.31 - 10.16 Thousand/uL    RBC 4.38 3.81 - 5.12 Million/uL    Hemoglobin 12.0 11.5 - 15.4 g/dL    Hematocrit 37.5 34.8 - 46.1 %    MCV 86 82 - 98 fL    MCH 27.4 26.8 - 34.3 pg    MCHC 32.0 31.4 - 37.4 g/dL    RDW 12.9 11.6 - 15.1 %    MPV 9.3 8.9 - 12.7 fL    Platelets 832 832 - 880 Thousands/uL    nRBC 0 /100 WBCs    Neutrophils Relative 70 43 - 75 %    Immat GRANS % 0 0 - 2 %    Lymphocytes Relative 21 14 - 44 %    Monocytes Relative 7 4 - 12 %    Eosinophils Relative 2 0 - 6 %    Basophils Relative 0 0 - 1 %    Neutrophils Absolute 7.04 1.85 - 7.62 Thousands/µL    Immature Grans Absolute 0.03 0.00 - 0.20 Thousand/uL    Lymphocytes Absolute 2.11 0.60 - 4.47 Thousands/µL    Monocytes Absolute 0.70 0.17 - 1.22 Thousand/µL    Eosinophils Absolute 0.20 0.00 - 0.61 Thousand/µL    Basophils Absolute 0.04 0.00 - 0.10 Thousands/µL   Comprehensive metabolic panel    Collection Time: 07/22/23  1:36 PM   Result Value Ref Range    Sodium 142 135 - 147 mmol/L    Potassium 4.1 3.5 - 5.3 mmol/L    Chloride 112 (H) 96 - 108 mmol/L    CO2 26 21 - 32 mmol/L    ANION GAP 4 mmol/L    BUN 13 5 - 25 mg/dL    Creatinine 0.76 0.60 - 1.30 mg/dL    Glucose 104 65 - 140 mg/dL    Calcium 8.9 8.3 - 10.1 mg/dL    Corrected Calcium 9.5 8.3 - 10.1 mg/dL    AST 9 5 - 45 U/L    ALT 16 12 - 78 U/L    Alkaline Phosphatase 68 46 - 116 U/L    Total Protein 7.5 6.4 - 8.4 g/dL    Albumin 3.2 (L) 3.5 - 5.0 g/dL    Total Bilirubin 0.19 (L) 0.20 - 1.00 mg/dL    eGFR 111 ml/min/1.73sq m   D-Dimer    Collection Time: 07/22/23  1:36 PM   Result Value Ref Range    D-Dimer, Quant 1.06 (H) <0.50 ug/ml FEU   TSH, 3rd generation with Free T4 reflex    Collection Time: 07/22/23  1:36 PM   Result Value Ref Range    TSH 3RD GENERATON 0.970 0.450 - 4.500 uIU/mL hCG, qualitative pregnancy    Collection Time: 07/22/23  1:36 PM   Result Value Ref Range    Preg, Serum Negative Negative   ECG 12 lead    Collection Time: 07/22/23  1:45 PM   Result Value Ref Range    Ventricular Rate 106 BPM    Atrial Rate 106 BPM    TX Interval 134 ms    QRSD Interval 80 ms    QT Interval 338 ms    QTC Interval 448 ms    P Axis 65 degrees    QRS Axis 72 degrees    T Wave Axis 35 degrees   ECG 12 lead    Collection Time: 07/22/23  1:47 PM   Result Value Ref Range    Ventricular Rate 104 BPM    Atrial Rate 104 BPM    TX Interval 134 ms    QRSD Interval 80 ms    QT Interval 338 ms    QTC Interval 444 ms    P Axis 63 degrees    QRS Axis 71 degrees    T Wave Axis 35 degrees         CT soft tissue neck with contrast    Result Date: 7/22/2023  Narrative: CT NECK WITH CONTRAST INDICATION:   Thyroid cancer, staging thyroid cancer, swelling. COMPARISON: 6/1/2023 TECHNIQUE:  Axial, sagittal, and coronal 2D reformatted images were created from the axial source data and submitted for interpretation. Radiation dose length product (DLP) for this visit:  721.66 mGy-cm . This examination, like all CT scans performed in the [facility], was performed utilizing techniques to minimize radiation dose exposure, including the use of iterative  reconstruction and automated exposure control. IV Contrast:  100 mL of iohexol (OMNIPAQUE) IMAGE QUALITY:  Diagnostic. FINDINGS: VISUALIZED BRAIN PARENCHYMA:  No acute intracranial pathology of the visualized brain parenchyma. VISUALIZED ORBITS: Normal visualized orbits. PARANASAL SINUSES: Normal visualized paranasal sinuses. NASAL CAVITY AND NASOPHARYNX:  Normal. SUPRAHYOID NECK:  Normal oral cavity, tongue base, tonsillar fossa and epiglottis. INFRAHYOID NECK:  Aryepiglottic folds and piriform sinuses are normal.  Normal glottis and subglottic airway/trachea.  THYROID GLAND: Significantly increased size of thyroid tissue compared to the prior exam compressing the airway. PAROTID AND SUBMANDIBULAR GLANDS:  Normal. LYMPH NODES: Prominent bilateral cervical level 2 lymph nodes as before VASCULAR STRUCTURES:  Normal enhancement of the cervical vasculature. THORACIC INLET:  Lung apices and upper mediastinum are unremarkable. BONY STRUCTURES: No acute fracture or destructive osseous lesion. Impression: Significantly increased size of thyroid tissue compared to the prior exam compressing the airway/trachea. This is consistent with patient's known history of thyroid cancer. Surgical consult recommended for further evaluation. Workstation performed: [id number]     CTA ED chest PE study    Result Date: 7/22/2023  Narrative: CTA - CHEST WITH IV CONTRAST - PULMONARY ANGIOGRAM INDICATION:   Pulmonary embolism (PE) suspected, positive D-dimer tachycardia, SOB, elevated d-dimer. Per my review of the medical record, right thyroid nodule biopsy 7/20/2023 showing lymphoma. Right neck pain, difficulty breathing, difficulty swallowing. COMPARISON: Neck CT from today, 6/1/2023 and 1/31/2018. TECHNIQUE: CT angiogram timed for optimal opacification of the pulmonary arteries. Axial, sagittal, and coronal 2D reformats created from source data. Coronal 3D MIP postprocessing on the acquisition scanner. Radiation dose length product (DLP):  455.29 mGy-cm . Radiation dose exposure minimized using iterative reconstruction and automated exposure control. IV Contrast:  100 mL of iohexol (OMNIPAQUE) FINDINGS: PULMONARY ARTERIES:  No pulmonary embolus. LUNGS:  Lungs clear. AIRWAYS: No significant filling defects. PLEURA:  Unremarkable. HEART/GREAT VESSELS:  Normal for age. MEDIASTINUM AND LYNNE:  Unremarkable. CHEST WALL AND LOWER NECK: Marked enlargement of the thyroid gland severe tracheal narrowing, slightly increased since 6/1/2023. No obvious postbiopsy hemorrhage but the thyroid is incompletely imaged. UPPER ABDOMEN: Hepatic steatosis. OSSEOUS STRUCTURES:  Normal for age.      Impression: No pulmonary embolus. No acute pulmonary disease. Severe enlargement of the thyroid gland with recent biopsy showing lymphoma, with increased tracheal narrowing compared with the neck CT from 6/1/2023 with no obvious postprocedural hemorrhage. See neck CT from today. Hepatic steatosis. 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 After a brief ultrasound examination was performed of the thyroid, and correlated with the prior examination, the neck was then prepped and draped in usual sterile fashion. Under direct sonographic guidance, a 17-gauge coaxial needle was placed into the right thyroid lobe. Through the coaxial needle, multiple core needle biopsies were performed of the right thyroid lobe using an 18-gauge bio pence needle. Multiple ultrasound images were saved. At the end of the procedure, the needles were removed intact. Sterile dressing was applied. A full pathology report will follow. The patient tolerated the procedure well and suffered no complications. Findings: 1. Sonographic evaluation of the right thyroid lobe demonstrated a markedly enlarged right thyroid lobe with heterogeneous echogenicity. Color flow Doppler showed hypovascularity. 2.  Interval ultrasound demonstrated needle tip within the right lower lobe. 3.  Completion ultrasound showed no immediate complication/surrounding hematoma. Impression: Impression: Technically successful core needle biopsy of the right thyroid lobe. A full report will follow. Workstation performed: [id number]     US pelvis complete w transvaginal    Result Date: 6/24/2023  Narrative: PELVIC ULTRASOUND, COMPLETE INDICATION:  The patient is 26years old. ovarian cysts, vomiting eval torsion.  COMPARISON: Multiple priors most recently 6/23/2023 TECHNIQUE:   Transabdominal pelvic ultrasound was performed in sagittal and transverse planes with a curvilinear transducer. Additional transvaginal imaging was performed to better evaluate the endometrium and ovaries. Imaging included volumetric sweeps as well as traditional still imaging technique. FINDINGS: UTERUS: The uterus is anteverted in position, measuring 8.6 x 3.9 x 5.0 cm. The uterus has a normal contour and echotexture. The cervix appears within normal limits. ENDOMETRIUM: The endometrial echo complex has an AP caliber of 6.0 mm. Its appearance is within normal limits for age and cycle and shows no filling defects. OVARIES/ADNEXA: Right ovary:  2.8 x 4.0 x 3.2 cm. 19.0 mL. Left ovary:  4.6 x 5.1 x 5.8 cm. 71.3 mL. Ovarian Doppler flow is within normal limits. Within the right ovary, there is a 2.6 x 2.7 x 3.2 cm complex cyst with areas of internal septation and some degree of lacy architecture. Within the left ovary, there is a 4.2 x 4.9 x 4.9 cm complex cyst with lacy reticular complex internal architecture. OTHER: Trace free fluid. Impression: No evidence of ovarian torsion at the time of examination. Probable bilateral hemorrhagic cysts. Recommend 12-week follow-up ultrasound to assess stability/resolution. The study was marked in [facility] for immediate notification. Workstation performed: [id number]     CT abdomen pelvis with contrast    Result Date: 6/23/2023  Narrative: CT ABDOMEN AND PELVIS WITH IV CONTRAST INDICATION:   abdominal pain, nausea and vomiting. COMPARISON: 12/10/2022 TECHNIQUE:  CT examination of the abdomen and pelvis was performed. Multiplanar 2D reformatted images were created from the source data. This examination, like all CT scans performed in the [facility], was performed utilizing techniques to minimize radiation dose exposure, including the use of iterative reconstruction and automated exposure control.  Radiation dose length product (DLP) for this visit:  1466 mGy-cm IV Contrast:  100 mL of iohexol (OMNIPAQUE) Enteric Contrast:  Enteric contrast was not administered. FINDINGS: ABDOMEN LOWER CHEST:  No clinically significant abnormality identified in the visualized lower chest. LIVER/BILIARY TREE:  Unremarkable. GALLBLADDER:  No calcified gallstones. No pericholecystic inflammatory change. SPLEEN:  Unremarkable. PANCREAS:  Unremarkable. ADRENAL GLANDS:  Unremarkable. KIDNEYS/URETERS: 0.2 cm nonobstructing left lower pole renal calculus. No hydronephrosis. STOMACH AND BOWEL:  Unremarkable. APPENDIX:  A normal appendix was visualized. ABDOMINOPELVIC CAVITY:  No ascites. No pneumoperitoneum. No lymphadenopathy. VESSELS:  Unremarkable for patient's age. PELVIS REPRODUCTIVE ORGANS: 5.1 cm left ovarian cyst. 2.7 cm right adnexal cyst URINARY BLADDER:  Unremarkable. ABDOMINAL WALL/INGUINAL REGIONS:  Unremarkable. OSSEOUS STRUCTURES:  No acute fracture or destructive osseous lesion. Impression: No acute abnormality. 0.2 cm nonobstructing left lower pole renal calculus 5.1 cm left ovarian cyst. 2.7 cm right adnexal cyst. Recommend outpatient pelvic ultrasound for complete evaluation. However, if there is concern for ovarian torsion, recommend emergent ultrasound The study was marked in [employer] for immediate notification. Workstation performed: [id number]       Assessment: Thyroid lymphoma, CD10 positive. Plan: A 71-year-old female with history as described above. Preliminary path report showed CD10 positive lymphoma. CD10 positive B-cell lymphoma could be follicular lymphoma, Burkitt lymphoma and small subset of diffuse large B-cell lymphoma. Because of her young age, follicle lymphoma is extremely rare. Because of the rapid growth, I have relatively high suspicion that she has Burkitt lymphoma. I would like to wait for final pathology report as well as [address] translocation.   Once we obtain the final path report, we will discuss the treatment options. To prepare for systemic chemotherapy, I will obtain echocardiogram, chronic hepatitis panel since rituximab is part of the treatment regimen.

## 2023-07-23 NOTE — UTILIZATION REVIEW
Initial Clinical Review    Admission: Date/Time/Statement:   Admission Orders (From admission, onward)     Ordered        07/22/23 1755  INPATIENT ADMISSION  Once                      Orders Placed This Encounter   Procedures   • INPATIENT ADMISSION     Standing Status:   Standing     Number of Occurrences:   1     Order Specific Question:   Level of Care     Answer:   Med Surg [16]     Order Specific Question:   Estimated length of stay     Answer:   More than 2 Midnights     Order Specific Question:   Certification     Answer:   I certify that inpatient services are medically necessary for this patient for a duration of greater than two midnights. See H&P and MD Progress Notes for additional information about the patient's course of treatment. ED Arrival Information     Expected   -    Arrival   7/22/2023 12:14    Acuity   Emergent            Means of arrival   Walk-In    Escorted by   Family Member    Service   Hospitalist    Admission type   Emergency            Arrival complaint   sent from doctor           Chief Complaint   Patient presents with   • Evaluation of Abnormal Diagnostic Test     Biopsy showed lymphoma in neck, sent by provider. C/o pain in right side of neck. Difficulty breathing in the mornings, difficulty swallowing       Initial Presentation: 26 y.o. female PMH of mild asthma, obesity, bilateral thyroid enlargement, recent diagnosis of thyroid lymphoma by tissue biopsy was sent to ED by endocrinologist office due to concern for airway compromise. CTA chest obtained: Severe enlargement of thyroid gland with recent biopsy showing lymphoma, with increased tracheal narrowing compared with neck CT from 6/1/23. Admitted Inpatient Lymphoma of thyroid gland  Consult oncology and endocrinology. ENT consult: may need surgical intervention during hospital stay continue levothyroxine and prednisone  Dysphagia intermittent dysphagia with solid foot.  Speech therapy consulted  ENT CONSULT  no acute ENT interventions required at this time   oncology recommendations for lymphoma management    Date: 7/23/23   Day 2:   Oncology Consult  Thyroid lymphoma preliminary pathology report showed positive lymphoma , exact subtype of lymphoma is not yet known. Throat mucosa moist . Pharynx without injection. Neck Large thyroid mass measuring 8 to 9 cm. Lungs clear auscultation b/l   Because of rapid growth relatively high suspicion that she has Burkitt lymphoma. Need to wait for final report as well as [address] translocation. Then we can discuss treatment options. In preparation for chemotherapy will order echo and chronic hepatitis panel since rituximab is part of tx regimen    Speech pathology  Aspiration currently low.  Recommend regular diet and thin liquids                   ED Triage Vitals   Temperature Pulse Respirations Blood Pressure SpO2   07/22/23 1218 07/22/23 1218 07/22/23 1218 07/22/23 1218 07/22/23 1218   99.3 °F (37.4 °C) (!) 118 18 (!) 147/101 97 %      Temp Source Heart Rate Source Patient Position - Orthostatic VS BP Location FiO2 (%)   07/22/23 1218 07/22/23 1218 07/22/23 2000 07/22/23 1315 --   Temporal Monitor Lying Left arm       Pain Score       07/22/23 1218       3          Wt Readings from Last 1 Encounters:   07/22/23 123 kg (271 lb 2.7 oz)     Additional Vital Signs:   07/23/23 07:07:21 98.4 °F (36.9 °C) 85 17 133/84 100 96 % -- --   07/23/23 04:19:29 -- 108 Abnormal  98 Abnormal  149/108 Abnormal  122 96 % -- --   07/23/23 03:38:11 98.5 °F (36.9 °C) 99 -- 147/107 Abnormal  120 96 % -- --   07/22/23 2005 -- 117 Abnormal  29 Abnormal  150/96 116 96 % -- --   07/22/23 2000 -- 116 Abnormal  22 150/96 116 96 % None (Room air) Lying   07/22/23 1730 -- 107 Abnormal  20 165/89 116 98 % None (Room air) --   07/22/23 1530 -- 107 Abnormal  24 Abnormal  128/97 108 97 %         Pertinent Labs/Diagnostic Test Results:   CTA ED chest PE study   Final Result by Ish Horan MD (07/22 1734)      No pulmonary embolus. No acute pulmonary disease. Severe enlargement of the thyroid gland with recent biopsy showing lymphoma, with increased tracheal narrowing compared with the neck CT from 6/1/2023 with no obvious postprocedural hemorrhage. See neck CT from today. Hepatic steatosis. Workstation performed: [id number]         CT soft tissue neck with contrast   Final Result by Isa Birmingham MD (07/22 1934)      Significantly increased size of thyroid tissue compared to the prior exam compressing the airway/trachea. This is consistent with patient's known history of thyroid cancer. Surgical consult recommended for further evaluation.          Workstation performed: [id number]               Results from last 7 days   Lab Units 07/22/23  1336 07/20/23  1047   WBC Thousand/uL 10.12 8.64   HEMOGLOBIN g/dL 12.0 11.9   HEMATOCRIT % 37.5 36.2   PLATELETS Thousands/uL 344 323   NEUTROS ABS Thousands/µL 7.04 5.51     Results from last 7 days   Lab Units 07/22/23  1336   SODIUM mmol/L 142   POTASSIUM mmol/L 4.1   CHLORIDE mmol/L 112*   CO2 mmol/L 26   ANION GAP mmol/L 4   BUN mg/dL 13   CREATININE mg/dL 0.76   EGFR ml/min/1.73sq m 111   CALCIUM mg/dL 8.9     Results from last 7 days   Lab Units 07/22/23  1336   AST U/L 9   ALT U/L 16   ALK PHOS U/L 68   TOTAL PROTEIN g/dL 7.5   ALBUMIN g/dL 3.2*   TOTAL BILIRUBIN mg/dL 0.19*     Results from last 7 days   Lab Units 07/22/23  1336   GLUCOSE RANDOM mg/dL 104     Results from last 7 days   Lab Units 07/22/23  1336   D-DIMER QUANTITATIVE ug/ml FEU 1.06*     Results from last 7 days   Lab Units 07/20/23  1047   PROTIME seconds 12.9   INR  0.99     Results from last 7 days   Lab Units 07/22/23  1336   TSH 3RD GENERATON uIU/mL 0.970     ED Treatment:   Medication Administration from 07/22/2023 1214 to 07/23/2023 0335       Date/Time Order Dose Route Action Action by Comments     07/22/2023 1706 EDT iohexol (OMNIPAQUE) 350 MG/ML injection (SINGLE-DOSE) 100 mL 100 mL Intravenous Given Rocio Jones --     07/22/2023 1951 EDT dicyclomine (BENTYL) tablet 20 mg 20 mg Oral Given Cortes Baron --     07/22/2023 2204 EDT montelukast (SINGULAIR) tablet 10 mg 10 mg Oral Given Cortes Baron --     07/22/2023 1951 EDT predniSONE tablet 10 mg 10 mg Oral Given Cortes Whyteee --     07/22/2023 2008 EDT acetaminophen (TYLENOL) tablet 650 mg 650 mg Oral Given Cortes Baron --        Past Medical History:   Diagnosis Date   • Asthma    • Disease of thyroid gland    • Ear infection      Present on Admission:  • Mild intermittent asthma without complication      Admitting Diagnosis: Thyroid cancer ([address]) [C73]  Abdominal pain [R10.9]  Age/Sex: 26 y.o. female  Admission Orders:  Med surg  LD blood  Chronic hepatitis panel  echo  Scheduled Medications:  dicyclomine, 20 mg, Oral, BID  levothyroxine, 150 mcg, Oral, Early Morning  montelukast, 10 mg, Oral, HS  predniSONE, 10 mg, Oral, Daily      Continuous IV Infusions:     PRN Meds:  acetaminophen, 650 mg, Oral, Q6H PRN  albuterol, 2 puff, Inhalation, Q6H PRN  aluminum-magnesium hydroxide-simethicone, 30 mL, Oral, Q6H PRN  ondansetron, 4 mg, Intravenous, Q6H PRN        IP CONSULT TO ENDOCRINOLOGY  IP CONSULT TO ONCOLOGY  IP CONSULT TO ENT    Network Utilization Review Department  ATTENTION: Please call with any questions or concerns to [phone] and carefully listen to the prompts so that you are directed to the right person. All voicemails are confidential.  Elissa Daimond all requests for admission clinical reviews, approved or denied determinations and any other requests to dedicated fax number below belonging to the campus where the patient is receiving treatment.  List of dedicated fax numbers for the Facilities:  [place] DENIALS (Administrative/Medical Necessity) [phone] [facility] [address] [facility] Zenaida Lepe [phone]   [address] [phone]   [address] [place] [address] Road [address] [place] [address] [phone]   [address] [address] [phone]

## 2023-07-23 NOTE — ED PROVIDER NOTES
History  Chief Complaint   Patient presents with   • Evaluation of Abnormal Diagnostic Test     Biopsy showed lymphoma in neck, sent by provider. C/o pain in right side of neck. Difficulty breathing in the mornings, difficulty swallowing     HPI  Tiera Rubin is a 26 y.o. female who presents to the emergency department after an abnormal thyroid biopsy. She had noticed swelling in the right side of her neck and had been evaluated by an endocrinologist for thyroid problems. 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 She has had chronic shortness of breath for at least the past month since noticing the mass. Prior to Admission Medications   Prescriptions Last Dose Informant Patient Reported? Taking?    albuterol (Proventil HFA) 90 mcg/act inhaler Past Month  No Yes   Sig: Inhale 2 puffs every 6 (six) hours as needed for wheezing   benzonatate (TESSALON) 200 MG capsule Not Taking  No No   Sig: Take 1 capsule (200 mg total) by mouth 3 (three) times a day as needed for cough   Patient not taking: Reported on 7/23/2023   dicyclomine (BENTYL) 20 mg tablet 7/22/2023  No Yes   Sig: Take 1 tablet (20 mg total) by mouth 2 (two) times a day   diphenhydrAMINE (BENADRYL) 25 mg capsule Past Month  No Yes   Sig: Take 1 capsule (25 mg total) by mouth every 6 (six) hours as needed for itching   fluticasone (FLONASE) 50 mcg/act nasal spray Past Month  No Yes   Sig: SPRAY 1 SPRAY INTO EACH NOSTRIL EVERY DAY   ibuprofen (MOTRIN) 400 mg tablet Not Taking  No No   Sig: Take 1 tablet (400 mg total) by mouth every 6 (six) hours as needed for mild pain   Patient not taking: Reported on 7/23/2023   levothyroxine 150 mcg tablet 7/22/2023  No Yes   Sig: Take 1 tablet (150 mcg total) by mouth daily   methocarbamol (ROBAXIN) 500 mg tablet Not Taking  No No   Sig: Take 1 tablet (500 mg total) by mouth 3 (three) times a day as needed for muscle spasms   Patient not taking: Reported on 4/6/2023   montelukast (SINGULAIR) 10 mg tablet Not Taking Self Yes No   Sig: Take 10 mg by mouth daily at bedtime   Patient not taking: Reported on 7/23/2023   naproxen (NAPROSYN) 500 mg tablet Not Taking  No No   Sig: Take 1 tablet by mouth every 12 (twelve) hours as needed for mild pain or moderate pain   Patient not taking: Reported on 7/23/2023   ondansetron (ZOFRAN) 4 mg tablet Past Month  No Yes   Sig: Take 1 tablet (4 mg total) by mouth every 6 (six) hours   ondansetron (ZOFRAN-ODT) 4 mg disintegrating tablet Not Taking  No No   Sig: Take 1 tablet by mouth every 6 (six) hours as needed for nausea or vomiting   Patient not taking: Reported on 7/23/2023   ondansetron (ZOFRAN-ODT) 4 mg disintegrating tablet   No No   Sig: Take 1 tablet (4 mg total) by mouth every 8 (eight) hours as needed for nausea   predniSONE 20 mg tablet 7/22/2023 Self No Yes   Sig: TAKE 1 TABLET BY MOUTH EVERY DAY   Patient taking differently: Take 10 mg by mouth daily      Facility-Administered Medications: None       Past Medical History:   Diagnosis Date   • Asthma    • Disease of thyroid gland    • Ear infection        Past Surgical History:   Procedure Laterality Date   • IR BIOPSY NECK  7/20/2023       History reviewed. No pertinent family history. I have reviewed and agree with the history as documented. E-Cigarette/Vaping   • E-Cigarette Use Never User      E-Cigarette/Vaping Substances   • Nicotine No    • THC No    • CBD No    • Flavoring No    • Other No    • Unknown No      Social History     Tobacco Use   • Smoking status: Never   • Smokeless tobacco: Never   Vaping Use   • Vaping Use: Never used   Substance Use Topics   • Alcohol use: Yes     Alcohol/week: 1.0 standard drink of alcohol     Types: 1 Standard drinks or equivalent per week     Comment: socially   • Drug use: Never       Home medications:  Prior to Admission Medications   Prescriptions Last Dose Informant Patient Reported? Taking?    albuterol (Proventil HFA) 90 mcg/act inhaler Past Month  No Yes   Sig: Inhale 2 puffs every 6 (six) hours as needed for wheezing   benzonatate (TESSALON) 200 MG capsule Not Taking  No No   Sig: Take 1 capsule (200 mg total) by mouth 3 (three) times a day as needed for cough   Patient not taking: Reported on 7/23/2023   dicyclomine (BENTYL) 20 mg tablet 7/22/2023  No Yes   Sig: Take 1 tablet (20 mg total) by mouth 2 (two) times a day   diphenhydrAMINE (BENADRYL) 25 mg capsule Past Month  No Yes   Sig: Take 1 capsule (25 mg total) by mouth every 6 (six) hours as needed for itching   fluticasone (FLONASE) 50 mcg/act nasal spray Past Month  No Yes   Sig: SPRAY 1 SPRAY INTO EACH NOSTRIL EVERY DAY   ibuprofen (MOTRIN) 400 mg tablet Not Taking  No No   Sig: Take 1 tablet (400 mg total) by mouth every 6 (six) hours as needed for mild pain   Patient not taking: Reported on 7/23/2023   levothyroxine 150 mcg tablet 7/22/2023  No Yes   Sig: Take 1 tablet (150 mcg total) by mouth daily   methocarbamol (ROBAXIN) 500 mg tablet Not Taking  No No   Sig: Take 1 tablet (500 mg total) by mouth 3 (three) times a day as needed for muscle spasms   Patient not taking: Reported on 4/6/2023   montelukast (SINGULAIR) 10 mg tablet Not Taking Self Yes No   Sig: Take 10 mg by mouth daily at bedtime   Patient not taking: Reported on 7/23/2023   naproxen (NAPROSYN) 500 mg tablet Not Taking  No No   Sig: Take 1 tablet by mouth every 12 (twelve) hours as needed for mild pain or moderate pain   Patient not taking: Reported on 7/23/2023   ondansetron (ZOFRAN) 4 mg tablet Past Month  No Yes   Sig: Take 1 tablet (4 mg total) by mouth every 6 (six) hours   ondansetron (ZOFRAN-ODT) 4 mg disintegrating tablet Not Taking  No No   Sig: Take 1 tablet by mouth every 6 (six) hours as needed for nausea or vomiting   Patient not taking: Reported on 7/23/2023   ondansetron (ZOFRAN-ODT) 4 mg disintegrating tablet   No No   Sig: Take 1 tablet (4 mg total) by mouth every 8 (eight) hours as needed for nausea   predniSONE 20 mg tablet 7/22/2023 Self No Yes   Sig: TAKE 1 TABLET BY MOUTH EVERY DAY   Patient taking differently: Take 10 mg by mouth daily      Facility-Administered Medications: None     Allergies: Allergies   Allergen Reactions   • Pollen Extract    • Shrimp Extract Allergy Skin Test - Food Allergy Hives        Review of Systems   Constitutional: Negative for fever. Respiratory: Positive for shortness of breath. Cardiovascular: Positive for palpitations. Negative for chest pain and leg swelling. Gastrointestinal: Negative for abdominal pain and vomiting. All other systems reviewed and are negative. Physical Exam  ED Triage Vitals   Temperature Pulse Respirations Blood Pressure SpO2   07/22/23 1218 07/22/23 1218 07/22/23 1218 07/22/23 1218 07/22/23 1218   99.3 °F (37.4 °C) (!) 118 18 (!) 147/101 97 %      Temp Source Heart Rate Source Patient Position - Orthostatic VS BP Location FiO2 (%)   07/22/23 1218 07/22/23 1218 07/22/23 2000 07/22/23 1315 --   Temporal Monitor Lying Left arm       Pain Score       07/22/23 1218       3             Orthostatic Vital Signs  Vitals:    07/23/23 2229 07/23/23 2230 07/24/23 0729 07/24/23 1500   BP: (!) 158/116 (!) 158/115 (!) 147/108 147/100   Pulse: (!) 114 (!) 114 95 (!) 115   Patient Position - Orthostatic VS: Sitting Sitting  Lying       Physical Exam  Vitals and nursing note reviewed. Constitutional:       General: She is not in acute distress. Appearance: She is not toxic-appearing. HENT:      Head: Normocephalic. Mouth/Throat:      Mouth: Mucous membranes are moist.   Eyes:      Pupils: Pupils are equal, round, and reactive to light. Neck:      Comments: Enlarged thyroid, especially along the right side  Cardiovascular:      Rate and Rhythm: Regular rhythm. Tachycardia present. Pulmonary:      Effort: No respiratory distress. Breath sounds: No stridor. No wheezing, rhonchi or rales.       Comments: Breathing comfortably on room air  Abdominal:      General: Abdomen is flat. There is no distension. Palpations: Abdomen is soft. Tenderness: There is no abdominal tenderness. There is no guarding or rebound. Skin:     General: Skin is warm and dry. Neurological:      Mental Status: She is alert.          ED Medications  Medications   albuterol (PROVENTIL HFA,VENTOLIN HFA) inhaler 2 puff (has no administration in time range)   dicyclomine (BENTYL) tablet 20 mg (20 mg Oral Given 7/24/23 1709)   levothyroxine tablet 150 mcg (150 mcg Oral Given 7/24/23 0530)   montelukast (SINGULAIR) tablet 10 mg (10 mg Oral Given 7/23/23 2116)   predniSONE tablet 10 mg (10 mg Oral Given 7/24/23 0851)   ondansetron (ZOFRAN) injection 4 mg (has no administration in time range)   acetaminophen (TYLENOL) tablet 650 mg (650 mg Oral Given 7/22/23 2008)   aluminum-magnesium hydroxide-simethicone (MAALOX) oral suspension 30 mL (has no administration in time range)   iohexol (OMNIPAQUE) 350 MG/ML injection (SINGLE-DOSE) 100 mL (100 mL Intravenous Given 7/22/23 1706)       Diagnostic Studies  Results Reviewed     Procedure Component Value Units Date/Time    hCG, qualitative pregnancy [[id number]]  (Normal) Collected: 07/22/23 1336    Lab Status: Final result Specimen: Blood from Arm, Left Updated: 07/22/23 1759     Preg, Serum Negative    TSH, 3rd generation with Free T4 reflex [[id number]]  (Normal) Collected: 07/22/23 1336    Lab Status: Final result Specimen: Blood from Arm, Left Updated: 07/22/23 1439     TSH 3RD GENERATON 0.970 uIU/mL     D-Dimer [[id number]]  (Abnormal) Collected: 07/22/23 1336    Lab Status: Final result Specimen: Blood from Arm, Left Updated: 07/22/23 1418     D-Dimer, Quant 1.06 ug/ml FEU     Comprehensive metabolic panel [[id number]]  (Abnormal) Collected: 07/22/23 1336    Lab Status: Final result Specimen: Blood from Arm, Left Updated: 07/22/23 1414     Sodium 142 mmol/L      Potassium 4.1 mmol/L Chloride 112 mmol/L      CO2 26 mmol/L      ANION GAP 4 mmol/L      BUN 13 mg/dL      Creatinine 0.76 mg/dL      Glucose 104 mg/dL      Calcium 8.9 mg/dL      Corrected Calcium 9.5 mg/dL      AST 9 U/L      ALT 16 U/L      Alkaline Phosphatase 68 U/L      Total Protein 7.5 g/dL      Albumin 3.2 g/dL      Total Bilirubin 0.19 mg/dL      eGFR 111 ml/min/1.73sq m     Narrative:      [employer] guidelines for Chronic Kidney Disease (CKD):   •  Stage 1 with normal or high GFR (GFR > 90 mL/min/1.73 square meters)  •  Stage 2 Mild CKD (GFR = 60-89 mL/min/1.73 square meters)  •  Stage 3A Moderate CKD (GFR = 45-59 mL/min/1.73 square meters)  •  Stage 3B Moderate CKD (GFR = 30-44 mL/min/1.73 square meters)  •  Stage 4 Severe CKD (GFR = 15-29 mL/min/1.73 square meters)  •  Stage 5 End Stage CKD (GFR <15 mL/min/1.73 square meters)  Note: GFR calculation is accurate only with a steady state creatinine    CBC and differential [[id number]] Collected: 07/22/23 1336    Lab Status: Final result Specimen: Blood from Arm, Left Updated: 07/22/23 1347     WBC 10.12 Thousand/uL      RBC 4.38 Million/uL      Hemoglobin 12.0 g/dL      Hematocrit 37.5 %      MCV 86 fL      MCH 27.4 pg      MCHC 32.0 g/dL      RDW 12.9 %      MPV 9.3 fL      Platelets 622 Thousands/uL      nRBC 0 /100 WBCs      Neutrophils Relative 70 %      Immat GRANS % 0 %      Lymphocytes Relative 21 %      Monocytes Relative 7 %      Eosinophils Relative 2 %      Basophils Relative 0 %      Neutrophils Absolute 7.04 Thousands/µL      Immature Grans Absolute 0.03 Thousand/uL      Lymphocytes Absolute 2.11 Thousands/µL      Monocytes Absolute 0.70 Thousand/µL      Eosinophils Absolute 0.20 Thousand/µL      Basophils Absolute 0.04 Thousands/µL                  CTA ED chest PE study   Final Result by Eliza Owens MD (07/22 8370)      No pulmonary embolus. No acute pulmonary disease.       Severe enlargement of the thyroid gland with recent biopsy showing lymphoma, with increased tracheal narrowing compared with the neck CT from 6/1/2023 with no obvious postprocedural hemorrhage. See neck CT from today. Hepatic steatosis. Workstation performed: [id number]         CT soft tissue neck with contrast   Final Result by Harley Burkett MD (07/22 1934)      Significantly increased size of thyroid tissue compared to the prior exam compressing the airway/trachea. This is consistent with patient's known history of thyroid cancer. Surgical consult recommended for further evaluation. Workstation performed: [id number]         IR biopsy bone marrow    (Results Pending)         Procedures  Procedures      ED Course  ED Course as of 07/24/23 1724   Sat Jul 22, 2023   1433 D-Dimer, Quant(!): 1.06  Will obtain CTA to evaluate. SBIRT 20yo+    Flowsheet Row Most Recent Value   Initial Alcohol Screen: US AUDIT-C     1. How often do you have a drink containing alcohol? 1 Filed at: 07/22/2023 1219   2. How many drinks containing alcohol do you have on a typical day you are drinking? 0 Filed at: 07/22/2023 1219   3a. Male UNDER 65: How often do you have five or more drinks on one occasion? 0 Filed at: 07/22/2023 1219   3b. FEMALE Any Age, or MALE 65+: How often do you have 4 or more drinks on one occassion? 0 Filed at: 07/22/2023 1219   Audit-C Score 1 Filed at: 07/22/2023 1219   DINAH: How many times in the past year have you. .. Used an illegal drug or used a prescription medication for non-medical reasons? Never Filed at: 07/22/2023 1219                [facility]  Chanel Schultz is a 26 y.o. female who presents to the emergency department with subjective shortness of breath, recent thyroid biopsy showing thyroid lymphoma. Workup including vital signs, physical exam, CT. Given tachycardia, shortness of breath, elevated D-dimer, plan to do CT PE scan, as well as CT soft tissue neck to evaluate for airway compression.   CT without evidence of PE, CT showing enlarged thyroid with mild airway compression, however patient saturating well on room air without respiratory distress. Plan for admission to medicine for endocrinology/oncology evaluation. Disposition  Final diagnoses:   Thyroid cancer ([address])     Time reflects when diagnosis was documented in both MDM as applicable and the Disposition within this note     Time User Action Codes Description Comment    7/22/2023  5:54 PM Juanpablo Finnegan Add [C73] Thyroid cancer ([address])     7/24/2023  9:12 AM Joey Kenny Add [C85.99] Lymphoma of thyroid gland [facility])       ED Disposition     ED Disposition   Admit    Condition   Stable    Date/Time   Sat Jul 22, 2023  5:55 PM    Comment   Case was discussed with internal medicine and the patient's admission status was agreed to be Admission Status: inpatient status to the service of Dr. Farrah Alcazar . Follow-up Information    None         Current Discharge Medication List      CONTINUE these medications which have NOT CHANGED    Details   albuterol (Proventil HFA) 90 mcg/act inhaler Inhale 2 puffs every 6 (six) hours as needed for wheezing  Qty: 6.7 g, Refills: 5    Comments: Substitution to a formulary equivalent within the same pharmaceutical class is authorized.   Associated Diagnoses: Mild intermittent asthma without complication      dicyclomine (BENTYL) 20 mg tablet Take 1 tablet (20 mg total) by mouth 2 (two) times a day  Qty: 20 tablet, Refills: 0    Associated Diagnoses: Abdominal pain      diphenhydrAMINE (BENADRYL) 25 mg capsule Take 1 capsule (25 mg total) by mouth every 6 (six) hours as needed for itching  Qty: 20 capsule, Refills: 0    Associated Diagnoses: Rash      fluticasone (FLONASE) 50 mcg/act nasal spray SPRAY 1 SPRAY INTO EACH NOSTRIL EVERY DAY  Qty: 16 mL, Refills: 2    Associated Diagnoses: Chronic sinusitis, unspecified location      levothyroxine 150 mcg tablet Take 1 tablet (150 mcg total) by mouth daily  Qty: 90 tablet, Refills: 0    Associated Diagnoses: Acquired hypothyroidism      ondansetron (ZOFRAN) 4 mg tablet Take 1 tablet (4 mg total) by mouth every 6 (six) hours  Qty: 12 tablet, Refills: 0    Associated Diagnoses: Gastroenteritis      predniSONE 20 mg tablet TAKE 1 TABLET BY MOUTH EVERY DAY  Qty: 30 tablet, Refills: 0    Associated Diagnoses: Subacute thyroiditis      benzonatate (TESSALON) 200 MG capsule Take 1 capsule (200 mg total) by mouth 3 (three) times a day as needed for cough  Qty: 20 capsule, Refills: 0    Associated Diagnoses: Viral infection, unspecified; Acute cough      ibuprofen (MOTRIN) 400 mg tablet Take 1 tablet (400 mg total) by mouth every 6 (six) hours as needed for mild pain  Qty: 30 tablet, Refills: 0    Associated Diagnoses: Wrist sprain, right, initial encounter      methocarbamol (ROBAXIN) 500 mg tablet Take 1 tablet (500 mg total) by mouth 3 (three) times a day as needed for muscle spasms  Qty: 20 tablet, Refills: 0    Associated Diagnoses: Low back strain, initial encounter      montelukast (SINGULAIR) 10 mg tablet Take 10 mg by mouth daily at bedtime      naproxen (NAPROSYN) 500 mg tablet Take 1 tablet by mouth every 12 (twelve) hours as needed for mild pain or moderate pain  Qty: 20 tablet, Refills: 0      !! ondansetron (ZOFRAN-ODT) 4 mg disintegrating tablet Take 1 tablet by mouth every 6 (six) hours as needed for nausea or vomiting  Qty: 14 tablet, Refills: 0      !! ondansetron (ZOFRAN-ODT) 4 mg disintegrating tablet Take 1 tablet (4 mg total) by mouth every 8 (eight) hours as needed for nausea  Qty: 20 tablet, Refills: 0    Associated Diagnoses: Nausea and vomiting       !! - Potential duplicate medications found. Please discuss with provider. No discharge procedures on file. PDMP Review     None           ED Provider  Attending physically available and evaluated Laurance Sandifer. I managed the patient along with the ED Attending.     Electronically Signed by    Portions of the record may have been created with voice recognition software. Occasional wrong word or "sound a like" substitutions may have occurred due to the inherent limitations of voice recognition software.   Read the chart carefully and recognize, using context, where substitutions have occurred     Jana Lees MD  07/23/23 1879       Julianne Avila MD  07/24/23 0170

## 2023-07-23 NOTE — ASSESSMENT & PLAN NOTE
· Working diagnosis before found to have lymphoma with subacute thyroiditis on levothyroxine and prednisone  · Continue levothyroxine 125 mcg daily  · On tapering dose of prednisone currently at 10 mg  · Endocrinology consulted

## 2023-07-23 NOTE — PROGRESS NOTES
[address] [facility], [place]  Progress Note  Name: Rufus Licona  MRN: [id number]  Unit/Bed#: [facility] 913-01 I Date of Admission: 7/22/2023   Date of Service: 7/23/2023 I Hospital Day: 1    Assessment/Plan   * Lymphoma of thyroid gland [facility])  Assessment & Plan  History of asthma recently found to have thyroid enlargement thought to have subacute thyroiditis and started on prednisone/levothyroxine eventually biopsy found to have thyroid lymphoma  · Admitted for multidisciplinary evaluation including oncology ENT and endocrinology  · Oncology has ordered hepatitis panel and echocardiogram anticipation of need for chemotherapy  · Awaiting final pathology for treatment options  · Currently maintaining airway. Morbid obesity ([address])  Assessment & Plan  · Body mass index is 48.03 kg/m². Dysphagia  Assessment & Plan  · Secondary to thyromegaly    Subacute thyroiditis  Assessment & Plan  · Working diagnosis before found to have lymphoma with subacute thyroiditis on levothyroxine and prednisone  · Continue levothyroxine 125 mcg daily  · On tapering dose of prednisone currently at 10 mg  · Endocrinology consulted    Mild intermittent asthma without complication  Assessment & Plan  · No exacerbation on albuterol. Continue montelukast    VTE Pharmacologic Prophylaxis:   Low Risk (Score 0-2) - Encourage Ambulation. Patient Centered Rounds: I have performed bedside rounds with nursing staff today. Discussions with Specialists or Other Care Team Provider:     Education and Discussions with Family / Patient: Updated  (significant other, mother and grandfather) at bedside. Time Spent for Care:    This time was spent on one or more of the following: performing physical exam; counseling and coordination of care; obtaining or reviewing history; documenting in the medical record; reviewing/ordering tests, medications or procedures; communicating with other healthcare professionals and discussing with patient's family/caregivers. Current Length of Stay: 1 day(s)  Current Patient Status: Inpatient   Certification Statement: The patient will continue to require additional inpatient hospital stay due to Further recommendations from endocrinology and oncology  Discharge Plan: Anticipate discharge in 48-72 hrs to home. Code Status: Level 1 - Full Code      Subjective:   Patient seen and examined. No new complaints. Swallowing better today. Objective:   Vitals: Blood pressure 133/84, pulse 85, temperature 98.4 °F (36.9 °C), resp. rate 17, height 5' 3" (1.6 m), weight 123 kg (271 lb 2.7 oz), last menstrual period 06/29/2023, SpO2 96 %. No intake or output data in the 24 hours ending 07/23/23 1425    Physical Exam  Vitals reviewed. Constitutional:       General: She is not in acute distress. Appearance: Normal appearance. She is obese. HENT:      Head: Atraumatic. Neck:      Thyroid: Thyromegaly present. Cardiovascular:      Rate and Rhythm: Regular rhythm. Heart sounds: Normal heart sounds. Pulmonary:      Breath sounds: No wheezing. Abdominal:      General: Bowel sounds are normal.      Palpations: Abdomen is soft. Tenderness: There is no guarding or rebound. Musculoskeletal:         General: No swelling. Cervical back: Normal range of motion. Skin:     General: Skin is warm. Neurological:      General: No focal deficit present. Mental Status: She is alert. Cranial Nerves: No cranial nerve deficit. Motor: No weakness.    Psychiatric:         Mood and Affect: Mood normal.       Additional Data:   Labs:  Results from last 7 days   Lab Units 07/22/23  1336 07/20/23  1047   WBC Thousand/uL 10.12 8.64   HEMOGLOBIN g/dL 12.0 11.9   PLATELETS Thousands/uL 344 323   MCV fL 86 85   INR   --  0.99     Results from last 7 days   Lab Units 07/22/23  1336   SODIUM mmol/L 142   POTASSIUM mmol/L 4.1   CHLORIDE mmol/L 112*   CO2 mmol/L 26   ANION GAP mmol/L 4   BUN mg/dL 13   CREATININE mg/dL 0.76   CALCIUM mg/dL 8.9   ALBUMIN g/dL 3.2*   TOTAL BILIRUBIN mg/dL 0.19*   ALK PHOS U/L 68   ALT U/L 16   AST U/L 9   EGFR ml/min/1.73sq m 111   GLUCOSE RANDOM mg/dL 104       Results from last 7 days   Lab Units 07/22/23  1336   TSH 3RD GENERATON uIU/mL 0.970     * I Have Reviewed All Lab Data Listed Above. Cultures:                   Lines/Drains:  Invasive Devices     Peripheral Intravenous Line  Duration           Peripheral IV 07/22/23 Left Antecubital 1 day              Telemetry:      Imaging:  Imaging Reports Reviewed Today Include:   CT soft tissue neck with contrast    Result Date: 7/22/2023  Impression: Significantly increased size of thyroid tissue compared to the prior exam compressing the airway/trachea. This is consistent with patient's known history of thyroid cancer. Surgical consult recommended for further evaluation. Workstation performed: [id number]     CTA ED chest PE study    Result Date: 7/22/2023  Impression: No pulmonary embolus. No acute pulmonary disease. Severe enlargement of the thyroid gland with recent biopsy showing lymphoma, with increased tracheal narrowing compared with the neck CT from 6/1/2023 with no obvious postprocedural hemorrhage. See neck CT from today. Hepatic steatosis.  Workstation performed: [id number]       Scheduled Meds:  Current Facility-Administered Medications   Medication Dose Route Frequency Provider Last Rate   • acetaminophen  650 mg Oral Q6H PRN Perry Lucas MD     • albuterol  2 puff Inhalation Q6H PRN Kirby Swan MD     • aluminum-magnesium hydroxide-simethicone  30 mL Oral Q6H PRN Kirby Swan MD     • dicyclomine  20 mg Oral BID Kirby Swan MD     • levothyroxine  150 mcg Oral Early Morning Kirby Swan MD     • montelukast  10 mg Oral HS Kirby Swan MD     • ondansetron  4 mg Intravenous Q6H PRN Kirby Swan MD     • predniSONE  10 mg Oral Daily Kirby Swan MD         Today, Patient Was Seen By: Jay Jay Whitfield DO    ** Please Note: Dictation voice to text software may have been used in the creation of this document.  **

## 2023-07-23 NOTE — SPEECH THERAPY NOTE
Speech-Language Pathology Bedside Swallow Evaluation    Patient Name: Flynn Lincoln    [id number] Date: 7/23/2023     Problem List  Principal Problem:    Lymphoma of thyroid gland ([address])  Active Problems:    Mild intermittent asthma without complication    Subacute thyroiditis    Dysphagia    Obesity    Past Medical History  Past Medical History:   Diagnosis Date   • Asthma    • Disease of thyroid gland    • Ear infection      Past Surgical History  Past Surgical History:   Procedure Laterality Date   • IR BIOPSY NECK  7/20/2023       Summary  Pt presented with functional appearing oral and pharyngeal stage swallowing skills with materials administered today. Swallows were audible but prompt with no s/s aspiration. Pt reported occasional difficulty with solids, but is able to manage by chewing food thoroughly before swallowing. Discussed possible worsening of dysphagia pending course of treatment for thyroid lymphoma. ST will f/u peripherally and assist with any swallowing needs. Risk/s for Aspiration: currently low    Recommended Diet: regular diet and thin liquids   Recommended Form of Meds: whole with liquid   Aspiration precautions and swallowing strategies: upright posture  Other Recommendations: Continue frequent oral care      Current Medical Status per H&P 7/22/23  Flynn Lincoln is a 26 y.o. female with PMH of mild asthma, obesity, bilateral thyroid enlargement, recent diagnosis of thyroid lymphoma by tissue biopsy was sent to ED by endocrinologist office due to concern for airway compromise. Patient reports she has occasional dysphagia with solid food and occasional dyspnea. On ER, patient is hemodynamically stable, remains in room air. On exam, lungs are clear to auscultation, breathing comfortably on room air. However due to concern for rapidly enlarging thyroid gland, patient will be admitted for airway monitoring, oncology consult, and possibly ENT consult.     Special Studies:  CT neck 7/22/23 IMPRESSION:  Significantly increased size of thyroid tissue compared to the prior exam compressing the airway/trachea. This is consistent with patient's known history of thyroid cancer. Surgical consult recommended for further evaluation. CT chest 7/22/23 IMPRESSION:  No pulmonary embolus. No acute pulmonary disease. Severe enlargement of the thyroid gland with recent biopsy showing lymphoma, with increased tracheal narrowing compared with the neck CT from 6/1/2023 with no obvious postprocedural hemorrhage. See neck CT from today. Hepatic steatosis. Social/Education/Vocational Hx:  Pt lives with family    Swallow Information   Current Risks for Dysphagia & Aspiration: thyroid lymphoma  Current Diet: regular diet and thin liquids   Baseline Diet: regular diet and thin liquids      Baseline Assessment   Behavior/Cognition: alert  Speech/Language Status: able to participate in conversation and able to follow commands  Patient Positioning: upright in bed  Pain Status/Interventions/Response to Interventions: No report of or nonverbal indications of pain. Swallow Mechanism Exam  Facial: symmetrical  Labial: WFL  Lingual: WFL  Velum: symmetrical  Mandible: adequate ROM  Dentition: adequate  Vocal quality:clear/adequate   Volitional Cough: strong/productive   Respiratory Status: on RA     Consistencies Assessed and Performance   Consistencies Administered: thin liquids and hard solids    Oral Stage: WFL  Mastication was adequate with the materials administered today. Bolus formation and transfer were functional with no significant oral residue noted. No overt s/s reduced oral control. Pharyngeal Stage: WFL  Swallow Mechanics: Swallowing initiation appeared prompt. Laryngeal rise was palpated and judged to be within functional limits. No coughing, throat clearing, change in vocal quality or respiratory status noted today.      Esophageal Concerns: none reported      Summary and Recommendations (see above)    Results Reviewed with: patient and RN     Treatment Recommended: ST will f/u peripherally and assist an needed pending changes in swallowing r/t CA treatment. Dysphagia LTG  -Patient will demonstrate safe and effective oral intake (without overt s/s significant oral/pharyngeal dysphagia including s/s penetration or aspiration) for the highest appropriate diet level.

## 2023-07-24 PROBLEM — E03.9 HYPOTHYROIDISM: Status: ACTIVE | Noted: 2023-07-24

## 2023-07-24 LAB
ALBUMIN SERPL BCP-MCNC: 3.1 G/DL (ref 3.5–5)
ALP SERPL-CCNC: 74 U/L (ref 46–116)
ALT SERPL W P-5'-P-CCNC: 15 U/L (ref 12–78)
ANION GAP SERPL CALCULATED.3IONS-SCNC: 6 MMOL/L
AST SERPL W P-5'-P-CCNC: 8 U/L (ref 5–45)
BILIRUB SERPL-MCNC: 0.22 MG/DL (ref 0.2–1)
BUN SERPL-MCNC: 15 MG/DL (ref 5–25)
CALCIUM ALBUM COR SERPL-MCNC: 9.7 MG/DL (ref 8.3–10.1)
CALCIUM SERPL-MCNC: 9 MG/DL (ref 8.3–10.1)
CHLORIDE SERPL-SCNC: 111 MMOL/L (ref 96–108)
CO2 SERPL-SCNC: 24 MMOL/L (ref 21–32)
CREAT SERPL-MCNC: 0.69 MG/DL (ref 0.6–1.3)
ERYTHROCYTE [DISTWIDTH] IN BLOOD BY AUTOMATED COUNT: 12.8 % (ref 11.6–15.1)
GFR SERPL CREATININE-BSD FRML MDRD: 123 ML/MIN/1.73SQ M
GLUCOSE SERPL-MCNC: 96 MG/DL (ref 65–140)
HBV CORE AB SER QL: NORMAL
HBV CORE IGM SER QL: NORMAL
HBV SURFACE AG SER QL: NORMAL
HCT VFR BLD AUTO: 40.5 % (ref 34.8–46.1)
HCV AB SER QL: NORMAL
HGB BLD-MCNC: 12.1 G/DL (ref 11.5–15.4)
LDH SERPL-CCNC: 192 U/L (ref 81–234)
LDH SERPL-CCNC: 195 U/L (ref 81–234)
MCH RBC QN AUTO: 26.8 PG (ref 26.8–34.3)
MCHC RBC AUTO-ENTMCNC: 29.9 G/DL (ref 31.4–37.4)
MCV RBC AUTO: 90 FL (ref 82–98)
PLATELET # BLD AUTO: 372 THOUSANDS/UL (ref 149–390)
PMV BLD AUTO: 9.6 FL (ref 8.9–12.7)
POTASSIUM SERPL-SCNC: 4 MMOL/L (ref 3.5–5.3)
PROT SERPL-MCNC: 7.4 G/DL (ref 6.4–8.4)
RBC # BLD AUTO: 4.51 MILLION/UL (ref 3.81–5.12)
SODIUM SERPL-SCNC: 141 MMOL/L (ref 135–147)
WBC # BLD AUTO: 9.49 THOUSAND/UL (ref 4.31–10.16)

## 2023-07-24 PROCEDURE — 99232 SBSQ HOSP IP/OBS MODERATE 35: CPT | Performed by: NURSE PRACTITIONER

## 2023-07-24 PROCEDURE — 83615 LACTATE (LD) (LDH) ENZYME: CPT | Performed by: INTERNAL MEDICINE

## 2023-07-24 PROCEDURE — 86803 HEPATITIS C AB TEST: CPT | Performed by: INTERNAL MEDICINE

## 2023-07-24 PROCEDURE — 99232 SBSQ HOSP IP/OBS MODERATE 35: CPT | Performed by: INTERNAL MEDICINE

## 2023-07-24 PROCEDURE — 87340 HEPATITIS B SURFACE AG IA: CPT | Performed by: INTERNAL MEDICINE

## 2023-07-24 PROCEDURE — 80053 COMPREHEN METABOLIC PANEL: CPT | Performed by: INTERNAL MEDICINE

## 2023-07-24 PROCEDURE — 86704 HEP B CORE ANTIBODY TOTAL: CPT | Performed by: INTERNAL MEDICINE

## 2023-07-24 PROCEDURE — NC001 PR NO CHARGE: Performed by: RADIOLOGY

## 2023-07-24 PROCEDURE — 85027 COMPLETE CBC AUTOMATED: CPT | Performed by: INTERNAL MEDICINE

## 2023-07-24 PROCEDURE — 86705 HEP B CORE ANTIBODY IGM: CPT | Performed by: INTERNAL MEDICINE

## 2023-07-24 RX ADMIN — DICYCLOMINE HYDROCHLORIDE 20 MG: 20 TABLET ORAL at 08:51

## 2023-07-24 RX ADMIN — MONTELUKAST 10 MG: 10 TABLET, FILM COATED ORAL at 22:57

## 2023-07-24 RX ADMIN — DICYCLOMINE HYDROCHLORIDE 20 MG: 20 TABLET ORAL at 17:09

## 2023-07-24 RX ADMIN — LEVOTHYROXINE SODIUM 150 MCG: 75 TABLET ORAL at 05:30

## 2023-07-24 RX ADMIN — PREDNISONE 10 MG: 10 TABLET ORAL at 08:51

## 2023-07-24 NOTE — ED ATTENDING ATTESTATION
I saw and evaluated the patient. I have discussed the patient with the resident physician and agree with the resident's findings, assessment and plan as documented in the resident physician's note, unless otherwise documented below. All available laboratory and imaging studies were reviewed by myself. I was present for key portions of any procedure(s) performed by the resident and I was immediately available to provide assistance. I agree with the current assessment done in the Emergency Department. I have conducted an independent evaluation of this patient    Final Diagnosis:  1. Thyroid cancer ([address])    2. Lymphoma of thyroid gland [facility])      ED Course as of 07/24/23 1709   Sat Jul 22, 2023   1433 D-Dimer, Quant(!): 1.06  Will obtain CTA to evaluate. I saw and evaluated the patient. I have discussed the patient with the resident physician and agree with the resident's findings, assessment and plan as documented in the resident physician's note, unless otherwise documented below. All available laboratory and imaging studies were reviewed by myself. I was present for key portions of any procedure(s) performed by the resident and I was immediately available to provide assistance. I agree with the current assessment done in the Emergency Department. I have conducted an independent evaluation of this patient    Chief Complaint   Patient presents with   • Evaluation of Abnormal Diagnostic Test     Biopsy showed lymphoma in neck, sent by provider. C/o pain in right side of neck. Difficulty breathing in the mornings, difficulty swallowing     This is a 26 y.o. female presenting for evaluation of difficulty breathing. Patient has had a neck mass and was recently diagnosed with thyroid lymphoma on biopsy. Has had difficulty breathing x1 month but gradually getting worse and endocrinologist advised coming to ED for evaluation.  Denies fever, chills, cough, chest pain, n/v/d, abdominal pain, headache, any other complaints. PMH:   has a past medical history of Asthma, Disease of thyroid gland, and Ear infection. PSH:   has a past surgical history that includes IR biopsy neck (7/20/2023). Social:  Social History     Substance and Sexual Activity   Alcohol Use Yes   • Alcohol/week: 1.0 standard drink of alcohol   • Types: 1 Standard drinks or equivalent per week    Comment: socially     Social History     Tobacco Use   Smoking Status Never   Smokeless Tobacco Never     Social History     Substance and Sexual Activity   Drug Use Never     PE:  Vitals:    07/23/23 2229 07/23/23 2230 07/24/23 0729 07/24/23 1500   BP: (!) 158/116 (!) 158/115 (!) 147/108 147/100   BP Location: Left arm Right arm  Left arm   Pulse: (!) 114 (!) 114 95 (!) 115   Resp: 18  14 14   Temp: 99.7 °F (37.6 °C) 99.7 °F (37.6 °C) 98.1 °F (36.7 °C) 98 °F (36.7 °C)   TempSrc: Oral   Oral   SpO2: 97% 96% 97% 96%   Weight:       Height:             - 13 point ROS was performed and all are normal unless stated in the history above. ROS: Negative for fever, chills, cough, CP, n/v/d, abdominal pain, headache, any other complaints. - Nursing note reviewed. Vitals reviewed. Physical exam:  GENERAL APPEARANCE: Appears comfortable, no acute distress, calm and cooperative   NEURO: GCS 15, no focal deficits, cranial nerves grossly intact, clear fluent speech, no facial asymmetry   HEENT: Enlarged thyroid. Normocephalic, atraumatic, moist mucous membranes   Neck: Supple, full ROM  CV: RRR, no murmurs, rubs, or gallops  LUNGS: No retractions, use of accessory muscles, no respiratory distress. CTAB, no wheezing, rales, or rhonchi  GI: Abdomen soft, non-tender, no rebound or guarding   MSK: Extremities non-tender, no pitting edema  SKIN: Warm and dry      Assessment and plan: Patient with recently diagnosed thyroid lymphoma gradually worsening shortness of breath.   Within the differential consider secondary to airway obstruction from mass, PE, pneumonia, pneumothorax. Will obtain labs including D-dimer to assess. Plan to obtain chest imaging -chest x-ray versus CT. Code Status: Level 1 - Full Code  Advance Directive and Living Will:      Power of :    POLST:      Medications   albuterol (PROVENTIL HFA,VENTOLIN HFA) inhaler 2 puff (has no administration in time range)   dicyclomine (BENTYL) tablet 20 mg (20 mg Oral Given 7/24/23 0851)   levothyroxine tablet 150 mcg (150 mcg Oral Given 7/24/23 0530)   montelukast (SINGULAIR) tablet 10 mg (10 mg Oral Given 7/23/23 2116)   predniSONE tablet 10 mg (10 mg Oral Given 7/24/23 0851)   ondansetron (ZOFRAN) injection 4 mg (has no administration in time range)   acetaminophen (TYLENOL) tablet 650 mg (650 mg Oral Given 7/22/23 2008)   aluminum-magnesium hydroxide-simethicone (MAALOX) oral suspension 30 mL (has no administration in time range)   iohexol (OMNIPAQUE) 350 MG/ML injection (SINGLE-DOSE) 100 mL (100 mL Intravenous Given 7/22/23 1706)     CTA ED chest PE study   Final Result      No pulmonary embolus. No acute pulmonary disease. Severe enlargement of the thyroid gland with recent biopsy showing lymphoma, with increased tracheal narrowing compared with the neck CT from 6/1/2023 with no obvious postprocedural hemorrhage. See neck CT from today. Hepatic steatosis. Workstation performed: [id number]         CT soft tissue neck with contrast   Final Result      Significantly increased size of thyroid tissue compared to the prior exam compressing the airway/trachea. This is consistent with patient's known history of thyroid cancer. Surgical consult recommended for further evaluation.          Workstation performed: [id number]         IR biopsy bone marrow    (Results Pending)     Orders Placed This Encounter   Procedures   • CTA ED chest PE study   • CT soft tissue neck with contrast   • IR biopsy bone marrow   • CBC and differential   • Comprehensive metabolic panel   • D-Dimer   • TSH, 3rd generation with Free T4 reflex   • hCG, qualitative pregnancy   • Chronic Hepatitis Panel   • LD,Blood   • Comprehensive metabolic panel   • CBC   • Uric acid   • LD,Blood   • Protime-INR   • CBC and differential   • Basic metabolic panel   • Diet Regular; Regular House   • Diet NPO; Sips with meds   • Nursing communication Continue IV as ordered. • Notify admitting physician   • Notify admitting physician on arrival   • Vital Signs per unit routine   • Up and OOB as tolerated   • Apply SCD or Foot pumps   • Level 1-Full Code: all life saving measures are indicated   • Inpatient consult to Endocrinology   • Inpatient consult to Oncology   • Inpatient consult to ENT   • Inpatient Consult to IR   • SPEECH bedside swallowing evaluation and treatment   • ECG 12 lead   • ECG 12 lead   • ECG 12 lead   • Echo complete w/ contrast if indicated   • INPATIENT ADMISSION     Labs Reviewed   COMPREHENSIVE METABOLIC PANEL - Abnormal       Result Value Ref Range Status    Sodium 142  135 - 147 mmol/L Final    Potassium 4.1  3.5 - 5.3 mmol/L Final    Chloride 112 (*) 96 - 108 mmol/L Final    CO2 26  21 - 32 mmol/L Final    ANION GAP 4  mmol/L Final    BUN 13  5 - 25 mg/dL Final    Creatinine 0.76  0.60 - 1.30 mg/dL Final    Comment: Standardized to IDMS reference method    Glucose 104  65 - 140 mg/dL Final    Comment: If the patient is fasting, the ADA then defines impaired fasting glucose as > 100 mg/dL and diabetes as > or equal to 123 mg/dL. Specimen collection should occur prior to Sulfasalazine administration due to the potential for falsely depressed results. Specimen collection should occur prior to Sulfapyridine administration due to the potential for falsely elevated results.     Calcium 8.9  8.3 - 10.1 mg/dL Final    Corrected Calcium 9.5  8.3 - 10.1 mg/dL Final    AST 9  5 - 45 U/L Final    Comment: Specimen collection should occur prior to Sulfasalazine administration due to the potential for falsely depressed results. ALT 16  12 - 78 U/L Final    Comment: Specimen collection should occur prior to Sulfasalazine and/or Sulfapyridine administration due to the potential for falsely depressed results. Alkaline Phosphatase 68  46 - 116 U/L Final    Total Protein 7.5  6.4 - 8.4 g/dL Final    Albumin 3.2 (*) 3.5 - 5.0 g/dL Final    Total Bilirubin 0.19 (*) 0.20 - 1.00 mg/dL Final    Comment: Use of this assay is not recommended for patients undergoing treatment with eltrombopag due to the potential for falsely elevated results. eGFR 111  ml/min/1.73sq m Final    Narrative:     [place] guidelines for Chronic Kidney Disease (CKD):   •  Stage 1 with normal or high GFR (GFR > 90 mL/min/1.73 square meters)  •  Stage 2 Mild CKD (GFR = 60-89 mL/min/1.73 square meters)  •  Stage 3A Moderate CKD (GFR = 45-59 mL/min/1.73 square meters)  •  Stage 3B Moderate CKD (GFR = 30-44 mL/min/1.73 square meters)  •  Stage 4 Severe CKD (GFR = 15-29 mL/min/1.73 square meters)  •  Stage 5 End Stage CKD (GFR <15 mL/min/1.73 square meters)  Note: GFR calculation is accurate only with a steady state creatinine   D-DIMER, QUANTITATIVE - Abnormal    D-Dimer, Quant 1.06 (*) <0.50 ug/ml FEU Final    Comment: Reference and upper limits to exclude DVT and PE are the same. Do not use to exclude if clinical symptoms are present. Pregnant women:  1st trimester:  <0.22 - 1.06 ug/ml FEU  2nd trimester:  <0.22 - 1.88 ug/ml FEU  3rd trimester:   0.24 - 3.28 ug/ml FEU    Note: Normal ranges may not apply to patients who are transgender, non-binary, or whose legal sex, sex at birth, and gender identity differ.      TSH, 3RD GENERATION WITH FREE T4 REFLEX - Normal    TSH 3RD GENERATON 0.970  0.450 - 4.500 uIU/mL Final    Comment: The recommended reference ranges for TSH during pregnancy are as follows:   First trimester 0.1 to 2.5 uIU/mL   Second trimester  0.2 to 3.0 uIU/mL   Third trimester 0.3 to 3.0 uIU/m    Note: Normal ranges may not apply to patients who are transgender, non-binary, or whose legal sex, sex at birth, and gender identity differ. Adult TSH (3rd generation) reference range follows the recommended guidelines of the American Thyroid Association, January, 2020. PREGNANCY TEST (HCG QUALITATIVE) - Normal    Preg, Serum Negative  Negative Final   CBC AND DIFFERENTIAL    WBC 10.12  4.31 - 10.16 Thousand/uL Final    RBC 4.38  3.81 - 5.12 Million/uL Final    Hemoglobin 12.0  11.5 - 15.4 g/dL Final    Hematocrit 37.5  34.8 - 46.1 % Final    MCV 86  82 - 98 fL Final    MCH 27.4  26.8 - 34.3 pg Final    MCHC 32.0  31.4 - 37.4 g/dL Final    RDW 12.9  11.6 - 15.1 % Final    MPV 9.3  8.9 - 12.7 fL Final    Platelets 676  178 - 390 Thousands/uL Final    nRBC 0  /100 WBCs Final    Neutrophils Relative 70  43 - 75 % Final    Immat GRANS % 0  0 - 2 % Final    Lymphocytes Relative 21  14 - 44 % Final    Monocytes Relative 7  4 - 12 % Final    Eosinophils Relative 2  0 - 6 % Final    Basophils Relative 0  0 - 1 % Final    Neutrophils Absolute 7.04  1.85 - 7.62 Thousands/µL Final    Immature Grans Absolute 0.03  0.00 - 0.20 Thousand/uL Final    Lymphocytes Absolute 2.11  0.60 - 4.47 Thousands/µL Final    Monocytes Absolute 0.70  0.17 - 1.22 Thousand/µL Final    Eosinophils Absolute 0.20  0.00 - 0.61 Thousand/µL Final    Basophils Absolute 0.04  0.00 - 0.10 Thousands/µL Final       Final assessment: Patient had elevated D-dimer. Obtain CT which was negative for PE. Soft tissue neck CT reveals obstruction secondary to mass. Will admit for further evaluation. Discussed with admitting physician who agrees to accept patient for further management. Patient remains stable throughout ED course.          Time reflects when diagnosis was documented in both MDM as applicable and the Disposition within this note     Time User Action Codes Description Comment    7/22/2023  5:54 PM Jeanna Finnegan Add [C73] Thyroid cancer ([address] )     7/24/2023  9:12 AM Keny Cory Add [C85.99] Lymphoma of thyroid gland [facility])       ED Disposition     ED Disposition   Admit    Condition   Stable    Date/Time   Sat Jul 22, 2023  5:55 PM    Comment   Case was discussed with internal medicine and the patient's admission status was agreed to be Admission Status: inpatient status to the service of Dr. Cristy Olivera . Follow-up Information    None       Current Discharge Medication List      CONTINUE these medications which have NOT CHANGED    Details   albuterol (Proventil HFA) 90 mcg/act inhaler Inhale 2 puffs every 6 (six) hours as needed for wheezing  Qty: 6.7 g, Refills: 5    Comments: Substitution to a formulary equivalent within the same pharmaceutical class is authorized.   Associated Diagnoses: Mild intermittent asthma without complication      dicyclomine (BENTYL) 20 mg tablet Take 1 tablet (20 mg total) by mouth 2 (two) times a day  Qty: 20 tablet, Refills: 0    Associated Diagnoses: Abdominal pain      diphenhydrAMINE (BENADRYL) 25 mg capsule Take 1 capsule (25 mg total) by mouth every 6 (six) hours as needed for itching  Qty: 20 capsule, Refills: 0    Associated Diagnoses: Rash      fluticasone (FLONASE) 50 mcg/act nasal spray SPRAY 1 SPRAY INTO EACH NOSTRIL EVERY DAY  Qty: 16 mL, Refills: 2    Associated Diagnoses: Chronic sinusitis, unspecified location      levothyroxine 150 mcg tablet Take 1 tablet (150 mcg total) by mouth daily  Qty: 90 tablet, Refills: 0    Associated Diagnoses: Acquired hypothyroidism      ondansetron (ZOFRAN) 4 mg tablet Take 1 tablet (4 mg total) by mouth every 6 (six) hours  Qty: 12 tablet, Refills: 0    Associated Diagnoses: Gastroenteritis      predniSONE 20 mg tablet TAKE 1 TABLET BY MOUTH EVERY DAY  Qty: 30 tablet, Refills: 0    Associated Diagnoses: Subacute thyroiditis      benzonatate (TESSALON) 200 MG capsule Take 1 capsule (200 mg total) by mouth 3 (three) times a day as needed for cough  Qty: 20 capsule, Refills: 0    Associated Diagnoses: Viral infection, unspecified; Acute cough      ibuprofen (MOTRIN) 400 mg tablet Take 1 tablet (400 mg total) by mouth every 6 (six) hours as needed for mild pain  Qty: 30 tablet, Refills: 0    Associated Diagnoses: Wrist sprain, right, initial encounter      methocarbamol (ROBAXIN) 500 mg tablet Take 1 tablet (500 mg total) by mouth 3 (three) times a day as needed for muscle spasms  Qty: 20 tablet, Refills: 0    Associated Diagnoses: Low back strain, initial encounter      montelukast (SINGULAIR) 10 mg tablet Take 10 mg by mouth daily at bedtime      naproxen (NAPROSYN) 500 mg tablet Take 1 tablet by mouth every 12 (twelve) hours as needed for mild pain or moderate pain  Qty: 20 tablet, Refills: 0      !! ondansetron (ZOFRAN-ODT) 4 mg disintegrating tablet Take 1 tablet by mouth every 6 (six) hours as needed for nausea or vomiting  Qty: 14 tablet, Refills: 0      !! ondansetron (ZOFRAN-ODT) 4 mg disintegrating tablet Take 1 tablet (4 mg total) by mouth every 8 (eight) hours as needed for nausea  Qty: 20 tablet, Refills: 0    Associated Diagnoses: Nausea and vomiting       !! - Potential duplicate medications found. Please discuss with provider. No discharge procedures on file. Prior to Admission Medications   Prescriptions Last Dose Informant Patient Reported? Taking?    albuterol (Proventil HFA) 90 mcg/act inhaler Past Month  No Yes   Sig: Inhale 2 puffs every 6 (six) hours as needed for wheezing   benzonatate (TESSALON) 200 MG capsule Not Taking  No No   Sig: Take 1 capsule (200 mg total) by mouth 3 (three) times a day as needed for cough   Patient not taking: Reported on 7/23/2023   dicyclomine (BENTYL) 20 mg tablet 7/22/2023  No Yes   Sig: Take 1 tablet (20 mg total) by mouth 2 (two) times a day   diphenhydrAMINE (BENADRYL) 25 mg capsule Past Month  No Yes   Sig: Take 1 capsule (25 mg total) by mouth every 6 (six) hours as needed for itching   fluticasone (FLONASE) 50 mcg/act nasal spray Past Month  No Yes   Sig: SPRAY 1 SPRAY INTO EACH NOSTRIL EVERY DAY   ibuprofen (MOTRIN) 400 mg tablet Not Taking  No No   Sig: Take 1 tablet (400 mg total) by mouth every 6 (six) hours as needed for mild pain   Patient not taking: Reported on 7/23/2023   levothyroxine 150 mcg tablet 7/22/2023  No Yes   Sig: Take 1 tablet (150 mcg total) by mouth daily   methocarbamol (ROBAXIN) 500 mg tablet Not Taking  No No   Sig: Take 1 tablet (500 mg total) by mouth 3 (three) times a day as needed for muscle spasms   Patient not taking: Reported on 4/6/2023   montelukast (SINGULAIR) 10 mg tablet Not Taking Self Yes No   Sig: Take 10 mg by mouth daily at bedtime   Patient not taking: Reported on 7/23/2023   naproxen (NAPROSYN) 500 mg tablet Not Taking  No No   Sig: Take 1 tablet by mouth every 12 (twelve) hours as needed for mild pain or moderate pain   Patient not taking: Reported on 7/23/2023   ondansetron (ZOFRAN) 4 mg tablet Past Month  No Yes   Sig: Take 1 tablet (4 mg total) by mouth every 6 (six) hours   ondansetron (ZOFRAN-ODT) 4 mg disintegrating tablet Not Taking  No No   Sig: Take 1 tablet by mouth every 6 (six) hours as needed for nausea or vomiting   Patient not taking: Reported on 7/23/2023   ondansetron (ZOFRAN-ODT) 4 mg disintegrating tablet   No No   Sig: Take 1 tablet (4 mg total) by mouth every 8 (eight) hours as needed for nausea   predniSONE 20 mg tablet 7/22/2023 Self No Yes   Sig: TAKE 1 TABLET BY MOUTH EVERY DAY   Patient taking differently: Take 10 mg by mouth daily      Facility-Administered Medications: None         Portions of the record may have been created with voice recognition software. Occasional wrong word or "sound a like" substitutions may have occurred due to the inherent limitations of voice recognition software. Read the chart carefully and recognize, using context, where substitutions have occurred.     Electronically signed by:  Jeff Marx

## 2023-07-24 NOTE — PLAN OF CARE
Problem: PAIN - ADULT  Goal: Verbalizes/displays adequate comfort level or baseline comfort level  Description: Interventions:  - Encourage patient to monitor pain and request assistance  - Assess pain using appropriate pain scale  - Administer analgesics based on type and severity of pain and evaluate response  - Implement non-pharmacological measures as appropriate and evaluate response  - Consider cultural and social influences on pain and pain management  - Notify physician/advanced practitioner if interventions unsuccessful or patient reports new pain  Outcome: Progressing     Problem: INFECTION - ADULT  Goal: Absence or prevention of progression during hospitalization  Description: INTERVENTIONS:  - Assess and monitor for signs and symptoms of infection  - Monitor lab/diagnostic results  - Monitor all insertion sites, i.e. indwelling lines, tubes, and drains  - Monitor endotracheal if appropriate and nasal secretions for changes in amount and color  - [place] appropriate cooling/warming therapies per order  - Administer medications as ordered  - Instruct and encourage patient and family to use good hand hygiene technique  - Identify and instruct in appropriate isolation precautions for identified infection/condition  Outcome: Progressing  Goal: Absence of fever/infection during neutropenic period  Description: INTERVENTIONS:  - Monitor WBC    Outcome: Progressing     Problem: SAFETY ADULT  Goal: Patient will remain free of falls  Description: INTERVENTIONS:  - Educate patient/family on patient safety including physical limitations  - Instruct patient to call for assistance with activity   - Keep Call bell within reach  - Keep bed low and locked with side rails adjusted as appropriate  - Keep care items and personal belongings within reach  - Initiate and maintain comfort rounds  Outcome: Progressing  Goal: Maintain or return to baseline ADL function  Description: INTERVENTIONS:  -  Assess patient's ability to carry out ADLs; assess patient's baseline for ADL function and identify physical deficits which impact ability to perform ADLs (bathing, care of mouth/teeth, toileting, grooming, dressing, etc.)  - Assess/evaluate cause of self-care deficits   - Assess range of motion  - Assess patient's mobility; develop plan if impaired  - Assess patient's need for assistive devices and provide as appropriate  - Encourage maximum independence but intervene and supervise when necessary  - Involve family in performance of ADLs  - Assess for home care needs following discharge   - Consider OT consult to assist with ADL evaluation and planning for discharge  - Provide patient education as appropriate  Outcome: Progressing  Goal: Maintains/Returns to pre admission functional level  Description: INTERVENTIONS:  - Perform BMAT or MOVE assessment daily.   - Set and communicate daily mobility goal to care team and patient/family/caregiver.    - Collaborate with rehabilitation services on mobility goals if consulted  Outcome: Progressing     Problem: DISCHARGE PLANNING  Goal: Discharge to home or other facility with appropriate resources  Description: INTERVENTIONS:  - Identify barriers to discharge w/patient and caregiver  - Arrange for needed discharge resources and transportation as appropriate  - Identify discharge learning needs (meds, wound care, etc.)  - Arrange for interpretive services to assist at discharge as needed  - Refer to Case Management Department for coordinating discharge planning if the patient needs post-hospital services based on physician/advanced practitioner order or complex needs related to functional status, cognitive ability, or social support system  Outcome: Progressing     Problem: Knowledge Deficit  Goal: Patient/family/caregiver demonstrates understanding of disease process, treatment plan, medications, and discharge instructions  Description: Complete learning assessment and assess knowledge base.  Interventions:  - Provide teaching at level of understanding  - Provide teaching via preferred learning methods  Outcome: Progressing

## 2023-07-24 NOTE — PROGRESS NOTES
[address] [facility]  Progress Note  Name: Kami Elise  MRN: [id number]  Unit/Bed#: [facility] 913-01 I Date of Admission: 7/22/2023   Date of Service: 7/24/2023 I Hospital Day: 2    Assessment/Plan   * Lymphoma of thyroid gland [facility])  Assessment & Plan  History of asthma recently found to have thyroid enlargement thought to have subacute thyroiditis and started on prednisone/levothyroxine eventually biopsy found to have thyroid lymphoma, awaiting final biopsy result. · Seen by endocrinology, weaning off prednisone. Continuing levothyroxine. · Discussed with oncology, awaiting final biopsy result. Recommending bone marrow biopsy. Possible chemotherapy. · Echo done in preparation for chemotherapy, unremarkable. Subacute thyroiditis  Assessment & Plan  Working diagnosis before found to have lymphoma with subacute thyroiditis on levothyroxine and prednisone  · Continue levothyroxine 125 mcg daily  · On tapering dose of prednisone     Mild intermittent asthma without complication  Assessment & Plan  · No exacerbation on albuterol. Continue montelukast    Dysphagia  Assessment & Plan  · Secondary to thyromegaly  · Regular diet    Hypothyroidism  Assessment & Plan  · Continue levothyroxine  · TSH within normal limits    Morbid obesity (HCC)  Assessment & Plan  · Body mass index is 48.01 kg/m². · TLC           VTE Pharmacologic Prophylaxis:   Low Risk (Score 0-2) - Encourage Ambulation. Patient Centered Rounds: I performed bedside rounds with nursing staff today. Discussions with Specialists or Other Care Team Provider: nursing, case management, oncology fellow    Education and Discussions with Family / Patient: Patient declined call to .      Total Time Spent on Date of Encounter in care of patient: 35 minutes This time was spent on one or more of the following: performing physical exam; counseling and coordination of care; obtaining or reviewing history; documenting in the medical record; reviewing/ordering tests, medications or procedures; communicating with other healthcare professionals and discussing with patient's family/caregivers. Current Length of Stay: 2 day(s)    Current Patient Status: Inpatient     Certification Statement: The patient will continue to require additional inpatient hospital stay due to awaiting biopsy, bone marrow biopsy, possible inpatient chemo     Discharge Plan: Anticipate discharge in >72 hrs to home. Code Status: Level 1 - Full Code    Subjective:   Overall reports feeling anxious adding diagnosis but is without acute complaints. Aware that we are awaiting thyroid biopsy. States that oncology did speak with her about bone marrow biopsy, she is in remain with this. Objective:     Vitals:   Temp (24hrs), Av °F (37.2 °C), Min:98.1 °F (36.7 °C), Max:99.7 °F (37.6 °C)    Temp:  [98.1 °F (36.7 °C)-99.7 °F (37.6 °C)] 98.1 °F (36.7 °C)  HR:  [] 95  Resp:  [14-18] 14  BP: (133-158)/() 147/108  SpO2:  [96 %-97 %] 97 %  Body mass index is 48.01 kg/m². Input and Output Summary (last 24 hours): Intake/Output Summary (Last 24 hours) at 2023 1134  Last data filed at 2023 0900  Gross per 24 hour   Intake 840 ml   Output --   Net 840 ml       Physical Exam:   Physical Exam  Vitals and nursing note reviewed. Constitutional:       General: She is not in acute distress. Appearance: She is obese. Cardiovascular:      Rate and Rhythm: Normal rate. Pulmonary:      Effort: No respiratory distress. Musculoskeletal:         General: No swelling. Skin:     General: Skin is warm. Neurological:      Mental Status: She is alert and oriented to person, place, and time. Mental status is at baseline.    Psychiatric:         Mood and Affect: Mood normal.          Additional Data:     Labs:  Results from last 7 days   Lab Units 23  0454 23  1336   WBC Thousand/uL 9.49 10.12   HEMOGLOBIN g/dL 12.1 12.0   HEMATOCRIT % 40.5 37.5   PLATELETS Thousands/uL 372 344   NEUTROS PCT %  --  70   LYMPHS PCT %  --  21   MONOS PCT %  --  7   EOS PCT %  --  2     Results from last 7 days   Lab Units 07/24/23  0454   SODIUM mmol/L 141   POTASSIUM mmol/L 4.0   CHLORIDE mmol/L 111*   CO2 mmol/L 24   BUN mg/dL 15   CREATININE mg/dL 0.69   ANION GAP mmol/L 6   CALCIUM mg/dL 9.0   ALBUMIN g/dL 3.1*   TOTAL BILIRUBIN mg/dL 0.22   ALK PHOS U/L 74   ALT U/L 15   AST U/L 8   GLUCOSE RANDOM mg/dL 96     Results from last 7 days   Lab Units 07/20/23  1047   INR  0.99                   Lines/Drains:  Invasive Devices     Peripheral Intravenous Line  Duration           Peripheral IV 07/22/23 Left Antecubital 1 day                      Imaging: No pertinent imaging reviewed. Recent Cultures (last 7 days):         Last 24 Hours Medication List:   Current Facility-Administered Medications   Medication Dose Route Frequency Provider Last Rate   • acetaminophen  650 mg Oral Q6H PRN Perry Lucas MD     • albuterol  2 puff Inhalation Q6H PRN Kirby Swan MD     • aluminum-magnesium hydroxide-simethicone  30 mL Oral Q6H PRN Kirby Swan MD     • dicyclomine  20 mg Oral BID Kirby Swan MD     • levothyroxine  150 mcg Oral Early Morning Kirby Swan MD     • montelukast  10 mg Oral HS Kirby Swna MD     • ondansetron  4 mg Intravenous Q6H PRN Kirby Swan MD     • predniSONE  10 mg Oral Daily Deepa Salvador DO          Today, Patient Was Seen By: TATI Marie    **Please Note: This note may have been constructed using a voice recognition system. **

## 2023-07-24 NOTE — PROGRESS NOTES
Progress Note - Hematology:  Hayden Phelps 26 y.o. female MRN: [id number]  [facility] Encounter: [id number]    Assessment and Plan:  Problem List:  1. Thyroid Lymphoma (CD10-Positive). A. ECOG Performance Status: 0-Points. 1. Thyroid Lymphoma (CD10-Positive):   A. ECOG Performance Status: 0-Points. Patient is a 26-year-old female, with a past medical history significant for asthma, and hypothyroidism; who presents with a rapidly enlarging thyroid. Now status-post thyroid biopsy on July 20th. Preliminary results show CD10-positive lymphoma. High-suspicion of Burkitt lymphoma versus diffuse large B-cell lymphoma versus follicular lymphoma (Less likely). Will obtain a Bone Marrow Biopsy to complete staging. Further management recommendations are pending pathology results. 1. Interventional Radiology consultation placed for Bone Marrow Biopsy (Anticipating tomorrow, July 25th). 2. Awaiting final pathology results from Thyroid Biopsy for further management recommendations. A. Anticipate need for induction chemotherapy while inpatient (Pending above-mentioned). Subjective/Objective   Subjective: Interval Events: Patient seen and examined on rounds. No acute events overnight. Patient was resting comfortably in bed, on my arrival. Patient's significant other was present at bedside. She states that she is feeling well this morning. No pain reported. Discussed need for Bone Marrow Biopsy, at this time. Patient was agreeable to the above-mentioned. Was contacted by Nursing Staff regarding arrival of patient's mother. Returned to bedside and answered all questions. Patient and family expressed understanding and agreement with the plan. Review of Systems:  All systems reviewed and negative except otherwise listed in the History of Present Illness.     Objective:  Vital-Signs:  Temp:  [98.1 °F (36.7 °C)-99.7 °F (37.6 °C)] 98.1 °F (36.7 °C)  HR:  [] 95  Resp:  [14-18] 14  BP: (133-158)/() 147/108  SpO2:  [96 %-97 %] 97 %  Temp (24hrs), Av °F (37.2 °C), Min:98.1 °F (36.7 °C), Max:99.7 °F (37.6 °C)  Current: Temperature: 98.1 °F (36.7 °C)    Physical Exam:  General: Alert, and oriented; Pleasant, and conversational; No apparent distress  HEENT: Atraumatic, and normocephalic; PERRLA; EOMI; Moist mucosal membranes  Neck: Trachea midline; No neck masses, thyromegaly, or cervical lymphadenopathy present on my exam  Cardiovascular: Regular rate and rhythm; No murmurs, rubs, or gallops appreciated; No peripheral edema  Respiratory: Clear to auscultation bilaterally; Adequate aeration; No supplemental oxygen requirement  Abdomen: Soft/Obese, non-tender; Non-distended; No organomegaly appreciated; Bowel sounds present in 4-quadrants  Extremities: No obvious deformities; No peripheral edema; Moves all  Neurologic: Appropriately alert, and oriented to Person, Place, and Time; No focal neurologic deficits    Invasive Devices     Peripheral Intravenous Line  Duration           Peripheral IV 23 Left Antecubital 1 day              Lab, Imaging and other studies: I have personally reviewed pertinent reports. Violeta Zapata D.O.   Hematology-Oncology Fellow (PGY-4)

## 2023-07-24 NOTE — CASE MANAGEMENT
Case Management Assessment & Discharge Planning Note    Patient name Selena Gaston  Location [facility] 913/[facility] 406-50 MRN [id number]  : 2000 Date 2023       Current Admission Date: 2023  Current Admission Diagnosis:Lymphoma of thyroid gland [facility])   Patient Active Problem List    Diagnosis Date Noted   • Lymphoma of thyroid gland ([address] ) 2023   • Subacute thyroiditis 2023   • Dysphagia 2023   • Morbid obesity ([address]) 2023   • Enlarged thyroid 2023   • Mild intermittent asthma without complication    • Seasonal allergic rhinitis due to pollen 2017   • Shrimp allergy 2017      LOS (days): 2  Geometric Mean LOS (GMLOS) (days):   Days to GMLOS:     OBJECTIVE:    Risk of Unplanned Readmission Score: 9.62         Current admission status: Inpatient       Preferred Pharmacy:   [facility] [place] [address]  Phone: [phone] Fax: [phone]    Primary Care Provider: Ines Morocho DO    Primary Insurance: Sumayamaddi Carrasco  Secondary Insurance:     ASSESSMENT:  Brown Proxies    There are no active Health Care Proxies on file. Patient Information  Mental Status: Alert  During Assessment patient was accompanied by: Other-Comment (Boyfriend)  Assessment information provided by[de-identified] Patient  Primary Caregiver: Self  Support Systems: Self, Spouse/significant other, Parent  Home entry access options.  Select all that apply.: Stairs  Number of steps to enter home.: 3  Do the steps have railings?: Yes  Type of Current Residence: [facility]  In the last 12 months, was there a time when you were not able to pay the mortgage or rent on time?: No  In the last 12 months, was there a time when you did not have a steady place to sleep or slept in a shelter (including now)?: No  Homeless/housing insecurity resource given?: N/A  Living Arrangements: Lives w/ Parent(s)  Is patient a ?: No    Activities of Daily Living Prior to Admission  Functional Status: Independent  Completes ADLs independently?: Yes  Ambulates independently?: Yes  Does patient use assisted devices?: No  Does patient currently own DME?: No  Does patient have a history of Outpatient Therapy (PT/OT)?: Yes  Does the patient have a history of Short-Term Rehab?: No  Does patient have a history of HHC?: No  Does patient currently have [facility]?: No         Patient Information Continued  Does patient have prescription coverage?: Yes  Within the past 12 months, you worried that your food would run out before you got the money to buy more.: Never true  Within the past 12 months, the food you bought just didn't last and you didn't have money to get more.: Never true  Food insecurity resource given?: N/A  Does patient receive dialysis treatments?: No  Does patient have a history of substance abuse?: No (per patient)  Does patient have a history of Mental Health Diagnosis?: No (per patient)         Means of Transportation  Means of Transport to Appts[de-identified] Family transport  In the past 12 months, has lack of transportation kept you from medical appointments or from getting medications?: No  In the past 12 months, has lack of transportation kept you from meetings, work, or from getting things needed for daily living?: No  Was application for public transport provided?: N/A        DISCHARGE DETAILS:  CM met at bedside with patient. BF present in room sleeping. Patient lives at home with parents, IADL's. Family or BF drives to appts. Family or BF to transport home at [place]. CM to continue to support.

## 2023-07-24 NOTE — ASSESSMENT & PLAN NOTE
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 · Seen by endocrinology, weaning off prednisone. Continuing levothyroxine. · Discussed with oncology, awaiting final biopsy result. Recommending bone marrow biopsy. Possible chemotherapy. · Echo done in preparation for chemotherapy, unremarkable.

## 2023-07-24 NOTE — TELEMEDICINE
e-Consult (IPC)  - Interventional Radiology  Selena Gaston 26 y.o. female MRN: [id number]  Unit/Bed#: [facility] 913-01 Encounter: [id number]          Interventional Radiology has been consulted to evaluate [address] to IR  Consult performed by: Reynaldo Reno MD  Consult ordered by: Clark Nolan MD        07/24/23    Assessment/Recommendation:   Pt presented after abnormal thyroid biopsy result. Pt had a right thyroid lobe core needle biopsy with me on 7/20/23. Dr. Montero Him (Pathologist) was made aware of the biopsy location being right thyroid lobe. Prelim report showed "involved by B-cell lymphoma."    IR consulted for bone marrow biopsy. Order placed. Not NPO today. Timing TBD. Maybe Wed 7/26. 5-10 minutes, >50% of the total time devoted to medical consultative verbal/EMR discussion between providers. Written report will be generated in the EMR. Thank you for allowing Interventional Radiology to participate in the care of Selena Gaston. Please don't hesitate to call or TigerText us with any questions.      Reynaldo Reno MD

## 2023-07-24 NOTE — UTILIZATION REVIEW
NOTIFICATION OF INPATIENT ADMISSION   AUTHORIZATION REQUEST   SERVICING FACILITY:   [address] [place] [address]  Address: [address] [place] [address] [address] [facility] [address] [address]  Tax ID: [id number]  NPI: [id number] ATTENDING PROVIDER:  Attending Name and NPI#: Brianna Oliva Md [[id number]]  Address: [address] [place] [address] [address] [facility] [address] [address]  Phone: [phone]   ADMISSION INFORMATION:  Place of Service: [address] [place] [address]  Place of Service Code: 21  Inpatient Admission Date/Time: 7/22/23  5:56 PM  Discharge Date/Time: No discharge date for patient encounter. Admitting Diagnosis Code/Description:  Thyroid cancer ([address]) [C73]  Abdominal pain [R10.9]     UTILIZATION REVIEW CONTACT:  Fatmata Granda Utilization   Network Utilization Review Department  Phone: [phone]  Fax: [phone]  Email: [email or web address]  Contact for approvals/pending authorizations, clinical reviews, and discharge. PHYSICIAN ADVISORY SERVICES:  Medical Necessity Denial & Fipc-ki-Nnjh Review  Phone: [phone]  Fax: [phone]  Email: [email or web address]

## 2023-07-24 NOTE — ASSESSMENT & PLAN NOTE
Working diagnosis before found to have lymphoma with subacute thyroiditis on levothyroxine and prednisone  · Continue levothyroxine 125 mcg daily  · On tapering dose of prednisone

## 2023-07-25 PROBLEM — R03.0 ELEVATED BLOOD PRESSURE READING: Status: ACTIVE | Noted: 2023-07-25

## 2023-07-25 LAB
ANION GAP SERPL CALCULATED.3IONS-SCNC: 6 MMOL/L
BASOPHILS # BLD AUTO: 0.06 THOUSANDS/ÂΜL (ref 0–0.1)
BASOPHILS NFR BLD AUTO: 1 % (ref 0–1)
BUN SERPL-MCNC: 17 MG/DL (ref 5–25)
CALCIUM SERPL-MCNC: 8.8 MG/DL (ref 8.3–10.1)
CHLORIDE SERPL-SCNC: 109 MMOL/L (ref 96–108)
CO2 SERPL-SCNC: 24 MMOL/L (ref 21–32)
CREAT SERPL-MCNC: 0.69 MG/DL (ref 0.6–1.3)
EOSINOPHIL # BLD AUTO: 0.4 THOUSAND/ÂΜL (ref 0–0.61)
EOSINOPHIL NFR BLD AUTO: 3 % (ref 0–6)
ERYTHROCYTE [DISTWIDTH] IN BLOOD BY AUTOMATED COUNT: 12.8 % (ref 11.6–15.1)
GFR SERPL CREATININE-BSD FRML MDRD: 123 ML/MIN/1.73SQ M
GLUCOSE SERPL-MCNC: 92 MG/DL (ref 65–140)
HCT VFR BLD AUTO: 38.4 % (ref 34.8–46.1)
HGB BLD-MCNC: 12 G/DL (ref 11.5–15.4)
IMM GRANULOCYTES # BLD AUTO: 0.07 THOUSAND/UL (ref 0–0.2)
IMM GRANULOCYTES NFR BLD AUTO: 1 % (ref 0–2)
INR PPP: 0.92 (ref 0.84–1.19)
LYMPHOCYTES # BLD AUTO: 3.03 THOUSANDS/ÂΜL (ref 0.6–4.47)
LYMPHOCYTES NFR BLD AUTO: 25 % (ref 14–44)
MCH RBC QN AUTO: 26.9 PG (ref 26.8–34.3)
MCHC RBC AUTO-ENTMCNC: 31.3 G/DL (ref 31.4–37.4)
MCV RBC AUTO: 86 FL (ref 82–98)
MONOCYTES # BLD AUTO: 0.85 THOUSAND/ÂΜL (ref 0.17–1.22)
MONOCYTES NFR BLD AUTO: 7 % (ref 4–12)
NEUTROPHILS # BLD AUTO: 7.61 THOUSANDS/ÂΜL (ref 1.85–7.62)
NEUTS SEG NFR BLD AUTO: 63 % (ref 43–75)
NRBC BLD AUTO-RTO: 0 /100 WBCS
PLATELET # BLD AUTO: 375 THOUSANDS/UL (ref 149–390)
PMV BLD AUTO: 9.5 FL (ref 8.9–12.7)
POTASSIUM SERPL-SCNC: 3.7 MMOL/L (ref 3.5–5.3)
PROTHROMBIN TIME: 12.6 SECONDS (ref 11.6–14.5)
RBC # BLD AUTO: 4.46 MILLION/UL (ref 3.81–5.12)
SODIUM SERPL-SCNC: 139 MMOL/L (ref 135–147)
URATE SERPL-MCNC: 5.8 MG/DL (ref 2–7.5)
WBC # BLD AUTO: 12.02 THOUSAND/UL (ref 4.31–10.16)

## 2023-07-25 PROCEDURE — 84550 ASSAY OF BLOOD/URIC ACID: CPT | Performed by: INTERNAL MEDICINE

## 2023-07-25 PROCEDURE — 85025 COMPLETE CBC W/AUTO DIFF WBC: CPT | Performed by: NURSE PRACTITIONER

## 2023-07-25 PROCEDURE — 80048 BASIC METABOLIC PNL TOTAL CA: CPT | Performed by: NURSE PRACTITIONER

## 2023-07-25 PROCEDURE — 99233 SBSQ HOSP IP/OBS HIGH 50: CPT | Performed by: INTERNAL MEDICINE

## 2023-07-25 PROCEDURE — 99232 SBSQ HOSP IP/OBS MODERATE 35: CPT | Performed by: NURSE PRACTITIONER

## 2023-07-25 PROCEDURE — 85610 PROTHROMBIN TIME: CPT | Performed by: NURSE PRACTITIONER

## 2023-07-25 RX ADMIN — PREDNISONE 10 MG: 10 TABLET ORAL at 07:45

## 2023-07-25 RX ADMIN — MONTELUKAST 10 MG: 10 TABLET, FILM COATED ORAL at 21:32

## 2023-07-25 RX ADMIN — LEVOTHYROXINE SODIUM 150 MCG: 75 TABLET ORAL at 05:48

## 2023-07-25 RX ADMIN — DICYCLOMINE HYDROCHLORIDE 20 MG: 20 TABLET ORAL at 16:19

## 2023-07-25 NOTE — PROGRESS NOTES
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 · Seen by endocrinology, weaning off prednisone. Continuing levothyroxine. · Discussed with oncology, pending final pathology report. Recommending bone marrow biopsy, will be done tomorrow in IR. · Discussion regarding treatment following biopsy results. Possible inpatient chemotherapy. · Echo done in preparation for chemotherapy, unremarkable. Subacute thyroiditis  Assessment & Plan  Working diagnosis before found to have lymphoma with subacute thyroiditis on levothyroxine and prednisone  · Continue levothyroxine 125 mcg daily  · On tapering dose of prednisone     Mild intermittent asthma without complication  Assessment & Plan  · No exacerbation on albuterol. Continue montelukast    Elevated blood pressure reading  Assessment & Plan  Noted. Systolic borderline, diastolic elevated. · Will hold off on further work up or rx for now  · Will monitor closely    Dysphagia  Assessment & Plan  · Secondary to thyromegaly  · Tolerating regular diet    Hypothyroidism  Assessment & Plan  · Continue levothyroxine  · TSH within normal limits    Morbid obesity ([address])  Assessment & Plan  · Body mass index is 48.01 kg/m². · TLC         VTE Pharmacologic Prophylaxis:   Low Risk (Score 0-2) - Encourage Ambulation. Patient Centered Rounds: I performed bedside rounds with nursing staff today.   Discussions with Specialists or Other Care Team Provider: nursing, case management     Education and Discussions with Family / Patient: Updated  (significant other) at bedside. Total Time Spent on Date of Encounter in care of patient: 25 minutes This time was spent on one or more of the following: performing physical exam; counseling and coordination of care; obtaining or reviewing history; documenting in the medical record; reviewing/ordering tests, medications or procedures; communicating with other healthcare professionals and discussing with patient's family/caregivers. Current Length of Stay: 3 day(s)  Current Patient Status: Inpatient   Certification Statement: The patient will continue to require additional inpatient hospital stay due to IR bone marrow Bx tomorrow, rest of plan pending pathology  Discharge Plan: Anticipate discharge in >72 hrs to home. Code Status: Level 1 - Full Code    Subjective:   Offers no acute complaints. Agreeable for bone marrow biopsy. Objective:     Vitals:   Temp (24hrs), Av °F (36.7 °C), Min:98 °F (36.7 °C), Max:98 °F (36.7 °C)    Temp:  [98 °F (36.7 °C)] 98 °F (36.7 °C)  HR:  [103-116] 103  Resp:  [14-20] 20  BP: (140-182)/() 140/96  SpO2:  [95 %-97 %] 97 %  Body mass index is 48.01 kg/m². Input and Output Summary (last 24 hours):   No intake or output data in the 24 hours ending 23 0939    Physical Exam:   Physical Exam  Vitals and nursing note reviewed. Constitutional:       General: She is not in acute distress. Appearance: She is obese. Cardiovascular:      Rate and Rhythm: Normal rate. Pulmonary:      Effort: No respiratory distress. Skin:     General: Skin is warm. Neurological:      Mental Status: She is alert and oriented to person, place, and time. Mental status is at baseline.    Psychiatric:         Mood and Affect: Mood normal.          Additional Data:     Labs:  Results from last 7 days   Lab Units 23  0601   WBC Thousand/uL 12.02*   HEMOGLOBIN g/dL 12.0   HEMATOCRIT % 38.4   PLATELETS Thousands/uL 375   NEUTROS PCT % 63 LYMPHS PCT % 25   MONOS PCT % 7   EOS PCT % 3     Results from last 7 days   Lab Units 07/25/23  0601 07/24/23  0454   SODIUM mmol/L 139 141   POTASSIUM mmol/L 3.7 4.0   CHLORIDE mmol/L 109* 111*   CO2 mmol/L 24 24   BUN mg/dL 17 15   CREATININE mg/dL 0.69 0.69   ANION GAP mmol/L 6 6   CALCIUM mg/dL 8.8 9.0   ALBUMIN g/dL  --  3.1*   TOTAL BILIRUBIN mg/dL  --  0.22   ALK PHOS U/L  --  74   ALT U/L  --  15   AST U/L  --  8   GLUCOSE RANDOM mg/dL 92 96     Results from last 7 days   Lab Units 07/25/23  0601   INR  0.92                   Lines/Drains:  Invasive Devices     Peripheral Intravenous Line  Duration           Peripheral IV 07/22/23 Left Antecubital 2 days                      Imaging: No pertinent imaging reviewed. Recent Cultures (last 7 days):         Last 24 Hours Medication List:   Current Facility-Administered Medications   Medication Dose Route Frequency Provider Last Rate   • acetaminophen  650 mg Oral Q6H PRN Perry Lucas MD     • albuterol  2 puff Inhalation Q6H PRN Loree Garza MD     • aluminum-magnesium hydroxide-simethicone  30 mL Oral Q6H PRN Loree Garza MD     • dicyclomine  20 mg Oral BID Loree Garza MD     • levothyroxine  150 mcg Oral Early Morning Loree Garza MD     • montelukast  10 mg Oral HS Loree Garza MD     • ondansetron  4 mg Intravenous Q6H PRN Loree Garza MD     • predniSONE  10 mg Oral Daily Deepa Salvador DO          Today, Patient Was Seen By: TATI Crain    **Please Note: This note may have been constructed using a voice recognition system. **

## 2023-07-25 NOTE — ASSESSMENT & PLAN NOTE
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 · Seen by endocrinology, weaning off prednisone. Continuing levothyroxine. · Discussed with oncology, pending final pathology report. Recommending bone marrow biopsy, will be done tomorrow in IR. · Discussion regarding treatment following biopsy results. Possible inpatient chemotherapy. · Echo done in preparation for chemotherapy, unremarkable.

## 2023-07-25 NOTE — PLAN OF CARE
Problem: PAIN - ADULT  Goal: Verbalizes/displays adequate comfort level or baseline comfort level  Description: Interventions:  - Encourage patient to monitor pain and request assistance  - Assess pain using appropriate pain scale  - Administer analgesics based on type and severity of pain and evaluate response  - Implement non-pharmacological measures as appropriate and evaluate response  - Consider cultural and social influences on pain and pain management  - Notify physician/advanced practitioner if interventions unsuccessful or patient reports new pain  Outcome: Progressing     Problem: INFECTION - ADULT  Goal: Absence or prevention of progression during hospitalization  Description: INTERVENTIONS:  - Assess and monitor for signs and symptoms of infection  - Monitor lab/diagnostic results  - Monitor all insertion sites, i.e. indwelling lines, tubes, and drains  - Monitor endotracheal if appropriate and nasal secretions for changes in amount and color  - [place] appropriate cooling/warming therapies per order  - Administer medications as ordered  - Instruct and encourage patient and family to use good hand hygiene technique  - Identify and instruct in appropriate isolation precautions for identified infection/condition  Outcome: Progressing  Goal: Absence of fever/infection during neutropenic period  Description: INTERVENTIONS:  - Monitor WBC    Outcome: Progressing     Problem: SAFETY ADULT  Goal: Patient will remain free of falls  Description: INTERVENTIONS:  - Educate patient/family on patient safety including physical limitations  - Instruct patient to call for assistance with activity   - Keep Call bell within reach  - Keep bed low and locked with side rails adjusted as appropriate  - Keep care items and personal belongings within reach  - Initiate and maintain comfort rounds  Outcome: Progressing  Goal: Maintain or return to baseline ADL function  Description: INTERVENTIONS:  -  Assess patient's ability to carry out ADLs; assess patient's baseline for ADL function and identify physical deficits which impact ability to perform ADLs (bathing, care of mouth/teeth, toileting, grooming, dressing, etc.)  - Assess/evaluate cause of self-care deficits   - Assess range of motion  - Assess patient's mobility; develop plan if impaired  - Assess patient's need for assistive devices and provide as appropriate  - Encourage maximum independence but intervene and supervise when necessary  - Involve family in performance of ADLs  - Assess for home care needs following discharge   - Consider OT consult to assist with ADL evaluation and planning for discharge  - Provide patient education as appropriate  Outcome: Progressing  Goal: Maintains/Returns to pre admission functional level  Description: INTERVENTIONS:  - Perform BMAT or MOVE assessment daily.   - Set and communicate daily mobility goal to care team and patient/family/caregiver.    - Collaborate with rehabilitation services on mobility goals if consulted  Outcome: Progressing     Problem: DISCHARGE PLANNING  Goal: Discharge to home or other facility with appropriate resources  Description: INTERVENTIONS:  - Identify barriers to discharge w/patient and caregiver  - Arrange for needed discharge resources and transportation as appropriate  - Identify discharge learning needs (meds, wound care, etc.)  - Arrange for interpretive services to assist at discharge as needed  - Refer to Case Management Department for coordinating discharge planning if the patient needs post-hospital services based on physician/advanced practitioner order or complex needs related to functional status, cognitive ability, or social support system  Outcome: Progressing     Problem: Knowledge Deficit  Goal: Patient/family/caregiver demonstrates understanding of disease process, treatment plan, medications, and discharge instructions  Description: Complete learning assessment and assess knowledge base.  Interventions:  - Provide teaching at level of understanding  - Provide teaching via preferred learning methods  Outcome: Progressing

## 2023-07-25 NOTE — ASSESSMENT & PLAN NOTE
Noted. Systolic borderline, diastolic elevated.    · Will hold off on further work up or rx for now  · Will monitor closely

## 2023-07-25 NOTE — PROGRESS NOTES
Oncology Progress Note  Laurance Sandifer 26 y.o. female MRN: [id number]  Unit/Bed#: [facility] 913-01 Encounter: [id number]      /96   Pulse 103   Temp 98 °F (36.7 °C)   Resp 20   Ht 5' 3" (1.6 m)   Wt 123 kg (271 lb)   LMP 06/29/2023 (Approximate)   SpO2 97%   BMI 48.01 kg/m²     Subjective: Aggressive B-cell lymphoma of thyroid. Objective: She is quite stable. She has no fever, chills or night sweats. She denies any pain. She has no bleeding symptoms. She still eats small frequent meals. No difficulty of swallowing liquid. She denied shortness of breath. General Appearance:    Alert, oriented        Eyes:    PERRL   Ears:    Normal external ear canals, both ears   Nose:   Nares normal, septum midline   Throat:   Mucosa moist. Pharynx without injection. Neck:   Supple       Lungs:     Clear to auscultation bilaterally   Chest Wall:    No tenderness or deformity    Heart:    Regular rate and rhythm       Abdomen:     Soft, non-tender, bowel sounds +, no organomegaly           Extremities:   Extremities no cyanosis or edema       Skin:   no rash or icterus.     Lymph nodes:   Cervical, supraclavicular, and axillary nodes normal   Neurologic:   CNII-XII intact, normal strength, sensation and reflexes     throughout        Recent Results (from the past 48 hour(s))   Echo complete w/ contrast if indicated    Collection Time: 07/23/23  2:40 PM   Result Value Ref Range    A4C EF 54 %    LVIDd 5.20 cm    LVIDS 3.20 cm    IVSd 1.00 cm    LVPWd 0.90 cm    FS 38 28 - 44    MV E' Tissue Velocity Septal 12 cm/s    MV E' Tissue Velocity Lateral 12 cm/s    E wave deceleration time 134 ms    MV Peak E Duong 97 cm/s    MV Peak A Duong 0.81 m/s    RVID d 3.4 cm    Tricuspid annular plane systolic excursion 8.07 cm    LA size 3.3 cm    LA length (A2C) 5.20 cm    LA volume (BP) 43 mL    RAA A4C 14.8 cm2    MV stenosis pressure 1/2 time 39 ms    MV valve area p 1/2 method 5.64     TR Peak Duong 1.0 m/s    Triscuspid Valve Regurgitation Peak Gradient 4.0 mmHg    Ao root 3.10 cm    Asc Ao 3.3 cm    Tricuspid valve peak regurgitation velocity 0.99 m/s    Left ventricular stroke volume (2D) 91.00 mL    IVS 1 cm    LEFT VENTRICLE SYSTOLIC VOLUME (MOD BIPLANE) 2D 40 mL    LV DIASTOLIC VOLUME (MOD BIPLANE) 2D 131 mL    Left Atrium Area-systolic Four Chamber 06.2 cm2    Left Atrium Area-systolic Apical Two Chamber 18.2 cm2    LVSV, 2D 91 mL    LV EF 65    LD,Blood    Collection Time: 07/24/23  4:17 AM   Result Value Ref Range     81 - 234 U/L   Comprehensive metabolic panel    Collection Time: 07/24/23  4:54 AM   Result Value Ref Range    Sodium 141 135 - 147 mmol/L    Potassium 4.0 3.5 - 5.3 mmol/L    Chloride 111 (H) 96 - 108 mmol/L    CO2 24 21 - 32 mmol/L    ANION GAP 6 mmol/L    BUN 15 5 - 25 mg/dL    Creatinine 0.69 0.60 - 1.30 mg/dL    Glucose 96 65 - 140 mg/dL    Calcium 9.0 8.3 - 10.1 mg/dL    Corrected Calcium 9.7 8.3 - 10.1 mg/dL    AST 8 5 - 45 U/L    ALT 15 12 - 78 U/L    Alkaline Phosphatase 74 46 - 116 U/L    Total Protein 7.4 6.4 - 8.4 g/dL    Albumin 3.1 (L) 3.5 - 5.0 g/dL    Total Bilirubin 0.22 0.20 - 1.00 mg/dL    eGFR 123 ml/min/1.73sq m   CBC    Collection Time: 07/24/23  4:54 AM   Result Value Ref Range    WBC 9.49 4.31 - 10.16 Thousand/uL    RBC 4.51 3.81 - 5.12 Million/uL    Hemoglobin 12.1 11.5 - 15.4 g/dL    Hematocrit 40.5 34.8 - 46.1 %    MCV 90 82 - 98 fL    MCH 26.8 26.8 - 34.3 pg    MCHC 29.9 (L) 31.4 - 37.4 g/dL    RDW 12.8 11.6 - 15.1 %    Platelets 644 869 - 377 Thousands/uL    MPV 9.6 8.9 - 12.7 fL   Chronic Hepatitis Panel    Collection Time: 07/24/23  9:09 AM   Result Value Ref Range    Hepatitis B Surface Ag Non-reactive Non-Reactive    Hepatitis C Ab Non-reactive Non-Reactive    Hep B C IgM Non-reactive Non-Reactive    Hep B Core Total Ab Non-reactive Non-Reactive   LD,Blood    Collection Time: 07/24/23  9:09 AM   Result Value Ref Range     81 - 234 U/L   Uric acid    Collection Time: 07/25/23  6:01 AM   Result Value Ref Range    Uric Acid 5.8 2.0 - 7.5 mg/dL   Protime-INR    Collection Time: 07/25/23  6:01 AM   Result Value Ref Range    Protime 12.6 11.6 - 14.5 seconds    INR 0.92 0.84 - 1.19   CBC and differential    Collection Time: 07/25/23  6:01 AM   Result Value Ref Range    WBC 12.02 (H) 4.31 - 10.16 Thousand/uL    RBC 4.46 3.81 - 5.12 Million/uL    Hemoglobin 12.0 11.5 - 15.4 g/dL    Hematocrit 38.4 34.8 - 46.1 %    MCV 86 82 - 98 fL    MCH 26.9 26.8 - 34.3 pg    MCHC 31.3 (L) 31.4 - 37.4 g/dL    RDW 12.8 11.6 - 15.1 %    MPV 9.5 8.9 - 12.7 fL    Platelets 093 591 - 773 Thousands/uL    nRBC 0 /100 WBCs    Neutrophils Relative 63 43 - 75 %    Immat GRANS % 1 0 - 2 %    Lymphocytes Relative 25 14 - 44 %    Monocytes Relative 7 4 - 12 %    Eosinophils Relative 3 0 - 6 %    Basophils Relative 1 0 - 1 %    Neutrophils Absolute 7.61 1.85 - 7.62 Thousands/µL    Immature Grans Absolute 0.07 0.00 - 0.20 Thousand/uL    Lymphocytes Absolute 3.03 0.60 - 4.47 Thousands/µL    Monocytes Absolute 0.85 0.17 - 1.22 Thousand/µL    Eosinophils Absolute 0.40 0.00 - 0.61 Thousand/µL    Basophils Absolute 0.06 0.00 - 0.10 Thousands/µL   Basic metabolic panel    Collection Time: 07/25/23  6:01 AM   Result Value Ref Range    Sodium 139 135 - 147 mmol/L    Potassium 3.7 3.5 - 5.3 mmol/L    Chloride 109 (H) 96 - 108 mmol/L    CO2 24 21 - 32 mmol/L    ANION GAP 6 mmol/L    BUN 17 5 - 25 mg/dL    Creatinine 0.69 0.60 - 1.30 mg/dL    Glucose 92 65 - 140 mg/dL    Calcium 8.8 8.3 - 10.1 mg/dL    eGFR 123 ml/min/1.73sq m         Echo complete w/ contrast if indicated    Result Date: 7/23/2023  Narrative: •  Left Ventricle: Left ventricular cavity size is normal. Wall thickness is normal. The left ventricular ejection fraction is 65%. Systolic function is normal. Although no diagnostic regional wall motion abnormality was identified, this possibility cannot be completely excluded on the basis of this study.  Diastolic function is normal. Strain not reliable due to poor image quality. Technically difficult study likely due to body habitus. No prior studies for comparison. CT soft tissue neck with contrast    Result Date: 7/22/2023  Narrative: CT NECK WITH CONTRAST INDICATION:   Thyroid cancer, staging thyroid cancer, swelling. COMPARISON: 6/1/2023 TECHNIQUE:  Axial, sagittal, and coronal 2D reformatted images were created from the axial source data and submitted for interpretation. Radiation dose length product (DLP) for this visit:  721.66 mGy-cm . This examination, like all CT scans performed in the [facility], was performed utilizing techniques to minimize radiation dose exposure, including the use of iterative  reconstruction and automated exposure control. IV Contrast:  100 mL of iohexol (OMNIPAQUE) IMAGE QUALITY:  Diagnostic. FINDINGS: VISUALIZED BRAIN PARENCHYMA:  No acute intracranial pathology of the visualized brain parenchyma. VISUALIZED ORBITS: Normal visualized orbits. PARANASAL SINUSES: Normal visualized paranasal sinuses. NASAL CAVITY AND NASOPHARYNX:  Normal. SUPRAHYOID NECK:  Normal oral cavity, tongue base, tonsillar fossa and epiglottis. INFRAHYOID NECK:  Aryepiglottic folds and piriform sinuses are normal.  Normal glottis and subglottic airway/trachea. THYROID GLAND: Significantly increased size of thyroid tissue compared to the prior exam compressing the airway. PAROTID AND SUBMANDIBULAR GLANDS:  Normal. LYMPH NODES: Prominent bilateral cervical level 2 lymph nodes as before VASCULAR STRUCTURES:  Normal enhancement of the cervical vasculature. THORACIC INLET:  Lung apices and upper mediastinum are unremarkable. BONY STRUCTURES: No acute fracture or destructive osseous lesion. Impression: Significantly increased size of thyroid tissue compared to the prior exam compressing the airway/trachea. This is consistent with patient's known history of thyroid cancer.  Surgical consult recommended for further evaluation. Workstation performed: [id number]     CTA ED chest PE study    Result Date: 7/22/2023  Narrative: CTA - CHEST WITH IV CONTRAST - PULMONARY ANGIOGRAM INDICATION:   Pulmonary embolism (PE) suspected, positive D-dimer tachycardia, SOB, elevated d-dimer. Per my review of the medical record, right thyroid nodule biopsy 7/20/2023 showing lymphoma. Right neck pain, difficulty breathing, difficulty swallowing. COMPARISON: Neck CT from today, 6/1/2023 and 1/31/2018. TECHNIQUE: CT angiogram timed for optimal opacification of the pulmonary arteries. Axial, sagittal, and coronal 2D reformats created from source data. Coronal 3D MIP postprocessing on the acquisition scanner. Radiation dose length product (DLP):  455.29 mGy-cm . Radiation dose exposure minimized using iterative reconstruction and automated exposure control. IV Contrast:  100 mL of iohexol (OMNIPAQUE) FINDINGS: PULMONARY ARTERIES:  No pulmonary embolus. LUNGS:  Lungs clear. AIRWAYS: No significant filling defects. PLEURA:  Unremarkable. HEART/GREAT VESSELS:  Normal for age. MEDIASTINUM AND LYNNE:  Unremarkable. CHEST WALL AND LOWER NECK: Marked enlargement of the thyroid gland severe tracheal narrowing, slightly increased since 6/1/2023. No obvious postbiopsy hemorrhage but the thyroid is incompletely imaged. UPPER ABDOMEN: Hepatic steatosis. OSSEOUS STRUCTURES:  Normal for age. Impression: No pulmonary embolus. No acute pulmonary disease. Severe enlargement of the thyroid gland with recent biopsy showing lymphoma, with increased tracheal narrowing compared with the neck CT from 6/1/2023 with no obvious postprocedural hemorrhage. See neck CT from today. Hepatic steatosis.  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 After a brief ultrasound examination was performed of the thyroid, and correlated with the prior examination, the neck was then prepped and draped in usual sterile fashion. Under direct sonographic guidance, a 17-gauge coaxial needle was placed into the right thyroid lobe. Through the coaxial needle, multiple core needle biopsies were performed of the right thyroid lobe using an 18-gauge bio pence needle. Multiple ultrasound images were saved. At the end of the procedure, the needles were removed intact. Sterile dressing was applied. A full pathology report will follow. The patient tolerated the procedure well and suffered no complications. Findings: 1. Sonographic evaluation of the right thyroid lobe demonstrated a markedly enlarged right thyroid lobe with heterogeneous echogenicity. Color flow Doppler showed hypovascularity. 2.  Interval ultrasound demonstrated needle tip within the right lower lobe. 3.  Completion ultrasound showed no immediate complication/surrounding hematoma. Impression: Impression: Technically successful core needle biopsy of the right thyroid lobe. A full report will follow. Workstation performed: [id number]         Assessment : Thyroid aggressive B-cell lymphoma. Plan: Based on the preliminary pathology report, MYC expression is absent. Bcl-2 expression is present. This is not typical Burkitt lymphoma phenotype. She may have diffuse large B-cell lymphoma with germinal center origin with CD10 positivity. We still have to wait for FISH testing of MYC, BCL-2, BCL-6 translocation for definitive diagnosis. I had long conversation with patient and her extended family including his mother and father. At this moment, what we know is she has aggressive B-cell lymphoma. Whether this is Burkitt lymphoma or diffuse large B-cell lymphoma, appropriate chemotherapeutic regimen is slightly different.   If she has Burkitt lymphoma, intensive chemotherapy which may require many weeks of hospitalization is indicated. If she has diffuse large B-cell lymphoma, I may consider or EPOCH. Side effect of chemotherapy was thoroughly discussed, including but not limited to alopecia, nausea, vomiting, neutropenia, risk infection, cardiotoxicity, fatigue, risk of permanent menopause. They understood.

## 2023-07-25 NOTE — PLAN OF CARE
Problem: PAIN - ADULT  Goal: Verbalizes/displays adequate comfort level or baseline comfort level  Description: Interventions:  - Encourage patient to monitor pain and request assistance  - Assess pain using appropriate pain scale  - Administer analgesics based on type and severity of pain and evaluate response  - Implement non-pharmacological measures as appropriate and evaluate response  - Consider cultural and social influences on pain and pain management  - Notify physician/advanced practitioner if interventions unsuccessful or patient reports new pain  Outcome: Progressing     Problem: INFECTION - ADULT  Goal: Absence or prevention of progression during hospitalization  Description: INTERVENTIONS:  - Assess and monitor for signs and symptoms of infection  - Monitor lab/diagnostic results  - Monitor all insertion sites, i.e. indwelling lines, tubes, and drains  - Monitor endotracheal if appropriate and nasal secretions for changes in amount and color  - [place] appropriate cooling/warming therapies per order  - Administer medications as ordered  - Instruct and encourage patient and family to use good hand hygiene technique  - Identify and instruct in appropriate isolation precautions for identified infection/condition  Outcome: Progressing  Goal: Absence of fever/infection during neutropenic period  Description: INTERVENTIONS:  - Monitor WBC    Outcome: Progressing     Problem: SAFETY ADULT  Goal: Patient will remain free of falls  Description: INTERVENTIONS:  - Educate patient/family on patient safety including physical limitations  - Instruct patient to call for assistance with activity   - Keep Call bell within reach  - Keep bed low and locked with side rails adjusted as appropriate  - Keep care items and personal belongings within reach  - Initiate and maintain comfort rounds  Outcome: Progressing  Goal: Maintain or return to baseline ADL function  Description: INTERVENTIONS:  -  Assess patient's ability to carry out ADLs; assess patient's baseline for ADL function and identify physical deficits which impact ability to perform ADLs (bathing, care of mouth/teeth, toileting, grooming, dressing, etc.)  - Assess/evaluate cause of self-care deficits   - Assess range of motion  - Assess patient's mobility; develop plan if impaired  - Assess patient's need for assistive devices and provide as appropriate  - Encourage maximum independence but intervene and supervise when necessary  - Involve family in performance of ADLs  - Assess for home care needs following discharge   - Consider OT consult to assist with ADL evaluation and planning for discharge  - Provide patient education as appropriate  Outcome: Progressing  Goal: Maintains/Returns to pre admission functional level  Description: INTERVENTIONS:  - Perform BMAT or MOVE assessment daily.   - Set and communicate daily mobility goal to care team and patient/family/caregiver.    - Collaborate with rehabilitation services on mobility goals if consulted  Outcome: Progressing     Problem: DISCHARGE PLANNING  Goal: Discharge to home or other facility with appropriate resources  Description: INTERVENTIONS:  - Identify barriers to discharge w/patient and caregiver  - Arrange for needed discharge resources and transportation as appropriate  - Identify discharge learning needs (meds, wound care, etc.)  - Arrange for interpretive services to assist at discharge as needed  - Refer to Case Management Department for coordinating discharge planning if the patient needs post-hospital services based on physician/advanced practitioner order or complex needs related to functional status, cognitive ability, or social support system  Outcome: Progressing     Problem: Knowledge Deficit  Goal: Patient/family/caregiver demonstrates understanding of disease process, treatment plan, medications, and discharge instructions  Description: Complete learning assessment and assess knowledge base.  Interventions:  - Provide teaching at level of understanding  - Provide teaching via preferred learning methods  Outcome: Progressing

## 2023-07-26 ENCOUNTER — TELEPHONE (OUTPATIENT)
Dept: HEMATOLOGY ONCOLOGY | Facility: CLINIC | Age: 23
End: 2023-07-26

## 2023-07-26 ENCOUNTER — APPOINTMENT (INPATIENT)
Dept: RADIOLOGY | Facility: HOSPITAL | Age: 23
DRG: 691 | End: 2023-07-26
Attending: RADIOLOGY
Payer: COMMERCIAL

## 2023-07-26 PROCEDURE — 99152 MOD SED SAME PHYS/QHP 5/>YRS: CPT | Performed by: RADIOLOGY

## 2023-07-26 PROCEDURE — 88237 TISSUE CULTURE BONE MARROW: CPT | Performed by: INTERNAL MEDICINE

## 2023-07-26 PROCEDURE — 99153 MOD SED SAME PHYS/QHP EA: CPT

## 2023-07-26 PROCEDURE — 88184 FLOWCYTOMETRY/ TC 1 MARKER: CPT | Performed by: INTERNAL MEDICINE

## 2023-07-26 PROCEDURE — 88364 INSITU HYBRIDIZATION (FISH): CPT | Performed by: STUDENT IN AN ORGANIZED HEALTH CARE EDUCATION/TRAINING PROGRAM

## 2023-07-26 PROCEDURE — 88305 TISSUE EXAM BY PATHOLOGIST: CPT | Performed by: STUDENT IN AN ORGANIZED HEALTH CARE EDUCATION/TRAINING PROGRAM

## 2023-07-26 PROCEDURE — 88342 IMHCHEM/IMCYTCHM 1ST ANTB: CPT | Performed by: STUDENT IN AN ORGANIZED HEALTH CARE EDUCATION/TRAINING PROGRAM

## 2023-07-26 PROCEDURE — 88313 SPECIAL STAINS GROUP 2: CPT | Performed by: STUDENT IN AN ORGANIZED HEALTH CARE EDUCATION/TRAINING PROGRAM

## 2023-07-26 PROCEDURE — 77002 NEEDLE LOCALIZATION BY XRAY: CPT | Performed by: RADIOLOGY

## 2023-07-26 PROCEDURE — 85097 BONE MARROW INTERPRETATION: CPT | Performed by: STUDENT IN AN ORGANIZED HEALTH CARE EDUCATION/TRAINING PROGRAM

## 2023-07-26 PROCEDURE — 88264 CHROMOSOME ANALYSIS 20-25: CPT

## 2023-07-26 PROCEDURE — 88185 FLOWCYTOMETRY/TC ADD-ON: CPT

## 2023-07-26 PROCEDURE — 38222 DX BONE MARROW BX & ASPIR: CPT

## 2023-07-26 PROCEDURE — 38222 DX BONE MARROW BX & ASPIR: CPT | Performed by: RADIOLOGY

## 2023-07-26 PROCEDURE — 99152 MOD SED SAME PHYS/QHP 5/>YRS: CPT

## 2023-07-26 PROCEDURE — 07DR3ZX EXTRACTION OF ILIAC BONE MARROW, PERCUTANEOUS APPROACH, DIAGNOSTIC: ICD-10-PCS | Performed by: RADIOLOGY

## 2023-07-26 PROCEDURE — 77002 NEEDLE LOCALIZATION BY XRAY: CPT

## 2023-07-26 PROCEDURE — 88365 INSITU HYBRIDIZATION (FISH): CPT | Performed by: STUDENT IN AN ORGANIZED HEALTH CARE EDUCATION/TRAINING PROGRAM

## 2023-07-26 PROCEDURE — 88341 IMHCHEM/IMCYTCHM EA ADD ANTB: CPT | Performed by: STUDENT IN AN ORGANIZED HEALTH CARE EDUCATION/TRAINING PROGRAM

## 2023-07-26 PROCEDURE — 99232 SBSQ HOSP IP/OBS MODERATE 35: CPT | Performed by: NURSE PRACTITIONER

## 2023-07-26 PROCEDURE — 88311 DECALCIFY TISSUE: CPT | Performed by: STUDENT IN AN ORGANIZED HEALTH CARE EDUCATION/TRAINING PROGRAM

## 2023-07-26 RX ORDER — MIDAZOLAM HYDROCHLORIDE 2 MG/2ML
INJECTION, SOLUTION INTRAMUSCULAR; INTRAVENOUS AS NEEDED
Status: COMPLETED | OUTPATIENT
Start: 2023-07-26 | End: 2023-07-26

## 2023-07-26 RX ORDER — FENTANYL CITRATE 50 UG/ML
INJECTION, SOLUTION INTRAMUSCULAR; INTRAVENOUS AS NEEDED
Status: COMPLETED | OUTPATIENT
Start: 2023-07-26 | End: 2023-07-26

## 2023-07-26 RX ORDER — LIDOCAINE HYDROCHLORIDE 10 MG/ML
INJECTION, SOLUTION EPIDURAL; INFILTRATION; INTRACAUDAL; PERINEURAL AS NEEDED
Status: COMPLETED | OUTPATIENT
Start: 2023-07-26 | End: 2023-07-26

## 2023-07-26 RX ADMIN — MIDAZOLAM 0.5 MG: 1 INJECTION INTRAMUSCULAR; INTRAVENOUS at 09:12

## 2023-07-26 RX ADMIN — PREDNISONE 10 MG: 10 TABLET ORAL at 11:14

## 2023-07-26 RX ADMIN — LIDOCAINE HYDROCHLORIDE 10 ML: 10 INJECTION, SOLUTION EPIDURAL; INFILTRATION; INTRACAUDAL; PERINEURAL at 09:11

## 2023-07-26 RX ADMIN — LEVOTHYROXINE SODIUM 150 MCG: 75 TABLET ORAL at 04:36

## 2023-07-26 RX ADMIN — MONTELUKAST 10 MG: 10 TABLET, FILM COATED ORAL at 22:26

## 2023-07-26 RX ADMIN — MIDAZOLAM 1 MG: 1 INJECTION INTRAMUSCULAR; INTRAVENOUS at 09:02

## 2023-07-26 RX ADMIN — DICYCLOMINE HYDROCHLORIDE 20 MG: 20 TABLET ORAL at 11:14

## 2023-07-26 RX ADMIN — ACETAMINOPHEN 650 MG: 325 TABLET, FILM COATED ORAL at 20:06

## 2023-07-26 RX ADMIN — DICYCLOMINE HYDROCHLORIDE 20 MG: 20 TABLET ORAL at 17:33

## 2023-07-26 RX ADMIN — FENTANYL CITRATE 50 MCG: 50 INJECTION, SOLUTION INTRAMUSCULAR; INTRAVENOUS at 09:03

## 2023-07-26 RX ADMIN — FENTANYL CITRATE 25 MCG: 50 INJECTION, SOLUTION INTRAMUSCULAR; INTRAVENOUS at 09:12

## 2023-07-26 RX ADMIN — MIDAZOLAM 1 MG: 1 INJECTION INTRAMUSCULAR; INTRAVENOUS at 08:57

## 2023-07-26 RX ADMIN — FENTANYL CITRATE 50 MCG: 50 INJECTION, SOLUTION INTRAMUSCULAR; INTRAVENOUS at 08:57

## 2023-07-26 NOTE — BRIEF OP NOTE (RAD/CATH)
INTERVENTIONAL RADIOLOGY PROCEDURE NOTE    Date: 7/26/2023    Procedure: IR BIOPSY BONE MARROW     Preoperative diagnosis:   1. Thyroid cancer ([address])    2. Lymphoma of thyroid gland ([address])         Postoperative diagnosis: Same. Surgeon: Levi Smith MD     Assistant: None. No qualified resident was available. Blood loss: minimal    Specimens: bone marrow    Findings: Fluoroscopic guided right iliac bone marrow aspiration and core biopsy. Complications: None immediate.     Anesthesia: conscious sedation and local

## 2023-07-26 NOTE — PROGRESS NOTES
[facility]  Progress Note  Name: Caron Lao  MRN: [id number]  Unit/Bed#: [facility] 913-01 I Date of Admission: 7/22/2023   Date of Service: 7/26/2023 I Hospital Day: 4    Assessment/Plan   * Lymphoma of thyroid gland [facility])  Assessment & Plan  History of asthma recently found to have thyroid enlargement thought to have subacute thyroiditis and started on prednisone/levothyroxine eventually biopsy found to have thyroid lymphoma, awaiting final biopsy result. · Seen by endocrinology, weaning off prednisone. Continuing levothyroxine. · Discussed with oncology, pending final pathology report. Recommending bone marrow biopsy, done in IR 7/26. · Discussion regarding treatment following biopsy results. Possible inpatient chemotherapy. Awaiting further recommendations. · Echo done in preparation for chemotherapy, unremarkable. Subacute thyroiditis  Assessment & Plan  Working diagnosis before found to have lymphoma with subacute thyroiditis on levothyroxine and prednisone  · Continue levothyroxine 125 mcg daily  · On tapering dose of prednisone     Mild intermittent asthma without complication  Assessment & Plan  · No exacerbation on albuterol. Continue montelukast    Elevated blood pressure reading  Assessment & Plan  Systolic borderline, diastolic elevated. · Will hold off on further work up or rx for now  · Will monitor closely    Dysphagia  Assessment & Plan  · Secondary to thyromegaly  · Tolerating regular diet    Hypothyroidism  Assessment & Plan  · Continue levothyroxine  · TSH within normal limits    Morbid obesity ([address])  Assessment & Plan  · Body mass index is 48.01 kg/m². · TLC         VTE Pharmacologic Prophylaxis:   Low Risk (Score 0-2) - Encourage Ambulation. Patient Centered Rounds: I performed bedside rounds with nursing staff today.   Discussions with Specialists or Other Care Team Provider: nursing, case management     Education and Discussions with Family / Patient: Updated  (significant other) at bedside. Total Time Spent on Date of Encounter in care of patient: 35 minutes This time was spent on one or more of the following: performing physical exam; counseling and coordination of care; obtaining or reviewing history; documenting in the medical record; reviewing/ordering tests, medications or procedures; communicating with other healthcare professionals and discussing with patient's family/caregivers. Current Length of Stay: 4 day(s)  Current Patient Status: Inpatient   Certification Statement: The patient will continue to require additional inpatient hospital stay due to awaiting biopsy results, awaiting further med onc recommendations, possible inpatient chemo  Discharge Plan: Anticipate discharge in >72 hrs to home. Code Status: Level 1 - Full Code    Subjective:   Patient somewhat drowsy, just got back from IR for bone marrow biopsy. Has no complaints. Denies pain. Objective:     Vitals:   Temp (24hrs), Av °F (36.7 °C), Min:97.5 °F (36.4 °C), Max:98.2 °F (36.8 °C)    Temp:  [97.5 °F (36.4 °C)-98.2 °F (36.8 °C)] 97.5 °F (36.4 °C)  HR:  [] 98  Resp:  [16-18] 16  BP: (118-166)/() 148/95  SpO2:  [93 %-98 %] 96 %  Body mass index is 48.01 kg/m². Input and Output Summary (last 24 hours): Intake/Output Summary (Last 24 hours) at 2023 1020  Last data filed at 2023 0553  Gross per 24 hour   Intake 480 ml   Output --   Net 480 ml       Physical Exam:   Physical Exam  Vitals and nursing note reviewed. Constitutional:       General: She is not in acute distress. Appearance: She is obese. Cardiovascular:      Rate and Rhythm: Normal rate. Pulmonary:      Effort: No respiratory distress. Abdominal:      Tenderness: There is no abdominal tenderness. Musculoskeletal:         General: No swelling. Skin:     General: Skin is warm.    Neurological:      Mental Status: She is alert and oriented to person, place, and time. Mental status is at baseline. Psychiatric:         Mood and Affect: Mood normal.          Additional Data:     Labs:  Results from last 7 days   Lab Units 07/25/23  0601   WBC Thousand/uL 12.02*   HEMOGLOBIN g/dL 12.0   HEMATOCRIT % 38.4   PLATELETS Thousands/uL 375   NEUTROS PCT % 63   LYMPHS PCT % 25   MONOS PCT % 7   EOS PCT % 3     Results from last 7 days   Lab Units 07/25/23  0601 07/24/23  0454   SODIUM mmol/L 139 141   POTASSIUM mmol/L 3.7 4.0   CHLORIDE mmol/L 109* 111*   CO2 mmol/L 24 24   BUN mg/dL 17 15   CREATININE mg/dL 0.69 0.69   ANION GAP mmol/L 6 6   CALCIUM mg/dL 8.8 9.0   ALBUMIN g/dL  --  3.1*   TOTAL BILIRUBIN mg/dL  --  0.22   ALK PHOS U/L  --  74   ALT U/L  --  15   AST U/L  --  8   GLUCOSE RANDOM mg/dL 92 96     Results from last 7 days   Lab Units 07/25/23  0601   INR  0.92                   Lines/Drains:  Invasive Devices     Peripheral Intravenous Line  Duration           Peripheral IV 07/22/23 Left Antecubital 3 days                      Imaging: Reviewed radiology reports from this admission including: procedure reports    Recent Cultures (last 7 days):         Last 24 Hours Medication List:   Current Facility-Administered Medications   Medication Dose Route Frequency Provider Last Rate   • acetaminophen  650 mg Oral Q6H PRN Perry Lucas MD     • albuterol  2 puff Inhalation Q6H PRN Sarita Corbin MD     • aluminum-magnesium hydroxide-simethicone  30 mL Oral Q6H PRN Sarita Corbin MD     • dicyclomine  20 mg Oral BID Sarita Corbin MD     • levothyroxine  150 mcg Oral Early Morning Sarita Corbin MD     • montelukast  10 mg Oral HS Sarita Corbin MD     • ondansetron  4 mg Intravenous Q6H PRN Sarita Corbin MD     • predniSONE  10 mg Oral Daily Deepa Salvador DO          Today, Patient Was Seen By: TATI Mcclellan    **Please Note: This note may have been constructed using a voice recognition system. **

## 2023-07-26 NOTE — SEDATION DOCUMENTATION
Bone marrow biopsy performed by Dr Toño Hebert without complication. Patient tolerated procedure well. Bandaid to right lower back. Specimens sent. Report called to primary nurse. IR Procedure Bedrest Start Time is 9688.

## 2023-07-26 NOTE — ASSESSMENT & PLAN NOTE
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 · Seen by endocrinology, weaning off prednisone. Continuing levothyroxine. · Discussed with oncology, pending final pathology report. Recommending bone marrow biopsy, done in IR 7/26. · Discussion regarding treatment following biopsy results. Possible inpatient chemotherapy. Awaiting further recommendations. · Echo done in preparation for chemotherapy, unremarkable.

## 2023-07-26 NOTE — TELEPHONE ENCOUNTER
Per Alvin Hancock RN, the patient and her family had questions regarding financial and social resources. I emailed [employer] (group) and [employer] (group) regarding this. Additionally, I went to the patient's room and introduced myself and explained my role as the inpatient cancer care coordinator and was able to provide the [address] [place] [address] pamphlet to the patient, which includes social resources. I did indicate to the patient and her family that I had reached out to [employer] and [employer]. They were appreciative of the information.

## 2023-07-26 NOTE — ASSESSMENT & PLAN NOTE
Systolic borderline, diastolic elevated.    · Will hold off on further work up or rx for now  · Will monitor closely

## 2023-07-26 NOTE — CASE MANAGEMENT
Case Management Discharge Planning Note    Patient name Cr Macias  Location [facility] 913/[facility] 630-33 MRN [id number]  : 2000 Date 2023       Current Admission Date: 2023  Current Admission Diagnosis:Lymphoma of thyroid gland [facility])   Patient Active Problem List    Diagnosis Date Noted   • Elevated blood pressure reading 2023   • Hypothyroidism 2023   • Lymphoma of thyroid gland ([address]) 2023   • Subacute thyroiditis 2023   • Dysphagia 2023   • Morbid obesity ([address]) 2023   • Enlarged thyroid 2023   • Mild intermittent asthma without complication    • Seasonal allergic rhinitis due to pollen 2017   • Shrimp allergy 2017      LOS (days): 4  Geometric Mean LOS (GMLOS) (days):   Days to GMLOS:     OBJECTIVE:  Risk of Unplanned Readmission Score: 10.3         Current admission status: Inpatient   Preferred Pharmacy:   [facility], [address] [address]  Phone: [phone] Fax: [phone]    Primary Care Provider: Mayelin Givens DO    Primary Insurance: [address] [place] [address]  Secondary Insurance:     DISCHARGE DETAILS:    Met with patient, questions regarding disability  States she works part time. Advised to call HR at employer to identify if she has disability coverage. If she does, her employer will provide her paperwork to have completed which her MD would complete the medical portion for her. Advised she can reach out to the social security office to identify next steps for applying for disability for herself.     She states she understood and will relay to her mom

## 2023-07-26 NOTE — PLAN OF CARE
Problem: PAIN - ADULT  Goal: Verbalizes/displays adequate comfort level or baseline comfort level  Description: Interventions:  - Encourage patient to monitor pain and request assistance  - Assess pain using appropriate pain scale  - Administer analgesics based on type and severity of pain and evaluate response  - Implement non-pharmacological measures as appropriate and evaluate response  - Consider cultural and social influences on pain and pain management  - Notify physician/advanced practitioner if interventions unsuccessful or patient reports new pain  Outcome: Progressing     Problem: INFECTION - ADULT  Goal: Absence or prevention of progression during hospitalization  Description: INTERVENTIONS:  - Assess and monitor for signs and symptoms of infection  - Monitor lab/diagnostic results  - Monitor all insertion sites, i.e. indwelling lines, tubes, and drains  - Monitor endotracheal if appropriate and nasal secretions for changes in amount and color  - [place] appropriate cooling/warming therapies per order  - Administer medications as ordered  - Instruct and encourage patient and family to use good hand hygiene technique  - Identify and instruct in appropriate isolation precautions for identified infection/condition  Outcome: Progressing  Goal: Absence of fever/infection during neutropenic period  Description: INTERVENTIONS:  - Monitor WBC    Outcome: Progressing     Problem: SAFETY ADULT  Goal: Patient will remain free of falls  Description: INTERVENTIONS:  - Educate patient/family on patient safety including physical limitations  - Instruct patient to call for assistance with activity   - Keep Call bell within reach  - Keep bed low and locked with side rails adjusted as appropriate  - Keep care items and personal belongings within reach  - Initiate and maintain comfort rounds  Outcome: Progressing  Goal: Maintain or return to baseline ADL function  Description: INTERVENTIONS:  -  Assess patient's ability to carry out ADLs; assess patient's baseline for ADL function and identify physical deficits which impact ability to perform ADLs (bathing, care of mouth/teeth, toileting, grooming, dressing, etc.)  - Assess/evaluate cause of self-care deficits   - Assess range of motion  - Assess patient's mobility; develop plan if impaired  - Assess patient's need for assistive devices and provide as appropriate  - Encourage maximum independence but intervene and supervise when necessary  - Involve family in performance of ADLs  - Assess for home care needs following discharge   - Consider OT consult to assist with ADL evaluation and planning for discharge  - Provide patient education as appropriate  Outcome: Progressing  Goal: Maintains/Returns to pre admission functional level  Description: INTERVENTIONS:  - Perform BMAT or MOVE assessment daily.   - Set and communicate daily mobility goal to care team and patient/family/caregiver.    - Collaborate with rehabilitation services on mobility goals if consulted  Outcome: Progressing     Problem: DISCHARGE PLANNING  Goal: Discharge to home or other facility with appropriate resources  Description: INTERVENTIONS:  - Identify barriers to discharge w/patient and caregiver  - Arrange for needed discharge resources and transportation as appropriate  - Identify discharge learning needs (meds, wound care, etc.)  - Arrange for interpretive services to assist at discharge as needed  - Refer to Case Management Department for coordinating discharge planning if the patient needs post-hospital services based on physician/advanced practitioner order or complex needs related to functional status, cognitive ability, or social support system  Outcome: Progressing     Problem: Knowledge Deficit  Goal: Patient/family/caregiver demonstrates understanding of disease process, treatment plan, medications, and discharge instructions  Description: Complete learning assessment and assess knowledge base.  Interventions:  - Provide teaching at level of understanding  - Provide teaching via preferred learning methods  Outcome: Progressing

## 2023-07-26 NOTE — DISCHARGE INSTRUCTIONS
Bone Marrow Biopsy     WHAT YOU NEED TO KNOW:   A bone marrow biopsy is a procedure to remove a small amount of bone marrow from your bone. Bone marrow is the soft tissue inside your bone that helps to make blood cells. The sample is tested for disease or infection. DISCHARGE INSTRUCTIONS:     1. Limit your activities day of biopsy as directed by your doctor. 2. Use medication as ordered. 3. Return to your normal diet. Small sips of flat soda will help with nausea. 4. Remove band-aid or dressing 24 hours after procedure. Contact Interventional Radiology at [phone] if:    1. Difficulty breathing, nausea or vomiting. 2. Chills or fever above 101 F.    3. Pain at biopsy site not relieved by medication. 4. Develop any redness, swelling, heat, unusual drainage, heavy bruising or bleeding from biopsy site.

## 2023-07-27 ENCOUNTER — PATIENT OUTREACH (OUTPATIENT)
Dept: CASE MANAGEMENT | Facility: HOSPITAL | Age: 23
End: 2023-07-27

## 2023-07-27 PROCEDURE — 99233 SBSQ HOSP IP/OBS HIGH 50: CPT | Performed by: INTERNAL MEDICINE

## 2023-07-27 RX ADMIN — MONTELUKAST 10 MG: 10 TABLET, FILM COATED ORAL at 21:59

## 2023-07-27 RX ADMIN — LEVOTHYROXINE SODIUM 150 MCG: 75 TABLET ORAL at 06:19

## 2023-07-27 RX ADMIN — PREDNISONE 10 MG: 10 TABLET ORAL at 08:38

## 2023-07-27 RX ADMIN — DICYCLOMINE HYDROCHLORIDE 20 MG: 20 TABLET ORAL at 17:33

## 2023-07-27 RX ADMIN — DICYCLOMINE HYDROCHLORIDE 20 MG: 20 TABLET ORAL at 08:38

## 2023-07-27 RX ADMIN — ACETAMINOPHEN 650 MG: 325 TABLET, FILM COATED ORAL at 13:34

## 2023-07-27 NOTE — PROGRESS NOTES
MSW r/o to pt by phone this afternoon, her mother answered and explained that pt was with a nurse right now and could not talk. I introduced myself and shared that I was calling to answer some possible questions about disability for pt. Her mother says that pt was working prior to being hospitalized and I suggested that they start with her employer to see if she has any STD benefits with them. I explained that she may not even have the option, but if she does that is where she needs to start. If she does not, I advised them to go on [email or web address] and start the application process for SSD benefits. I did caution her that the application will take some time to get completed and processed, and there will almost certainly be a 5 month wait period before benefits would start paying out if she is approved. She says that pt was living at home with family and they will continue to support her financially if necessary. She thanked me for the information and the time spent talking. I will remain available to assist and support them as a family moving forward as needed. No other needs at this time.

## 2023-07-27 NOTE — PLAN OF CARE
Problem: PAIN - ADULT  Goal: Verbalizes/displays adequate comfort level or baseline comfort level  Description: Interventions:  - Encourage patient to monitor pain and request assistance  - Assess pain using appropriate pain scale  - Administer analgesics based on type and severity of pain and evaluate response  - Implement non-pharmacological measures as appropriate and evaluate response  - Consider cultural and social influences on pain and pain management  - Notify physician/advanced practitioner if interventions unsuccessful or patient reports new pain  Outcome: Progressing     Problem: INFECTION - ADULT  Goal: Absence or prevention of progression during hospitalization  Description: INTERVENTIONS:  - Assess and monitor for signs and symptoms of infection  - Monitor lab/diagnostic results  - Monitor all insertion sites, i.e. indwelling lines, tubes, and drains  - Monitor endotracheal if appropriate and nasal secretions for changes in amount and color  - [place] appropriate cooling/warming therapies per order  - Administer medications as ordered  - Instruct and encourage patient and family to use good hand hygiene technique  - Identify and instruct in appropriate isolation precautions for identified infection/condition  Outcome: Progressing  Goal: Absence of fever/infection during neutropenic period  Description: INTERVENTIONS:  - Monitor WBC    Outcome: Progressing     Problem: SAFETY ADULT  Goal: Patient will remain free of falls  Description: INTERVENTIONS:  - Educate patient/family on patient safety including physical limitations  - Instruct patient to call for assistance with activity   - Keep Call bell within reach  - Keep bed low and locked with side rails adjusted as appropriate  - Keep care items and personal belongings within reach  - Initiate and maintain comfort rounds  Outcome: Progressing  Goal: Maintain or return to baseline ADL function  Description: INTERVENTIONS:  -  Assess patient's ability to carry out ADLs; assess patient's baseline for ADL function and identify physical deficits which impact ability to perform ADLs (bathing, care of mouth/teeth, toileting, grooming, dressing, etc.)  - Assess/evaluate cause of self-care deficits   - Assess range of motion  - Assess patient's mobility; develop plan if impaired  - Assess patient's need for assistive devices and provide as appropriate  - Encourage maximum independence but intervene and supervise when necessary  - Involve family in performance of ADLs  - Assess for home care needs following discharge   - Consider OT consult to assist with ADL evaluation and planning for discharge  - Provide patient education as appropriate  Outcome: Progressing  Goal: Maintains/Returns to pre admission functional level  Description: INTERVENTIONS:  - Perform BMAT or MOVE assessment daily.   - Set and communicate daily mobility goal to care team and patient/family/caregiver.    - Collaborate with rehabilitation services on mobility goals if consulted  Outcome: Progressing     Problem: DISCHARGE PLANNING  Goal: Discharge to home or other facility with appropriate resources  Description: INTERVENTIONS:  - Identify barriers to discharge w/patient and caregiver  - Arrange for needed discharge resources and transportation as appropriate  - Identify discharge learning needs (meds, wound care, etc.)  - Arrange for interpretive services to assist at discharge as needed  - Refer to Case Management Department for coordinating discharge planning if the patient needs post-hospital services based on physician/advanced practitioner order or complex needs related to functional status, cognitive ability, or social support system  Outcome: Progressing     Problem: Knowledge Deficit  Goal: Patient/family/caregiver demonstrates understanding of disease process, treatment plan, medications, and discharge instructions  Description: Complete learning assessment and assess knowledge base.  Interventions:  - Provide teaching at level of understanding  - Provide teaching via preferred learning methods  Outcome: Progressing

## 2023-07-27 NOTE — PROGRESS NOTES
[address] [facility]  Progress Note  Name: Jose Melendez  MRN: [id number]  Unit/Bed#: [facility] 913-01 I Date of Admission: 7/22/2023   Date of Service: 7/27/2023 I Hospital Day: 5    Assessment/Plan   * Lymphoma of thyroid gland [facility])  Assessment & Plan  History of asthma recently found to have thyroid enlargement thought to have subacute thyroiditis and started on prednisone/levothyroxine eventually biopsy found to have thyroid lymphoma, awaiting final biopsy result. · Seen by endocrinology, weaning off prednisone. Continuing levothyroxine. · Discussed with oncology, pending final pathology report. Recommending bone marrow biopsy, done in IR 7/26. · Discussion regarding treatment following biopsy results. Possible inpatient chemotherapy. Awaiting further recommendations. · Echo done in preparation for chemotherapy, unremarkable. Elevated blood pressure reading  Assessment & Plan  Systolic borderline, diastolic elevated. · Will hold off on further work up or rx for now  · Will monitor closely    Hypothyroidism  Assessment & Plan  · Continue levothyroxine  · TSH within normal limits    Morbid obesity ([address])  Assessment & Plan  · Body mass index is 48.01 kg/m². · TLC    Dysphagia  Assessment & Plan  · Secondary to thyromegaly  · Tolerating regular diet    Subacute thyroiditis  Assessment & Plan  Working diagnosis before found to have lymphoma with subacute thyroiditis on levothyroxine and prednisone  · Continue levothyroxine 125 mcg daily  · On tapering dose of prednisone     Mild intermittent asthma without complication  Assessment & Plan  · No exacerbation on albuterol. Continue montelukast             VTE Pharmacologic Prophylaxis:   Pharmacologic: Ambulatory  Mechanical VTE Prophylaxis in Place: No    Patient Centered Rounds: I have performed bedside rounds with nursing staff today. Time Spent for Care: 54.   More than 50% of total time spent on counseling and coordination of care as described above. Current Length of Stay: 5 day(s)    Current Patient Status: Inpatient   Certification Statement: The patient will continue to require additional inpatient hospital stay due to Needs chemotherapy for new diagnosis of lymphoma    Discharge Plan: Pending response to chemotherapy    Code Status: Level 1 - Full Code      Subjective:   Patient comfortable    Objective:     Vitals:   Temp (24hrs), Av.9 °F (36.6 °C), Min:97.8 °F (36.6 °C), Max:97.9 °F (36.6 °C)    Temp:  [97.8 °F (36.6 °C)-97.9 °F (36.6 °C)] 97.9 °F (36.6 °C)  HR:  [101-123] 101  Resp:  [16-20] 18  BP: (121-157)/() 143/92  SpO2:  [95 %-97 %] 96 %  Body mass index is 48.01 kg/m². Input and Output Summary (last 24 hours): Intake/Output Summary (Last 24 hours) at 2023 0950  Last data filed at 2023 1900  Gross per 24 hour   Intake 480 ml   Output --   Net 480 ml       Physical Exam:     Physical Exam  HENT:      Head: Normocephalic and atraumatic. Mouth/Throat:      Mouth: Mucous membranes are moist.   Cardiovascular:      Rate and Rhythm: Normal rate and regular rhythm. Musculoskeletal:         General: Normal range of motion. Neurological:      General: No focal deficit present. Mental Status: She is oriented to person, place, and time.    Psychiatric:         Mood and Affect: Mood normal.         Additional Data:     Labs:    Results from last 7 days   Lab Units 23  0601   WBC Thousand/uL 12.02*   HEMOGLOBIN g/dL 12.0   HEMATOCRIT % 38.4   PLATELETS Thousands/uL 375   NEUTROS PCT % 63   LYMPHS PCT % 25   MONOS PCT % 7   EOS PCT % 3     Results from last 7 days   Lab Units 23  0601 23  0454   POTASSIUM mmol/L 3.7 4.0   CHLORIDE mmol/L 109* 111*   CO2 mmol/L 24 24   BUN mg/dL 17 15   CREATININE mg/dL 0.69 0.69   CALCIUM mg/dL 8.8 9.0   ALK PHOS U/L  --  74   ALT U/L  --  15   AST U/L  --  8     Results from last 7 days   Lab Units 23  0601   INR  0.92 * I Have Reviewed All Lab Data Listed Above. * Additional Pertinent Lab Tests Reviewed: All Labs Within Last 24 Hours Reviewed      Recent Cultures (last 7 days):           Last 24 Hours Medication List:   Current Facility-Administered Medications   Medication Dose Route Frequency Provider Last Rate   • acetaminophen  650 mg Oral Q6H PRN Perry Lucas MD     • albuterol  2 puff Inhalation Q6H PRN Melinda Nj MD     • aluminum-magnesium hydroxide-simethicone  30 mL Oral Q6H PRN Melinda Nj MD     • dicyclomine  20 mg Oral BID Melinda Nj MD     • levothyroxine  150 mcg Oral Early Morning Melinda Nj MD     • montelukast  10 mg Oral HS Melinda Nj MD     • ondansetron  4 mg Intravenous Q6H PRN Melinda Nj MD     • predniSONE  10 mg Oral Daily Deepa Salvador DO          Today, Patient Was Seen By: Ananda Neri DO    ** Please Note: Dictation voice to text software may have been used in the creation of this document.  **

## 2023-07-27 NOTE — PLAN OF CARE
Problem: PAIN - ADULT  Goal: Verbalizes/displays adequate comfort level or baseline comfort level  Description: Interventions:  - Encourage patient to monitor pain and request assistance  - Assess pain using appropriate pain scale  - Administer analgesics based on type and severity of pain and evaluate response  - Implement non-pharmacological measures as appropriate and evaluate response  - Consider cultural and social influences on pain and pain management  - Notify physician/advanced practitioner if interventions unsuccessful or patient reports new pain  Outcome: Progressing     Problem: INFECTION - ADULT  Goal: Absence or prevention of progression during hospitalization  Description: INTERVENTIONS:  - Assess and monitor for signs and symptoms of infection  - Monitor lab/diagnostic results  - Monitor all insertion sites, i.e. indwelling lines, tubes, and drains  - Monitor endotracheal if appropriate and nasal secretions for changes in amount and color  - [place] appropriate cooling/warming therapies per order  - Administer medications as ordered  - Instruct and encourage patient and family to use good hand hygiene technique  - Identify and instruct in appropriate isolation precautions for identified infection/condition  Outcome: Progressing  Goal: Absence of fever/infection during neutropenic period  Description: INTERVENTIONS:  - Monitor WBC    Outcome: Progressing     Problem: SAFETY ADULT  Goal: Patient will remain free of falls  Description: INTERVENTIONS:  - Educate patient/family on patient safety including physical limitations  - Instruct patient to call for assistance with activity   - Keep Call bell within reach  - Keep bed low and locked with side rails adjusted as appropriate  - Keep care items and personal belongings within reach  - Initiate and maintain comfort rounds  Outcome: Progressing  Goal: Maintain or return to baseline ADL function  Description: INTERVENTIONS:  -  Assess patient's ability to carry out ADLs; assess patient's baseline for ADL function and identify physical deficits which impact ability to perform ADLs (bathing, care of mouth/teeth, toileting, grooming, dressing, etc.)  - Assess/evaluate cause of self-care deficits   - Assess range of motion  - Assess patient's mobility; develop plan if impaired  - Assess patient's need for assistive devices and provide as appropriate  - Encourage maximum independence but intervene and supervise when necessary  - Involve family in performance of ADLs  - Assess for home care needs following discharge   - Consider OT consult to assist with ADL evaluation and planning for discharge  - Provide patient education as appropriate  Outcome: Progressing  Goal: Maintains/Returns to pre admission functional level  Description: INTERVENTIONS:  - Perform BMAT or MOVE assessment daily.   - Set and communicate daily mobility goal to care team and patient/family/caregiver.    - Collaborate with rehabilitation services on mobility goals if consulted  Outcome: Progressing     Problem: DISCHARGE PLANNING  Goal: Discharge to home or other facility with appropriate resources  Description: INTERVENTIONS:  - Identify barriers to discharge w/patient and caregiver  - Arrange for needed discharge resources and transportation as appropriate  - Identify discharge learning needs (meds, wound care, etc.)  - Arrange for interpretive services to assist at discharge as needed  - Refer to Case Management Department for coordinating discharge planning if the patient needs post-hospital services based on physician/advanced practitioner order or complex needs related to functional status, cognitive ability, or social support system  Outcome: Progressing     Problem: Knowledge Deficit  Goal: Patient/family/caregiver demonstrates understanding of disease process, treatment plan, medications, and discharge instructions  Description: Complete learning assessment and assess knowledge base.  Interventions:  - Provide teaching at level of understanding  - Provide teaching via preferred learning methods  Outcome: Progressing

## 2023-07-28 LAB — MISCELLANEOUS LAB TEST RESULT: NORMAL

## 2023-07-28 PROCEDURE — 88364 INSITU HYBRIDIZATION (FISH): CPT | Performed by: STUDENT IN AN ORGANIZED HEALTH CARE EDUCATION/TRAINING PROGRAM

## 2023-07-28 PROCEDURE — 88311 DECALCIFY TISSUE: CPT | Performed by: STUDENT IN AN ORGANIZED HEALTH CARE EDUCATION/TRAINING PROGRAM

## 2023-07-28 PROCEDURE — 88342 IMHCHEM/IMCYTCHM 1ST ANTB: CPT | Performed by: STUDENT IN AN ORGANIZED HEALTH CARE EDUCATION/TRAINING PROGRAM

## 2023-07-28 PROCEDURE — 88305 TISSUE EXAM BY PATHOLOGIST: CPT | Performed by: STUDENT IN AN ORGANIZED HEALTH CARE EDUCATION/TRAINING PROGRAM

## 2023-07-28 PROCEDURE — 99233 SBSQ HOSP IP/OBS HIGH 50: CPT | Performed by: INTERNAL MEDICINE

## 2023-07-28 PROCEDURE — 88365 INSITU HYBRIDIZATION (FISH): CPT | Performed by: STUDENT IN AN ORGANIZED HEALTH CARE EDUCATION/TRAINING PROGRAM

## 2023-07-28 PROCEDURE — 85097 BONE MARROW INTERPRETATION: CPT | Performed by: STUDENT IN AN ORGANIZED HEALTH CARE EDUCATION/TRAINING PROGRAM

## 2023-07-28 PROCEDURE — 88313 SPECIAL STAINS GROUP 2: CPT | Performed by: STUDENT IN AN ORGANIZED HEALTH CARE EDUCATION/TRAINING PROGRAM

## 2023-07-28 PROCEDURE — 88341 IMHCHEM/IMCYTCHM EA ADD ANTB: CPT | Performed by: STUDENT IN AN ORGANIZED HEALTH CARE EDUCATION/TRAINING PROGRAM

## 2023-07-28 RX ORDER — LORAZEPAM 0.5 MG/1
0.5 TABLET ORAL EVERY 8 HOURS PRN
Status: DISCONTINUED | OUTPATIENT
Start: 2023-07-28 | End: 2023-08-05

## 2023-07-28 RX ORDER — AMLODIPINE BESYLATE 2.5 MG/1
2.5 TABLET ORAL DAILY
Status: DISCONTINUED | OUTPATIENT
Start: 2023-07-28 | End: 2023-08-06

## 2023-07-28 RX ADMIN — DICYCLOMINE HYDROCHLORIDE 20 MG: 20 TABLET ORAL at 17:52

## 2023-07-28 RX ADMIN — AMLODIPINE BESYLATE 2.5 MG: 2.5 TABLET ORAL at 17:52

## 2023-07-28 RX ADMIN — LEVOTHYROXINE SODIUM 150 MCG: 75 TABLET ORAL at 05:34

## 2023-07-28 RX ADMIN — PREDNISONE 10 MG: 10 TABLET ORAL at 08:32

## 2023-07-28 RX ADMIN — DICYCLOMINE HYDROCHLORIDE 20 MG: 20 TABLET ORAL at 08:32

## 2023-07-28 RX ADMIN — MONTELUKAST 10 MG: 10 TABLET, FILM COATED ORAL at 22:15

## 2023-07-28 NOTE — PROGRESS NOTES
[address] [facility]  Progress Note  Name: Kami Elise  MRN: [id number]  Unit/Bed#: [facility] 913-01 I Date of Admission: 2023   Date of Service: 2023 I Hospital Day: 6    Assessment/Plan   * Lymphoma of thyroid gland [facility])  Assessment & Plan  History of asthma recently found to have thyroid enlargement thought to have subacute thyroiditis and started on prednisone/levothyroxine eventually biopsy found to have thyroid lymphoma, awaiting final biopsy result. · Seen by endocrinology, weaning off prednisone. Continuing levothyroxine. · Discussed with oncology, pending final pathology report. Recommending bone marrow biopsy, done in IR . · Discussion regarding treatment following biopsy results. Possible inpatient chemotherapy. Awaiting further recommendations. · Echo done in preparation for chemotherapy, unremarkable. Elevated blood pressure reading  Assessment & Plan  Systolic borderline, diastolic elevated. · Start norvasc    Hypothyroidism  Assessment & Plan  · Continue levothyroxine  · TSH within normal limits    Subacute thyroiditis  Assessment & Plan  Working diagnosis before found to have lymphoma with subacute thyroiditis on levothyroxine and prednisone  · Continue levothyroxine 125 mcg daily  · On tapering dose of prednisone     Mild intermittent asthma without complication  Assessment & Plan  · No exacerbation on albuterol. Continue montelukast                 Patient Centered Rounds: I have performed bedside rounds with nursing staff today. Time Spent for Care: 54. More than 50% of total time spent on counseling and coordination of care as described above.     Current Length of Stay: 6 day(s)    Current Patient Status: Inpatient     Code Status: Level 1 - Full Code      Subjective:   nad    Objective:     Vitals:   Temp (24hrs), Av.8 °F (37.1 °C), Min:97.9 °F (36.6 °C), Max:99.2 °F (37.3 °C)    Temp:  [97.9 °F (36.6 °C)-99.2 °F (37.3 °C)] 99.2 °F (37.3 °C)  HR:  [] 115  Resp:  [14-19] 18  BP: (145-158)/(100-106) 145/100  SpO2:  [94 %-97 %] 94 %  Body mass index is 48.01 kg/m². Input and Output Summary (last 24 hours):     No intake or output data in the 24 hours ending 07/28/23 1726    Physical Exam:     Physical Exam    Additional Data:     Labs:    Results from last 7 days   Lab Units 07/25/23  0601   WBC Thousand/uL 12.02*   HEMOGLOBIN g/dL 12.0   HEMATOCRIT % 38.4   PLATELETS Thousands/uL 375   NEUTROS PCT % 63   LYMPHS PCT % 25   MONOS PCT % 7   EOS PCT % 3     Results from last 7 days   Lab Units 07/25/23  0601 07/24/23  0454   POTASSIUM mmol/L 3.7 4.0   CHLORIDE mmol/L 109* 111*   CO2 mmol/L 24 24   BUN mg/dL 17 15   CREATININE mg/dL 0.69 0.69   CALCIUM mg/dL 8.8 9.0   ALK PHOS U/L  --  74   ALT U/L  --  15   AST U/L  --  8     Results from last 7 days   Lab Units 07/25/23  0601   INR  0.92           Recent Cultures (last 7 days):           Last 24 Hours Medication List:   Current Facility-Administered Medications   Medication Dose Route Frequency Provider Last Rate   • acetaminophen  650 mg Oral Q6H PRN Perry Lucas MD     • albuterol  2 puff Inhalation Q6H PRN Perry Lucas MD     • aluminum-magnesium hydroxide-simethicone  30 mL Oral Q6H PRN Yu Du MD     • amLODIPine  2.5 mg Oral Daily Arielle Cabezas DO     • dicyclomine  20 mg Oral BID Yu Du MD     • levothyroxine  150 mcg Oral Early Morning Perry Lucas MD     • LORazepam  0.5 mg Oral Q8H PRN Arielle Cabezas DO     • montelukast  10 mg Oral HS Yu Du MD     • ondansetron  4 mg Intravenous Q6H PRN Yu Du MD          Today, Patient Was Seen By: Rick Jefferson DO    ** Please Note: Dictation voice to text software may have been used in the creation of this document.  **

## 2023-07-28 NOTE — PLAN OF CARE
Problem: PAIN - ADULT  Goal: Verbalizes/displays adequate comfort level or baseline comfort level  Description: Interventions:  - Encourage patient to monitor pain and request assistance  - Assess pain using appropriate pain scale  - Administer analgesics based on type and severity of pain and evaluate response  - Implement non-pharmacological measures as appropriate and evaluate response  - Consider cultural and social influences on pain and pain management  - Notify physician/advanced practitioner if interventions unsuccessful or patient reports new pain  Outcome: Progressing     Problem: INFECTION - ADULT  Goal: Absence or prevention of progression during hospitalization  Description: INTERVENTIONS:  - Assess and monitor for signs and symptoms of infection  - Monitor lab/diagnostic results  - Monitor all insertion sites, i.e. indwelling lines, tubes, and drains  - Monitor endotracheal if appropriate and nasal secretions for changes in amount and color  - [place] appropriate cooling/warming therapies per order  - Administer medications as ordered  - Instruct and encourage patient and family to use good hand hygiene technique  - Identify and instruct in appropriate isolation precautions for identified infection/condition  Outcome: Progressing  Goal: Absence of fever/infection during neutropenic period  Description: INTERVENTIONS:  - Monitor WBC    Outcome: Progressing     Problem: SAFETY ADULT  Goal: Patient will remain free of falls  Description: INTERVENTIONS:  - Educate patient/family on patient safety including physical limitations  - Instruct patient to call for assistance with activity   - Keep Call bell within reach  - Keep bed low and locked with side rails adjusted as appropriate  - Keep care items and personal belongings within reach  - Initiate and maintain comfort rounds  Outcome: Progressing  Goal: Maintain or return to baseline ADL function  Description: INTERVENTIONS:  -  Assess patient's ability to carry out ADLs; assess patient's baseline for ADL function and identify physical deficits which impact ability to perform ADLs (bathing, care of mouth/teeth, toileting, grooming, dressing, etc.)  - Assess/evaluate cause of self-care deficits   - Assess range of motion  - Assess patient's mobility; develop plan if impaired  - Assess patient's need for assistive devices and provide as appropriate  - Encourage maximum independence but intervene and supervise when necessary  - Involve family in performance of ADLs  - Assess for home care needs following discharge   - Consider OT consult to assist with ADL evaluation and planning for discharge  - Provide patient education as appropriate  Outcome: Progressing  Goal: Maintains/Returns to pre admission functional level  Description: INTERVENTIONS:  - Perform BMAT or MOVE assessment daily.   - Set and communicate daily mobility goal to care team and patient/family/caregiver.    - Collaborate with rehabilitation services on mobility goals if consulted  Outcome: Progressing

## 2023-07-28 NOTE — PLAN OF CARE
Problem: PAIN - ADULT  Goal: Verbalizes/displays adequate comfort level or baseline comfort level  Description: Interventions:  - Encourage patient to monitor pain and request assistance  - Assess pain using appropriate pain scale  - Administer analgesics based on type and severity of pain and evaluate response  - Implement non-pharmacological measures as appropriate and evaluate response  - Consider cultural and social influences on pain and pain management  - Notify physician/advanced practitioner if interventions unsuccessful or patient reports new pain  Outcome: Progressing     Problem: INFECTION - ADULT  Goal: Absence or prevention of progression during hospitalization  Description: INTERVENTIONS:  - Assess and monitor for signs and symptoms of infection  - Monitor lab/diagnostic results  - Monitor all insertion sites, i.e. indwelling lines, tubes, and drains  - Monitor endotracheal if appropriate and nasal secretions for changes in amount and color  - [place] appropriate cooling/warming therapies per order  - Administer medications as ordered  - Instruct and encourage patient and family to use good hand hygiene technique  - Identify and instruct in appropriate isolation precautions for identified infection/condition  Outcome: Progressing  Goal: Absence of fever/infection during neutropenic period  Description: INTERVENTIONS:  - Monitor WBC    Outcome: Progressing     Problem: SAFETY ADULT  Goal: Patient will remain free of falls  Description: INTERVENTIONS:  - Educate patient/family on patient safety including physical limitations  - Instruct patient to call for assistance with activity   - Keep Call bell within reach  - Keep bed low and locked with side rails adjusted as appropriate  - Keep care items and personal belongings within reach  - Initiate and maintain comfort rounds  Outcome: Progressing  Goal: Maintain or return to baseline ADL function  Description: INTERVENTIONS:  -  Assess patient's ability to carry out ADLs; assess patient's baseline for ADL function and identify physical deficits which impact ability to perform ADLs (bathing, care of mouth/teeth, toileting, grooming, dressing, etc.)  - Assess/evaluate cause of self-care deficits   - Assess range of motion  - Assess patient's mobility; develop plan if impaired  - Assess patient's need for assistive devices and provide as appropriate  - Encourage maximum independence but intervene and supervise when necessary  - Involve family in performance of ADLs  - Assess for home care needs following discharge   - Consider OT consult to assist with ADL evaluation and planning for discharge  - Provide patient education as appropriate  Outcome: Progressing  Goal: Maintains/Returns to pre admission functional level  Description: INTERVENTIONS:  - Perform BMAT or MOVE assessment daily.   - Set and communicate daily mobility goal to care team and patient/family/caregiver.    - Collaborate with rehabilitation services on mobility goals if consulted  Outcome: Progressing     Problem: DISCHARGE PLANNING  Goal: Discharge to home or other facility with appropriate resources  Description: INTERVENTIONS:  - Identify barriers to discharge w/patient and caregiver  - Arrange for needed discharge resources and transportation as appropriate  - Identify discharge learning needs (meds, wound care, etc.)  - Arrange for interpretive services to assist at discharge as needed  - Refer to Case Management Department for coordinating discharge planning if the patient needs post-hospital services based on physician/advanced practitioner order or complex needs related to functional status, cognitive ability, or social support system  Outcome: Progressing     Problem: Knowledge Deficit  Goal: Patient/family/caregiver demonstrates understanding of disease process, treatment plan, medications, and discharge instructions  Description: Complete learning assessment and assess knowledge base.  Interventions:  - Provide teaching at level of understanding  - Provide teaching via preferred learning methods  Outcome: Progressing

## 2023-07-29 PROCEDURE — 99233 SBSQ HOSP IP/OBS HIGH 50: CPT | Performed by: INTERNAL MEDICINE

## 2023-07-29 RX ADMIN — LEVOTHYROXINE SODIUM 150 MCG: 75 TABLET ORAL at 05:30

## 2023-07-29 RX ADMIN — DICYCLOMINE HYDROCHLORIDE 20 MG: 20 TABLET ORAL at 18:48

## 2023-07-29 RX ADMIN — DICYCLOMINE HYDROCHLORIDE 20 MG: 20 TABLET ORAL at 09:04

## 2023-07-29 RX ADMIN — AMLODIPINE BESYLATE 2.5 MG: 2.5 TABLET ORAL at 09:04

## 2023-07-29 RX ADMIN — MONTELUKAST 10 MG: 10 TABLET, FILM COATED ORAL at 22:59

## 2023-07-29 NOTE — PROGRESS NOTES
[address] [facility]  Progress Note  Name: Navid Bird  MRN: [id number]  Unit/Bed#: [facility] 913-01 I Date of Admission: 7/22/2023   Date of Service: 7/29/2023 I Hospital Day: 7    Assessment/Plan   * Lymphoma of thyroid gland [facility])  Assessment & Plan  History of asthma recently found to have thyroid enlargement thought to have subacute thyroiditis and started on prednisone/levothyroxine eventually biopsy found to have thyroid lymphoma, awaiting final biopsy result. · Seen by endocrinology, weaning off prednisone. Continuing levothyroxine. · Discussed with oncology, pending final pathology report. Recommending bone marrow biopsy, done in IR 7/26. · Discussion regarding treatment following biopsy results. Possible inpatient chemotherapy. Awaiting further recommendations. · Echo done in preparation for chemotherapy, unremarkable. Elevated blood pressure reading  Assessment & Plan  Systolic borderline, diastolic elevated. · Start norvasc    Hypothyroidism  Assessment & Plan  · Continue levothyroxine  · TSH within normal limits    Morbid obesity (HCC)  Assessment & Plan  · Body mass index is 48.01 kg/m². · TLC    Dysphagia  Assessment & Plan  · Secondary to thyromegaly  · Tolerating regular diet    Subacute thyroiditis  Assessment & Plan  Working diagnosis before found to have lymphoma with subacute thyroiditis on levothyroxine and prednisone  · Continue levothyroxine 125 mcg daily  · On tapering dose of prednisone     Mild intermittent asthma without complication  Assessment & Plan  · No exacerbation on albuterol. Continue montelukast             VTE Pharmacologic Prophylaxis:   Pharmacologic: Ambulatory  Mechanical VTE Prophylaxis in Place: No    Patient Centered Rounds: I have performed bedside rounds with nursing staff today. Time Spent for Care: 54. More than 50% of total time spent on counseling and coordination of care as described above.     Current Length of Stay: 7 day(s)    Current Patient Status: Inpatient       Code Status: Level 1 - Full Code      Subjective:   No acute distress    Objective:     Vitals:   Temp (24hrs), Av.7 °F (37.1 °C), Min:98 °F (36.7 °C), Max:99.2 °F (37.3 °C)    Temp:  [98 °F (36.7 °C)-99.2 °F (37.3 °C)] 98 °F (36.7 °C)  HR:  [] 94  Resp:  [18-20] 20  BP: (140-151)/() 140/97  SpO2:  [94 %-97 %] 96 %  Body mass index is 48.01 kg/m². Input and Output Summary (last 24 hours): Intake/Output Summary (Last 24 hours) at 2023 0851  Last data filed at 2023 1813  Gross per 24 hour   Intake 200 ml   Output --   Net 200 ml       Physical Exam:     Physical Exam  HENT:      Head: Normocephalic and atraumatic. Cardiovascular:      Rate and Rhythm: Normal rate and regular rhythm. Pulses: Normal pulses. Pulmonary:      Effort: Pulmonary effort is normal.      Breath sounds: Normal breath sounds. Abdominal:      General: Abdomen is flat. Palpations: Abdomen is soft. Musculoskeletal:         General: No swelling. Skin:     General: Skin is warm and dry. Neurological:      General: No focal deficit present. Mental Status: She is alert and oriented to person, place, and time. Psychiatric:         Mood and Affect: Mood normal.         Additional Data:     Labs:    Results from last 7 days   Lab Units 23  0601   WBC Thousand/uL 12.02*   HEMOGLOBIN g/dL 12.0   HEMATOCRIT % 38.4   PLATELETS Thousands/uL 375   NEUTROS PCT % 63   LYMPHS PCT % 25   MONOS PCT % 7   EOS PCT % 3     Results from last 7 days   Lab Units 23  0601 23  0454   POTASSIUM mmol/L 3.7 4.0   CHLORIDE mmol/L 109* 111*   CO2 mmol/L 24 24   BUN mg/dL 17 15   CREATININE mg/dL 0.69 0.69   CALCIUM mg/dL 8.8 9.0   ALK PHOS U/L  --  74   ALT U/L  --  15   AST U/L  --  8     Results from last 7 days   Lab Units 23  0601   INR  0.92       * I Have Reviewed All Lab Data Listed Above.   * Additional Pertinent Lab Tests Reviewed: All Labs Within Last 24 Hours Reviewed      Recent Cultures (last 7 days):           Last 24 Hours Medication List:   Current Facility-Administered Medications   Medication Dose Route Frequency Provider Last Rate   • acetaminophen  650 mg Oral Q6H PRN Perry Lucas MD     • albuterol  2 puff Inhalation Q6H PRN Yu Du MD     • aluminum-magnesium hydroxide-simethicone  30 mL Oral Q6H PRN Yu Du MD     • amLODIPine  2.5 mg Oral Daily Arielle Cabezas DO     • dicyclomine  20 mg Oral BID Yu Du MD     • levothyroxine  150 mcg Oral Early Morning Perry Lucas MD     • LORazepam  0.5 mg Oral Q8H PRN Arielle Cabezas DO     • montelukast  10 mg Oral HS Yu Du MD     • ondansetron  4 mg Intravenous Q6H PRN Yu Du MD          Today, Patient Was Seen By: Rick Jefferson DO    ** Please Note: Dictation voice to text software may have been used in the creation of this document.  **

## 2023-07-29 NOTE — PLAN OF CARE
Problem: PAIN - ADULT  Goal: Verbalizes/displays adequate comfort level or baseline comfort level  Description: Interventions:  - Encourage patient to monitor pain and request assistance  - Assess pain using appropriate pain scale  - Administer analgesics based on type and severity of pain and evaluate response  - Implement non-pharmacological measures as appropriate and evaluate response  - Consider cultural and social influences on pain and pain management  - Notify physician/advanced practitioner if interventions unsuccessful or patient reports new pain  Outcome: Progressing     Problem: INFECTION - ADULT  Goal: Absence or prevention of progression during hospitalization  Description: INTERVENTIONS:  - Assess and monitor for signs and symptoms of infection  - Monitor lab/diagnostic results  - Monitor all insertion sites, i.e. indwelling lines, tubes, and drains  - Monitor endotracheal if appropriate and nasal secretions for changes in amount and color  - [place] appropriate cooling/warming therapies per order  - Administer medications as ordered  - Instruct and encourage patient and family to use good hand hygiene technique  - Identify and instruct in appropriate isolation precautions for identified infection/condition  Outcome: Progressing  Goal: Absence of fever/infection during neutropenic period  Description: INTERVENTIONS:  - Monitor WBC    Outcome: Progressing     Problem: SAFETY ADULT  Goal: Patient will remain free of falls  Description: INTERVENTIONS:  - Educate patient/family on patient safety including physical limitations  - Instruct patient to call for assistance with activity   - Keep Call bell within reach  - Keep bed low and locked with side rails adjusted as appropriate  - Keep care items and personal belongings within reach  - Initiate and maintain comfort rounds  Outcome: Progressing  Goal: Maintain or return to baseline ADL function  Description: INTERVENTIONS:  -  Assess patient's ability to carry out ADLs; assess patient's baseline for ADL function and identify physical deficits which impact ability to perform ADLs (bathing, care of mouth/teeth, toileting, grooming, dressing, etc.)  - Assess/evaluate cause of self-care deficits   - Assess range of motion  - Assess patient's mobility; develop plan if impaired  - Assess patient's need for assistive devices and provide as appropriate  - Encourage maximum independence but intervene and supervise when necessary  - Involve family in performance of ADLs  - Assess for home care needs following discharge   - Consider OT consult to assist with ADL evaluation and planning for discharge  - Provide patient education as appropriate  Outcome: Progressing  Goal: Maintains/Returns to pre admission functional level  Description: INTERVENTIONS:  - Perform BMAT or MOVE assessment daily.   - Set and communicate daily mobility goal to care team and patient/family/caregiver.    - Collaborate with rehabilitation services on mobility goals if consulted  Outcome: Progressing     Problem: DISCHARGE PLANNING  Goal: Discharge to home or other facility with appropriate resources  Description: INTERVENTIONS:  - Identify barriers to discharge w/patient and caregiver  - Arrange for needed discharge resources and transportation as appropriate  - Identify discharge learning needs (meds, wound care, etc.)  - Arrange for interpretive services to assist at discharge as needed  - Refer to Case Management Department for coordinating discharge planning if the patient needs post-hospital services based on physician/advanced practitioner order or complex needs related to functional status, cognitive ability, or social support system  Outcome: Progressing     Problem: Knowledge Deficit  Goal: Patient/family/caregiver demonstrates understanding of disease process, treatment plan, medications, and discharge instructions  Description: Complete learning assessment and assess knowledge base.  Interventions:  - Provide teaching at level of understanding  - Provide teaching via preferred learning methods  Outcome: Progressing

## 2023-07-30 LAB — CARDIAC TROPONIN I PNL SERPL HS: 5 NG/L

## 2023-07-30 PROCEDURE — 84484 ASSAY OF TROPONIN QUANT: CPT

## 2023-07-30 PROCEDURE — 99233 SBSQ HOSP IP/OBS HIGH 50: CPT | Performed by: INTERNAL MEDICINE

## 2023-07-30 PROCEDURE — 93005 ELECTROCARDIOGRAM TRACING: CPT

## 2023-07-30 RX ADMIN — LEVOTHYROXINE SODIUM 150 MCG: 75 TABLET ORAL at 05:14

## 2023-07-30 RX ADMIN — DICYCLOMINE HYDROCHLORIDE 20 MG: 20 TABLET ORAL at 17:01

## 2023-07-30 RX ADMIN — AMLODIPINE BESYLATE 2.5 MG: 2.5 TABLET ORAL at 08:00

## 2023-07-30 RX ADMIN — MONTELUKAST 10 MG: 10 TABLET, FILM COATED ORAL at 23:07

## 2023-07-30 RX ADMIN — DICYCLOMINE HYDROCHLORIDE 20 MG: 20 TABLET ORAL at 08:00

## 2023-07-30 NOTE — PLAN OF CARE
Problem: PAIN - ADULT  Goal: Verbalizes/displays adequate comfort level or baseline comfort level  Description: Interventions:  - Encourage patient to monitor pain and request assistance  - Assess pain using appropriate pain scale  - Administer analgesics based on type and severity of pain and evaluate response  - Implement non-pharmacological measures as appropriate and evaluate response  - Consider cultural and social influences on pain and pain management  - Notify physician/advanced practitioner if interventions unsuccessful or patient reports new pain  Outcome: Progressing     Problem: INFECTION - ADULT  Goal: Absence or prevention of progression during hospitalization  Description: INTERVENTIONS:  - Assess and monitor for signs and symptoms of infection  - Monitor lab/diagnostic results  - Monitor all insertion sites, i.e. indwelling lines, tubes, and drains  - Monitor endotracheal if appropriate and nasal secretions for changes in amount and color  - [place] appropriate cooling/warming therapies per order  - Administer medications as ordered  - Instruct and encourage patient and family to use good hand hygiene technique  - Identify and instruct in appropriate isolation precautions for identified infection/condition  Outcome: Progressing  Goal: Absence of fever/infection during neutropenic period  Description: INTERVENTIONS:  - Monitor WBC    Outcome: Progressing     Problem: SAFETY ADULT  Goal: Patient will remain free of falls  Description: INTERVENTIONS:  - Educate patient/family on patient safety including physical limitations  - Instruct patient to call for assistance with activity   - Keep Call bell within reach  - Keep bed low and locked with side rails adjusted as appropriate  - Keep care items and personal belongings within reach  - Initiate and maintain comfort rounds  Outcome: Progressing  Goal: Maintain or return to baseline ADL function  Description: INTERVENTIONS:  -  Assess patient's ability to carry out ADLs; assess patient's baseline for ADL function and identify physical deficits which impact ability to perform ADLs (bathing, care of mouth/teeth, toileting, grooming, dressing, etc.)  - Assess/evaluate cause of self-care deficits   - Assess range of motion  - Assess patient's mobility; develop plan if impaired  - Assess patient's need for assistive devices and provide as appropriate  - Encourage maximum independence but intervene and supervise when necessary  - Involve family in performance of ADLs  - Assess for home care needs following discharge   - Consider OT consult to assist with ADL evaluation and planning for discharge  - Provide patient education as appropriate  Outcome: Progressing  Goal: Maintains/Returns to pre admission functional level  Description: INTERVENTIONS:  - Perform BMAT or MOVE assessment daily.   - Set and communicate daily mobility goal to care team and patient/family/caregiver.    - Collaborate with rehabilitation services on mobility goals if consulted  Outcome: Progressing     Problem: DISCHARGE PLANNING  Goal: Discharge to home or other facility with appropriate resources  Description: INTERVENTIONS:  - Identify barriers to discharge w/patient and caregiver  - Arrange for needed discharge resources and transportation as appropriate  - Identify discharge learning needs (meds, wound care, etc.)  - Arrange for interpretive services to assist at discharge as needed  - Refer to Case Management Department for coordinating discharge planning if the patient needs post-hospital services based on physician/advanced practitioner order or complex needs related to functional status, cognitive ability, or social support system  Outcome: Progressing     Problem: Knowledge Deficit  Goal: Patient/family/caregiver demonstrates understanding of disease process, treatment plan, medications, and discharge instructions  Description: Complete learning assessment and assess knowledge base.  Interventions:  - Provide teaching at level of understanding  - Provide teaching via preferred learning methods  Outcome: Progressing

## 2023-07-30 NOTE — PLAN OF CARE
Problem: PAIN - ADULT  Goal: Verbalizes/displays adequate comfort level or baseline comfort level  Description: Interventions:  - Encourage patient to monitor pain and request assistance  - Assess pain using appropriate pain scale  - Administer analgesics based on type and severity of pain and evaluate response  - Implement non-pharmacological measures as appropriate and evaluate response  - Consider cultural and social influences on pain and pain management  - Notify physician/advanced practitioner if interventions unsuccessful or patient reports new pain  Outcome: Progressing     Problem: INFECTION - ADULT  Goal: Absence or prevention of progression during hospitalization  Description: INTERVENTIONS:  - Assess and monitor for signs and symptoms of infection  - Monitor lab/diagnostic results  - Monitor all insertion sites, i.e. indwelling lines, tubes, and drains  - Monitor endotracheal if appropriate and nasal secretions for changes in amount and color  - [place] appropriate cooling/warming therapies per order  - Administer medications as ordered  - Instruct and encourage patient and family to use good hand hygiene technique  - Identify and instruct in appropriate isolation precautions for identified infection/condition  Outcome: Progressing  Goal: Absence of fever/infection during neutropenic period  Description: INTERVENTIONS:  - Monitor WBC    Outcome: Progressing     Problem: SAFETY ADULT  Goal: Patient will remain free of falls  Description: INTERVENTIONS:  - Educate patient/family on patient safety including physical limitations  - Instruct patient to call for assistance with activity   - Keep Call bell within reach  - Keep bed low and locked with side rails adjusted as appropriate  - Keep care items and personal belongings within reach  - Initiate and maintain comfort rounds  Outcome: Progressing  Goal: Maintain or return to baseline ADL function  Description: INTERVENTIONS:  -  Assess patient's ability to carry out ADLs; assess patient's baseline for ADL function and identify physical deficits which impact ability to perform ADLs (bathing, care of mouth/teeth, toileting, grooming, dressing, etc.)  - Assess/evaluate cause of self-care deficits   - Assess range of motion  - Assess patient's mobility; develop plan if impaired  - Assess patient's need for assistive devices and provide as appropriate  - Encourage maximum independence but intervene and supervise when necessary  - Involve family in performance of ADLs  - Assess for home care needs following discharge   - Consider OT consult to assist with ADL evaluation and planning for discharge  - Provide patient education as appropriate  Outcome: Progressing  Goal: Maintains/Returns to pre admission functional level  Description: INTERVENTIONS:  - Perform BMAT or MOVE assessment daily.   - Set and communicate daily mobility goal to care team and patient/family/caregiver.    - Collaborate with rehabilitation services on mobility goals if consulted  Outcome: Progressing     Problem: DISCHARGE PLANNING  Goal: Discharge to home or other facility with appropriate resources  Description: INTERVENTIONS:  - Identify barriers to discharge w/patient and caregiver  - Arrange for needed discharge resources and transportation as appropriate  - Identify discharge learning needs (meds, wound care, etc.)  - Arrange for interpretive services to assist at discharge as needed  - Refer to Case Management Department for coordinating discharge planning if the patient needs post-hospital services based on physician/advanced practitioner order or complex needs related to functional status, cognitive ability, or social support system  Outcome: Progressing     Problem: Knowledge Deficit  Goal: Patient/family/caregiver demonstrates understanding of disease process, treatment plan, medications, and discharge instructions  Description: Complete learning assessment and assess knowledge base.  Interventions:  - Provide teaching at level of understanding  - Provide teaching via preferred learning methods  Outcome: Progressing

## 2023-07-30 NOTE — PROGRESS NOTES
[address] [facility]  Progress Note  Name: Candy Franz  MRN: [id number]  Unit/Bed#: [facility] 913-01 I Date of Admission: 7/22/2023   Date of Service: 7/30/2023 I Hospital Day: 8    Assessment/Plan   * Lymphoma of thyroid gland [facility])  Assessment & Plan  History of asthma recently found to have thyroid enlargement thought to have subacute thyroiditis and started on prednisone/levothyroxine eventually biopsy found to have thyroid lymphoma, awaiting final biopsy result. · Seen by endocrinology, weaning off prednisone. Continuing levothyroxine. · Discussed with oncology, pending final pathology report. Recommending bone marrow biopsy, done in IR 7/26. · Discussion regarding treatment following biopsy results. Possible inpatient chemotherapy. Awaiting further recommendations. · Echo done in preparation for chemotherapy, unremarkable. Elevated blood pressure reading  Assessment & Plan  Systolic borderline, diastolic elevated. · Start norvasc    Hypothyroidism  Assessment & Plan  · Continue levothyroxine  · TSH within normal limits    Morbid obesity (HCC)  Assessment & Plan  · Body mass index is 48.01 kg/m². · TLC    Dysphagia  Assessment & Plan  · Secondary to thyromegaly  · Tolerating regular diet    Subacute thyroiditis  Assessment & Plan  Working diagnosis before found to have lymphoma with subacute thyroiditis on levothyroxine and prednisone  · Continue levothyroxine 125 mcg daily  · On tapering dose of prednisone     Mild intermittent asthma without complication  Assessment & Plan  · No exacerbation on albuterol. Continue montelukast             VTE Pharmacologic Prophylaxis:   Pharmacologic: Ambulatory  Mechanical VTE Prophylaxis in Place: No    Patient Centered Rounds: I have performed bedside rounds with nursing staff today. Time Spent for Care: 85. More than 50% of total time spent on counseling and coordination of care as described above.     Current Length of Stay: 8 day(s)    Current Patient Status: Inpatient     Code Status: Level 1 - Full Code      Subjective:   nad    Objective:     Vitals:   Temp (24hrs), Av.1 °F (36.7 °C), Min:97.7 °F (36.5 °C), Max:98.5 °F (36.9 °C)    Temp:  [97.7 °F (36.5 °C)-98.5 °F (36.9 °C)] 97.7 °F (36.5 °C)  HR:  [] 88  Resp:  [14-18] 18  BP: (148-154)/() 148/101  SpO2:  [95 %-97 %] 95 %  Body mass index is 48.01 kg/m². Input and Output Summary (last 24 hours): Intake/Output Summary (Last 24 hours) at 2023 1013  Last data filed at 2023 0501  Gross per 24 hour   Intake --   Output 0 ml   Net 0 ml       Physical Exam:     Physical Exam  Constitutional:       Appearance: Normal appearance. HENT:      Head: Normocephalic and atraumatic. Cardiovascular:      Rate and Rhythm: Normal rate and regular rhythm. Pulmonary:      Effort: Pulmonary effort is normal.      Breath sounds: Normal breath sounds. Abdominal:      General: Abdomen is flat. Palpations: Abdomen is soft. Musculoskeletal:         General: No swelling. Normal range of motion. Neurological:      General: No focal deficit present. Mental Status: She is alert and oriented to person, place, and time. Psychiatric:         Mood and Affect: Mood normal.         Additional Data:     Labs:    Results from last 7 days   Lab Units 23  0601   WBC Thousand/uL 12.02*   HEMOGLOBIN g/dL 12.0   HEMATOCRIT % 38.4   PLATELETS Thousands/uL 375   NEUTROS PCT % 63   LYMPHS PCT % 25   MONOS PCT % 7   EOS PCT % 3     Results from last 7 days   Lab Units 23  0601 23  0454   POTASSIUM mmol/L 3.7 4.0   CHLORIDE mmol/L 109* 111*   CO2 mmol/L 24 24   BUN mg/dL 17 15   CREATININE mg/dL 0.69 0.69   CALCIUM mg/dL 8.8 9.0   ALK PHOS U/L  --  74   ALT U/L  --  15   AST U/L  --  8     Results from last 7 days   Lab Units 23  0601   INR  0.92       * I Have Reviewed All Lab Data Listed Above.   * Additional Pertinent Lab Tests Reviewed: All Labs Within Last 24 Hours Reviewed        Recent Cultures (last 7 days):           Last 24 Hours Medication List:   Current Facility-Administered Medications   Medication Dose Route Frequency Provider Last Rate   • acetaminophen  650 mg Oral Q6H PRN Perry Lucas MD     • albuterol  2 puff Inhalation Q6H PRN Florian Jenkins MD     • aluminum-magnesium hydroxide-simethicone  30 mL Oral Q6H PRN Floiran Jenkins MD     • amLODIPine  2.5 mg Oral Daily Arielle Cabezas DO     • dicyclomine  20 mg Oral BID Florian Jenkins MD     • levothyroxine  150 mcg Oral Early Morning Perry Lucas MD     • LORazepam  0.5 mg Oral Q8H PRN Arielle Cabezas DO     • montelukast  10 mg Oral HS Florian Jenkins MD     • ondansetron  4 mg Intravenous Q6H PRN Florian Jenkins MD          Today, Patient Was Seen By: Carroll Barnes DO    ** Please Note: Dictation voice to text software may have been used in the creation of this document.  **

## 2023-07-31 LAB
2HR DELTA HS TROPONIN: -1 NG/L
ALBUMIN SERPL BCP-MCNC: 3.1 G/DL (ref 3.5–5)
ALP SERPL-CCNC: 79 U/L (ref 46–116)
ALT SERPL W P-5'-P-CCNC: 17 U/L (ref 12–78)
ANION GAP SERPL CALCULATED.3IONS-SCNC: 5 MMOL/L
AST SERPL W P-5'-P-CCNC: 11 U/L (ref 5–45)
ATRIAL RATE: 107 BPM
ATRIAL RATE: 113 BPM
ATRIAL RATE: 116 BPM
ATRIAL RATE: 118 BPM
ATRIAL RATE: 118 BPM
BASOPHILS # BLD AUTO: 0.07 THOUSANDS/ÂΜL (ref 0–0.1)
BASOPHILS NFR BLD AUTO: 1 % (ref 0–1)
BILIRUB SERPL-MCNC: 0.35 MG/DL (ref 0.2–1)
BUN SERPL-MCNC: 18 MG/DL (ref 5–25)
CALCIUM ALBUM COR SERPL-MCNC: 9.6 MG/DL (ref 8.3–10.1)
CALCIUM SERPL-MCNC: 8.9 MG/DL (ref 8.3–10.1)
CARDIAC TROPONIN I PNL SERPL HS: 4 NG/L
CHLORIDE SERPL-SCNC: 108 MMOL/L (ref 96–108)
CO2 SERPL-SCNC: 24 MMOL/L (ref 21–32)
CREAT SERPL-MCNC: 0.7 MG/DL (ref 0.6–1.3)
EOSINOPHIL # BLD AUTO: 0.44 THOUSAND/ÂΜL (ref 0–0.61)
EOSINOPHIL NFR BLD AUTO: 4 % (ref 0–6)
ERYTHROCYTE [DISTWIDTH] IN BLOOD BY AUTOMATED COUNT: 12.6 % (ref 11.6–15.1)
GFR SERPL CREATININE-BSD FRML MDRD: 123 ML/MIN/1.73SQ M
GLUCOSE SERPL-MCNC: 104 MG/DL (ref 65–140)
HCT VFR BLD AUTO: 37.1 % (ref 34.8–46.1)
HGB BLD-MCNC: 12 G/DL (ref 11.5–15.4)
IMM GRANULOCYTES # BLD AUTO: 0.12 THOUSAND/UL (ref 0–0.2)
IMM GRANULOCYTES NFR BLD AUTO: 1 % (ref 0–2)
LYMPHOCYTES # BLD AUTO: 2.68 THOUSANDS/ÂΜL (ref 0.6–4.47)
LYMPHOCYTES NFR BLD AUTO: 27 % (ref 14–44)
MCH RBC QN AUTO: 27.8 PG (ref 26.8–34.3)
MCHC RBC AUTO-ENTMCNC: 32.3 G/DL (ref 31.4–37.4)
MCV RBC AUTO: 86 FL (ref 82–98)
MONOCYTES # BLD AUTO: 0.68 THOUSAND/ÂΜL (ref 0.17–1.22)
MONOCYTES NFR BLD AUTO: 7 % (ref 4–12)
NEUTROPHILS # BLD AUTO: 6.11 THOUSANDS/ÂΜL (ref 1.85–7.62)
NEUTS SEG NFR BLD AUTO: 60 % (ref 43–75)
NRBC BLD AUTO-RTO: 0 /100 WBCS
P AXIS: 65 DEGREES
P AXIS: 67 DEGREES
P AXIS: 68 DEGREES
P AXIS: 72 DEGREES
P AXIS: 75 DEGREES
PLATELET # BLD AUTO: 390 THOUSANDS/UL (ref 149–390)
PMV BLD AUTO: 9.3 FL (ref 8.9–12.7)
POTASSIUM SERPL-SCNC: 3.8 MMOL/L (ref 3.5–5.3)
PR INTERVAL: 132 MS
PR INTERVAL: 134 MS
PR INTERVAL: 134 MS
PR INTERVAL: 136 MS
PR INTERVAL: 136 MS
PROT SERPL-MCNC: 7.5 G/DL (ref 6.4–8.4)
QRS AXIS: 84 DEGREES
QRS AXIS: 84 DEGREES
QRS AXIS: 85 DEGREES
QRS AXIS: 85 DEGREES
QRS AXIS: 90 DEGREES
QRSD INTERVAL: 70 MS
QRSD INTERVAL: 70 MS
QRSD INTERVAL: 80 MS
QRSD INTERVAL: 80 MS
QRSD INTERVAL: 86 MS
QT INTERVAL: 334 MS
QT INTERVAL: 334 MS
QT INTERVAL: 336 MS
QT INTERVAL: 338 MS
QT INTERVAL: 350 MS
QTC INTERVAL: 458 MS
QTC INTERVAL: 467 MS
QTC INTERVAL: 468 MS
QTC INTERVAL: 469 MS
QTC INTERVAL: 470 MS
RBC # BLD AUTO: 4.32 MILLION/UL (ref 3.81–5.12)
SODIUM SERPL-SCNC: 137 MMOL/L (ref 135–147)
T WAVE AXIS: 12 DEGREES
T WAVE AXIS: 16 DEGREES
T WAVE AXIS: 23 DEGREES
T WAVE AXIS: 7 DEGREES
T WAVE AXIS: 9 DEGREES
VENTRICULAR RATE: 107 BPM
VENTRICULAR RATE: 113 BPM
VENTRICULAR RATE: 116 BPM
VENTRICULAR RATE: 118 BPM
VENTRICULAR RATE: 118 BPM
WBC # BLD AUTO: 10.1 THOUSAND/UL (ref 4.31–10.16)

## 2023-07-31 PROCEDURE — 99232 SBSQ HOSP IP/OBS MODERATE 35: CPT | Performed by: PHYSICIAN ASSISTANT

## 2023-07-31 PROCEDURE — 85025 COMPLETE CBC W/AUTO DIFF WBC: CPT | Performed by: INTERNAL MEDICINE

## 2023-07-31 PROCEDURE — 93005 ELECTROCARDIOGRAM TRACING: CPT

## 2023-07-31 PROCEDURE — 80053 COMPREHEN METABOLIC PANEL: CPT | Performed by: INTERNAL MEDICINE

## 2023-07-31 PROCEDURE — 93010 ELECTROCARDIOGRAM REPORT: CPT | Performed by: INTERNAL MEDICINE

## 2023-07-31 RX ORDER — METOPROLOL TARTRATE 5 MG/5ML
2.5 INJECTION INTRAVENOUS ONCE
Status: DISCONTINUED | OUTPATIENT
Start: 2023-07-31 | End: 2023-07-31

## 2023-07-31 RX ADMIN — AMLODIPINE BESYLATE 2.5 MG: 2.5 TABLET ORAL at 10:05

## 2023-07-31 RX ADMIN — MONTELUKAST 10 MG: 10 TABLET, FILM COATED ORAL at 20:30

## 2023-07-31 RX ADMIN — ACETAMINOPHEN 650 MG: 325 TABLET, FILM COATED ORAL at 20:29

## 2023-07-31 RX ADMIN — LEVOTHYROXINE SODIUM 150 MCG: 75 TABLET ORAL at 06:31

## 2023-07-31 RX ADMIN — Medication 6.25 MG: at 01:48

## 2023-07-31 RX ADMIN — DICYCLOMINE HYDROCHLORIDE 20 MG: 20 TABLET ORAL at 17:25

## 2023-07-31 RX ADMIN — DICYCLOMINE HYDROCHLORIDE 20 MG: 20 TABLET ORAL at 10:05

## 2023-07-31 NOTE — ASSESSMENT & PLAN NOTE
· History of asthma recently found to have thyroid enlargement thought to have subacute thyroiditis and started on prednisone/levothyroxine eventually biopsy found to have thyroid lymphoma, awaiting final biopsy result. · Echo done in preparation for chemotherapy, unremarkable. · Seen by endocrinology  · Weaning off prednisone  · Continuing levothyroxine. · Discussed with oncology, pending final pathology report. · Status post bone marrow biopsy 7/26  · Discussion regarding treatment following biopsy, FISH, and Cytogenetics results  · Possible (likely) inpatient chemotherapy.  Awaiting further recommendations pending above

## 2023-07-31 NOTE — PLAN OF CARE
Problem: PAIN - ADULT  Goal: Verbalizes/displays adequate comfort level or baseline comfort level  Description: Interventions:  - Encourage patient to monitor pain and request assistance  - Assess pain using appropriate pain scale  - Administer analgesics based on type and severity of pain and evaluate response  - Implement non-pharmacological measures as appropriate and evaluate response  - Consider cultural and social influences on pain and pain management  - Notify physician/advanced practitioner if interventions unsuccessful or patient reports new pain  Outcome: Progressing     Problem: INFECTION - ADULT  Goal: Absence or prevention of progression during hospitalization  Description: INTERVENTIONS:  - Assess and monitor for signs and symptoms of infection  - Monitor lab/diagnostic results  - Monitor all insertion sites, i.e. indwelling lines, tubes, and drains  - Monitor endotracheal if appropriate and nasal secretions for changes in amount and color  - [place] appropriate cooling/warming therapies per order  - Administer medications as ordered  - Instruct and encourage patient and family to use good hand hygiene technique  - Identify and instruct in appropriate isolation precautions for identified infection/condition  Outcome: Progressing  Goal: Absence of fever/infection during neutropenic period  Description: INTERVENTIONS:  - Monitor WBC    Outcome: Progressing     Problem: SAFETY ADULT  Goal: Patient will remain free of falls  Description: INTERVENTIONS:  - Educate patient/family on patient safety including physical limitations  - Instruct patient to call for assistance with activity   - Keep Call bell within reach  - Keep bed low and locked with side rails adjusted as appropriate  - Keep care items and personal belongings within reach  - Initiate and maintain comfort rounds  Outcome: Progressing  Goal: Maintain or return to baseline ADL function  Description: INTERVENTIONS:  -  Assess patient's ability to carry out ADLs; assess patient's baseline for ADL function and identify physical deficits which impact ability to perform ADLs (bathing, care of mouth/teeth, toileting, grooming, dressing, etc.)  - Assess/evaluate cause of self-care deficits   - Assess range of motion  - Assess patient's mobility; develop plan if impaired  - Assess patient's need for assistive devices and provide as appropriate  - Encourage maximum independence but intervene and supervise when necessary  - Involve family in performance of ADLs  - Assess for home care needs following discharge   - Consider OT consult to assist with ADL evaluation and planning for discharge  - Provide patient education as appropriate  Outcome: Progressing  Goal: Maintains/Returns to pre admission functional level  Description: INTERVENTIONS:  - Perform BMAT or MOVE assessment daily.   - Set and communicate daily mobility goal to care team and patient/family/caregiver.    - Collaborate with rehabilitation services on mobility goals if consulted  Outcome: Progressing     Problem: DISCHARGE PLANNING  Goal: Discharge to home or other facility with appropriate resources  Description: INTERVENTIONS:  - Identify barriers to discharge w/patient and caregiver  - Arrange for needed discharge resources and transportation as appropriate  - Identify discharge learning needs (meds, wound care, etc.)  - Arrange for interpretive services to assist at discharge as needed  - Refer to Case Management Department for coordinating discharge planning if the patient needs post-hospital services based on physician/advanced practitioner order or complex needs related to functional status, cognitive ability, or social support system  Outcome: Progressing     Problem: Knowledge Deficit  Goal: Patient/family/caregiver demonstrates understanding of disease process, treatment plan, medications, and discharge instructions  Description: Complete learning assessment and assess knowledge base.  Interventions:  - Provide teaching at level of understanding  - Provide teaching via preferred learning methods  Outcome: Progressing

## 2023-07-31 NOTE — ASSESSMENT & PLAN NOTE
· Diastolic improved after starting Norvasc, systolic still above goal   · Norvasc 2.5 mg QD  · Will increase today if BP remains elevated

## 2023-07-31 NOTE — PROGRESS NOTES
Hematology - Oncology Progress Note    Reji Villar, 2000, [id number]  [facility] 913/[facility] 913-01  This is a progress note. For addition information, refer to initial consult note by Dr. Mee Gilford on 7/23/23     Impression and plan:     Aggressive thyroid B-cell lymphoma. We are still waiting on the ancillary pathology report to determine the type of B-cell lymphoma. Patient is clinically stable. She is able to breathe comfortably and is tolerating oral intake. We can delay treatment until the reports come back in order to start with the correct regimen. If it's Burkitt's lymphoma, intensive therapy with EPOCH may be required. If DLBCL, then may consider R-CHOP. Side effect of chemotherapy was thoroughly discussed, including but not limited to alopecia, nausea, vomiting, neutropenia, risk infection, cardiotoxicity, fatigue, risk of permanent menopause. They understood. __________________________________________________________________________________________    Chief complaint: Neck mass    History of present illness: Patient is a 26 yof here for a thyroid mass. No acute overnight events. Patient is clinically stable. She is having some trouble swallowing and needs to spend more time chewing and cutting food into smaller pieces. No shortness of breath or trouble breathing. She is saturating well on room air. Patient has tenderness on the right side of her neck where her mass is more dominant. She also has some fatigue. ROS otherwise negative.       Hospital medications list:  Current Facility-Administered Medications   Medication Dose Route Frequency Provider Last Rate   • acetaminophen  650 mg Oral Q6H PRN Perry Lucas MD     • albuterol  2 puff Inhalation Q6H PRN Ambar Garcia MD     • aluminum-magnesium hydroxide-simethicone  30 mL Oral Q6H PRN Ambar Garcia MD     • amLODIPine  2.5 mg Oral Daily Arielle Cabezas DO     • dicyclomine  20 mg Oral BID Ambar Garcia MD     • levothyroxine  150 mcg Oral Early Morning Joanna Mayfield MD     • LORazepam  0.5 mg Oral Q8H PRN Arielle Cabezas DO     • montelukast  10 mg Oral HS Joanna Mayfield MD     • ondansetron  4 mg Intravenous Q6H PRN Joanna Mayfield MD              Physical exam  /79   Pulse 104   Temp 97.5 °F (36.4 °C)   Resp 13   Ht 5' 3" (1.6 m)   Wt 123 kg (271 lb)   LMP 06/29/2023 (Approximate)   SpO2 95%   BMI 48.01 kg/m²   Constitutional: Well formed, well-nourished  in no apparent distress  Eyes: PERRL, conjunctiva , anicteric    HENT: Atraumatic, external ears normal, nose normal,    oropharynx moist, no pharyngeal exudates, no thrush, pink  Neck: Anterior swelling, worse on the right, tender to palpation  Respiratory: CTA bilaterally   Cardiovascular: Normal rate, normal rhythm, no murmurs, no gallops, no rubs    GI: Soft, nondistended, normal bowel sounds, nontender, no organomegaly, no mass, no rebound, no guarding    : No costovertebral angle tenderness, normal reproductive organs, no discharge  Musculoskeletal: No pain or tenderness with palpation of joints, muscles or bones  Extremities: No LLE  Neurologic: Alert & oriented x 3, no focal deficits noted  Psychiatric: normal mood and behavior    Laboratory test results:   CMP from today: albumin 3.1 otherwise WNL

## 2023-07-31 NOTE — ASSESSMENT & PLAN NOTE
· Working diagnosis before found to have lymphoma with subacute thyroiditis on levothyroxine and prednisone  · Continue levothyroxine 125 mcg daily  · On tapering dose of prednisone

## 2023-07-31 NOTE — PROGRESS NOTES
[facility]  Progress Note  Name: Joshua Washburn  MRN: [id number]  Unit/Bed#: [facility] 913-01 I Date of Admission: 7/22/2023   Date of Service: 7/31/2023 I Hospital Day: 9    Assessment/Plan   * Lymphoma of thyroid gland ([address])  Assessment & Plan  · History of asthma recently found to have thyroid enlargement thought to have subacute thyroiditis and started on prednisone/levothyroxine eventually biopsy found to have thyroid lymphoma, awaiting final biopsy result. · Echo done in preparation for chemotherapy, unremarkable. · Seen by endocrinology  · Weaning off prednisone  · Continuing levothyroxine. · Discussed with oncology, pending final pathology report. · Status post bone marrow biopsy 7/26  · Discussion regarding treatment following biopsy, FISH, and Cytogenetics results  · Possible (likely) inpatient chemotherapy. Awaiting further recommendations pending above       Elevated blood pressure reading  Assessment & Plan  · Diastolic improved after starting Norvasc, systolic still above goal   · Norvasc 2.5 mg QD  · Will increase today if BP remains elevated     Morbid obesity (HCC)  Assessment & Plan  · Body mass index is 48.01 kg/m². · Diet and lifestyle modifications    Subacute thyroiditis  Assessment & Plan  · Working diagnosis before found to have lymphoma with subacute thyroiditis on levothyroxine and prednisone  · Continue levothyroxine 125 mcg daily  · On tapering dose of prednisone     Mild intermittent asthma without complication  Assessment & Plan  · No exacerbation   · Continue Albuterol PRN  · Continue montelukast    Dysphagia  Assessment & Plan  · Secondary to thyromegaly  · Tolerating regular diet    Hypothyroidism  Assessment & Plan  · TSH within normal limits  · Continue Levothyroxine             VTE Pharmacologic Prophylaxis:   Low Risk (Score 0-2) - Encourage Ambulation.     Patient Centered Rounds: I performed bedside rounds with nursing staff today.  Discussions with Specialists or Other Care Team Provider: Discussed with RNSAJAN    Education and Discussions with Family / Patient: Updated  (significant other) at bedside. Total Time Spent on Date of Encounter in care of patient: 35 minutes This time was spent on one or more of the following: performing physical exam; counseling and coordination of care; obtaining or reviewing history; documenting in the medical record; reviewing/ordering tests, medications or procedures; communicating with other healthcare professionals and discussing with patient's family/caregivers. Current Length of Stay: 9 day(s)  Current Patient Status: Inpatient   Certification Statement: The patient will continue to require additional inpatient hospital stay due to pending [facility] and [facility]etics for determination of chemo regimen   Discharge Plan: Anticipate discharge in >72 hrs to home. Code Status: Level 1 - Full Code    Subjective:   Patient denies complaints today. Just waiting for biopsy results. Objective:     Vitals:   Temp (24hrs), Av.6 °F (37 °C), Min:97.5 °F (36.4 °C), Max:99.2 °F (37.3 °C)    Temp:  [97.5 °F (36.4 °C)-99.2 °F (37.3 °C)] 97.5 °F (36.4 °C)  HR:  [104-125] 104  Resp:  [13-20] 13  BP: (127-158)/() 151/79  SpO2:  [94 %-97 %] 95 %  Body mass index is 48.01 kg/m². Input and Output Summary (last 24 hours):   No intake or output data in the 24 hours ending 23 0936    Physical Exam:   Physical Exam  Constitutional:       General: She is not in acute distress. Appearance: Normal appearance. She is obese. She is not ill-appearing or diaphoretic. HENT:      Head: Normocephalic and atraumatic. Mouth/Throat:      Mouth: Mucous membranes are moist.   Eyes:      General: No scleral icterus. Pupils: Pupils are equal, round, and reactive to light. Cardiovascular:      Rate and Rhythm: Regular rhythm. Tachycardia present. Pulses: Normal pulses.       Heart sounds: Normal heart sounds, S1 normal and S2 normal. No murmur heard. No systolic murmur is present. No diastolic murmur is present. No gallop. No S3 or S4 sounds. Pulmonary:      Effort: Pulmonary effort is normal. No accessory muscle usage or respiratory distress. Breath sounds: Normal breath sounds. No stridor. No decreased breath sounds, wheezing, rhonchi or rales. Chest:      Chest wall: No tenderness. Abdominal:      General: Bowel sounds are normal. There is no distension. Palpations: Abdomen is soft. Tenderness: There is no abdominal tenderness. There is no guarding. Musculoskeletal:      Right lower leg: No edema. Left lower leg: No edema. Skin:     General: Skin is warm and dry. Coloration: Skin is not jaundiced. Neurological:      General: No focal deficit present. Mental Status: She is alert. Mental status is at baseline. Motor: No tremor or seizure activity. Psychiatric:         Behavior: Behavior is cooperative. Additional Data:     Labs:  Results from last 7 days   Lab Units 07/31/23  0541   WBC Thousand/uL 10.10   HEMOGLOBIN g/dL 12.0   HEMATOCRIT % 37.1   PLATELETS Thousands/uL 390   NEUTROS PCT % 60   LYMPHS PCT % 27   MONOS PCT % 7   EOS PCT % 4     Results from last 7 days   Lab Units 07/31/23  0541   SODIUM mmol/L 137   POTASSIUM mmol/L 3.8   CHLORIDE mmol/L 108   CO2 mmol/L 24   BUN mg/dL 18   CREATININE mg/dL 0.70   ANION GAP mmol/L 5   CALCIUM mg/dL 8.9   ALBUMIN g/dL 3.1*   TOTAL BILIRUBIN mg/dL 0.35   ALK PHOS U/L 79   ALT U/L 17   AST U/L 11   GLUCOSE RANDOM mg/dL 104     Results from last 7 days   Lab Units 07/25/23  0601   INR  0.92                   Lines/Drains:  Invasive Devices     None                       Imaging: No pertinent imaging reviewed.     Recent Cultures (last 7 days):         Last 24 Hours Medication List:   Current Facility-Administered Medications   Medication Dose Route Frequency Provider Last Rate   • acetaminophen  650 mg Oral Q6H PRN Claudia Dobbs MD     • albuterol  2 puff Inhalation Q6H PRN Claudia Dobbs MD     • aluminum-magnesium hydroxide-simethicone  30 mL Oral Q6H PRN Claudia Dobbs MD     • amLODIPine  2.5 mg Oral Daily Hetul Cabezas, DO     • dicyclomine  20 mg Oral BID Claudia Dobbs MD     • levothyroxine  150 mcg Oral Early Morning Perry Lucas MD     • LORazepam  0.5 mg Oral Q8H PRN Arielle Cabezas, DO     • montelukast  10 mg Oral HS Claudia Dobbs MD     • ondansetron  4 mg Intravenous Q6H PRN Claudia Dobbs MD          Today, Patient Was Seen By: Betty Jones PA-C    **Please Note: This note may have been constructed using a voice recognition system. **

## 2023-07-31 NOTE — UTILIZATION REVIEW
Continued Stay Review    Date: 7/29/23                          Current Patient Class: inpatient  Current Level of Care: med surg  HPI:23 y.o. female initially admitted on 7/22/23     Assessment/Plan:   Lymphoma of thyroid with subacute thyroiditis. Completed steroids 7/28. Started on Norvasc 7/28 for elevated BP, monitor. Levothyroxine continued. Awaiting Bx results for possibility of starting inpatient chemo.      Vital Signs:   07/29/23 22:57:09 98.5 °F (36.9 °C) 118 Abnormal  -- 150/98 -- 97 % -- Sitting   07/29/23 14:31:38 98.2 °F (36.8 °C) 114 Abnormal  14 154/97 116 96 % -- --   07/29/23 0900 -- -- -- -- -- -- None (Room air) --   07/29/23 08:20:27 98 °F (36.7 °C) 94 20 140/97 111 96 % -- --   07/29/23 01:31:09 98.2 °F (36.8 °C) 103 18 140/96 -- 96 % -- Sitting   07/29/23 01:30:36 -- 114 Abnormal  -- -- -- 96 % -- --   07/29/23 0100 -- -- -- -- -- -- None (Room air) --     Pertinent Labs/Diagnostic Results:     Results from last 7 days   Lab Units 07/31/23  0541 07/25/23  0601   WBC Thousand/uL 10.10 12.02*   HEMOGLOBIN g/dL 12.0 12.0   HEMATOCRIT % 37.1 38.4   PLATELETS Thousands/uL 390 375   NEUTROS ABS Thousands/µL 6.11 7.61     Results from last 7 days   Lab Units 07/31/23  0541 07/25/23  0601   SODIUM mmol/L 137 139   POTASSIUM mmol/L 3.8 3.7   CHLORIDE mmol/L 108 109*   CO2 mmol/L 24 24   ANION GAP mmol/L 5 6   BUN mg/dL 18 17   CREATININE mg/dL 0.70 0.69   EGFR ml/min/1.73sq m 123 123   CALCIUM mg/dL 8.9 8.8     Results from last 7 days   Lab Units 07/31/23  0541   AST U/L 11   ALT U/L 17   ALK PHOS U/L 79   TOTAL PROTEIN g/dL 7.5   ALBUMIN g/dL 3.1*   TOTAL BILIRUBIN mg/dL 0.35     Results from last 7 days   Lab Units 07/31/23  0541 07/25/23  0601   GLUCOSE RANDOM mg/dL 104 92     Results from last 7 days   Lab Units 07/30/23  2352 07/30/23  2151   HS TNI 0HR ng/L  --  5   HS TNI 2HR ng/L 4  --    HSTNI D2 ng/L -1  --      Results from last 7 days   Lab Units 07/25/23  0601   PROTIME seconds 12.6   INR 0.92     Medications:   Scheduled Medications:  amLODIPine, 2.5 mg, Oral, Daily  dicyclomine, 20 mg, Oral, BID  levothyroxine, 150 mcg, Oral, Early Morning  montelukast, 10 mg, Oral, HS    PRN Meds:  acetaminophen, 650 mg, Oral, Q6H PRN  albuterol, 2 puff, Inhalation, Q6H PRN  aluminum-magnesium hydroxide-simethicone, 30 mL, Oral, Q6H PRN  LORazepam, 0.5 mg, Oral, Q8H PRN  ondansetron, 4 mg, Intravenous, Q6H PRN    Discharge Plan: d    Network Utilization Review Department  ATTENTION: Please call with any questions or concerns to [phone] and carefully listen to the prompts so that you are directed to the right person. All voicemails are confidential.  Sandrine Whittington all requests for admission clinical reviews, approved or denied determinations and any other requests to dedicated fax number below belonging to the campus where the patient is receiving treatment.  List of dedicated fax numbers for the Facilities:  [place] DENIALS (Administrative/Medical Necessity) [phone]   [address] [facility] (Maternity/NICU/Pediatrics) [phone]   [address] [place] [address] Drive [phone]   [facility] [phone]   [facility] [address] [facility] [address] [place] [address] [phone]   [address] [facility] [phone]

## 2023-08-01 PROBLEM — R13.10 DYSPHAGIA: Status: RESOLVED | Noted: 2023-07-22 | Resolved: 2023-08-01

## 2023-08-01 PROCEDURE — NC001 PR NO CHARGE: Performed by: INTERNAL MEDICINE

## 2023-08-01 PROCEDURE — 99232 SBSQ HOSP IP/OBS MODERATE 35: CPT | Performed by: NURSE PRACTITIONER

## 2023-08-01 RX ADMIN — AMLODIPINE BESYLATE 2.5 MG: 2.5 TABLET ORAL at 08:46

## 2023-08-01 RX ADMIN — MONTELUKAST 10 MG: 10 TABLET, FILM COATED ORAL at 21:59

## 2023-08-01 RX ADMIN — LORAZEPAM 0.5 MG: 0.5 TABLET ORAL at 22:01

## 2023-08-01 RX ADMIN — LEVOTHYROXINE SODIUM 150 MCG: 75 TABLET ORAL at 05:54

## 2023-08-01 RX ADMIN — DICYCLOMINE HYDROCHLORIDE 20 MG: 20 TABLET ORAL at 08:46

## 2023-08-01 RX ADMIN — DICYCLOMINE HYDROCHLORIDE 20 MG: 20 TABLET ORAL at 16:51

## 2023-08-01 NOTE — PLAN OF CARE
Problem: PAIN - ADULT  Goal: Verbalizes/displays adequate comfort level or baseline comfort level  Description: Interventions:  - Encourage patient to monitor pain and request assistance  - Assess pain using appropriate pain scale  - Administer analgesics based on type and severity of pain and evaluate response  - Implement non-pharmacological measures as appropriate and evaluate response  - Consider cultural and social influences on pain and pain management  - Notify physician/advanced practitioner if interventions unsuccessful or patient reports new pain  Outcome: Progressing     Problem: INFECTION - ADULT  Goal: Absence or prevention of progression during hospitalization  Description: INTERVENTIONS:  - Assess and monitor for signs and symptoms of infection  - Monitor lab/diagnostic results  - Monitor all insertion sites, i.e. indwelling lines, tubes, and drains  - Monitor endotracheal if appropriate and nasal secretions for changes in amount and color  - [place] appropriate cooling/warming therapies per order  - Administer medications as ordered  - Instruct and encourage patient and family to use good hand hygiene technique  - Identify and instruct in appropriate isolation precautions for identified infection/condition  Outcome: Progressing  Goal: Absence of fever/infection during neutropenic period  Description: INTERVENTIONS:  - Monitor WBC    Outcome: Progressing     Problem: SAFETY ADULT  Goal: Patient will remain free of falls  Description: INTERVENTIONS:  - Educate patient/family on patient safety including physical limitations  - Instruct patient to call for assistance with activity   - Keep Call bell within reach  - Keep bed low and locked with side rails adjusted as appropriate  - Keep care items and personal belongings within reach  - Initiate and maintain comfort rounds  Outcome: Progressing  Goal: Maintain or return to baseline ADL function  Description: INTERVENTIONS:  -  Assess patient's ability to carry out ADLs; assess patient's baseline for ADL function and identify physical deficits which impact ability to perform ADLs (bathing, care of mouth/teeth, toileting, grooming, dressing, etc.)  - Assess/evaluate cause of self-care deficits   - Assess range of motion  - Assess patient's mobility; develop plan if impaired  - Assess patient's need for assistive devices and provide as appropriate  - Encourage maximum independence but intervene and supervise when necessary  - Involve family in performance of ADLs  - Assess for home care needs following discharge   - Consider OT consult to assist with ADL evaluation and planning for discharge  - Provide patient education as appropriate  Outcome: Progressing  Goal: Maintains/Returns to pre admission functional level  Description: INTERVENTIONS:  - Perform BMAT or MOVE assessment daily.   - Set and communicate daily mobility goal to care team and patient/family/caregiver.    - Collaborate with rehabilitation services on mobility goals if consulted  Outcome: Progressing     Problem: DISCHARGE PLANNING  Goal: Discharge to home or other facility with appropriate resources  Description: INTERVENTIONS:  - Identify barriers to discharge w/patient and caregiver  - Arrange for needed discharge resources and transportation as appropriate  - Identify discharge learning needs (meds, wound care, etc.)  - Arrange for interpretive services to assist at discharge as needed  - Refer to Case Management Department for coordinating discharge planning if the patient needs post-hospital services based on physician/advanced practitioner order or complex needs related to functional status, cognitive ability, or social support system  Outcome: Progressing     Problem: Knowledge Deficit  Goal: Patient/family/caregiver demonstrates understanding of disease process, treatment plan, medications, and discharge instructions  Description: Complete learning assessment and assess knowledge base.  Interventions:  - Provide teaching at level of understanding  - Provide teaching via preferred learning methods  Outcome: Progressing

## 2023-08-01 NOTE — ASSESSMENT & PLAN NOTE
Noted this hospitalization, started on Norvasc 2.5 mg p.o. daily with overall improvement.   · We will continue to monitor and adjust medications as needed

## 2023-08-01 NOTE — PROGRESS NOTES
[address] [facility]  Progress Note  Name: Promise Cooper  MRN: [id number]  Unit/Bed#: [facility] 913-01 I Date of Admission: 7/22/2023   Date of Service: 8/1/2023 I Hospital Day: 10    Assessment/Plan   * Lymphoma of thyroid gland [facility])  Assessment & Plan  History of asthma recently found to have thyroid enlargement thought to have subacute thyroiditis and started on prednisone/levothyroxine eventually biopsy found to have thyroid lymphoma, awaiting final biopsy result. · Echo done in preparation for chemotherapy, unremarkable. · Seen by endocrinology  · Now weaned off prednisone. Continuing levothyroxine. · Oncology following, recommended bone marrow biopsy was done in IR 7/26 which does not show any involvement by B-cell lymphoma. Flow cytometry negative. Did show reduced iron stores, will check iron panel. · Discussion regarding treatment following biopsy, FISH, and Cytogenetics results, hopeful for results in next 24-48 hours per oncology. · Possible (likely) inpatient chemotherapy    Subacute thyroiditis  Assessment & Plan  · Working diagnosis before found to have lymphoma with subacute thyroiditis   · Continue levothyroxine 125 mcg daily  · Now off prednisone. Mild intermittent asthma without complication  Assessment & Plan  · No exacerbation   · Continue Albuterol PRN  · Continue montelukast    Elevated blood pressure reading  Assessment & Plan  Noted this hospitalization, started on Norvasc 2.5 mg p.o. daily with overall improvement. · We will continue to monitor and adjust medications as needed    Dysphagia-resolved as of 8/1/2023  Assessment & Plan  · Secondary to thyromegaly  · Tolerating regular diet    Hypothyroidism  Assessment & Plan  · TSH within normal limits  · Continue Levothyroxine    Morbid obesity ([address])  Assessment & Plan  · Body mass index is 48.01 kg/m².    · Diet and lifestyle modifications         VTE Pharmacologic Prophylaxis:   Low Risk (Score 0-2) - Encourage Ambulation. Patient Centered Rounds: I performed bedside rounds with nursing staff today. Discussions with Specialists or Other Care Team Provider: nursing, cm, oncology     Education and Discussions with Family / Patient: Updated  (significant other) at bedside. Total Time Spent on Date of Encounter in care of patient: 35 minutes This time was spent on one or more of the following: performing physical exam; counseling and coordination of care; obtaining or reviewing history; documenting in the medical record; reviewing/ordering tests, medications or procedures; communicating with other healthcare professionals and discussing with patient's family/caregivers. Current Length of Stay: 10 day(s)  Current Patient Status: Inpatient   Certification Statement: The patient will continue to require additional inpatient hospital stay due to pending final biopsy results, oncology recommendations   Discharge Plan: Anticipate discharge in >72 hrs to home. Code Status: Level 1 - Full Code    Subjective:   No complaints. Objective:     Vitals:   Temp (24hrs), Av.9 °F (36.6 °C), Min:97.4 °F (36.3 °C), Max:98.3 °F (36.8 °C)    Temp:  [97.4 °F (36.3 °C)-98.3 °F (36.8 °C)] 97.4 °F (36.3 °C)  HR:  [100-124] 100  Resp:  [14-18] 18  BP: (139-151)/() 139/94  SpO2:  [90 %-96 %] 95 %  Body mass index is 48.01 kg/m². Input and Output Summary (last 24 hours): Intake/Output Summary (Last 24 hours) at 2023 1020  Last data filed at 2023 0900  Gross per 24 hour   Intake 0 ml   Output --   Net 0 ml       Physical Exam:   Physical Exam  Vitals and nursing note reviewed. Constitutional:       Appearance: She is obese. Cardiovascular:      Rate and Rhythm: Normal rate. Pulmonary:      Effort: No respiratory distress. Musculoskeletal:         General: No swelling. Skin:     General: Skin is warm.    Neurological:      Mental Status: She is alert and oriented to person, place, and time. Mental status is at baseline. Psychiatric:         Mood and Affect: Mood normal.          Additional Data:     Labs:  Results from last 7 days   Lab Units 07/31/23  0541   WBC Thousand/uL 10.10   HEMOGLOBIN g/dL 12.0   HEMATOCRIT % 37.1   PLATELETS Thousands/uL 390   NEUTROS PCT % 60   LYMPHS PCT % 27   MONOS PCT % 7   EOS PCT % 4     Results from last 7 days   Lab Units 07/31/23  0541   SODIUM mmol/L 137   POTASSIUM mmol/L 3.8   CHLORIDE mmol/L 108   CO2 mmol/L 24   BUN mg/dL 18   CREATININE mg/dL 0.70   ANION GAP mmol/L 5   CALCIUM mg/dL 8.9   ALBUMIN g/dL 3.1*   TOTAL BILIRUBIN mg/dL 0.35   ALK PHOS U/L 79   ALT U/L 17   AST U/L 11   GLUCOSE RANDOM mg/dL 104                       Lines/Drains:  Invasive Devices     None                       Imaging: No pertinent imaging reviewed. Recent Cultures (last 7 days):         Last 24 Hours Medication List:   Current Facility-Administered Medications   Medication Dose Route Frequency Provider Last Rate   • acetaminophen  650 mg Oral Q6H PRN Perry Lucas MD     • albuterol  2 puff Inhalation Q6H PRN Shamar Zamarripa MD     • aluminum-magnesium hydroxide-simethicone  30 mL Oral Q6H PRN Shamar Zamarripa MD     • amLODIPine  2.5 mg Oral Daily Arielle Cabezas DO     • dicyclomine  20 mg Oral BID Shamar Zamarripa MD     • levothyroxine  150 mcg Oral Early Morning Perry Lucas MD     • LORazepam  0.5 mg Oral Q8H PRN Arielle Cabezas DO     • montelukast  10 mg Oral HS Shamar Zamarripa MD     • ondansetron  4 mg Intravenous Q6H PRN Shamar Zamarripa MD          Today, Patient Was Seen By: TATI Borrego    **Please Note: This note may have been constructed using a voice recognition system. **

## 2023-08-01 NOTE — ASSESSMENT & PLAN NOTE
· Working diagnosis before found to have lymphoma with subacute thyroiditis   · Continue levothyroxine 125 mcg daily  · Now off prednisone.

## 2023-08-01 NOTE — ASSESSMENT & PLAN NOTE
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 · Echo done in preparation for chemotherapy, unremarkable. · Seen by endocrinology  · Now weaned off prednisone. Continuing levothyroxine. · Oncology following, recommended bone marrow biopsy was done in IR 7/26 which does not show any involvement by B-cell lymphoma. Flow cytometry negative. Did show reduced iron stores, will check iron panel. · Discussion regarding treatment following biopsy, FISH, and Cytogenetics results, hopeful for results in next 24-48 hours per oncology.    · Possible (likely) inpatient chemotherapy

## 2023-08-02 ENCOUNTER — DOCUMENTATION (OUTPATIENT)
Dept: HEMATOLOGY ONCOLOGY | Facility: CLINIC | Age: 23
End: 2023-08-02

## 2023-08-02 LAB
ALBUMIN SERPL BCP-MCNC: 3.1 G/DL (ref 3.5–5)
ALP SERPL-CCNC: 76 U/L (ref 46–116)
ALT SERPL W P-5'-P-CCNC: 17 U/L (ref 12–78)
ANION GAP SERPL CALCULATED.3IONS-SCNC: 5 MMOL/L
AST SERPL W P-5'-P-CCNC: 12 U/L (ref 5–45)
ATRIAL RATE: 131 BPM
ATRIAL RATE: 134 BPM
BASOPHILS # BLD AUTO: 0.04 THOUSANDS/ÂΜL (ref 0–0.1)
BASOPHILS NFR BLD AUTO: 0 % (ref 0–1)
BILIRUB SERPL-MCNC: 0.17 MG/DL (ref 0.2–1)
BUN SERPL-MCNC: 13 MG/DL (ref 5–25)
CALCIUM ALBUM COR SERPL-MCNC: 9.6 MG/DL (ref 8.3–10.1)
CALCIUM SERPL-MCNC: 8.9 MG/DL (ref 8.3–10.1)
CHLORIDE SERPL-SCNC: 107 MMOL/L (ref 96–108)
CO2 SERPL-SCNC: 26 MMOL/L (ref 21–32)
CREAT SERPL-MCNC: 0.82 MG/DL (ref 0.6–1.3)
EOSINOPHIL # BLD AUTO: 0.38 THOUSAND/ÂΜL (ref 0–0.61)
EOSINOPHIL NFR BLD AUTO: 4 % (ref 0–6)
ERYTHROCYTE [DISTWIDTH] IN BLOOD BY AUTOMATED COUNT: 12.6 % (ref 11.6–15.1)
FERRITIN SERPL-MCNC: 82 NG/ML (ref 11–307)
GFR SERPL CREATININE-BSD FRML MDRD: 101 ML/MIN/1.73SQ M
GLUCOSE SERPL-MCNC: 107 MG/DL (ref 65–140)
HCG SERPL QL: NEGATIVE
HCT VFR BLD AUTO: 37.5 % (ref 34.8–46.1)
HGB BLD-MCNC: 11.9 G/DL (ref 11.5–15.4)
IMM GRANULOCYTES # BLD AUTO: 0.08 THOUSAND/UL (ref 0–0.2)
IMM GRANULOCYTES NFR BLD AUTO: 1 % (ref 0–2)
IRON SATN MFR SERPL: 13 % (ref 15–50)
IRON SERPL-MCNC: 42 UG/DL (ref 50–170)
LDH SERPL-CCNC: 235 U/L (ref 81–234)
LYMPHOCYTES # BLD AUTO: 2.58 THOUSANDS/ÂΜL (ref 0.6–4.47)
LYMPHOCYTES NFR BLD AUTO: 28 % (ref 14–44)
MCH RBC QN AUTO: 27.3 PG (ref 26.8–34.3)
MCHC RBC AUTO-ENTMCNC: 31.7 G/DL (ref 31.4–37.4)
MCV RBC AUTO: 86 FL (ref 82–98)
MONOCYTES # BLD AUTO: 0.58 THOUSAND/ÂΜL (ref 0.17–1.22)
MONOCYTES NFR BLD AUTO: 6 % (ref 4–12)
NEUTROPHILS # BLD AUTO: 5.56 THOUSANDS/ÂΜL (ref 1.85–7.62)
NEUTS SEG NFR BLD AUTO: 61 % (ref 43–75)
NRBC BLD AUTO-RTO: 0 /100 WBCS
P AXIS: 52 DEGREES
P AXIS: 53 DEGREES
PLATELET # BLD AUTO: 392 THOUSANDS/UL (ref 149–390)
PMV BLD AUTO: 9.5 FL (ref 8.9–12.7)
POTASSIUM SERPL-SCNC: 3.9 MMOL/L (ref 3.5–5.3)
PR INTERVAL: 128 MS
PR INTERVAL: 150 MS
PROT SERPL-MCNC: 7.4 G/DL (ref 6.4–8.4)
QRS AXIS: 79 DEGREES
QRS AXIS: 80 DEGREES
QRSD INTERVAL: 68 MS
QRSD INTERVAL: 68 MS
QT INTERVAL: 308 MS
QT INTERVAL: 314 MS
QTC INTERVAL: 454 MS
QTC INTERVAL: 468 MS
RBC # BLD AUTO: 4.36 MILLION/UL (ref 3.81–5.12)
SODIUM SERPL-SCNC: 138 MMOL/L (ref 135–147)
T WAVE AXIS: -4 DEGREES
T WAVE AXIS: -5 DEGREES
TIBC SERPL-MCNC: 319 UG/DL (ref 250–450)
URATE SERPL-MCNC: 5.8 MG/DL (ref 2–7.5)
VENTRICULAR RATE: 131 BPM
VENTRICULAR RATE: 134 BPM
WBC # BLD AUTO: 9.22 THOUSAND/UL (ref 4.31–10.16)

## 2023-08-02 PROCEDURE — 85025 COMPLETE CBC W/AUTO DIFF WBC: CPT | Performed by: STUDENT IN AN ORGANIZED HEALTH CARE EDUCATION/TRAINING PROGRAM

## 2023-08-02 PROCEDURE — 36569 INSJ PICC 5 YR+ W/O IMAGING: CPT

## 2023-08-02 PROCEDURE — 87040 BLOOD CULTURE FOR BACTERIA: CPT | Performed by: INTERNAL MEDICINE

## 2023-08-02 PROCEDURE — 83540 ASSAY OF IRON: CPT | Performed by: NURSE PRACTITIONER

## 2023-08-02 PROCEDURE — 02HV33Z INSERTION OF INFUSION DEVICE INTO SUPERIOR VENA CAVA, PERCUTANEOUS APPROACH: ICD-10-PCS | Performed by: STUDENT IN AN ORGANIZED HEALTH CARE EDUCATION/TRAINING PROGRAM

## 2023-08-02 PROCEDURE — 99232 SBSQ HOSP IP/OBS MODERATE 35: CPT | Performed by: NURSE PRACTITIONER

## 2023-08-02 PROCEDURE — 99232 SBSQ HOSP IP/OBS MODERATE 35: CPT | Performed by: INTERNAL MEDICINE

## 2023-08-02 PROCEDURE — 83615 LACTATE (LD) (LDH) ENZYME: CPT | Performed by: STUDENT IN AN ORGANIZED HEALTH CARE EDUCATION/TRAINING PROGRAM

## 2023-08-02 PROCEDURE — 84703 CHORIONIC GONADOTROPIN ASSAY: CPT | Performed by: STUDENT IN AN ORGANIZED HEALTH CARE EDUCATION/TRAINING PROGRAM

## 2023-08-02 PROCEDURE — 82728 ASSAY OF FERRITIN: CPT | Performed by: NURSE PRACTITIONER

## 2023-08-02 PROCEDURE — C1751 CATH, INF, PER/CENT/MIDLINE: HCPCS

## 2023-08-02 PROCEDURE — 80053 COMPREHEN METABOLIC PANEL: CPT | Performed by: STUDENT IN AN ORGANIZED HEALTH CARE EDUCATION/TRAINING PROGRAM

## 2023-08-02 PROCEDURE — 84550 ASSAY OF BLOOD/URIC ACID: CPT | Performed by: STUDENT IN AN ORGANIZED HEALTH CARE EDUCATION/TRAINING PROGRAM

## 2023-08-02 PROCEDURE — 93010 ELECTROCARDIOGRAM REPORT: CPT | Performed by: INTERNAL MEDICINE

## 2023-08-02 PROCEDURE — 3E04305 INTRODUCTION OF OTHER ANTINEOPLASTIC INTO CENTRAL VEIN, PERCUTANEOUS APPROACH: ICD-10-PCS | Performed by: INTERNAL MEDICINE

## 2023-08-02 PROCEDURE — 93005 ELECTROCARDIOGRAM TRACING: CPT

## 2023-08-02 PROCEDURE — 83550 IRON BINDING TEST: CPT | Performed by: NURSE PRACTITIONER

## 2023-08-02 RX ORDER — SODIUM CHLORIDE 9 MG/ML
20 INJECTION, SOLUTION INTRAVENOUS ONCE
Status: COMPLETED | OUTPATIENT
Start: 2023-08-02 | End: 2023-08-03

## 2023-08-02 RX ORDER — PREDNISONE 20 MG/1
100 TABLET ORAL DAILY
Status: DISCONTINUED | OUTPATIENT
Start: 2023-08-02 | End: 2023-08-05

## 2023-08-02 RX ORDER — ACETAMINOPHEN 325 MG/1
500 TABLET ORAL ONCE
Status: COMPLETED | OUTPATIENT
Start: 2023-08-02 | End: 2023-08-02

## 2023-08-02 RX ORDER — LORATADINE 10 MG/1
10 TABLET ORAL
Status: DISCONTINUED | OUTPATIENT
Start: 2023-08-02 | End: 2023-08-07 | Stop reason: HOSPADM

## 2023-08-02 RX ORDER — DIPHENHYDRAMINE HYDROCHLORIDE 50 MG/ML
25 INJECTION INTRAMUSCULAR; INTRAVENOUS ONCE
Status: COMPLETED | OUTPATIENT
Start: 2023-08-02 | End: 2023-08-02

## 2023-08-02 RX ORDER — PREDNISONE 50 MG/1
100 TABLET ORAL DAILY
Qty: 10 TABLET | Refills: 0 | Status: SHIPPED | OUTPATIENT
Start: 2023-08-02 | End: 2023-08-07

## 2023-08-02 RX ORDER — DOXORUBICIN HYDROCHLORIDE 2 MG/ML
50 INJECTION, SOLUTION INTRAVENOUS ONCE
Status: COMPLETED | OUTPATIENT
Start: 2023-08-02 | End: 2023-08-03

## 2023-08-02 RX ORDER — ALLOPURINOL 100 MG/1
100 TABLET ORAL DAILY
Status: DISCONTINUED | OUTPATIENT
Start: 2023-08-02 | End: 2023-08-05

## 2023-08-02 RX ORDER — ACETAMINOPHEN 325 MG/1
650 TABLET ORAL ONCE
Status: COMPLETED | OUTPATIENT
Start: 2023-08-02 | End: 2023-08-02

## 2023-08-02 RX ADMIN — DIPHENHYDRAMINE HYDROCHLORIDE 25 MG: 50 INJECTION, SOLUTION INTRAMUSCULAR; INTRAVENOUS at 20:18

## 2023-08-02 RX ADMIN — SODIUM CHLORIDE 1000 ML: 0.9 INJECTION, SOLUTION INTRAVENOUS at 19:55

## 2023-08-02 RX ADMIN — RITUXIMAB 800 MG: 10 INJECTION, SOLUTION INTRAVENOUS at 17:36

## 2023-08-02 RX ADMIN — FOSAPREPITANT 150 MG: 150 INJECTION, POWDER, LYOPHILIZED, FOR SOLUTION INTRAVENOUS at 22:57

## 2023-08-02 RX ADMIN — ONDANSETRON: 2 INJECTION INTRAMUSCULAR; INTRAVENOUS at 22:30

## 2023-08-02 RX ADMIN — ACETAMINOPHEN 488 MG: 325 TABLET, FILM COATED ORAL at 20:18

## 2023-08-02 RX ADMIN — ACETAMINOPHEN 650 MG: 325 TABLET, FILM COATED ORAL at 17:18

## 2023-08-02 RX ADMIN — SODIUM CHLORIDE 20 ML/HR: 0.9 INJECTION, SOLUTION INTRAVENOUS at 17:00

## 2023-08-02 RX ADMIN — AMLODIPINE BESYLATE 2.5 MG: 2.5 TABLET ORAL at 09:11

## 2023-08-02 RX ADMIN — MONTELUKAST 10 MG: 10 TABLET, FILM COATED ORAL at 21:36

## 2023-08-02 RX ADMIN — CYCLOPHOSPHAMIDE 1650 MG: 1 INJECTION, POWDER, FOR SOLUTION INTRAVENOUS; ORAL at 23:31

## 2023-08-02 RX ADMIN — PREDNISONE 100 MG: 20 TABLET ORAL at 21:35

## 2023-08-02 RX ADMIN — ALLOPURINOL 100 MG: 100 TABLET ORAL at 12:52

## 2023-08-02 RX ADMIN — LORATADINE 10 MG: 10 TABLET ORAL at 21:35

## 2023-08-02 RX ADMIN — LEVOTHYROXINE SODIUM 150 MCG: 75 TABLET ORAL at 05:50

## 2023-08-02 RX ADMIN — DICYCLOMINE HYDROCHLORIDE 20 MG: 20 TABLET ORAL at 17:18

## 2023-08-02 RX ADMIN — DIPHENHYDRAMINE HYDROCHLORIDE 25 MG: 50 INJECTION, SOLUTION INTRAMUSCULAR; INTRAVENOUS at 17:00

## 2023-08-02 RX ADMIN — DICYCLOMINE HYDROCHLORIDE 20 MG: 20 TABLET ORAL at 09:11

## 2023-08-02 RX ADMIN — SODIUM CHLORIDE 1000 ML: 0.9 INJECTION, SOLUTION INTRAVENOUS at 21:38

## 2023-08-02 NOTE — PLAN OF CARE
Problem: PAIN - ADULT  Goal: Verbalizes/displays adequate comfort level or baseline comfort level  Description: Interventions:  - Encourage patient to monitor pain and request assistance  - Assess pain using appropriate pain scale  - Administer analgesics based on type and severity of pain and evaluate response  - Implement non-pharmacological measures as appropriate and evaluate response  - Consider cultural and social influences on pain and pain management  - Notify physician/advanced practitioner if interventions unsuccessful or patient reports new pain  Outcome: Progressing     Problem: INFECTION - ADULT  Goal: Absence or prevention of progression during hospitalization  Description: INTERVENTIONS:  - Assess and monitor for signs and symptoms of infection  - Monitor lab/diagnostic results  - Monitor all insertion sites, i.e. indwelling lines, tubes, and drains  - Monitor endotracheal if appropriate and nasal secretions for changes in amount and color  - [place] appropriate cooling/warming therapies per order  - Administer medications as ordered  - Instruct and encourage patient and family to use good hand hygiene technique  - Identify and instruct in appropriate isolation precautions for identified infection/condition  Outcome: Progressing  Goal: Absence of fever/infection during neutropenic period  Description: INTERVENTIONS:  - Monitor WBC    Outcome: Progressing     Problem: SAFETY ADULT  Goal: Patient will remain free of falls  Description: INTERVENTIONS:  - Educate patient/family on patient safety including physical limitations  - Instruct patient to call for assistance with activity   - Keep Call bell within reach  - Keep bed low and locked with side rails adjusted as appropriate  - Keep care items and personal belongings within reach  - Initiate and maintain comfort rounds  Outcome: Progressing     Problem: DISCHARGE PLANNING  Goal: Discharge to home or other facility with appropriate resources  Description: INTERVENTIONS:  - Identify barriers to discharge w/patient and caregiver  - Arrange for needed discharge resources and transportation as appropriate  - Identify discharge learning needs (meds, wound care, etc.)  - Arrange for interpretive services to assist at discharge as needed  - Refer to Case Management Department for coordinating discharge planning if the patient needs post-hospital services based on physician/advanced practitioner order or complex needs related to functional status, cognitive ability, or social support system  Outcome: Progressing     Problem: Knowledge Deficit  Goal: Patient/family/caregiver demonstrates understanding of disease process, treatment plan, medications, and discharge instructions  Description: Complete learning assessment and assess knowledge base.   Interventions:  - Provide teaching at level of understanding  - Provide teaching via preferred learning methods  Outcome: Progressing     Problem: HEMATOLOGIC - ADULT  Goal: Maintains hematologic stability  Description: INTERVENTIONS  - Assess for signs and symptoms of bleeding or hemorrhage  - Monitor labs  - Administer supportive blood products/factors as ordered and appropriate  Outcome: Progressing

## 2023-08-02 NOTE — CASE MANAGEMENT
Case Management Discharge Planning Note    Patient name Nicola Every  Location [facility]13/[facility] 876-66 MRN [id number]  : 2000 Date 2023       Current Admission Date: 2023  Current Admission Diagnosis:Lymphoma of thyroid gland [facility])   Patient Active Problem List    Diagnosis Date Noted   • Elevated blood pressure reading 2023   • Hypothyroidism 2023   • Lymphoma of thyroid gland ([address]) 2023   • Subacute thyroiditis 2023   • Morbid obesity ([address]) 2023   • Enlarged thyroid 2023   • Mild intermittent asthma without complication    • Seasonal allergic rhinitis due to pollen 2017   • Shrimp allergy 2017      LOS (days): 11  Geometric Mean LOS (GMLOS) (days):   Days to GMLOS:     OBJECTIVE:  Risk of Unplanned Readmission Score: 11.62         Current admission status: Inpatient   Preferred Pharmacy:   [facility], [address] [address]  Phone: [phone] Fax: [phone]    Primary Care Provider: Janes Pinon DO    Primary Insurance: [facility]  Secondary Insurance:     DISCHARGE DETAILS:              Other Referral/Resources/Interventions Provided:  Referral Comments: Pt discussed with [facility] for care coordination. Will be starting chemo. Likely no cm needs at [place]. CM to follow.

## 2023-08-02 NOTE — PROGRESS NOTES
[address] [facility]  Progress Note  Name: Rufus Licona  MRN: [id number]  Unit/Bed#: [facility] 913-01 I Date of Admission: 7/22/2023   Date of Service: 8/2/2023 I Hospital Day: 11    Assessment/Plan   * Lymphoma of thyroid gland [facility])  Assessment & Plan  History of asthma recently found to have thyroid enlargement thought to have subacute thyroiditis and started on prednisone/levothyroxine eventually biopsy found to have thyroid lymphoma. · Echo done in preparation for chemotherapy, unremarkable. · Seen by endocrinology  · Now weaned off prednisone. Continuing levothyroxine. · Oncology following, recommended bone marrow biopsy was done in IR 7/26 which does not show any involvement by B-cell lymphoma. Flow cytometry negative. Did show reduced iron stores. · Per pathology, Burkitt lymphoma ruled out, at this time best classified as diffuse large B cell lymphoma. · Molecular testing remains pending. · Chemo to start today versus tomorrow. PICC to be placed for therapy. Subacute thyroiditis  Assessment & Plan  · Working diagnosis before found to have lymphoma with subacute thyroiditis   · Continue levothyroxine 125 mcg daily  · Now off prednisone. Mild intermittent asthma without complication  Assessment & Plan  · No exacerbation   · Continue Albuterol PRN  · Continue montelukast    Elevated blood pressure reading  Assessment & Plan  Noted this hospitalization, started on Norvasc 2.5 mg p.o. daily with overall improvement. · Monitor, increase as needed     Hypothyroidism  Assessment & Plan  · TSH within normal limits  · Continue Levothyroxine    Morbid obesity (HCC)  Assessment & Plan  · Body mass index is 48.01 kg/m². · Diet and lifestyle modifications         VTE Pharmacologic Prophylaxis:   Low Risk (Score 0-2) - Encourage Ambulation. Patient Centered Rounds: I performed bedside rounds with nursing staff today.      Discussions with Specialists or Other Care Team Provider: nursing, onc fellow     Education and Discussions with Family / Patient: Updated  (significant other) at bedside. Total Time Spent on Date of Encounter in care of patient: 25 minutes This time was spent on one or more of the following: performing physical exam; counseling and coordination of care; obtaining or reviewing history; documenting in the medical record; reviewing/ordering tests, medications or procedures; communicating with other healthcare professionals and discussing with patient's family/caregivers. Current Length of Stay: 11 day(s)  Current Patient Status: Inpatient   Certification Statement: The patient will continue to require additional inpatient hospital stay due to inpatient chemo  Discharge Plan: Anticipate discharge in >72 hrs to home. Code Status: Level 1 - Full Code    Subjective:   No complaints. Discussed pathology, she was already aware of result from onc team. She is feeling hopeful. Agreeable for inpatient chemo and PICC placement. Objective:     Vitals:   Temp (24hrs), Av.9 °F (36.6 °C), Min:97.7 °F (36.5 °C), Max:98 °F (36.7 °C)    Temp:  [97.7 °F (36.5 °C)-98 °F (36.7 °C)] 97.7 °F (36.5 °C)  HR:  [109-114] 109  Resp:  [14-17] 14  BP: (130-149)/() 130/89  SpO2:  [93 %-94 %] 94 %  Body mass index is 48.01 kg/m². Input and Output Summary (last 24 hours): Intake/Output Summary (Last 24 hours) at 2023 0948  Last data filed at 2023 1900  Gross per 24 hour   Intake 120 ml   Output --   Net 120 ml       Physical Exam:   Physical Exam  Vitals and nursing note reviewed. Constitutional:       General: She is not in acute distress. Appearance: She is obese. Cardiovascular:      Rate and Rhythm: Normal rate. Pulmonary:      Effort: No respiratory distress. Skin:     General: Skin is warm. Neurological:      Mental Status: She is alert and oriented to person, place, and time. Mental status is at baseline. Psychiatric:         Mood and Affect: Mood normal.          Additional Data:     Labs:  Results from last 7 days   Lab Units 08/02/23  0539   WBC Thousand/uL 9.22   HEMOGLOBIN g/dL 11.9   HEMATOCRIT % 37.5   PLATELETS Thousands/uL 392*   NEUTROS PCT % 61   LYMPHS PCT % 28   MONOS PCT % 6   EOS PCT % 4     Results from last 7 days   Lab Units 08/02/23  0539   SODIUM mmol/L 138   POTASSIUM mmol/L 3.9   CHLORIDE mmol/L 107   CO2 mmol/L 26   BUN mg/dL 13   CREATININE mg/dL 0.82   ANION GAP mmol/L 5   CALCIUM mg/dL 8.9   ALBUMIN g/dL 3.1*   TOTAL BILIRUBIN mg/dL 0.17*   ALK PHOS U/L 76   ALT U/L 17   AST U/L 12   GLUCOSE RANDOM mg/dL 107                       Lines/Drains:  Invasive Devices     None                       Imaging: No pertinent imaging reviewed. Recent Cultures (last 7 days):         Last 24 Hours Medication List:   Current Facility-Administered Medications   Medication Dose Route Frequency Provider Last Rate   • acetaminophen  650 mg Oral Q6H PRN Perry Lucas MD     • albuterol  2 puff Inhalation Q6H PRN Yasmany Mckeon MD     • aluminum-magnesium hydroxide-simethicone  30 mL Oral Q6H PRN Yasmany Mckeon MD     • amLODIPine  2.5 mg Oral Daily Arielle Cabezas DO     • dicyclomine  20 mg Oral BID Yasmany Mckeon MD     • levothyroxine  150 mcg Oral Early Morning Perry Lucas MD     • LORazepam  0.5 mg Oral Q8H PRN Arielle Cabezas DO     • montelukast  10 mg Oral HS Yasmany Mckeon MD     • ondansetron  4 mg Intravenous Q6H PRN Yasmany Mckeon MD          Today, Patient Was Seen By: TATI Kerr    **Please Note: This note may have been constructed using a voice recognition system. **

## 2023-08-02 NOTE — ASSESSMENT & PLAN NOTE
Noted this hospitalization, started on Norvasc 2.5 mg p.o. daily with overall improvement.   · Monitor, increase as needed

## 2023-08-02 NOTE — PROGRESS NOTES
Hematology - Oncology Progress Note    Sonia Bustos, 2000, [id number]  [facility] 913/[facility] 913-01      Impression and plan:    58-year-old female otherwise good general health recently diagnosed with germinal cell subtype diffuse B-cell lymphoma, non-double expresser (MYC, Bcl-2, BCL6, IRF4 rearrangements negative and 11 q. aberrations not detected). Patient has a large anterior neck mass and bilateral cervical adenopathy concerning for additional disease but no other evidence of adenopathy or masses throughout the body (bulky stage II disease). Recent bone marrow biopsy negative for malignancy. Because patient has been admitted, PET/CT was not able to be performed (we discussed this at length; PET/CT can be gotten after discharge). NCCN guidelines 5.2023 for diffuse large B-cell lymphoma, stage II, bulky (>7.5 cm) first-line therapy includes R-CHOP x 6 cycles +/- ISRT. Mrs. Ramakrishna Cohen can be seen/evaluated by radiation oncology in the future after discharge. At length, I discussed the pathologic diagnosis, the need for the work-up, the stage and treatment options. Patient agrees to chemotherapy (+/- RT in the future). We discussed side effects and toxicities, literature was provided. We discussed the possibility of infertility. Patient states that she is absolutely against getting pregnant in the future; does not wish to have children. We discussed the fact that her mind could change in 5 or 10 years but patient stated she understood and wished to proceed with chemotherapy without any pretreatment GYN manipulation/evaluation. Patient has a PICC line now, will eventually be scheduled for port placement. Patient has been placed on allopurinol; the plan is to administer Granix after the first cycle initially monitoring WBC/ANC is watching for any side effects from the chemotherapy. Mrs. Ramakrishna Cohen lives 5 minutes from the [address] [place] [address] and would like to be treated there after discharge.     The above was discussed at length with patient, boyfriend and mother; all questions were answered. Approximately 20 minutes was spent with the patient today. Case also discussed with the medical oncology fellow as well as the oncology nurse administering the chemotherapy today.  __________________________________________________________________________________________    Chief complaint/reason for admission: Anterior cervical mass    History of present illness: 64-year-old female with bulky thyroid/cervical mass. Presently Mrs. Jomar Brizuela states feeling okay, same as before. No shortness of breath or dyspnea on exertion. No chest pain or pressure. Patient can move her neck without difficulty. No dysphagia or done aphasia. No GI issues. No fevers, chills or sweats. Activities at baseline.     Hospital medications list:  Current Facility-Administered Medications   Medication Dose Route Frequency Provider Last Rate   • acetaminophen  650 mg Oral Q6H PRN Klaus Villareal MD     • albuterol  2 puff Inhalation Q6H PRN Klaus Villareal MD     • aluminum-magnesium hydroxide-simethicone  30 mL Oral Q6H PRN Klaus Villareal MD     • amLODIPine  2.5 mg Oral Daily Arielle Cabezas DO     • dicyclomine  20 mg Oral BID Klaus Villareal MD     • levothyroxine  150 mcg Oral Early Morning Perry Lucas MD     • LORazepam  0.5 mg Oral Q8H PRN Arielle Cabezas DO     • montelukast  10 mg Oral HS Klaus Villareal MD     • ondansetron  4 mg Intravenous Q6H PRN Klaus Villareal MD       Physical exam  /89   Pulse (!) 118   Temp 97.7 °F (36.5 °C) (Oral)   Resp 14   Ht 5' 3" (1.6 m)   Wt 123 kg (271 lb)   LMP 06/29/2023 (Approximate)   SpO2 94%   BMI 48.01 kg/m²   Constitutional: Well formed, well-nourished  in no apparent distress  Eyes: PERRL, conjunctiva pink, anicteric    HENT: Atraumatic, external ears normal, nose normal,    oropharynx moist, no pharyngeal exudates, no thrush, pink  Neck: Diffuse enlarged anterior neck mass, +/- bilateral shotty cervical lymph nodes, good range of motion  Respiratory: Fair entry bilaterally  Cardiovascular: Normal rate, normal rhythm, no murmurs, no gallops, no rubs    GI: Soft, nondistended, normal bowel sounds, nontender, cannot palpate liver or spleen  : No costovertebral angle tenderness, normal reproductive organs, no discharge  Musculoskeletal: No pain or tenderness with palpation of joints, muscles or bones  Integument: Warm, moist, good color, no petechiae or ecchymoses  Lymphatic: No adenopathy in the neck, supra-clavicular region, axilla and groin bilaterally  Extremities: No lower extremity edema, no cords, pulses are 1+  Neurologic: Alert & oriented x 3, CN 2-12 normal, normal motor function, normal sensory function, no focal deficits noted  Psychiatric: Pleasant, responsive, appropriate  Rectal: Deferred    Laboratory test results    8/2/2023 WBC = 9.22 hemoglobin = 11.9 hematocrit = 37.5 platelet = 571 neutrophil = 61% bands = 1% lymphocyte = 20% monocyte = 6% eosinophil = 4% BUN = 13 creatinine = 0.82 calcium = 8.9 AST = 12 ALT = 17 alkaline phosphatase = 76 total protein = 7.4 total bilirubin = 0.17 uric acid = 5.8 LDH = 235    Imaging    7/22/2023 CT soft tissue neck with contrast    THYROID GLAND: Significantly increased size of thyroid tissue compared to the prior exam compressing the airway. LYMPH NODES: Prominent bilateral cervical level 2 lymph nodes as before    IMPRESSION:     Significantly increased size of thyroid tissue compared to the prior exam compressing the airway/trachea. This is consistent with patient's known history of thyroid cancer. Surgical consult recommended for further evaluation. 7/22/2023 CTA chest PE study    IMPRESSION:     No pulmonary embolus. No acute pulmonary disease. Severe enlargement of the thyroid gland with recent biopsy showing lymphoma, with increased tracheal narrowing compared with the neck CT from 6/1/2023 with no obvious postprocedural hemorrhage. See neck CT from today. Hepatic steatosis. 6/23/2023 CT abdomen pelvis with contrast    IMPRESSION:     No acute abnormality.     0.2 cm nonobstructing left lower pole renal calculus     5.1 cm left ovarian cyst. 2.7 cm right adnexal cyst. Recommend outpatient pelvic ultrasound for complete evaluation. However, if there is concern for ovarian torsion, recommend emergent ultrasound    6/1/2023 ultrasound thyroid    FINDINGS: The thyroid is enlarged with rounded margins , right lobe greater than left, and has large areas of heterogeneous hypoechogenicity with diminished Doppler flow. No focal solid mass.     Right lobe: 7.4 x 4.2 x 4.7 cm. Volume 70.5 mL. Left lobe:  6.5 x 3.3 x 3.5 cm. Volume 35.7 mL. Isthmus: 1.4  cm.     IMPRESSION:     Enlarged hypoechoic thyroid with heterogeneity and diminished Doppler flow, findings most consistent with acute de Quervain thyroiditis given the presentation and onset. Cardiology    7/23/2020 three 2D echo.   Left ventricular ejection fraction = 65%     Pathology    Case Report   Surgical Pathology Report                         Case: [id number]                                    Authorizing Provider: Gildardo Barnes MD            Collected:           07/20/2023 1143               Ordering Location:     [facility] Received:            07/20/2023 1347                                      Cardiac Cath Lab                                                              Pathologist:           Jerald Griffin MD                                                                            Specimen:    Lymph Node, neck                                                                           Final Diagnosis   THYROID, BIOPSY: DIFFUSE LARGE B-CELL LYMPHOMA, GERMINAL CENTER SUBTYPE, NON-DOUBLE EXPRESSOR PHENOTYPE; ADDITIONAL ANCILLARY TESTING PENDING; SEE IMPRESSION AND COMMENT     IMPRESSION:  The preliminary findings are of thyroid biopsies involved by a diffuse large B-cell lymphoma with an elevated proliferation index (%). Cytogenetic FISH studies for MYC, BCL2, BCL6 and IRF4 rearrangements as well as 11q aberrations are reportedly negative. The BCL2 positivity by immunohistochemistry and the absence of MYC rearrangements rules out Burkitt lymphoma and high-grade B-cell lymphoma with MYC and BCL2 and / or BCL6 rearrangements. The absence of 11q aberrations rules out high-grade / large B-cell lymphoma with 11q aberrations and the absence of IRF4 rearrangements rules out large B-cell lymphoma with IRF4 rearrangements. The overall findings are most compatible with a diffuse large B-cell lymphoma (DLBCL), NOS, germinal center subtype, per the [facility] criteria, and non-double-expressor phenotype by immunohistochemistry. Correlation with systemic (PET/CT) imaging, SPEP/UPEP with serum immunoglobulin heavy chains (IgG, IgA, IgM, IgE, IgD) and serum and urine free light chains (kappa/lambda) to evaluate for systemic involvement, is recommended, if clinically indicated. Additional studies (molecular and FISH) are pending and will be added to the final report when available.      Subtype: Germinal Center Subtype: (CD10+/BCL6+/MUM1+)  Phenotype: Non-Double-Expressor Phenotype (BCL2+/MYC-)  Ki-67 Proliferation index: % within neoplastic cells  Cytogenetics: MYC, BCL2, BCL6, IRF4 rearrangements and 11q aberrations are NOT DETECTED; ([facility] #[id number], [id number], [id number]; see separate reports).      Reviewed with agreement in intradepartmental consensus. Preliminary results communicated to Sonya Conrad, Gabby Brewster and GALI Woodruff by Dr. Matthew Batista via [employer] on 07/21/2023, at [phone]. Updated results communicated to Dr. Rex Parra by Dr. Matthew Batista via [employer] on 07/24/2023, at [phone]. Updated FISH results communicated to Sonya Steele and LEIGH ANN Case by Dr. Larry Nichols. Gaby via [employer] on 08/01/2023, at 1604.      COMMENT:  H&E stained sections show thyroid tissue diffusely involved by an atypical lymphoid infiltrate. By cytomorphology, the infiltrate shows sheets of intermediate to large cells with variably increased amounts of cytoplasm, large, irregular nuclei with irregular nuclear contours, vesicular chromatin and variably prominent nucleoli. There are frequent mitotic figures and abundant apoptotic bodies. There are no areas of necrosis, granulomata or overt viral cytopathic changes.      By immunohistochemistry, CD20 and PAX5 show that the large cells are B-cells, which co-express CD10, BCL6, BCL2, MUM-1 and MYC (10-20%), and are negative for CD3, CD5, BCL1, CD43, CD23, CD30, p53, TdT, CMV, HSV and EBV encoded RNA in situ hybridization (MAINE JEMIMA). The Ki-67 proliferation index is overall high (%). CD3, CD5 and CD43 highlight background small T-cells. A pan-cytokeratin AE1/3 stain highlights background benign epithelium. All stains are performed with appropriate controls.       FLOW CYTOMETRY STUDIES:  Flow cytometry studies are performed and reportedly show involvement by a monotypic-kappa, CD10-positive B-cell population (48-49%) with variable side scatter, in a background of mixed T-cell subsets (32.28%); ([facility] #[id number]; see separate report).      Preliminary result electronically signed by Vikash Amezquita MD on 8/1/2023 at  4:24 PM   Preliminary result electronically signed by Vikash Amezquita MD on 7/24/2023 at  5:35 PM   Preliminary result electronically signed by Vikash mAezquita MD on 7/21/2023 at  2:56 PM                   Case Report   Surgical Pathology Report                         Case: [id number]                                    Authorizing Provider: Calixto Mancilla MD        Collected:           07/26/2023 0915               Ordering Location:     [address]      Received:            07/26/2023 0925                                      [facility] 9                                                               Pathologist:           Yanci Rodriguez MD                                                           Specimens:   A) - Iliac Crest, Right, core                                                                        B) - Iliac Crest, Right, clot                                                                        C) - Iliac Crest, Right, slide                                                             Final Diagnosis   A -C.  Bone marrow,  right iliac crest,  biopsy, clot, and aspirate:  -  No morphologic or immunophenotypic evidence of B-cell lymphoma.  -  Normocellular (~70%) bone marrow with trilineage maturing hematopoiesis with no increase in blasts. -  Reduced iron stores, correlation with serum iron studies is recommended. -  No increase in reticulin fibrosis.     Comment: The patient's recently diagnosed B-cell lymphoma pending ancillary work-up is noted ([id number]). The current bone marrow biopsy shows no morphologic or immunophenotypic evidence of involvement by B-cell lymphoma. Flow cytometry is negative for myeloid, lymphoid, and plasma cell abnormalities. Karyotype analysis is pending and will be reported in a separate addendum. If clinically indicated material is available for ancillary molecular studies including B-cell gene rearrangement and lymphoid NGS; if compared to the diagnostic sample these testing modalities may be more sensitive in excluding low level molecular involvement of the bone marrow. Clinical correlation is advised.    Electronically signed by Yanci Rodriguez MD on 7/28/2023 at  3:41 PM

## 2023-08-02 NOTE — ASSESSMENT & PLAN NOTE
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 · Echo done in preparation for chemotherapy, unremarkable. · Seen by endocrinology  · Now weaned off prednisone. Continuing levothyroxine. · Oncology following, recommended bone marrow biopsy was done in IR 7/26 which does not show any involvement by B-cell lymphoma. Flow cytometry negative. Did show reduced iron stores. · Per pathology, Burkitt lymphoma ruled out, at this time best classified as diffuse large B cell lymphoma. · Molecular testing remains pending. · Chemo to start today versus tomorrow. PICC to be placed for therapy.

## 2023-08-02 NOTE — PROGRESS NOTES
In-basket message received from Keiry Rojas RN to add patient to [facility] on 8/8/2023. Chart reviewed and prep completed.

## 2023-08-02 NOTE — PLAN OF CARE
Problem: PAIN - ADULT  Goal: Verbalizes/displays adequate comfort level or baseline comfort level  Description: Interventions:  - Encourage patient to monitor pain and request assistance  - Assess pain using appropriate pain scale  - Administer analgesics based on type and severity of pain and evaluate response  - Implement non-pharmacological measures as appropriate and evaluate response  - Consider cultural and social influences on pain and pain management  - Notify physician/advanced practitioner if interventions unsuccessful or patient reports new pain  Outcome: Progressing     Problem: INFECTION - ADULT  Goal: Absence or prevention of progression during hospitalization  Description: INTERVENTIONS:  - Assess and monitor for signs and symptoms of infection  - Monitor lab/diagnostic results  - Monitor all insertion sites, i.e. indwelling lines, tubes, and drains  - Monitor endotracheal if appropriate and nasal secretions for changes in amount and color  - [place] appropriate cooling/warming therapies per order  - Administer medications as ordered  - Instruct and encourage patient and family to use good hand hygiene technique  - Identify and instruct in appropriate isolation precautions for identified infection/condition  Outcome: Progressing  Goal: Absence of fever/infection during neutropenic period  Description: INTERVENTIONS:  - Monitor WBC    Outcome: Progressing     Problem: SAFETY ADULT  Goal: Patient will remain free of falls  Description: INTERVENTIONS:  - Educate patient/family on patient safety including physical limitations  - Instruct patient to call for assistance with activity   - Keep Call bell within reach  - Keep bed low and locked with side rails adjusted as appropriate  - Keep care items and personal belongings within reach  - Initiate and maintain comfort rounds  Outcome: Progressing  Goal: Maintain or return to baseline ADL function  Description: INTERVENTIONS:  -  Assess patient's ability to carry out ADLs; assess patient's baseline for ADL function and identify physical deficits which impact ability to perform ADLs (bathing, care of mouth/teeth, toileting, grooming, dressing, etc.)  - Assess/evaluate cause of self-care deficits   - Assess range of motion  - Assess patient's mobility; develop plan if impaired  - Assess patient's need for assistive devices and provide as appropriate  - Encourage maximum independence but intervene and supervise when necessary  - Involve family in performance of ADLs  - Assess for home care needs following discharge   - Consider OT consult to assist with ADL evaluation and planning for discharge  - Provide patient education as appropriate  Outcome: Progressing  Goal: Maintains/Returns to pre admission functional level  Description: INTERVENTIONS:  - Perform BMAT or MOVE assessment daily.   - Set and communicate daily mobility goal to care team and patient/family/caregiver.    - Collaborate with rehabilitation services on mobility goals if consulted  Outcome: Progressing     Problem: DISCHARGE PLANNING  Goal: Discharge to home or other facility with appropriate resources  Description: INTERVENTIONS:  - Identify barriers to discharge w/patient and caregiver  - Arrange for needed discharge resources and transportation as appropriate  - Identify discharge learning needs (meds, wound care, etc.)  - Arrange for interpretive services to assist at discharge as needed  - Refer to Case Management Department for coordinating discharge planning if the patient needs post-hospital services based on physician/advanced practitioner order or complex needs related to functional status, cognitive ability, or social support system  Outcome: Progressing     Problem: Knowledge Deficit  Goal: Patient/family/caregiver demonstrates understanding of disease process, treatment plan, medications, and discharge instructions  Description: Complete learning assessment and assess knowledge base.  Interventions:  - Provide teaching at level of understanding  - Provide teaching via preferred learning methods  Outcome: Progressing

## 2023-08-02 NOTE — PROCEDURES
Insert Complex Venous Access Line    Date/Time: 2023 10:39 AM    Performed by: Yanci Ding RN  Authorized by: Gary Ortiz DO    Patient location:  Bedside  Other Assisting Provider: Yes (comment) Alyssa Apgar, [facility] lynn)    Consent:     Consent obtained:  Written (Provider obtained)    Consent given by:  Patient    Risks discussed:  Arterial puncture, bleeding, infection, incorrect placement, nerve damage and pneumothorax (provider and [facility] RN discussed)    Alternatives discussed:  No treatment, alternative treatment and delayed treatment (Provider discussed)  Universal protocol:     Procedure explained and questions answered to patient or proxy's satisfaction: yes      Relevant documents present and verified: yes      Test results available and properly labeled: yes      Radiology Images displayed and confirmed. If images not available, report reviewed: yes      Required blood products, implants, devices, and special equipment available: yes      Site/side marked: yes      Immediately prior to procedure, a time out was called: yes      Patient identity confirmed:  Verbally with patient, arm band, provided demographic data and hospital-assigned identification number  Pre-procedure details:     Hand hygiene: Hand hygiene performed prior to insertion      Sterile barrier technique: All elements of maximal sterile technique followed      Skin preparation:  ChloraPrep    Skin preparation agent: Skin preparation agent completely dried prior to procedure    Indications:     PICC line indications: chemotherapy    Sedation:     Sedation type: None. Anesthesia (see MAR for exact dosages):      Anesthesia method:  Local infiltration    Local anesthetic:  Lidocaine 1% w/o epi (4 mL administered)  Procedure details:     Location:  [place]    Vessel type: vein      Laterality:  Right    Site selection rationale:  Largest, most patent vein    Approach: percutaneous technique used      Patient position:  Flat Procedural supplies:  Double lumen    Catheter size:  5 Fr    Landmarks identified: yes      Ultrasound guidance: yes      Ultrasound image availability:  Not saved    Sterile ultrasound techniques: Sterile gel and sterile probe covers were used      Number of attempts:  1    Successful placement: yes      Vessel of catheter tip end:  Sherlock 3CG confirmed (OK to use PICC. Sherlock 3CG confirmation results saved to chart.)    Total catheter length (cm):  42    Catheter out on skin (cm):  1    Max flow rate:  999 mL/hr    Arm circumference:  36  Post-procedure details:     Post-procedure:  Dressing applied and securement device placed    Assessment:  Blood return through all ports and free fluid flow    Post-procedure complications: none      Patient tolerance of procedure:   Tolerated well, no immediate complications

## 2023-08-03 LAB
ALBUMIN SERPL BCP-MCNC: 3.1 G/DL (ref 3.5–5)
ALP SERPL-CCNC: 75 U/L (ref 46–116)
ALT SERPL W P-5'-P-CCNC: 22 U/L (ref 12–78)
ANION GAP SERPL CALCULATED.3IONS-SCNC: 8 MMOL/L
AST SERPL W P-5'-P-CCNC: 17 U/L (ref 5–45)
BASOPHILS # BLD AUTO: 0.01 THOUSANDS/ÂΜL (ref 0–0.1)
BASOPHILS NFR BLD AUTO: 0 % (ref 0–1)
BILIRUB SERPL-MCNC: 0.25 MG/DL (ref 0.2–1)
BUN SERPL-MCNC: 11 MG/DL (ref 5–25)
CALCIUM ALBUM COR SERPL-MCNC: 9.4 MG/DL (ref 8.3–10.1)
CALCIUM SERPL-MCNC: 8.7 MG/DL (ref 8.3–10.1)
CHLORIDE SERPL-SCNC: 108 MMOL/L (ref 96–108)
CO2 SERPL-SCNC: 24 MMOL/L (ref 21–32)
CREAT SERPL-MCNC: 0.73 MG/DL (ref 0.6–1.3)
EOSINOPHIL # BLD AUTO: 0.01 THOUSAND/ÂΜL (ref 0–0.61)
EOSINOPHIL NFR BLD AUTO: 0 % (ref 0–6)
ERYTHROCYTE [DISTWIDTH] IN BLOOD BY AUTOMATED COUNT: 12.5 % (ref 11.6–15.1)
GFR SERPL CREATININE-BSD FRML MDRD: 117 ML/MIN/1.73SQ M
GLUCOSE SERPL-MCNC: 161 MG/DL (ref 65–140)
HCT VFR BLD AUTO: 38.6 % (ref 34.8–46.1)
HGB BLD-MCNC: 12.2 G/DL (ref 11.5–15.4)
HIV 1+2 AB+HIV1 P24 AG SERPL QL IA: NORMAL
HIV 2 AB SERPL QL IA: NORMAL
HIV1 AB SERPL QL IA: NORMAL
HIV1 P24 AG SERPL QL IA: NORMAL
IMM GRANULOCYTES # BLD AUTO: 0.06 THOUSAND/UL (ref 0–0.2)
IMM GRANULOCYTES NFR BLD AUTO: 1 % (ref 0–2)
LDH SERPL-CCNC: 231 U/L (ref 81–234)
LYMPHOCYTES # BLD AUTO: 0.34 THOUSANDS/ÂΜL (ref 0.6–4.47)
LYMPHOCYTES NFR BLD AUTO: 4 % (ref 14–44)
MCH RBC QN AUTO: 27.1 PG (ref 26.8–34.3)
MCHC RBC AUTO-ENTMCNC: 31.6 G/DL (ref 31.4–37.4)
MCV RBC AUTO: 86 FL (ref 82–98)
MONOCYTES # BLD AUTO: 0.07 THOUSAND/ÂΜL (ref 0.17–1.22)
MONOCYTES NFR BLD AUTO: 1 % (ref 4–12)
NEUTROPHILS # BLD AUTO: 8.98 THOUSANDS/ÂΜL (ref 1.85–7.62)
NEUTS SEG NFR BLD AUTO: 94 % (ref 43–75)
NRBC BLD AUTO-RTO: 0 /100 WBCS
PHOSPHATE SERPL-MCNC: 2.6 MG/DL (ref 2.7–4.5)
PLATELET # BLD AUTO: 376 THOUSANDS/UL (ref 149–390)
PMV BLD AUTO: 9.3 FL (ref 8.9–12.7)
POTASSIUM SERPL-SCNC: 3.7 MMOL/L (ref 3.5–5.3)
PROT SERPL-MCNC: 7.8 G/DL (ref 6.4–8.4)
RBC # BLD AUTO: 4.5 MILLION/UL (ref 3.81–5.12)
SODIUM SERPL-SCNC: 140 MMOL/L (ref 135–147)
URATE SERPL-MCNC: 4.4 MG/DL (ref 2–7.5)
WBC # BLD AUTO: 9.47 THOUSAND/UL (ref 4.31–10.16)

## 2023-08-03 PROCEDURE — 83615 LACTATE (LD) (LDH) ENZYME: CPT | Performed by: STUDENT IN AN ORGANIZED HEALTH CARE EDUCATION/TRAINING PROGRAM

## 2023-08-03 PROCEDURE — 99232 SBSQ HOSP IP/OBS MODERATE 35: CPT | Performed by: PHYSICIAN ASSISTANT

## 2023-08-03 PROCEDURE — 84100 ASSAY OF PHOSPHORUS: CPT | Performed by: STUDENT IN AN ORGANIZED HEALTH CARE EDUCATION/TRAINING PROGRAM

## 2023-08-03 PROCEDURE — 80053 COMPREHEN METABOLIC PANEL: CPT | Performed by: NURSE PRACTITIONER

## 2023-08-03 PROCEDURE — 85025 COMPLETE CBC W/AUTO DIFF WBC: CPT | Performed by: NURSE PRACTITIONER

## 2023-08-03 PROCEDURE — NC001 PR NO CHARGE: Performed by: INTERNAL MEDICINE

## 2023-08-03 PROCEDURE — 87389 HIV-1 AG W/HIV-1&-2 AB AG IA: CPT | Performed by: STUDENT IN AN ORGANIZED HEALTH CARE EDUCATION/TRAINING PROGRAM

## 2023-08-03 PROCEDURE — 84550 ASSAY OF BLOOD/URIC ACID: CPT | Performed by: STUDENT IN AN ORGANIZED HEALTH CARE EDUCATION/TRAINING PROGRAM

## 2023-08-03 RX ORDER — SODIUM CHLORIDE, SODIUM GLUCONATE, SODIUM ACETATE, POTASSIUM CHLORIDE, MAGNESIUM CHLORIDE, SODIUM PHOSPHATE, DIBASIC, AND POTASSIUM PHOSPHATE .53; .5; .37; .037; .03; .012; .00082 G/100ML; G/100ML; G/100ML; G/100ML; G/100ML; G/100ML; G/100ML
125 INJECTION, SOLUTION INTRAVENOUS CONTINUOUS
Status: DISCONTINUED | OUTPATIENT
Start: 2023-08-03 | End: 2023-08-07 | Stop reason: HOSPADM

## 2023-08-03 RX ADMIN — VINCRISTINE SULFATE 2 MG: 1 INJECTION, SOLUTION INTRAVENOUS at 00:38

## 2023-08-03 RX ADMIN — LORATADINE 10 MG: 10 TABLET ORAL at 21:09

## 2023-08-03 RX ADMIN — SODIUM CHLORIDE, SODIUM GLUCONATE, SODIUM ACETATE, POTASSIUM CHLORIDE, MAGNESIUM CHLORIDE, SODIUM PHOSPHATE, DIBASIC, AND POTASSIUM PHOSPHATE 125 ML/HR: .53; .5; .37; .037; .03; .012; .00082 INJECTION, SOLUTION INTRAVENOUS at 15:19

## 2023-08-03 RX ADMIN — AMLODIPINE BESYLATE 2.5 MG: 2.5 TABLET ORAL at 07:15

## 2023-08-03 RX ADMIN — PREDNISONE 100 MG: 20 TABLET ORAL at 07:14

## 2023-08-03 RX ADMIN — ACETAMINOPHEN 650 MG: 325 TABLET, FILM COATED ORAL at 21:08

## 2023-08-03 RX ADMIN — ACETAMINOPHEN 650 MG: 325 TABLET, FILM COATED ORAL at 15:19

## 2023-08-03 RX ADMIN — ALLOPURINOL 100 MG: 100 TABLET ORAL at 07:15

## 2023-08-03 RX ADMIN — LEVOTHYROXINE SODIUM 150 MCG: 75 TABLET ORAL at 05:10

## 2023-08-03 RX ADMIN — SODIUM CHLORIDE, SODIUM GLUCONATE, SODIUM ACETATE, POTASSIUM CHLORIDE, MAGNESIUM CHLORIDE, SODIUM PHOSPHATE, DIBASIC, AND POTASSIUM PHOSPHATE 125 ML/HR: .53; .5; .37; .037; .03; .012; .00082 INJECTION, SOLUTION INTRAVENOUS at 08:55

## 2023-08-03 RX ADMIN — SODIUM CHLORIDE, SODIUM GLUCONATE, SODIUM ACETATE, POTASSIUM CHLORIDE, MAGNESIUM CHLORIDE, SODIUM PHOSPHATE, DIBASIC, AND POTASSIUM PHOSPHATE 125 ML/HR: .53; .5; .37; .037; .03; .012; .00082 INJECTION, SOLUTION INTRAVENOUS at 21:10

## 2023-08-03 RX ADMIN — DICYCLOMINE HYDROCHLORIDE 20 MG: 20 TABLET ORAL at 07:15

## 2023-08-03 RX ADMIN — DOXORUBICIN HYDROCHLORIDE 110 MG: 2 INJECTION, SOLUTION INTRAVENOUS at 00:15

## 2023-08-03 RX ADMIN — DICYCLOMINE HYDROCHLORIDE 20 MG: 20 TABLET ORAL at 15:23

## 2023-08-03 RX ADMIN — MONTELUKAST 10 MG: 10 TABLET, FILM COATED ORAL at 21:09

## 2023-08-03 NOTE — UTILIZATION REVIEW
Continued Stay Review    Date: 8/3/2023                      Current Patient Class: inpatient  Current Level of Care: med/surg  HPI:23 y.o. female with diffuse large B-cell lymphoma, grade II/II-E, in her thyroid tissue, initially admitted on 8/3    Assessment/Plan: PICC line was placed and pt started on R-CHOP yesterday (8/2) - Rituximab 375 mg /m2, Cyclophosphamide 750 mg /m2, Doxorubicin 50 mg/m2, Vincristine 2 mg, Prednisone 100 mg X 5 days. Pt tachycardic overnight. this AM pt denies any reaction to the above chemo. Echo 7/23 unremarkable, hCG negative, hepatitis panel nonreactive, HIV antigen /antibody in process. Premeds: acetaminophen, Benadryl, Zofran. Prophylactically, given IV fluids, started on allopurinol, Claritin, will start Granix 24 hours after infusion, 8/4 at 7 AM. Given the size and location of the tumor and first cycle of R-CHOP, plan to continue to monitor today and reevaluate tomorrow morning. Reg diet. Supportive care. SCDs. OOB.     Vital Signs:  Date/Time Temp Pulse Resp BP MAP (mmHg) SpO2 O2 Device Patient Position - Orthostatic VS   08/03/23 11:01:35 97.5 °F (36.4 °C) 105 19 135/91 106 96 % -- Lying   08/03/23 07:45:58 97.2 °F (36.2 °C) Abnormal  104 -- 132/90 104 91 % -- --   08/03/23 07:21:35 98.7 °F (37.1 °C) 99 18 134/90 105 94 % None (Room air) Lying   08/03/23 02:09:57 97.7 °F (36.5 °C) 112 Abnormal  -- 136/91 106 95 % -- --   08/03/23 01:24:10 97.3 °F (36.3 °C) Abnormal  114 Abnormal  18 136/90 105 93 % -- --   08/02/23 22:04:11 99.6 °F (37.6 °C) 126 Abnormal  -- 143/95 111 96 % -- --   08/02/23 20:10:57 99.4 °F (37.4 °C) 129 Abnormal  22 140/91 107 96 % -- --   08/02/23 19:46:06 100.3 °F (37.9 °C) 133 Abnormal  -- 163/105 Abnormal  124 97 % None (Room air) --   08/02/23 19:44:11 -- 130 Abnormal  -- 163/105 Abnormal  124 95 % -- --   08/02/23 1930 -- 123 Abnormal  -- 148/103 Abnormal  118 94 % -- --   08/02/23 1915 -- 124 Abnormal  -- 144/102 Abnormal  116 95 % -- --   08/02/23 1900 -- 132 Abnormal  -- 141/100 114 97 % -- --   08/02/23 1800 -- 123 Abnormal  -- 132/96 108 98 % -- --   08/02/23 1500 97.3 °F (36.3 °C) Abnormal  -- -- 132/89 103 -- -- --   08/01/23 21:48:59 98 °F (36.7 °C) 109 Abnormal  16 149/108 Abnormal  122 94 % -- --   08/01/23 15:01:39 97.9 °F (36.6 °C) 114 Abnormal  17 136/94 108 93 % -- --   08/01/23 07:08:02 97.4 °F (36.3 °C) Abnormal  100 18 139/94 109 95 % -- --       Pertinent Labs/Diagnostic Results:     Results from last 7 days   Lab Units 08/03/23 0513 08/02/23  0539 07/31/23  0541   WBC Thousand/uL 9.47 9.22 10.10   HEMOGLOBIN g/dL 12.2 11.9 12.0   HEMATOCRIT % 38.6 37.5 37.1   PLATELETS Thousands/uL 376 392* 390   NEUTROS ABS Thousands/µL 8.98* 5.56 6.11     Results from last 7 days   Lab Units 08/03/23  0513 08/02/23  0539 07/31/23  0541   SODIUM mmol/L 140 138 137   POTASSIUM mmol/L 3.7 3.9 3.8   CHLORIDE mmol/L 108 107 108   CO2 mmol/L 24 26 24   ANION GAP mmol/L 8 5 5   BUN mg/dL 11 13 18   CREATININE mg/dL 0.73 0.82 0.70   EGFR ml/min/1.73sq m 117 101 123   CALCIUM mg/dL 8.7 8.9 8.9   PHOSPHORUS mg/dL 2.6*  --   --      Results from last 7 days   Lab Units 08/03/23  0513 08/02/23  0539 07/31/23  0541   AST U/L 17 12 11   ALT U/L 22 17 17   ALK PHOS U/L 75 76 79   TOTAL PROTEIN g/dL 7.8 7.4 7.5   ALBUMIN g/dL 3.1* 3.1* 3.1*   TOTAL BILIRUBIN mg/dL 0.25 0.17* 0.35       Results from last 7 days   Lab Units 08/03/23 0513 08/02/23  0539 07/31/23  0541   GLUCOSE RANDOM mg/dL 161* 107 104     Results from last 7 days   Lab Units 07/30/23  2352 07/30/23  2151   HS TNI 0HR ng/L  --  5   HS TNI 2HR ng/L 4  --    HSTNI D2 ng/L -1  --      Results from last 7 days   Lab Units 08/02/23  0539   FERRITIN ng/mL 82   IRON SATURATION % 13*   IRON ug/dL 42*   TIBC ug/dL 319     Results from last 7 days   Lab Units 08/02/23  2042   BLOOD CULTURE  Received in Microbiology Lab. Culture in Progress. Received in Microbiology Lab. Culture in Progress.        Medications: Scheduled Medications:  allopurinol, 100 mg, Oral, Daily  amLODIPine, 2.5 mg, Oral, Daily  dicyclomine, 20 mg, Oral, BID  levothyroxine, 150 mcg, Oral, Early Morning  loratadine, 10 mg, Oral, HS  montelukast, 10 mg, Oral, HS  predniSONE, 100 mg, Oral, Daily    Continuous IV Infusions:  multi-electrolyte, 125 mL/hr, Intravenous, Continuous    PRN Meds:  acetaminophen, 650 mg, Oral, Q6H PRN  albuterol, 2 puff, Inhalation, Q6H PRN  alteplase, 2 mg, Intracatheter, Q1MIN PRN  aluminum-magnesium hydroxide-simethicone, 30 mL, Oral, Q6H PRN  LORazepam, 0.5 mg, Oral, Q8H PRN  ondansetron, 4 mg, Intravenous, Q6H PRN        Discharge Plan: home when completed of chemo tx    Network Utilization Review Department  ATTENTION: Please call with any questions or concerns to [phone] and carefully listen to the prompts so that you are directed to the right person. All voicemails are confidential.  Froylan Crenshaw all requests for admission clinical reviews, approved or denied determinations and any other requests to dedicated fax number below belonging to the campus where the patient is receiving treatment.  List of dedicated fax numbers for the Facilities:  [place] DENIALS (Administrative/Medical Necessity) [phone] [address] [facility] (Maternity/NICU/Pediatrics) [phone]   [address] [place] [address] Drive [phone]   [facility] [address] [facility] [phone]   [facility] [id number] [place] [address] [phone]   [address] [place] [address]  [phone]

## 2023-08-03 NOTE — PROGRESS NOTES
[facility]  Progress Note  Name: Bria Cummings  MRN: [id number]  Unit/Bed#: [facility] 913-01 I Date of Admission: 7/22/2023   Date of Service: 8/3/2023 I Hospital Day: 12    Assessment/Plan   * Lymphoma of thyroid gland [facility])  Assessment & Plan  · History of asthma recently found to have thyroid enlargement thought to have subacute thyroiditis and started on prednisone/levothyroxine eventually biopsy found to have thyroid lymphoma. · Echo done in preparation for chemotherapy, unremarkable. · Seen by endocrinology  · Continuing levothyroxine. Now on prednisone per Heme/Onc as below   · Oncology following, recommended bone marrow biopsy was done in IR 7/26 which does not show any involvement by B-cell lymphoma. Flow cytometry negative. Did show reduced iron stores. · Per pathology, Burkitt lymphoma ruled out, at this time best classified as diffuse large B cell lymphoma. · Molecular testing remains pending. · PICC line was placed  · Patient was started on R-CHOP on 8/2/23 inpatient     Subacute thyroiditis  Assessment & Plan  · Working diagnosis before found to have lymphoma with subacute thyroiditis   · Continue levothyroxine 125 mcg daily    Hypothyroidism  Assessment & Plan  · TSH within normal limits  · Continue Levothyroxine    Elevated blood pressure reading  Assessment & Plan  · Noted this hospitalization, started on Norvasc 2.5 mg p.o. daily with overall improvement. · Monitor, adjust as needed     Mild intermittent asthma without complication  Assessment & Plan  · No exacerbation   · Continue Albuterol PRN  · Continue montelukast    Morbid obesity (HCC)  Assessment & Plan  · Body mass index is 48.54 kg/m². · Diet and lifestyle modifications             VTE Pharmacologic Prophylaxis:   Low Risk (Score 0-2) - Encourage Ambulation. Patient Centered Rounds: I performed bedside rounds with nursing staff today.    Discussions with Specialists or Other Care Team Provider:     Education and Discussions with Family / Patient: Updated  (significant other) at bedside. Total Time Spent on Date of Encounter in care of patient: 25 minutes This time was spent on one or more of the following: performing physical exam; counseling and coordination of care; obtaining or reviewing history; documenting in the medical record; reviewing/ordering tests, medications or procedures; communicating with other healthcare professionals and discussing with patient's family/caregivers. Current Length of Stay: 12 day(s)  Current Patient Status: Inpatient   Certification Statement: The patient will continue to require additional inpatient hospital stay due to per heme/onc  Discharge Plan: pending heme/onc clearance    Code Status: Level 1 - Full Code    Subjective:   Ms. Jazmyn Allen reports tachycardia overnight. Currently without complaint     Objective:     Vitals:   Temp (24hrs), Av.4 °F (36.9 °C), Min:97.2 °F (36.2 °C), Max:100.3 °F (37.9 °C)    Temp:  [97.2 °F (36.2 °C)-100.3 °F (37.9 °C)] 97.2 °F (36.2 °C)  HR:  [] 104  Resp:  [13-22] 18  BP: (132-163)/() 132/90  SpO2:  [91 %-98 %] 91 %  Body mass index is 48.54 kg/m². Input and Output Summary (last 24 hours): Intake/Output Summary (Last 24 hours) at 8/3/2023 1054  Last data filed at 8/3/2023 0704  Gross per 24 hour   Intake 2659.5 ml   Output --   Net 2659.5 ml       Physical Exam:   Physical Exam  Vitals reviewed. Constitutional:       Comments: Patient seen lying in bed, NAD   Cardiovascular:      Rate and Rhythm: Regular rhythm. Tachycardia present. Pulmonary:      Effort: Pulmonary effort is normal. No respiratory distress. Breath sounds: Normal breath sounds. Abdominal:      General: Bowel sounds are normal.      Palpations: Abdomen is soft. Tenderness: There is no abdominal tenderness. Musculoskeletal:      Right lower leg: No edema. Left lower leg: No edema. Skin:     General: Skin is warm. Neurological:      Mental Status: She is alert and oriented to person, place, and time. Psychiatric:         Mood and Affect: Mood normal.         Behavior: Behavior normal.         Additional Data:     Labs:  Results from last 7 days   Lab Units 08/03/23  0513   WBC Thousand/uL 9.47   HEMOGLOBIN g/dL 12.2   HEMATOCRIT % 38.6   PLATELETS Thousands/uL 376   NEUTROS PCT % 94*   LYMPHS PCT % 4*   MONOS PCT % 1*   EOS PCT % 0     Results from last 7 days   Lab Units 08/03/23  0513   SODIUM mmol/L 140   POTASSIUM mmol/L 3.7   CHLORIDE mmol/L 108   CO2 mmol/L 24   BUN mg/dL 11   CREATININE mg/dL 0.73   ANION GAP mmol/L 8   CALCIUM mg/dL 8.7   ALBUMIN g/dL 3.1*   TOTAL BILIRUBIN mg/dL 0.25   ALK PHOS U/L 75   ALT U/L 22   AST U/L 17   GLUCOSE RANDOM mg/dL 161*                       Lines/Drains:  Invasive Devices     Peripherally Inserted Central Catheter Line  Duration           PICC Line 54/38/07 Right Basilic 1 day                Central Line:  Goal for removal: picc as above         Telemetry:  Telemetry Orders (From admission, onward)             24 Hour Telemetry Monitoring  Continuous x 24 Hours (Telem)        Question:  Reason for 24 Hour Telemetry  Answer:  Alcohol withdrawal and CIWA >7, electrolyte abnormalities, abnormal ECG and/or heart disease                 Telemetry Reviewed: Sinus Tachycardia  Indication for Continued Telemetry Use: Arrthymias requiring medical therapy             Imaging: Reviewed radiology reports from this admission including: CT soft tissue neck    Recent Cultures (last 7 days):   Results from last 7 days   Lab Units 08/02/23 2042   BLOOD CULTURE  Received in Microbiology Lab. Culture in Progress. Received in Microbiology Lab. Culture in Progress.        Last 24 Hours Medication List:   Current Facility-Administered Medications   Medication Dose Route Frequency Provider Last Rate   • acetaminophen  650 mg Oral Q6H PRN Jennifer Lane MD     • albuterol  2 puff Inhalation Q6H PRN Yasmany Mckeon MD     • allopurinol  100 mg Oral Daily Yonathan Aiad, DO     • alteplase  2 mg Intracatheter Q1MIN PRN Jeri Hampton MD     • aluminum-magnesium hydroxide-simethicone  30 mL Oral Q6H PRN Yasmany Mckeon MD     • amLODIPine  2.5 mg Oral Daily Hetul Cabezas, DO     • dicyclomine  20 mg Oral BID Yasmany Mckeon MD     • levothyroxine  150 mcg Oral Early Morning Perry Lucas MD     • loratadine  10 mg Oral HS Yonathan Aiad, DO     • LORazepam  0.5 mg Oral Q8H PRN Hetul Cabezas, DO     • montelukast  10 mg Oral HS Perry Lucas MD     • multi-electrolyte  125 mL/hr Intravenous Continuous Yonathan Aiad,  mL/hr (08/03/23 0855)   • ondansetron  4 mg Intravenous Q6H PRN Yasmany Mckeon MD     • predniSONE  100 mg Oral Daily Jeri Hampton MD          Today, Patient Was Seen By: Luis Fernando Shell PA-C    **Please Note: This note may have been constructed using a voice recognition system. **

## 2023-08-03 NOTE — ASSESSMENT & PLAN NOTE
· History of asthma recently found to have thyroid enlargement thought to have subacute thyroiditis and started on prednisone/levothyroxine eventually biopsy found to have thyroid lymphoma. · Echo done in preparation for chemotherapy, unremarkable. · Seen by endocrinology  · Continuing levothyroxine. Now on prednisone per Heme/Onc as below   · Oncology following, recommended bone marrow biopsy was done in IR 7/26 which does not show any involvement by B-cell lymphoma. Flow cytometry negative. Did show reduced iron stores. · Per pathology, Burkitt lymphoma ruled out, at this time best classified as diffuse large B cell lymphoma. · Molecular testing remains pending.    · PICC line was placed  · Patient was started on R-CHOP on 8/2/23 inpatient

## 2023-08-03 NOTE — NURSING NOTE
After waiting 1-2 hours after Rituxin was stopped. Patient was assessed and vitals signs were obtained. Patient remained tachycardic in the 120s with rest of vitals remaining WDL. Patient also stated she was feeling a lot better in general and was no longer having palpitations. This RN called Dr. Terri Michel at [phone] to discuss whether or not we are going to restart chemo tonight. Dr. Iggy Lee stated we are okay to start chemo as long as internal medicine agrees d/t patient's heart rate. This RN reached out to MOHAN Crespo of [address] who also was in agreement to continuing chemo excluding Rituxin as ordered. Patient received remainer of planned chemotherapy without and issue whatsoever.

## 2023-08-03 NOTE — ASSESSMENT & PLAN NOTE
· Noted this hospitalization, started on Norvasc 2.5 mg p.o. daily with overall improvement.   · Monitor, adjust as needed

## 2023-08-03 NOTE — NURSING NOTE
Pt receiving Rituxan. Pt is sleeping and noted temp 100.3 oral, HR increasing to 150's, and /105. Pt woke up and walked to the bathroom, urinated then returned to bed complaining of  palpitations and "my heart feels like its coming out of my chest." Rituxan stopped and IVF of NSS bolus started. Triston Tang and Oncology Dr. Amara Hermosillo aware and new orders received. Pt received approximately 118cc of Rituxan. (total 145cc - 27cc of NSS in tubing= 118cc). Dr. Pa Keita stated to wait approximately 2 hours then if pt is stable, call him and confirm to continue the rest of the treatment plan except the rituxan. Discussed this with Kenya Hansen RN and Conchita Ahumada RN.

## 2023-08-03 NOTE — ASSESSMENT & PLAN NOTE
· Working diagnosis before found to have lymphoma with subacute thyroiditis   · Continue levothyroxine 125 mcg daily

## 2023-08-03 NOTE — APP STUDENT NOTE
[facility] STUDENT  Inpatient Progress Note for TRAINING ONLY  Not Part of Legal Medical Record     Progress Note - Jody Rowland 26 y.o. female MRN: [id number]  Unit/Bed#: [facility] 913-01 Encounter: [id number]    Assessment & Plan  * Lymphoma of thyroid gland [facility])  Assessment & Plan  • Patient recently found to have thyroid enlargement thought to have subacute thyroiditis and started on prednisone/levothyroxine eventually biopsy found to have thyroid lymphoma. • Echo done in preparation for chemotherapy, unremarkable. • Seen by endocrinology  - Continuing levothyroxine. Now on prednisone per Heme/Onc as below   • Oncology following, recommended bone marrow biopsy. Completely in IR 7/26 which does not show any involvement by B-cell lymphoma. • Biopsy showed diffuse large B cell lymphoma.  - PICC line was placed  - Patient was started on R-CHOP on 8/2/23 inpatient  - Continue monitoring patient on the chemo treatment until discharge. Patient is to continue treatment at [facility] as it is closer to the patient's home.     Subacute thyroiditis  Assessment & Plan  • Prior dx of lymphoma with subacute thyroiditis   ? Continue Levothyroxine 125 mcg daily     Hypothyroidism  Assessment & Plan  • TSH within normal limits  ? Continue Levothyroxine 125 mcg daily     Elevated blood pressure reading  Assessment & Plan  • New dx on this hospitalization, started on Norvasc 2.5 mg p.o. daily with overall improvement. • Continue to monitor.     Mild intermittent asthma without complication  Assessment & Plan  • No exacerbation   ? Continue Albuterol PRN  ? Continue montelukast     Morbid obesity ([address])  Assessment & Plan  • Body mass index is 48.54 kg/m². ? Diet and lifestyle modifications      VTE Pharmacologic Prophylaxis:   Low Risk (Score 0-2) - Encourage Ambulation. Patient Centered Rounds: I performed bedside rounds with nursing staff today.     Current Length of Stay: 12 day(s)  Current Patient Status: Inpatient Certification Statement: The patient will continue to require additional inpatient hospital stay due to heme/onc treatment  Discharge Plan: Pending clearance from the Heme/Onc team.    Code Status: Level 1 - Full Code    Subjective:   Patient reports she has been increasingly tired since starting the chemo therapy but denies N/V/D, abdominal pain. Patient reports she was tachycardiac overnight but denies any other sx. Patient is feeling better this afternoon and is in agreement with her treatment. No other sx at this time. Objective:     Vitals:   Temp (24hrs), Av.4 °F (36.9 °C), Min:97.2 °F (36.2 °C), Max:100.3 °F (37.9 °C)    Temp:  [97.2 °F (36.2 °C)-100.3 °F (37.9 °C)] 97.5 °F (36.4 °C)  HR:  [] 105  Resp:  [13-22] 19  BP: (132-163)/() 135/91  SpO2:  [91 %-98 %] 96 %  Body mass index is 48.54 kg/m². Input and Output Summary (last 24 hours): Intake/Output Summary (Last 24 hours) at 8/3/2023 1244  Last data filed at 8/3/2023 4350  Gross per 24 hour   Intake 2659.5 ml   Output --   Net 2659.5 ml       Physical Exam:   Physical Exam  Constitutional:       General: She is not in acute distress. Appearance: Normal appearance. Comments: overweight   HENT:      Head: Normocephalic and atraumatic. Nose: Nose normal.      Mouth/Throat:      Mouth: Mucous membranes are moist.      Pharynx: Oropharynx is clear. Eyes:      Extraocular Movements: Extraocular movements intact. Pupils: Pupils are equal, round, and reactive to light. Neck:      Thyroid: Thyroid mass (firm, nodular), thyromegaly and thyroid tenderness (right sided, no tenderness on the left side of neck) present. Vascular: No carotid bruit. Trachea: Trachea and phonation normal.      Comments: FROM of neck  Cardiovascular:      Rate and Rhythm: Regular rhythm. Tachycardia present. Heart sounds: Normal heart sounds. No murmur heard. No friction rub. No gallop.    Pulmonary:      Effort: Pulmonary effort is normal.      Breath sounds: Normal breath sounds. No wheezing, rhonchi or rales. Abdominal:      General: Abdomen is flat. Bowel sounds are normal.      Palpations: Abdomen is soft. Musculoskeletal:         General: No swelling. Normal range of motion. Cervical back: Normal range of motion. Tenderness (mild, to the right/lateral side of patient's neck) present. Lymphadenopathy:      Cervical: No cervical adenopathy. Skin:     General: Skin is warm and dry. Findings: No rash. Neurological:      General: No focal deficit present. Mental Status: She is alert and oriented to person, place, and time. Psychiatric:         Mood and Affect: Mood normal.         Behavior: Behavior normal.          Additional Data:     Labs:  Results from last 7 days   Lab Units 08/03/23  0513   WBC Thousand/uL 9.47   HEMOGLOBIN g/dL 12.2   HEMATOCRIT % 38.6   PLATELETS Thousands/uL 376   NEUTROS PCT % 94*   LYMPHS PCT % 4*   MONOS PCT % 1*   EOS PCT % 0     Results from last 7 days   Lab Units 08/03/23  0513   SODIUM mmol/L 140   POTASSIUM mmol/L 3.7   CHLORIDE mmol/L 108   CO2 mmol/L 24   BUN mg/dL 11   CREATININE mg/dL 0.73   ANION GAP mmol/L 8   CALCIUM mg/dL 8.7   ALBUMIN g/dL 3.1*   TOTAL BILIRUBIN mg/dL 0.25   ALK PHOS U/L 75   ALT U/L 22   AST U/L 17   GLUCOSE RANDOM mg/dL 161*                       Lines/Drains:  Invasive Devices     Peripherally Inserted Central Catheter Line  Duration           PICC Line 31/01/52 Right Basilic 1 day                Central Line:  Goal for removal: Will discontinue when meds requiring line are completed.          Telemetry:  Telemetry Orders (From admission, onward)             24 Hour Telemetry Monitoring  Continuous x 24 Hours (Telem)        Question:  Reason for 24 Hour Telemetry  Answer:  Alcohol withdrawal and CIWA >7, electrolyte abnormalities, abnormal ECG and/or heart disease                 Telemetry Reviewed: Sinus Tachycardia  Indication for Continued Telemetry Use: Patient has hypothyroidism              Recent Cultures (last 7 days):   Results from last 7 days   Lab Units 08/02/23 2042   BLOOD CULTURE  Received in Microbiology Lab. Culture in Progress. Received in Microbiology Lab. Culture in Progress. Last 24 Hours Medication List:   Current Facility-Administered Medications   Medication Dose Route Frequency Provider Last Rate   • acetaminophen  650 mg Oral Q6H PRN Dashawn Holland MD     • albuterol  2 puff Inhalation Q6H PRN Dashawn Holland MD     • allopurinol  100 mg Oral Daily Yonathan Aiad, DO     • alteplase  2 mg Intracatheter Q1MIN PRN David West MD     • aluminum-magnesium hydroxide-simethicone  30 mL Oral Q6H PRN Dashawn Holland MD     • amLODIPine  2.5 mg Oral Daily Arielle Cabezas DO     • dicyclomine  20 mg Oral BID Dashawn Holland MD     • levothyroxine  150 mcg Oral Early Morning Perry Lucas MD     • loratadine  10 mg Oral HS Yonathan Aiad, DO     • LORazepam  0.5 mg Oral Q8H PRN Arielle Cabezas DO     • montelukast  10 mg Oral HS Dashawn Holland MD     • multi-electrolyte  125 mL/hr Intravenous Continuous Yonathan Aiad,  mL/hr (08/03/23 0855)   • ondansetron  4 mg Intravenous Q6H PRN Dashawn Holland MD     • predniSONE  100 mg Oral Daily David West MD          Today, Patient Was Seen By: Jason Person    **Please Note: This note may have been constructed using a voice recognition system. **

## 2023-08-04 ENCOUNTER — APPOINTMENT (OUTPATIENT)
Dept: RADIOLOGY | Facility: HOSPITAL | Age: 23
DRG: 691 | End: 2023-08-04
Payer: COMMERCIAL

## 2023-08-04 ENCOUNTER — HOSPITAL ENCOUNTER (OUTPATIENT)
Dept: INFUSION CENTER | Facility: HOSPITAL | Age: 23
Discharge: HOME | End: 2023-08-04

## 2023-08-04 PROBLEM — R51.9 HEADACHE: Status: ACTIVE | Noted: 2023-08-04

## 2023-08-04 LAB
ALBUMIN SERPL BCP-MCNC: 2.7 G/DL (ref 3.5–5)
ALP SERPL-CCNC: 59 U/L (ref 46–116)
ALT SERPL W P-5'-P-CCNC: 20 U/L (ref 12–78)
ANION GAP SERPL CALCULATED.3IONS-SCNC: 5 MMOL/L
AST SERPL W P-5'-P-CCNC: 10 U/L (ref 5–45)
BILIRUB SERPL-MCNC: 0.13 MG/DL (ref 0.2–1)
BUN SERPL-MCNC: 12 MG/DL (ref 5–25)
CALCIUM ALBUM COR SERPL-MCNC: 9.5 MG/DL (ref 8.3–10.1)
CALCIUM SERPL-MCNC: 8.5 MG/DL (ref 8.3–10.1)
CHLORIDE SERPL-SCNC: 112 MMOL/L (ref 96–108)
CO2 SERPL-SCNC: 26 MMOL/L (ref 21–32)
CREAT SERPL-MCNC: 0.64 MG/DL (ref 0.6–1.3)
ERYTHROCYTE [DISTWIDTH] IN BLOOD BY AUTOMATED COUNT: 12.6 % (ref 11.6–15.1)
GFR SERPL CREATININE-BSD FRML MDRD: 127 ML/MIN/1.73SQ M
GLUCOSE SERPL-MCNC: 144 MG/DL (ref 65–140)
HCT VFR BLD AUTO: 33.2 % (ref 34.8–46.1)
HGB BLD-MCNC: 10.8 G/DL (ref 11.5–15.4)
LDH SERPL-CCNC: 138 U/L (ref 81–234)
MCH RBC QN AUTO: 27.9 PG (ref 26.8–34.3)
MCHC RBC AUTO-ENTMCNC: 32.5 G/DL (ref 31.4–37.4)
MCV RBC AUTO: 86 FL (ref 82–98)
MISCELLANEOUS LAB TEST RESULT: NORMAL
PHOSPHATE SERPL-MCNC: 2.7 MG/DL (ref 2.7–4.5)
PLATELET # BLD AUTO: 319 THOUSANDS/UL (ref 149–390)
PMV BLD AUTO: 9.3 FL (ref 8.9–12.7)
POTASSIUM SERPL-SCNC: 3.9 MMOL/L (ref 3.5–5.3)
PROT SERPL-MCNC: 6.5 G/DL (ref 6.4–8.4)
RBC # BLD AUTO: 3.87 MILLION/UL (ref 3.81–5.12)
SODIUM SERPL-SCNC: 143 MMOL/L (ref 135–147)
URATE SERPL-MCNC: 3.6 MG/DL (ref 2–7.5)
WBC # BLD AUTO: 11.55 THOUSAND/UL (ref 4.31–10.16)

## 2023-08-04 PROCEDURE — 99254 IP/OBS CNSLTJ NEW/EST MOD 60: CPT | Performed by: PSYCHIATRY & NEUROLOGY

## 2023-08-04 PROCEDURE — 85027 COMPLETE CBC AUTOMATED: CPT | Performed by: STUDENT IN AN ORGANIZED HEALTH CARE EDUCATION/TRAINING PROGRAM

## 2023-08-04 PROCEDURE — 80053 COMPREHEN METABOLIC PANEL: CPT | Performed by: STUDENT IN AN ORGANIZED HEALTH CARE EDUCATION/TRAINING PROGRAM

## 2023-08-04 PROCEDURE — 84550 ASSAY OF BLOOD/URIC ACID: CPT | Performed by: STUDENT IN AN ORGANIZED HEALTH CARE EDUCATION/TRAINING PROGRAM

## 2023-08-04 PROCEDURE — 83615 LACTATE (LD) (LDH) ENZYME: CPT | Performed by: STUDENT IN AN ORGANIZED HEALTH CARE EDUCATION/TRAINING PROGRAM

## 2023-08-04 PROCEDURE — 84100 ASSAY OF PHOSPHORUS: CPT | Performed by: STUDENT IN AN ORGANIZED HEALTH CARE EDUCATION/TRAINING PROGRAM

## 2023-08-04 PROCEDURE — 99232 SBSQ HOSP IP/OBS MODERATE 35: CPT | Performed by: INTERNAL MEDICINE

## 2023-08-04 PROCEDURE — NC001 PR NO CHARGE: Performed by: PHYSICIAN ASSISTANT

## 2023-08-04 RX ORDER — ACETAMINOPHEN 325 MG/1
975 TABLET ORAL EVERY 6 HOURS PRN
Status: DISCONTINUED | OUTPATIENT
Start: 2023-08-04 | End: 2023-08-07 | Stop reason: HOSPADM

## 2023-08-04 RX ORDER — HYDROMORPHONE HCL/PF 1 MG/ML
1 SYRINGE (ML) INJECTION ONCE
Status: COMPLETED | OUTPATIENT
Start: 2023-08-04 | End: 2023-08-04

## 2023-08-04 RX ORDER — OXYCODONE HYDROCHLORIDE 10 MG/1
10 TABLET ORAL EVERY 4 HOURS PRN
Status: DISCONTINUED | OUTPATIENT
Start: 2023-08-04 | End: 2023-08-04

## 2023-08-04 RX ORDER — HYDROMORPHONE HCL/PF 1 MG/ML
1 SYRINGE (ML) INJECTION ONCE AS NEEDED
Status: COMPLETED | OUTPATIENT
Start: 2023-08-04 | End: 2023-08-04

## 2023-08-04 RX ORDER — MINERAL OIL AND PETROLATUM 150; 830 MG/G; MG/G
OINTMENT OPHTHALMIC 2 TIMES DAILY
Status: DISCONTINUED | OUTPATIENT
Start: 2023-08-04 | End: 2023-08-05

## 2023-08-04 RX ORDER — OXYCODONE HYDROCHLORIDE 5 MG/1
5 TABLET ORAL EVERY 6 HOURS PRN
Status: DISCONTINUED | OUTPATIENT
Start: 2023-08-04 | End: 2023-08-04

## 2023-08-04 RX ORDER — ACETAMINOPHEN 325 MG/1
650 TABLET ORAL EVERY 6 HOURS PRN
Status: DISCONTINUED | OUTPATIENT
Start: 2023-08-04 | End: 2023-08-04

## 2023-08-04 RX ORDER — KETOROLAC TROMETHAMINE 30 MG/ML
30 INJECTION, SOLUTION INTRAMUSCULAR; INTRAVENOUS ONCE
Status: COMPLETED | OUTPATIENT
Start: 2023-08-04 | End: 2023-08-04

## 2023-08-04 RX ORDER — HEPARIN SODIUM 5000 [USP'U]/ML
5000 INJECTION, SOLUTION INTRAVENOUS; SUBCUTANEOUS EVERY 8 HOURS SCHEDULED
Status: DISCONTINUED | OUTPATIENT
Start: 2023-08-04 | End: 2023-08-07 | Stop reason: HOSPADM

## 2023-08-04 RX ORDER — ALLOPURINOL 300 MG/1
300 TABLET ORAL DAILY
Qty: 90 TABLET | Refills: 0 | Status: SHIPPED | OUTPATIENT
Start: 2023-08-04

## 2023-08-04 RX ORDER — MAGNESIUM SULFATE HEPTAHYDRATE 40 MG/ML
2 INJECTION, SOLUTION INTRAVENOUS ONCE
Status: COMPLETED | OUTPATIENT
Start: 2023-08-04 | End: 2023-08-04

## 2023-08-04 RX ORDER — TROPICAMIDE 10 MG/ML
1 SOLUTION/ DROPS OPHTHALMIC 2 TIMES DAILY
Status: COMPLETED | OUTPATIENT
Start: 2023-08-04 | End: 2023-08-04

## 2023-08-04 RX ORDER — OLANZAPINE 10 MG/1
5 INJECTION, POWDER, LYOPHILIZED, FOR SOLUTION INTRAMUSCULAR ONCE
Status: COMPLETED | OUTPATIENT
Start: 2023-08-04 | End: 2023-08-04

## 2023-08-04 RX ADMIN — TROPICAMIDE 1 DROP: 10 SOLUTION/ DROPS OPHTHALMIC at 16:29

## 2023-08-04 RX ADMIN — SODIUM CHLORIDE, SODIUM GLUCONATE, SODIUM ACETATE, POTASSIUM CHLORIDE, MAGNESIUM CHLORIDE, SODIUM PHOSPHATE, DIBASIC, AND POTASSIUM PHOSPHATE 125 ML/HR: .53; .5; .37; .037; .03; .012; .00082 INJECTION, SOLUTION INTRAVENOUS at 21:23

## 2023-08-04 RX ADMIN — PREDNISONE 100 MG: 20 TABLET ORAL at 09:19

## 2023-08-04 RX ADMIN — ALLOPURINOL 100 MG: 100 TABLET ORAL at 09:20

## 2023-08-04 RX ADMIN — VALPROATE SODIUM 500 MG: 100 INJECTION, SOLUTION INTRAVENOUS at 22:31

## 2023-08-04 RX ADMIN — LORATADINE 10 MG: 10 TABLET ORAL at 21:27

## 2023-08-04 RX ADMIN — ONDANSETRON 4 MG: 2 INJECTION INTRAMUSCULAR; INTRAVENOUS at 16:02

## 2023-08-04 RX ADMIN — MAGNESIUM SULFATE HEPTAHYDRATE 2 G: 40 INJECTION, SOLUTION INTRAVENOUS at 21:33

## 2023-08-04 RX ADMIN — HYDROMORPHONE HYDROCHLORIDE 1 MG: 1 INJECTION, SOLUTION INTRAMUSCULAR; INTRAVENOUS; SUBCUTANEOUS at 14:10

## 2023-08-04 RX ADMIN — OLANZAPINE 5 MG: 10 INJECTION, POWDER, FOR SOLUTION INTRAMUSCULAR at 21:53

## 2023-08-04 RX ADMIN — DICYCLOMINE HYDROCHLORIDE 20 MG: 20 TABLET ORAL at 09:20

## 2023-08-04 RX ADMIN — MONTELUKAST 10 MG: 10 TABLET, FILM COATED ORAL at 21:29

## 2023-08-04 RX ADMIN — TROPICAMIDE 1 DROP: 10 SOLUTION/ DROPS OPHTHALMIC at 16:05

## 2023-08-04 RX ADMIN — HEPARIN SODIUM 5000 UNITS: 5000 INJECTION INTRAVENOUS; SUBCUTANEOUS at 16:04

## 2023-08-04 RX ADMIN — FILGRASTIM-AAFI 480 MCG: 480 INJECTION, SOLUTION SUBCUTANEOUS at 21:27

## 2023-08-04 RX ADMIN — SODIUM CHLORIDE, SODIUM GLUCONATE, SODIUM ACETATE, POTASSIUM CHLORIDE, MAGNESIUM CHLORIDE, SODIUM PHOSPHATE, DIBASIC, AND POTASSIUM PHOSPHATE 125 ML/HR: .53; .5; .37; .037; .03; .012; .00082 INJECTION, SOLUTION INTRAVENOUS at 09:22

## 2023-08-04 RX ADMIN — OXYCODONE HYDROCHLORIDE 10 MG: 10 TABLET ORAL at 16:03

## 2023-08-04 RX ADMIN — VALPROATE SODIUM 500 MG: 100 INJECTION, SOLUTION INTRAVENOUS at 19:51

## 2023-08-04 RX ADMIN — KETOROLAC TROMETHAMINE 30 MG: 30 INJECTION, SOLUTION INTRAMUSCULAR; INTRAVENOUS at 21:24

## 2023-08-04 RX ADMIN — LORAZEPAM 0.5 MG: 0.5 TABLET ORAL at 13:55

## 2023-08-04 RX ADMIN — HYDROMORPHONE HYDROCHLORIDE 1 MG: 1 INJECTION, SOLUTION INTRAMUSCULAR; INTRAVENOUS; SUBCUTANEOUS at 18:26

## 2023-08-04 RX ADMIN — WHITE PETROLATUM 57.7 %-MINERAL OIL 31.9 % EYE OINTMENT: at 16:05

## 2023-08-04 RX ADMIN — AMLODIPINE BESYLATE 2.5 MG: 2.5 TABLET ORAL at 09:20

## 2023-08-04 RX ADMIN — LEVOTHYROXINE SODIUM 150 MCG: 75 TABLET ORAL at 06:43

## 2023-08-04 NOTE — CONSULTS
[address], [place]. Patient name Pritesh Anderson  Age: 26 y.o.  : 2000  MRN: [id number]    Hospital:      [facility]   Date of Maida Milligan MD  Reason for Consult: eye pain following R-CHOP  Chief complaint: R eye/forehead pain  History of illness: , [place]  initiallycomplains of headache, started as mild then progressed to severe headache screaming and crying noting that her bilateral eyes hurt, vision is okay,  Now heavily sedated, pain better     Relevant past ocular history/ Eye  meds:neg  Relevant chart review findings from below:   PMH/Chart reviewed in Medical record:   Past Medical History:   Diagnosis Date   • Asthma    • Disease of thyroid gland    • Ear infection      Past Surgical History:   Procedure Laterality Date   • IR BIOPSY BONE MARROW  2023   • IR BIOPSY NECK  2023      Social History    Social History     Substance and Sexual Activity   Alcohol Use Yes   • Alcohol/week: 1.0 standard drink of alcohol   • Types: 1 Standard drinks or equivalent per week    Comment: socially     Social History     Tobacco Use   Smoking Status Never   Smokeless Tobacco Never     Social History     Substance and Sexual Activity   Drug Use Never     [facility]  Allergies: Allergies   Allergen Reactions   • Pollen Extract    • Shrimp Extract Allergy Skin Test - Food Allergy Hives     Ros: Reviewed in Medical Record  S. H.shoulder  F.H. History reviewed. No pertinent family history. Dilated: 1% Mydriacyl  Mental status: ____AAO  Radiologic studies:  No recent neuroimaging      Vaph  scnear dil  Right 20/70 at least(uncoop/sedated           Left    20/70  Ttp    15     /15  Confront.  Visual Fields Normal  External Normal   Pupils Round symmetric dilated  Motility Versions Full   Anterior Segment: Conjunctiva Normal   Sclera Intact   Cornea (epithel,stroma,endothel) Clear   Anterior chamber Deep and Clear  lens clear  Posterior Segment:  Vitreous Clear   Optic nerve:  Disc sharp, 0.3 cup  ou  Macula Normal ou  Vessels Normal ou  Periphery Normal ou  Dx: Periorbital pain/eye pain without ocular pathology    Rx: Agree with management plan    Follow up:  call office [phone]   after discharge  Yulia Hernandes MD

## 2023-08-04 NOTE — PROGRESS NOTES
Hematology - Oncology Progress Note    Reji Villar, 2000, [id number]  [facility] 913/[facility] 913-01      Impression and plan:    Stanton Santos diffuse large B-cell lymphoma grade 2 E s/p R-CHOP on  08/02 with only one third of the rituximab given due to side effects, chills and rigors. Today, Stanton Santos complains of headache, started as mild then progressed to severe headache screaming and crying noting that her bilateral eyes hurt, vision is okay, limited eye exam due to the pain however unremarkable, with no conjunctivitis, no swelling, pupils equal round and reactive bilaterally     Given the severe headache and pain, IV Dilaudid 1 mg was given, patient is sleeping after however concerned if she had vasovagal syncope, boyfriend at bedside, spoke to the mother and the phone, neurology currently evaluating and we are following closely. Plan  -Continue to monitor inpatient given new concerning symptoms post chemotherapy, primary team aware, patient agree    -Pain control, as needed Tylenol, as needed oxycodone and Dilaudid    -Inpatient consult to neurology, input appreciated    -Inpatient consult to ophthalmology, discussed with Dr. Tsering Pickering, with suspected possible epithelial damage from the chemo, evaluation in process, appreciate input and help, artificial tear eyedrops and Mydriacyl eyedrops ordered per Dr. Tsering Pickering recommendations for now in preparation for exam    - on Claritin and prednisone 100 mg daily with R-CHOP protocol ;  Sc heparin subcutaneous 5000 every 8 ordered, on telemetry, mechanical DVT prophylaxis in place    -On allopurinol 300 mg daily for TLS prophylaxis  __________________________________________________________________________________________    Chief complaint: Diffuse B-cell lymphoma grade 2 E       Hospital medications list:  Current Facility-Administered Medications   Medication Dose Route Frequency Provider Last Rate   • acetaminophen  650 mg Oral Q6H PRN Ambar Garcia MD     • albuterol  2 puff Inhalation Q6H PRN Leonila Urrutia MD     • allopurinol  100 mg Oral Daily Yonathan Aiad, DO     • alteplase  2 mg Intracatheter Q1MIN PRN Serene Calhoun MD     • aluminum-magnesium hydroxide-simethicone  30 mL Oral Q6H PRN Leonila Urrutia MD     • amLODIPine  2.5 mg Oral Daily Hetul Cabezas, DO     • dicyclomine  20 mg Oral BID Leonila Urrutia MD     • Filgrastim-aafi  480 mcg Subcutaneous Daily Yonathan Aiad, DO     • levothyroxine  150 mcg Oral Early Morning Leonila Urrutia MD     • loratadine  10 mg Oral HS Yonathan Aiad, DO     • LORazepam  0.5 mg Oral Q8H PRN Hetlamine Cabezas, DO     • montelukast  10 mg Oral HS Leonila Urrutia MD     • multi-electrolyte  125 mL/hr Intravenous Continuous Yonathan Aiad,  mL/hr (08/04/23 1549)   • ondansetron  4 mg Intravenous Q6H PRN Leonila Urrutia MD     • oxyCODONE  10 mg Oral Q4H PRN Yonathan Aiad, DO     • oxyCODONE  5 mg Oral Q6H PRN Yonathan Aiad, DO     • predniSONE  100 mg Oral Daily Serene Calhoun MD              Physical exam  /94   Pulse 100   Temp 97.5 °F (36.4 °C)   Resp 18   Ht 5' 3" (1.6 m)   Wt 124 kg (274 lb 0.5 oz)   LMP 06/29/2023 (Approximate)   SpO2 (!) 82%   BMI 48.54 kg/m²     - GEN: Appears in moderate distress due to pain,, alert and oriented x 3  - HEENT: Eyes closed, anicteric, no conjunctivitis, no eye secretions, mucous membranes moist, PERRL and EOMI   - NECK: No lymphadenopathy, JVD or carotid bruits   - HEART: Mild tachycardia, regular rhythm, normal S1 and S2, no murmurs, clicks, gallops or rubs   - LUNGS: Clear to auscultation bilaterally; no wheezes, rales, or rhonchi  - ABDOMEN: Normal bowel sounds, soft, no tenderness, no distention, no organomegaly or masses felt on exam.   - EXTREMITIES: Peripheral pulses normal; no clubbing, cyanosis, or edema  - NEURO: No focal findings, CN II-XII are grossly intact. - Musculoskeletal: 5/5 strength, normal ROM, no swollen or erythematous joints.    - SKIN: Normal without suspicious lesions on exposed skin

## 2023-08-04 NOTE — ASSESSMENT & PLAN NOTE
25-year-old female history of prior migraines, concussion, and recently diagnosed thyroid lymphoma suspected to be diffuse large B-cell lymphoma. Was started on R-CHOP this admission on 8/2/2023 however Rituxan held secondary to adverse reaction but continued on rest of chemotherapy agents without difficulty. Today developed acute onset right frontal and periorbital sharp headache that progressed to 10/10 severity required Dilaudid with resolution. Headache not similar to prior migraines. Heme-onc concern for possible vincristine reaction. Impression: 25-year-old female with recently diagnosed diffuse large B-cell lymphoma started on R-CHOP during hospitalization with new onset right parietal headaches. Now improved. Could be multifocal in etiology in the setting of high-dose steroids, RCHOP reactions.      Plan:  · MRI can be done as outpatient   · Headache control:   · Recommended holding off on narcotics  · Depacon increased to 1 g for   · IV fluid hydration  · Toradol 30 mg scheduled every 8 hours   · Zofran 4 mg IV scheduled every 8 hours with Toradol

## 2023-08-04 NOTE — APP STUDENT NOTE
[facility] STUDENT  Inpatient Progress Note for TRAINING ONLY  Not Part of Legal Medical Record     Progress Note - Chanel Schultz 26 y.o. female MRN: [id number]  [facility] Encounter: [id number]      Current Length of Stay: 13 day(s)  Current Patient Status: Inpatient   Certification Statement: The patient will continue to require additional inpatient hospital stay due to Heme/onc status  Discharge Plan: Anticipate discharge later today or tomorrow to home. Code Status: Level 1 - Full Code    Assessment and Plan  * Lymphoma of thyroid gland [facility])  Assessment & Plan  • Patient recently found to have thyroid enlargement thought to have subacute thyroiditis and started on prednisone/levothyroxine eventually biopsy found to have thyroid lymphoma. • Echo done in preparation for chemotherapy, unremarkable. • Seen by endocrinology  - Continuing levothyroxine. Now on prednisone per Heme/Onc as below   • Oncology following, recommended bone marrow biopsy. Completely in IR 7/26 which does not show any involvement by B-cell lymphoma. • Biopsy showed diffuse large B cell lymphoma.  - PICC line was placed  - Patient was started on R-CHOP on 8/2/23 inpatient  - Continue monitoring patient on the R-CHOP treatment until discharge. Patient is to continue treatment at [facility] as it is closer to the patient's home. Order CBC, CMP, and LDH  - CBC, LD and CMP unremarkable 8/4/23. Pending Heme/Onc consideration for patient discharge.     Subacute thyroiditis  Assessment & Plan  • Prior dx of lymphoma with subacute thyroiditis   ? Continue Levothyroxine 125 mcg daily     Hypothyroidism  Assessment & Plan  • TSH within normal limits  ? Continue Levothyroxine 125 mcg daily     Elevated blood pressure reading  Assessment & Plan  • New dx on this hospitalization, started on Norvasc 2.5 mg p.o. daily with overall improvement.   • Continue to monitor.     Mild intermittent asthma without complication  Assessment & Plan  • No exacerbation   ? Continue Albuterol PRN  ? Continue montelukast     Tension Headache   Assessment & Plan  • Frontal headache that started yesterday. • Patient given Tylenol w/ relief for pain. Continue Tylenol PRN    Morbid obesity (HCC)  Assessment & Plan  • Body mass index is 48.54 kg/m².   ? Diet and lifestyle modifications    Subjective:   Patient reports occasional chest tightness to the sternum that started last night. Patient states it occurs only with deep inspirations. Patient denies any radiation of tightness to the UE or back. Patient denies any SOB, cough, or palpitations. Patient reports a 5/10 frontal headache this morning. Denies any vision changes, photophobia, tinnitus, nausea or vomiting. Patient states she was crying last night and thinks the headache could be from the stress of her diagnosis. She was given Tylenol for the pain last night with relief. Continue Tylenol at this time. Objective:     Vitals:   Temp (24hrs), Av.7 °F (36.5 °C), Min:97.5 °F (36.4 °C), Max:98 °F (36.7 °C)    Temp:  [97.5 °F (36.4 °C)-98 °F (36.7 °C)] 97.5 °F (36.4 °C)  HR:  [100-115] 100  Resp:  [16-19] 18  BP: (115-145)/(73-98) 139/92  SpO2:  [89 %-96 %] 94 %  Body mass index is 48.54 kg/m². Input and Output Summary (last 24 hours): Intake/Output Summary (Last 24 hours) at 2023 0824  Last data filed at 8/3/2023 1517  Gross per 24 hour   Intake 795.83 ml   Output --   Net 795.83 ml       Physical Exam:   Physical Exam  Constitutional:       Appearance: Normal appearance. HENT:      Head: Normocephalic and atraumatic. Nose: Nose normal.   Eyes:      Extraocular Movements: Extraocular movements intact. Pupils: Pupils are equal, round, and reactive to light. Neck:      Thyroid: Thyroid mass (firm, nodular), thyromegaly (b/l) and thyroid tenderness (right sided, no tenderness on the left side of neck) present. Vascular: No carotid bruit.       Trachea: Trachea and phonation normal. Cardiovascular:      Rate and Rhythm: Regular rhythm. Tachycardia present. Heart sounds: Normal heart sounds, S1 normal and S2 normal. No murmur heard. Pulmonary:      Effort: Pulmonary effort is normal.      Breath sounds: Normal breath sounds. No wheezing, rhonchi or rales. Musculoskeletal:         General: Normal range of motion. Cervical back: Normal range of motion. No pain with movement. Right lower leg: No edema. Left lower leg: No edema. Lymphadenopathy:      Cervical: No cervical adenopathy. Skin:     General: Skin is warm and dry. Neurological:      General: No focal deficit present. Mental Status: She is alert and oriented to person, place, and time.    Psychiatric:         Mood and Affect: Mood normal.         Behavior: Behavior normal.         Additional Data:     Labs:  Results from last 7 days   Lab Units 23  0649 23  0513   WBC Thousand/uL 11.55* 9.47   HEMOGLOBIN g/dL 10.8* 12.2   HEMATOCRIT % 33.2* 38.6   PLATELETS Thousands/uL 319 376   NEUTROS PCT %  --  94*   LYMPHS PCT %  --  4*   MONOS PCT %  --  1*   EOS PCT %  --  0     Results from last 7 days   Lab Units 23  0649   SODIUM mmol/L 143   POTASSIUM mmol/L 3.9   CHLORIDE mmol/L 112*   CO2 mmol/L 26   BUN mg/dL 12   CREATININE mg/dL 0.64   ANION GAP mmol/L 5   CALCIUM mg/dL 8.5   ALBUMIN g/dL 2.7*   TOTAL BILIRUBIN mg/dL 0.13*   ALK PHOS U/L 59   ALT U/L 20   AST U/L 10   GLUCOSE RANDOM mg/dL 144*                       Lines/Drains:  Invasive Devices     Peripherally Inserted Central Catheter Line  Duration           PICC Line  Right Basilic 1 day                Central Line:  Goal for removal: {Central Line Removal VNV}         Telemetry:  Telemetry Orders (From admission, onward)             24 Hour Telemetry Monitoring  Continuous x 24 Hours (Telem)        Question:  Reason for 24 Hour Telemetry  Answer:  Arrhythmias requiring acute medical intervention / PPM or ICD malfunction                 Telemetry Reviewed: {D Tele Review:70479}  Indication for Continued Telemetry Use: {Tele Indications:16351}             Imaging: {Radiology Review:70321}    Recent Cultures (last 7 days):   Results from last 7 days   Lab Units 08/02/23 2042   BLOOD CULTURE  No Growth at 24 hrs. No Growth at 24 hrs. Last 24 Hours Medication List:   Current Facility-Administered Medications   Medication Dose Route Frequency Provider Last Rate   • acetaminophen  650 mg Oral Q6H PRN Marquez Nava MD     • albuterol  2 puff Inhalation Q6H PRN Marquez Nava MD     • allopurinol  100 mg Oral Daily Yonathan Aiad, DO     • alteplase  2 mg Intracatheter Q1MIN PRN Oralia Pickett MD     • aluminum-magnesium hydroxide-simethicone  30 mL Oral Q6H PRN Marquez Nava MD     • amLODIPine  2.5 mg Oral Daily Hetul Cabezas, DO     • dicyclomine  20 mg Oral BID Marquez Nava MD     • levothyroxine  150 mcg Oral Early Morning Perry Lucas MD     • loratadine  10 mg Oral HS Yonathan Aiad, DO     • LORazepam  0.5 mg Oral Q8H PRN Hetul Cabezas, DO     • montelukast  10 mg Oral HS Marquez Nava MD     • multi-electrolyte  125 mL/hr Intravenous Continuous Yonathan Aiad,  mL/hr (08/03/23 2110)   • ondansetron  4 mg Intravenous Q6H PRN Marquez Nava MD     • predniSONE  100 mg Oral Daily Oralia Pickett MD          Today, Patient Was Seen By: Valeria Cordoba    **Please Note: This note may have been constructed using a voice recognition system. **

## 2023-08-04 NOTE — ASSESSMENT & PLAN NOTE
· Patient reports intermittent and worsening headache since the chemo  · Discussed with Heme/Onc - they suspect may be related to vincristine   · Neurology consulted

## 2023-08-04 NOTE — ASSESSMENT & PLAN NOTE
· History of asthma recently found to have thyroid enlargement thought to have subacute thyroiditis and started on prednisone/levothyroxine eventually biopsy found to have thyroid lymphoma. · Echo done in preparation for chemotherapy, unremarkable. · Seen by endocrinology  · Continuing levothyroxine. Now on prednisone per Heme/Onc as below   · Oncology following, recommended bone marrow biopsy was done in IR 7/26 which does not show any involvement by B-cell lymphoma. Flow cytometry negative. Did show reduced iron stores. · Per pathology, Burkitt lymphoma ruled out, at this time best classified as diffuse large B cell lymphoma. · Molecular testing remains pending.    · PICC line was placed  · Patient was started on R-CHOP on 8/2/23 inpatient   · Heme/Onc starting filgrastim today

## 2023-08-04 NOTE — QUICK NOTE
At 1311, this RN was called into pts room to find pt crying hysterically in a sudden 10/10 headache that affected her R eye and forehead. Vital taken. KARYNA Becerra notified and came to bedside as well as Dr. Alex Strauss hem/onc. See provider orders.

## 2023-08-04 NOTE — QUICK NOTE
At 1800, patient screaming and crying 10/10 pain again. New onset of blurriness. Neuro notified and internal medicine. Per boyfriend, they wanted to give migraine medicine instead of pain. Per MAR, pain medicines were d/c except for tylenol. Pt refused. reached out to internal medicine for a one time order.  See provider order

## 2023-08-04 NOTE — CONSULTS
NEUROLOGY RESIDENCY CONSULT NOTE     Name: Iris Macedo   Age & Sex: 26 y.o. female   MRN: [id number]  Unit/Bed#: 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 Was started on R-CHOP this admission on 8/2/2023 however Rituxan held secondary to adverse reaction but continued on rest of chemotherapy agents without difficulty. Today developed acute onset right frontal and periorbital sharp headache that progressed to 10/10 severity required Dilaudid with resolution. Headache not similar to prior migraines. Heme-onc concern for possible vincristine reaction. · Patient neurological examination: Sedated status post Dilaudid arousable to stimuli. Neurological exam nonfocal.  No visual field deficits. PERRLA. EOM intact. Denies visual disturbances, obscurities, diplopia. Plan:  · Given newly diagnosis of lymphoma will obtain MRI brain with and without contrast to rule out intracranial pathology  · Ophthalmology consulted, input appreciated  · Headache control:  · The recommended holding off on narcotics  · Trial Depacon 500 mg q.8 can consider titrating up to 1 g  · IV fluid hydration  · Can add low-dose quetiapine if headaches persist  · Can consider one-time dose of IV Toradol however given that patient is currently on high-dose steroids and no PPI would hold off. · Neurology will continue to follow    Recommendations for outpatient neurological follow up have yet to be determined.     Pending for discharge: Headache evaluation, oncology management     SUBJECTIVE     Reason for Consult / Principal Problem: Headache  Hx and PE limited by: N/A     HPI: Iris Macedo is a 26 y.o. female with history of concussion, migraine, recently-diagnosed thyroid lymphoma (most likely diffuse large B-cell lymphoma). She originally presented on 7/22/23 for the lymphoma with associated shortness of breath and dysphagia. Patient was started on R-CHOP on 8/2/23. Since starting R-CHOP, patient noted headache. Patient reports that the headache has been gradually increasing today to "20/10". Per boyfriend, patient was screaming and pressing her right eye. Patient describes the pain as stabbing pain in her right eye, frontal, and temporal areas. Patient was administered Dilaudid, after which patient promptly fell asleep. When we saw the patient, she noted that her headache was 3/10. Heme/ onc noted that the headache might be related to the vincristine. Inpatient consult to Neurology     Date/Time 7/22/2023 12:26 PM     Performed by  Lizzie Verdin DO   Authorized by Emiel Nolasco PA-C             Historical Information   Past Medical History:   Diagnosis Date   • Asthma    • Disease of thyroid gland    • Ear infection      Past Surgical History:   Procedure Laterality Date   • IR BIOPSY BONE MARROW  7/26/2023   • IR BIOPSY NECK  7/20/2023     Social History   Social History     Substance and Sexual Activity   Alcohol Use Yes   • Alcohol/week: 1.0 standard drink of alcohol   • Types: 1 Standard drinks or equivalent per week    Comment: socially     Social History     Substance and Sexual Activity   Drug Use Never     E-Cigarette/Vaping   • E-Cigarette Use Never User      E-Cigarette/Vaping Substances   • Nicotine No    • THC No    • CBD No    • Flavoring No    • Other No    • Unknown No      Social History     Tobacco Use   Smoking Status Never   Smokeless Tobacco Never     Family History: N/A  Meds/Allergies   all current active meds have been reviewed  Allergies   Allergen Reactions   • Pollen Extract    • Shrimp Extract Allergy Skin Test - Food Allergy Hives         Review of Systems   Constitutional: Negative for chills and fever. HENT: Negative for ear pain and sore throat.     Eyes: Positive for pain. Negative for visual disturbance. Respiratory: Negative for shortness of breath. Cardiovascular: Negative for chest pain. Gastrointestinal: Negative for abdominal pain, nausea and vomiting. Genitourinary: Negative for difficulty urinating. Musculoskeletal: Negative for arthralgias and myalgias. Neurological: Positive for headaches. Negative for weakness and numbness. OBJECTIVE     Patient ID: Sonia Bustos is a 26 y.o. female. Vitals:   Vitals:    23 0235 23 0740 23 1132 23 1304   BP: 145/98 139/92 147/98 137/94   Pulse: (!) 107 100 (!) 107 100   Resp: 18 18     Temp: 97.7 °F (36.5 °C) 97.5 °F (36.4 °C) 97.5 °F (36.4 °C)    TempSrc:       SpO2: (!) 89% 94% (!) 89% (!) 82%   Weight:       Height:          Body mass index is 48.54 kg/m². Intake/Output Summary (Last 24 hours) at 2023 191  Last data filed at 2023 5793  Gross per 24 hour   Intake 800 ml   Output --   Net 800 ml       Temperature:   Temp (24hrs), Av.7 °F (36.5 °C), Min:97.5 °F (36.4 °C), Max:98 °F (36.7 °C)    Temperature: 97.5 °F (36.4 °C)    Invasive Devices: Invasive Devices     Peripherally Inserted Central Catheter Line  Duration           PICC Line 74/29/70 Right Basilic 2 days                Physical Exam  Constitutional:       General: She is not in acute distress. HENT:      Head: Normocephalic and atraumatic. Right Ear: External ear normal.      Left Ear: External ear normal.      Nose: No congestion or rhinorrhea. Mouth/Throat:      Pharynx: No oropharyngeal exudate or posterior oropharyngeal erythema. Eyes:      General:         Right eye: No discharge. Left eye: No discharge. Extraocular Movements: EOM normal.      Pupils: Pupils are equal, round, and reactive to light. Skin:     General: Skin is warm and dry. Neurological:      Mental Status: She is oriented to person, place, and time.       Motor: Motor strength is normal. Deep Tendon Reflexes:      Reflex Scores:       Bicep reflexes are 2+ on the right side and 2+ on the left side. Brachioradialis reflexes are 2+ on the right side and 2+ on the left side. Patellar reflexes are 2+ on the right side and 2+ on the left side. Achilles reflexes are 2+ on the right side and 2+ on the left side. Psychiatric:         Speech: Speech normal.          Neurologic Exam     Mental Status   Oriented to person, place, and time. Attention: normal. Concentration: normal.   Speech: speech is normal   Level of consciousness: drowsy  Knowledge: good. Cranial Nerves     CN II   Right visual field deficit: none  Left visual field deficit: none     CN III, IV, VI   Pupils are equal, round, and reactive to light. Extraocular motions are normal.   Right pupil: Shape: regular. Reactivity: brisk. Left pupil: Shape: regular. Reactivity: brisk. CN III: no CN III palsy  CN VI: no CN VI palsy  Nystagmus: none   Diplopia: none  Upgaze: normal  Downgaze: normal    CN V   Right facial sensation deficit: none  Left facial sensation deficit: none    CN VII   Right facial weakness: none  Left facial weakness: none    CN VIII   Hearing: intact    CN IX, X   Palate: symmetric    CN XI   Right trapezius strength: normal  Left trapezius strength: normal    CN XII   Tongue: not atrophic  Fasciculations: absent  Tongue deviation: none    Motor Exam   Muscle bulk: normal  Overall muscle tone: normal  Right arm tone: normal  Left arm tone: normal  Right leg tone: normal  Left leg tone: normal    Strength   Strength 5/5 throughout.      Sensory Exam   Right arm light touch: normal  Left arm light touch: normal  Right leg light touch: normal  Left leg light touch: normal    Gait, Coordination, and Reflexes     Reflexes   Right brachioradialis: 2+  Left brachioradialis: 2+  Right biceps: 2+  Left biceps: 2+  Right patellar: 2+  Left patellar: 2+  Right achilles: 2+  Left achilles: 2+ LABORATORY DATA     Labs: I have personally reviewed pertinent reports. Results from last 7 days   Lab Units 08/04/23  0649 08/03/23  0513 08/02/23  0539 07/31/23  0541   WBC Thousand/uL 11.55* 9.47 9.22 10.10   HEMOGLOBIN g/dL 10.8* 12.2 11.9 12.0   HEMATOCRIT % 33.2* 38.6 37.5 37.1   PLATELETS Thousands/uL 319 376 392* 390   NEUTROS PCT %  --  94* 61 60   MONOS PCT %  --  1* 6 7   EOS PCT %  --  0 4 4      Results from last 7 days   Lab Units 08/04/23  0649 08/03/23  0513 08/02/23  0539   POTASSIUM mmol/L 3.9 3.7 3.9   CHLORIDE mmol/L 112* 108 107   CO2 mmol/L 26 24 26   BUN mg/dL 12 11 13   CREATININE mg/dL 0.64 0.73 0.82   CALCIUM mg/dL 8.5 8.7 8.9   ALK PHOS U/L 59 75 76   ALT U/L 20 22 17   AST U/L 10 17 12         Results from last 7 days   Lab Units 08/04/23  0649 08/03/23  0513   PHOSPHORUS mg/dL 2.7 2.6*                    IMAGING & DIAGNOSTIC TESTING     Radiology Results: I have personally reviewed pertinent reports. IR biopsy bone marrow   Final Result by Micah Walker MD (07/26 1303)   Impression: Successful percutaneous diagnostic bone marrow aspiration and bone core biopsy. A full pathology report will follow. Workstation performed: [id number]         CTA ED chest PE study   Final Result by Orlando Kitchen MD (07/22 3854)      No pulmonary embolus. No acute pulmonary disease. Severe enlargement of the thyroid gland with recent biopsy showing lymphoma, with increased tracheal narrowing compared with the neck CT from 6/1/2023 with no obvious postprocedural hemorrhage. See neck CT from today. Hepatic steatosis. Workstation performed: [id number]         CT soft tissue neck with contrast   Final Result by Carloz Milton MD (07/22 1934)      Significantly increased size of thyroid tissue compared to the prior exam compressing the airway/trachea. This is consistent with patient's known history of thyroid cancer.  Surgical consult recommended for further evaluation. Workstation performed: [id number]         MRI inpatient order    (Results Pending)       Other Diagnostic Testing: I have personally reviewed pertinent reports. ACTIVE MEDICATIONS     Current Facility-Administered Medications   Medication Dose Route Frequency   • acetaminophen (TYLENOL) tablet 650 mg  650 mg Oral Q6H PRN   • albuterol (PROVENTIL HFA,VENTOLIN HFA) inhaler 2 puff  2 puff Inhalation Q6H PRN   • allopurinol (ZYLOPRIM) tablet 100 mg  100 mg Oral Daily   • alteplase (CATHFLO) injection 2 mg  2 mg Intracatheter Q1MIN PRN   • aluminum-magnesium hydroxide-simethicone (MAALOX) oral suspension 30 mL  30 mL Oral Q6H PRN   • amLODIPine (NORVASC) tablet 2.5 mg  2.5 mg Oral Daily   • artificial tear (LUBRIFRESH P.M.) ophthalmic ointment   Both Eyes BID   • dicyclomine (BENTYL) tablet 20 mg  20 mg Oral BID   • Filgrastim-aafi (NIVESTYM) subcutaneous injection 480 mcg  480 mcg Subcutaneous Daily   • heparin (porcine) subcutaneous injection 5,000 Units  5,000 Units Subcutaneous Q8H [facility]   • levothyroxine tablet 150 mcg  150 mcg Oral Early Morning   • loratadine (CLARITIN) tablet 10 mg  10 mg Oral HS   • LORazepam (ATIVAN) tablet 0.5 mg  0.5 mg Oral Q8H PRN   • montelukast (SINGULAIR) tablet 10 mg  10 mg Oral HS   • multi-electrolyte (PLASMALYTE-A/ISOLYTE-S PH 7.4) IV solution  125 mL/hr Intravenous Continuous   • ondansetron (ZOFRAN) injection 4 mg  4 mg Intravenous Q6H PRN   • predniSONE tablet 100 mg  100 mg Oral Daily   • valproate (DEPACON) 500 mg in sodium chloride 0.9 % 50 mL IVPB  500 mg Intravenous Q8H [facility]       Prior to Admission medications    Medication Sig Start Date End Date Taking?  Authorizing Provider   albuterol (Proventil HFA) 90 mcg/act inhaler Inhale 2 puffs every 6 (six) hours as needed for wheezing 4/6/23  Yes Mayelin Givens,    allopurinol (ZYLOPRIM) 300 mg tablet Take 1 tablet (300 mg total) by mouth daily 8/4/23  Yes Yonathan Wilkinson, DO   dicyclomine (BENTYL) 20 mg tablet Take 1 tablet (20 mg total) by mouth 2 (two) times a day 6/24/23  Yes Eugene Grijalva MD   diphenhydrAMINE (BENADRYL) 25 mg capsule Take 1 capsule (25 mg total) by mouth every 6 (six) hours as needed for itching 11/19/18  Yes Harvinder Edgar MD   fluticasone [facility]) 50 mcg/act nasal spray SPRAY 1 SPRAY INTO EACH NOSTRIL EVERY DAY 6/29/23  Yes Rafaela Qiu DO   levothyroxine 150 mcg tablet Take 1 tablet (150 mcg total) by mouth daily 7/12/23 10/10/23 Yes Lionel Rodriguez MD   ondansetron [facility]) 4 mg tablet Take 1 tablet (4 mg total) by mouth every 6 (six) hours 2/12/23  Yes Milton Vivas MD   predniSONE 20 mg tablet TAKE 1 TABLET BY MOUTH EVERY DAY  Patient taking differently: Take 10 mg by mouth daily 6/29/23  Yes Lionel oRdriguez MD   pegfilgrastim (NEULASTA ONPRO) 6 mg/0.6 mL PSKT Inject 0.6 mL (6 mg total) under the skin once for 1 dose 8/4/23 8/4/23 Yes Yonathan Wilkinson DO   benzonatate (TESSALON) 200 MG capsule Take 1 capsule (200 mg total) by mouth 3 (three) times a day as needed for cough  Patient not taking: Reported on 7/23/2023 4/6/23   Rafaela Qiu DO   ibuprofen (MOTRIN) 400 mg tablet Take 1 tablet (400 mg total) by mouth every 6 (six) hours as needed for mild pain  Patient not taking: Reported on 7/23/2023 4/16/19   Uriah Thorpe MD   methocarbamol (ROBAXIN) 500 mg tablet Take 1 tablet (500 mg total) by mouth 3 (three) times a day as needed for muscle spasms  Patient not taking: Reported on 4/6/2023 8/2/22   Tanisha New PA-C   montelukast (SINGULAIR) 10 mg tablet Take 10 mg by mouth daily at bedtime  Patient not taking: Reported on 7/23/2023    Historical Provider, MD   naproxen (NAPROSYN) 500 mg tablet Take 1 tablet by mouth every 12 (twelve) hours as needed for mild pain or moderate pain  Patient not taking: Reported on 7/23/2023 8/12/17   Sharon Jones DO   ondansetron (ZOFRAN-ODT) 4 mg disintegrating tablet Take 1 tablet by mouth every 6 (six) hours as needed for nausea or vomiting  Patient not taking: Reported on 7/23/2023 8/12/17   Linda Willingham DO   ondansetron (ZOFRAN-ODT) 4 mg disintegrating tablet Take 1 tablet (4 mg total) by mouth every 8 (eight) hours as needed for nausea 6/24/23   Crystal Nunes MD   predniSONE 50 mg tablet Take 2 tablets (100 mg total) by mouth daily for 5 days Take 100mg by mouth daily on days 1 through 5 of current chemotherapy cycle.  8/2/23 8/7/23  Avery Gottlieb DO         CODE STATUS & ADVANCED DIRECTIVES     Code Status: Level 1 - Full Code  Advance Directive and Living Will:      Power of :    POLST:        VTE Pharmacologic Prophylaxis: Heparin  VTE Mechanical Prophylaxis: sequential compression device    ==  DO Jim Figueroa/ [facility], MS-4

## 2023-08-04 NOTE — PROGRESS NOTES
[address] [facility]  Progress Note  Name: Bria Cummings  MRN: [id number]  Unit/Bed#: [facility] 913-01 I Date of Admission: 7/22/2023   Date of Service: 8/4/2023 I Hospital Day: 13    Assessment/Plan   * Lymphoma of thyroid gland [facility])  Assessment & Plan  · History of asthma recently found to have thyroid enlargement thought to have subacute thyroiditis and started on prednisone/levothyroxine eventually biopsy found to have thyroid lymphoma. · Echo done in preparation for chemotherapy, unremarkable. · Seen by endocrinology  · Continuing levothyroxine. Now on prednisone per Heme/Onc as below   · Oncology following, recommended bone marrow biopsy was done in IR 7/26 which does not show any involvement by B-cell lymphoma. Flow cytometry negative. Did show reduced iron stores. · Per pathology, Burkitt lymphoma ruled out, at this time best classified as diffuse large B cell lymphoma. · Molecular testing remains pending. · PICC line was placed  · Patient was started on R-CHOP on 8/2/23 inpatient   · Heme/Onc starting filgrastim today    Subacute thyroiditis  Assessment & Plan  · Working diagnosis before found to have lymphoma with subacute thyroiditis   · Continue levothyroxine 125 mcg daily    Hypothyroidism  Assessment & Plan  · TSH within normal limits  · Continue Levothyroxine    Elevated blood pressure reading  Assessment & Plan  · Noted this hospitalization, started on Norvasc 2.5 mg p.o. daily with overall improvement. · Monitor, adjust as needed     Mild intermittent asthma without complication  Assessment & Plan  · No exacerbation   · Continue Albuterol PRN  · Continue montelukast    Morbid obesity (HCC)  Assessment & Plan  · Body mass index is 48.54 kg/m².    · Diet and lifestyle modifications    Headache  Assessment & Plan  · Patient reports intermittent and worsening headache since the chemo  · Discussed with Heme/Onc - they suspect may be related to vincristine · Neurology consulted           VTE Pharmacologic Prophylaxis:   Low Risk (Score 0-2) - Encourage Ambulation. Patient Centered Rounds: I performed bedside rounds with nursing staff today. Discussions with Specialists or Other Care Team Provider: Heme/Onc and CELESTINA"tobi Neurologist     Education and Discussions with Family / Patient: Updated  (significant other) at bedside. Total Time Spent on Date of Encounter in care of patient: 25 minutes This time was spent on one or more of the following: performing physical exam; counseling and coordination of care; obtaining or reviewing history; documenting in the medical record; reviewing/ordering tests, medications or procedures; communicating with other healthcare professionals and discussing with patient's family/caregivers. Current Length of Stay: 13 day(s)  Current Patient Status: Inpatient   Certification Statement: The patient will continue to require additional inpatient hospital stay due to headache, filgrastim  Discharge Plan: Anticipate discharge in 48-72 hrs to home. Code Status: Level 1 - Full Code    Subjective:   Ms. Lindy Cazares reports episode of "chest tightness" overnight when she took a really deep breath. She also reports right frontal headache. She reports a history of concussion and migraines    Objective:     Vitals:   Temp (24hrs), Av.7 °F (36.5 °C), Min:97.5 °F (36.4 °C), Max:98 °F (36.7 °C)    Temp:  [97.5 °F (36.4 °C)-98 °F (36.7 °C)] 97.5 °F (36.4 °C)  HR:  [100-115] 100  Resp:  [16-18] 18  BP: (115-147)/(73-98) 137/94  SpO2:  [82 %-96 %] 82 %  Body mass index is 48.54 kg/m². Input and Output Summary (last 24 hours): Intake/Output Summary (Last 24 hours) at 2023 1345  Last data filed at 2023 9631  Gross per 24 hour   Intake 1595.83 ml   Output --   Net 1595.83 ml       Physical Exam:   Physical Exam  Vitals reviewed. Constitutional:       Appearance: She is obese.       Comments: Patient seen sitting in bed, SO at bedside   Cardiovascular:      Rate and Rhythm: Regular rhythm. Tachycardia present. Pulmonary:      Effort: Pulmonary effort is normal. No respiratory distress. Breath sounds: Normal breath sounds. Abdominal:      General: Bowel sounds are normal.      Palpations: Abdomen is soft. Tenderness: There is no abdominal tenderness. Musculoskeletal:      Right lower leg: No edema. Left lower leg: No edema. Skin:     General: Skin is warm. Neurological:      Mental Status: She is alert and oriented to person, place, and time.    Psychiatric:         Mood and Affect: Mood normal.         Behavior: Behavior normal.        Additional Data:     Labs:  Results from last 7 days   Lab Units 08/04/23  0649 08/03/23  0513   WBC Thousand/uL 11.55* 9.47   HEMOGLOBIN g/dL 10.8* 12.2   HEMATOCRIT % 33.2* 38.6   PLATELETS Thousands/uL 319 376   NEUTROS PCT %  --  94*   LYMPHS PCT %  --  4*   MONOS PCT %  --  1*   EOS PCT %  --  0     Results from last 7 days   Lab Units 08/04/23  0649   SODIUM mmol/L 143   POTASSIUM mmol/L 3.9   CHLORIDE mmol/L 112*   CO2 mmol/L 26   BUN mg/dL 12   CREATININE mg/dL 0.64   ANION GAP mmol/L 5   CALCIUM mg/dL 8.5   ALBUMIN g/dL 2.7*   TOTAL BILIRUBIN mg/dL 0.13*   ALK PHOS U/L 59   ALT U/L 20   AST U/L 10   GLUCOSE RANDOM mg/dL 144*                       Lines/Drains:  Invasive Devices     Peripherally Inserted Central Catheter Line  Duration           PICC Line 58/18/48 Right Basilic 2 days                   Telemetry:  Telemetry Orders (From admission, onward)             24 Hour Telemetry Monitoring  Continuous x 24 Hours (Telem)        Expiring   Question:  Reason for 24 Hour Telemetry  Answer:  Arrhythmias requiring acute medical intervention / PPM or ICD malfunction                 Telemetry Reviewed: Sinus Tachycardia  Indication for Continued Telemetry Use: Arrthymias requiring medical therapy             Imaging: Reviewed radiology reports from this admission includin/22 CTA PE study    Recent Cultures (last 7 days):   Results from last 7 days   Lab Units 23   BLOOD CULTURE  No Growth at 24 hrs. No Growth at 24 hrs. Last 24 Hours Medication List:   Current Facility-Administered Medications   Medication Dose Route Frequency Provider Last Rate   • acetaminophen  650 mg Oral Q6H PRN Shamar Zamarripa MD     • albuterol  2 puff Inhalation Q6H PRN Shamar Zamarripa MD     • allopurinol  100 mg Oral Daily Yonathan Lisaad, DO     • alteplase  2 mg Intracatheter Q1MIN PRN Wisam Garza MD     • aluminum-magnesium hydroxide-simethicone  30 mL Oral Q6H PRN Shamar Zamarripa MD     • amLODIPine  2.5 mg Oral Daily Arielle Cabezas DO     • dicyclomine  20 mg Oral BID Shamar Zamarripa MD     • Filgrastim-aafi  480 mcg Subcutaneous Daily Yonathan Gonzalezad, DO     • levothyroxine  150 mcg Oral Early Morning Perry Lucas MD     • loratadine  10 mg Oral HS Yonathan Lisaad, DO     • LORazepam  0.5 mg Oral Q8H PRN Arielle Cabezas DO     • montelukast  10 mg Oral HS Shamar Zamarripa MD     • multi-electrolyte  125 mL/hr Intravenous Continuous Yonathan Aiad,  mL/hr (23 [id number])   • ondansetron  4 mg Intravenous Q6H PRN Shamar Zamarripa MD     • predniSONE  100 mg Oral Daily Wisam Garza MD          Today, Patient Was Seen By: Rossana García PA-C    **Please Note: This note may have been constructed using a voice recognition system. **

## 2023-08-05 LAB
ALBUMIN SERPL BCP-MCNC: 2.9 G/DL (ref 3.5–5)
ALP SERPL-CCNC: 61 U/L (ref 46–116)
ALT SERPL W P-5'-P-CCNC: 18 U/L (ref 12–78)
ANION GAP SERPL CALCULATED.3IONS-SCNC: 6 MMOL/L
AST SERPL W P-5'-P-CCNC: 7 U/L (ref 5–45)
BASOPHILS # BLD AUTO: 0.03 THOUSANDS/ÂΜL (ref 0–0.1)
BASOPHILS NFR BLD AUTO: 0 % (ref 0–1)
BILIRUB SERPL-MCNC: 0.25 MG/DL (ref 0.2–1)
BUN SERPL-MCNC: 16 MG/DL (ref 5–25)
CALCIUM ALBUM COR SERPL-MCNC: 9.1 MG/DL (ref 8.3–10.1)
CALCIUM SERPL-MCNC: 8.2 MG/DL (ref 8.3–10.1)
CHLORIDE SERPL-SCNC: 108 MMOL/L (ref 96–108)
CO2 SERPL-SCNC: 26 MMOL/L (ref 21–32)
CREAT SERPL-MCNC: 0.71 MG/DL (ref 0.6–1.3)
EOSINOPHIL # BLD AUTO: 0.01 THOUSAND/ÂΜL (ref 0–0.61)
EOSINOPHIL NFR BLD AUTO: 0 % (ref 0–6)
ERYTHROCYTE [DISTWIDTH] IN BLOOD BY AUTOMATED COUNT: 13 % (ref 11.6–15.1)
GFR SERPL CREATININE-BSD FRML MDRD: 121 ML/MIN/1.73SQ M
GLUCOSE SERPL-MCNC: 109 MG/DL (ref 65–140)
HCT VFR BLD AUTO: 30 % (ref 34.8–46.1)
HGB BLD-MCNC: 9.4 G/DL (ref 11.5–15.4)
IMM GRANULOCYTES # BLD AUTO: 0.39 THOUSAND/UL (ref 0–0.2)
IMM GRANULOCYTES NFR BLD AUTO: 2 % (ref 0–2)
LYMPHOCYTES # BLD AUTO: 1.12 THOUSANDS/ÂΜL (ref 0.6–4.47)
LYMPHOCYTES NFR BLD AUTO: 4 % (ref 14–44)
MAGNESIUM SERPL-MCNC: 3 MG/DL (ref 1.6–2.6)
MCH RBC QN AUTO: 27.9 PG (ref 26.8–34.3)
MCHC RBC AUTO-ENTMCNC: 31.3 G/DL (ref 31.4–37.4)
MCV RBC AUTO: 89 FL (ref 82–98)
MONOCYTES # BLD AUTO: 1.06 THOUSAND/ÂΜL (ref 0.17–1.22)
MONOCYTES NFR BLD AUTO: 4 % (ref 4–12)
NEUTROPHILS # BLD AUTO: 22.81 THOUSANDS/ÂΜL (ref 1.85–7.62)
NEUTS SEG NFR BLD AUTO: 90 % (ref 43–75)
NRBC BLD AUTO-RTO: 0 /100 WBCS
PHOSPHATE SERPL-MCNC: 3.6 MG/DL (ref 2.7–4.5)
PLATELET # BLD AUTO: 289 THOUSANDS/UL (ref 149–390)
PLATELET BLD QL SMEAR: ADEQUATE
PMV BLD AUTO: 9.6 FL (ref 8.9–12.7)
POTASSIUM SERPL-SCNC: 3.5 MMOL/L (ref 3.5–5.3)
PROT SERPL-MCNC: 6.7 G/DL (ref 6.4–8.4)
RBC # BLD AUTO: 3.37 MILLION/UL (ref 3.81–5.12)
RBC MORPH BLD: NORMAL
SODIUM SERPL-SCNC: 140 MMOL/L (ref 135–147)
URATE SERPL-MCNC: 5.6 MG/DL (ref 2–7.5)
WBC # BLD AUTO: 25.42 THOUSAND/UL (ref 4.31–10.16)

## 2023-08-05 PROCEDURE — 83735 ASSAY OF MAGNESIUM: CPT | Performed by: STUDENT IN AN ORGANIZED HEALTH CARE EDUCATION/TRAINING PROGRAM

## 2023-08-05 PROCEDURE — 80053 COMPREHEN METABOLIC PANEL: CPT | Performed by: PHYSICIAN ASSISTANT

## 2023-08-05 PROCEDURE — 99232 SBSQ HOSP IP/OBS MODERATE 35: CPT | Performed by: INTERNAL MEDICINE

## 2023-08-05 PROCEDURE — 84100 ASSAY OF PHOSPHORUS: CPT | Performed by: STUDENT IN AN ORGANIZED HEALTH CARE EDUCATION/TRAINING PROGRAM

## 2023-08-05 PROCEDURE — 84550 ASSAY OF BLOOD/URIC ACID: CPT | Performed by: STUDENT IN AN ORGANIZED HEALTH CARE EDUCATION/TRAINING PROGRAM

## 2023-08-05 PROCEDURE — 99233 SBSQ HOSP IP/OBS HIGH 50: CPT | Performed by: PHYSICIAN ASSISTANT

## 2023-08-05 PROCEDURE — 85025 COMPLETE CBC W/AUTO DIFF WBC: CPT | Performed by: STUDENT IN AN ORGANIZED HEALTH CARE EDUCATION/TRAINING PROGRAM

## 2023-08-05 PROCEDURE — 99232 SBSQ HOSP IP/OBS MODERATE 35: CPT | Performed by: PSYCHIATRY & NEUROLOGY

## 2023-08-05 RX ORDER — ALLOPURINOL 100 MG/1
100 TABLET ORAL DAILY
Status: DISCONTINUED | OUTPATIENT
Start: 2023-08-06 | End: 2023-08-07 | Stop reason: HOSPADM

## 2023-08-05 RX ORDER — HYDROMORPHONE HCL/PF 1 MG/ML
0.5 SYRINGE (ML) INJECTION ONCE
Status: COMPLETED | OUTPATIENT
Start: 2023-08-05 | End: 2023-08-05

## 2023-08-05 RX ORDER — PREDNISONE 20 MG/1
20 TABLET ORAL DAILY
Status: COMPLETED | OUTPATIENT
Start: 2023-08-06 | End: 2023-08-06

## 2023-08-05 RX ORDER — LORAZEPAM 1 MG/1
1 TABLET ORAL EVERY 6 HOURS PRN
Status: DISCONTINUED | OUTPATIENT
Start: 2023-08-05 | End: 2023-08-07 | Stop reason: HOSPADM

## 2023-08-05 RX ORDER — OLANZAPINE 5 MG/1
5 TABLET, ORALLY DISINTEGRATING ORAL 2 TIMES DAILY
Status: DISCONTINUED | OUTPATIENT
Start: 2023-08-05 | End: 2023-08-05

## 2023-08-05 RX ORDER — ONDANSETRON 2 MG/ML
4 INJECTION INTRAMUSCULAR; INTRAVENOUS EVERY 8 HOURS
Status: DISCONTINUED | OUTPATIENT
Start: 2023-08-05 | End: 2023-08-05

## 2023-08-05 RX ORDER — OLANZAPINE 10 MG/1
2.5 INJECTION, POWDER, LYOPHILIZED, FOR SOLUTION INTRAMUSCULAR ONCE
Status: DISCONTINUED | OUTPATIENT
Start: 2023-08-05 | End: 2023-08-07 | Stop reason: HOSPADM

## 2023-08-05 RX ORDER — KETOROLAC TROMETHAMINE 30 MG/ML
30 INJECTION, SOLUTION INTRAMUSCULAR; INTRAVENOUS EVERY 8 HOURS
Status: DISCONTINUED | OUTPATIENT
Start: 2023-08-05 | End: 2023-08-05

## 2023-08-05 RX ORDER — PANTOPRAZOLE SODIUM 40 MG/1
40 TABLET, DELAYED RELEASE ORAL
Status: DISCONTINUED | OUTPATIENT
Start: 2023-08-05 | End: 2023-08-07 | Stop reason: HOSPADM

## 2023-08-05 RX ORDER — ONDANSETRON 2 MG/ML
4 INJECTION INTRAMUSCULAR; INTRAVENOUS ONCE
Status: COMPLETED | OUTPATIENT
Start: 2023-08-05 | End: 2023-08-05

## 2023-08-05 RX ORDER — KETOROLAC TROMETHAMINE 30 MG/ML
30 INJECTION, SOLUTION INTRAMUSCULAR; INTRAVENOUS EVERY 8 HOURS
Status: DISPENSED | OUTPATIENT
Start: 2023-08-05 | End: 2023-08-07

## 2023-08-05 RX ORDER — KETOROLAC TROMETHAMINE 30 MG/ML
30 INJECTION, SOLUTION INTRAMUSCULAR; INTRAVENOUS ONCE
Status: COMPLETED | OUTPATIENT
Start: 2023-08-05 | End: 2023-08-05

## 2023-08-05 RX ORDER — ONDANSETRON 2 MG/ML
4 INJECTION INTRAMUSCULAR; INTRAVENOUS EVERY 8 HOURS
Status: DISCONTINUED | OUTPATIENT
Start: 2023-08-05 | End: 2023-08-07 | Stop reason: HOSPADM

## 2023-08-05 RX ADMIN — ONDANSETRON 4 MG: 2 INJECTION INTRAMUSCULAR; INTRAVENOUS at 08:41

## 2023-08-05 RX ADMIN — AMLODIPINE BESYLATE 2.5 MG: 2.5 TABLET ORAL at 09:18

## 2023-08-05 RX ADMIN — KETOROLAC TROMETHAMINE 30 MG: 30 INJECTION, SOLUTION INTRAMUSCULAR; INTRAVENOUS at 08:47

## 2023-08-05 RX ADMIN — VALPROATE SODIUM 1000 MG: 100 INJECTION, SOLUTION INTRAVENOUS at 22:17

## 2023-08-05 RX ADMIN — LEVOTHYROXINE SODIUM 150 MCG: 75 TABLET ORAL at 06:03

## 2023-08-05 RX ADMIN — SODIUM CHLORIDE, SODIUM GLUCONATE, SODIUM ACETATE, POTASSIUM CHLORIDE, MAGNESIUM CHLORIDE, SODIUM PHOSPHATE, DIBASIC, AND POTASSIUM PHOSPHATE 125 ML/HR: .53; .5; .37; .037; .03; .012; .00082 INJECTION, SOLUTION INTRAVENOUS at 05:56

## 2023-08-05 RX ADMIN — HYDROMORPHONE HYDROCHLORIDE 0.5 MG: 1 INJECTION, SOLUTION INTRAMUSCULAR; INTRAVENOUS; SUBCUTANEOUS at 09:53

## 2023-08-05 RX ADMIN — ONDANSETRON 4 MG: 2 INJECTION INTRAMUSCULAR; INTRAVENOUS at 22:23

## 2023-08-05 RX ADMIN — HEPARIN SODIUM 5000 UNITS: 5000 INJECTION INTRAVENOUS; SUBCUTANEOUS at 06:03

## 2023-08-05 RX ADMIN — SODIUM CHLORIDE, SODIUM GLUCONATE, SODIUM ACETATE, POTASSIUM CHLORIDE, MAGNESIUM CHLORIDE, SODIUM PHOSPHATE, DIBASIC, AND POTASSIUM PHOSPHATE 125 ML/HR: .53; .5; .37; .037; .03; .012; .00082 INJECTION, SOLUTION INTRAVENOUS at 22:36

## 2023-08-05 RX ADMIN — SODIUM CHLORIDE, SODIUM GLUCONATE, SODIUM ACETATE, POTASSIUM CHLORIDE, MAGNESIUM CHLORIDE, SODIUM PHOSPHATE, DIBASIC, AND POTASSIUM PHOSPHATE 125 ML/HR: .53; .5; .37; .037; .03; .012; .00082 INJECTION, SOLUTION INTRAVENOUS at 14:05

## 2023-08-05 RX ADMIN — ONDANSETRON 4 MG: 2 INJECTION INTRAMUSCULAR; INTRAVENOUS at 14:11

## 2023-08-05 RX ADMIN — KETOROLAC TROMETHAMINE 30 MG: 30 INJECTION, SOLUTION INTRAMUSCULAR; INTRAVENOUS at 21:30

## 2023-08-05 RX ADMIN — HYDROMORPHONE HYDROCHLORIDE 0.5 MG: 1 INJECTION, SOLUTION INTRAMUSCULAR; INTRAVENOUS; SUBCUTANEOUS at 08:57

## 2023-08-05 RX ADMIN — MONTELUKAST 10 MG: 10 TABLET, FILM COATED ORAL at 22:23

## 2023-08-05 RX ADMIN — DICYCLOMINE HYDROCHLORIDE 20 MG: 20 TABLET ORAL at 09:18

## 2023-08-05 RX ADMIN — VALPROATE SODIUM 1000 MG: 100 INJECTION, SOLUTION INTRAVENOUS at 14:15

## 2023-08-05 RX ADMIN — HEPARIN SODIUM 5000 UNITS: 5000 INJECTION INTRAVENOUS; SUBCUTANEOUS at 22:23

## 2023-08-05 RX ADMIN — DICYCLOMINE HYDROCHLORIDE 20 MG: 20 TABLET ORAL at 17:44

## 2023-08-05 RX ADMIN — LORAZEPAM 0.5 MG: 0.5 TABLET ORAL at 09:27

## 2023-08-05 RX ADMIN — LORAZEPAM 1 MG: 1 TABLET ORAL at 22:23

## 2023-08-05 RX ADMIN — OLANZAPINE 5 MG: 5 TABLET, ORALLY DISINTEGRATING ORAL at 09:13

## 2023-08-05 RX ADMIN — PREDNISONE 100 MG: 20 TABLET ORAL at 09:18

## 2023-08-05 RX ADMIN — HEPARIN SODIUM 5000 UNITS: 5000 INJECTION INTRAVENOUS; SUBCUTANEOUS at 14:18

## 2023-08-05 RX ADMIN — LORATADINE 10 MG: 10 TABLET ORAL at 22:23

## 2023-08-05 RX ADMIN — KETOROLAC TROMETHAMINE 30 MG: 30 INJECTION, SOLUTION INTRAMUSCULAR; INTRAVENOUS at 14:10

## 2023-08-05 RX ADMIN — VALPROATE SODIUM 500 MG: 100 INJECTION, SOLUTION INTRAVENOUS at 08:36

## 2023-08-05 RX ADMIN — ONDANSETRON 4 MG: 2 INJECTION INTRAMUSCULAR; INTRAVENOUS at 09:51

## 2023-08-05 RX ADMIN — PANTOPRAZOLE SODIUM 40 MG: 40 TABLET, DELAYED RELEASE ORAL at 17:44

## 2023-08-05 NOTE — RAPID RESPONSE
Rapid Response Note  Robbi Mcclain 26 y.o. female MRN: [id number]  [facility] Encounter: [id number]    Rapid Response Notification(s):   Response called date/time:  8/4/2023 8:56 PM  Response team arrival date/time:  8/4/2023 8:57 PM  Response end date/time:  8/4/2023 9:08 PM  Level of care:  Medsur  Rapid response location:  [facility] unit  Reason for rapid response team: intractable headache. Rapid Response Intervention(s):   Airway:  None  Breathing:  Oxygen  Circulation:  None  Fluids administered:  None  Medications administered:  None       Assessment:   · Intractable headache  · Concern for vincristine reaction   · Thyroid lymphoma    Plan:   · Neurology at bedside, will continue management of headache  · MRI brain if able - early attempt patient was unable to lay still  · Vital signs stable, no need for upgraded level of care     Rapid Response Outcome:   Transfer:  Remain on floor  Primary service notified of transfer: Yes    Code Status: Level 1 (Full Code)      Family notified of transfer: yes  Family member contacted: Boyfriend at bedside during RR     Background/Situation:   Robbi Mcclain is a 26 y.o. female who was recently diagnosed with thyroid lymphoma and started on R-CHOP. Today patient developed acute R frontal headache concerning for chemo reaction. Neurology was consulted for management. Rapid called for intractable headache. Review of Systems   Neurological: Positive for headaches. Objective:   Vitals:    08/04/23 1304 08/04/23 2056 08/04/23 2103 08/04/23 2112   BP: 137/94 (!) 153/106 (!) 154/109 (!) 150/107   Pulse: 100 (!) 119 95 92   Resp:   16    Temp:  97.5 °F (36.4 °C)     TempSrc:       SpO2: (!) 82% 100% 91% (!) 88%   Weight:       Height:         Physical Exam  Vitals and nursing note reviewed. Constitutional:       General: She is in acute distress. Appearance: She is morbidly obese. Interventions: Nasal cannula in place.    HENT:      Head: Normocephalic and atraumatic. Comments: Tenderness to R forehead  Eyes:      Pupils: Pupils are equal, round, and reactive to light. Cardiovascular:      Rate and Rhythm: Normal rate and regular rhythm. Pulmonary:      Effort: Pulmonary effort is normal.      Breath sounds: Normal breath sounds. No decreased breath sounds, wheezing, rhonchi or rales. Abdominal:      General: There is no distension. Palpations: Abdomen is soft. Skin:     General: Skin is warm and dry. Neurological:      Mental Status: She is alert. Comments: Crying and screaming in pain. Difficult to obtain neurologic exam         Portions of the record may have been created with voice recognition software. Occasional wrong word or "sound a like" substitutions may have occurred due to the inherent limitations of voice recognition software. Read the chart carefully and recognize, using context, where substitutions have occurred.     Josue Driscoll PA-C

## 2023-08-05 NOTE — PROGRESS NOTES
NEUROLOGY RESIDENCY PROGRESS NOTE     Name: Rosemary Ochoa   Age & Sex: 26 y.o. female   MRN: [id number]  Unit/Bed#: [facility] 913-01   Encounter: [id number]    Recommendations for outpatient neurological follow up have yet to be determined. Pending for discharge: Clinical Improvement    ASSESSMENT & PLAN     * Headache  Assessment & Plan  58-year-old female history of prior migraines, concussion, and recently diagnosed thyroid lymphoma suspected to be diffuse large B-cell lymphoma. Was started on R-CHOP this admission on 8/2/2023 however Rituxan held secondary to adverse reaction but continued on rest of chemotherapy agents without difficulty. Today developed acute onset right frontal and periorbital sharp headache that progressed to 10/10 severity required Dilaudid with resolution. Headache not similar to prior migraines. Heme-onc concern for possible vincristine reaction. Plan:  · Given newly diagnosis of lymphoma will obtain MRI brain with and without contrast to rule out intracranial pathology  · Ophthalmology consulted, input appreciated  · Headache control:  · Recommended holding off on narcotics  · Depacon increased to 1 g   · IV fluid hydration  · Zyprexa scheduled 5 mg BID  · Toradol 30 mg scheduled every 8 hours   · Zofran 4 mg IV scheduled every 8 hours with Toradol  · Neurology will continue to follow        SUBJECTIVE     Patient is 58-year-old female who presented to the ED as a result of lymphoma of the thyroid gland. Neurology was consulted due to the significant headache that the patient was experiencing. Patient was seen and examined. Overnight there was a rapid response that was called on the patient due to the patient in significant pain for the headache. During that time, neurology saw the patient and the patient received Toradol and Zyprexa. After that, patient did not complain and neurology was not called at bedside.     Today, patient sleeping when I arrived for examination. Upon waking up, patient was crying due to the severe headache that she was experiencing. She stated that she has a history of migraines and usually just takes Tylenol for them. Patient states she was nauseous and wanted to vomit but has not vomited. She denied any neck pain. Review of Systems   Gastrointestinal: Positive for nausea. Neurological: Positive for headaches. Negative for dizziness, tremors, seizures, syncope, facial asymmetry, speech difficulty, weakness, light-headedness and numbness. OBJECTIVE     Patient ID: Shelby Walker is a 26 y.o. female. Vitals:    23 2103 23 2112 23 2345 23 0827   BP: (!) 154/109 (!) 150/107  136/100   Pulse: 95 92  96   Resp: 16   16   Temp:    (!) 97.4 °F (36.3 °C)   TempSrc:       SpO2: 91% (!) 88% 92% 91%   Weight:       Height:          Temperature:   Temp (24hrs), Av.5 °F (36.4 °C), Min:97.4 °F (36.3 °C), Max:97.5 °F (36.4 °C)    Temperature: (!) 97.4 °F (36.3 °C)      Physical Exam  Constitutional:       General: She is in acute distress. Comments: Crying during examination   HENT:      Head: Normocephalic and atraumatic. Mouth/Throat:      Mouth: Mucous membranes are moist.   Eyes:      Extraocular Movements: Extraocular movements intact and EOM normal.      Conjunctiva/sclera: Conjunctivae normal.   Cardiovascular:      Rate and Rhythm: Tachycardia present. Pulmonary:      Effort: Pulmonary effort is normal.   Skin:     General: Skin is warm. Neurological:      Mental Status: She is oriented to person, place, and time. Motor: Motor strength is normal.     Coordination: Finger-Nose-Finger Test normal.      Deep Tendon Reflexes:      Reflex Scores:       Tricep reflexes are 2+ on the right side and 2+ on the left side. Bicep reflexes are 2+ on the right side and 2+ on the left side. Brachioradialis reflexes are 2+ on the right side and 2+ on the left side.        Patellar reflexes are 2+ on the right side and 2+ on the left side. Achilles reflexes are 2+ on the right side and 2+ on the left side. Psychiatric:         Speech: Speech normal.          Neurologic Exam     Mental Status   Oriented to person, place, and time. Attention: normal. Concentration: normal.   Speech: speech is normal   Level of consciousness: alert  Knowledge: good. Cranial Nerves     CN II   Visual fields full to confrontation. CN III, IV, VI   Extraocular motions are normal.     CN V   Facial sensation intact. CN VII   Facial expression full, symmetric. CN VIII   CN VIII normal.     CN IX, X   CN IX normal.   CN X normal.     CN XI   CN XI normal.     CN XII   CN XII normal.     Motor Exam   Muscle bulk: normal  Overall muscle tone: normal  Right arm tone: normal  Left arm tone: normal  Right arm pronator drift: absent  Left arm pronator drift: absent  Right leg tone: normal  Left leg tone: normal    Strength   Strength 5/5 throughout. Sensory Exam   Light touch normal.     Gait, Coordination, and Reflexes     Coordination   Finger to nose coordination: normal    Reflexes   Right brachioradialis: 2+  Left brachioradialis: 2+  Right biceps: 2+  Left biceps: 2+  Right triceps: 2+  Left triceps: 2+  Right patellar: 2+  Left patellar: 2+  Right achilles: 2+  Left achilles: 2+      LABORATORY DATA     Labs: I have personally reviewed pertinent reports.     Results from last 7 days   Lab Units 08/05/23 0602 08/04/23 0649 08/03/23 0513 08/02/23  0539 07/31/23  0541   WBC Thousand/uL 25.42* 11.55* 9.47 9.22 10.10   HEMOGLOBIN g/dL 9.4* 10.8* 12.2 11.9 12.0   HEMATOCRIT % 30.0* 33.2* 38.6 37.5 37.1   PLATELETS Thousands/uL 289 319 376 392* 390   NEUTROS PCT %  --   --  94* 61 60   MONOS PCT %  --   --  1* 6 7   EOS PCT %  --   --  0 4 4      Results from last 7 days   Lab Units 08/05/23 0602 08/04/23 0649 08/03/23 0513   SODIUM mmol/L 140 143 140   POTASSIUM mmol/L 3.5 3.9 3.7   CHLORIDE mmol/L 108 112* 108   CO2 mmol/L 26 26 24   BUN mg/dL 16 12 11   CREATININE mg/dL 0.71 0.64 0.73   CALCIUM mg/dL 8.2* 8.5 8.7   ALK PHOS U/L 61 59 75   ALT U/L 18 20 22   AST U/L 7 10 17     Results from last 7 days   Lab Units 08/05/23  0602   MAGNESIUM mg/dL 3.0*     Results from last 7 days   Lab Units 08/05/23  0602 08/04/23  0649 08/03/23  0513   PHOSPHORUS mg/dL 3.6 2.7 2.6*                    IMAGING & DIAGNOSTIC TESTING     Radiology Results: I have personally reviewed pertinent reports. IR biopsy bone marrow   Final Result by Monse Ellis MD (07/26 1303)   Impression: Successful percutaneous diagnostic bone marrow aspiration and bone core biopsy. A full pathology report will follow. Workstation performed: [id number]         CTA ED chest PE study   Final Result by Lisa Lara MD (07/22 9944)      No pulmonary embolus. No acute pulmonary disease. Severe enlargement of the thyroid gland with recent biopsy showing lymphoma, with increased tracheal narrowing compared with the neck CT from 6/1/2023 with no obvious postprocedural hemorrhage. See neck CT from today. Hepatic steatosis. Workstation performed: [id number]         CT soft tissue neck with contrast   Final Result by Shaka Reyes MD (07/22 1934)      Significantly increased size of thyroid tissue compared to the prior exam compressing the airway/trachea. This is consistent with patient's known history of thyroid cancer. Surgical consult recommended for further evaluation. Workstation performed: [id number]         MRI brain w wo contrast    (Results Pending)       Other Diagnostic Testing: I have personally reviewed pertinent reports.       ACTIVE MEDICATIONS     Current Facility-Administered Medications   Medication Dose Route Frequency   • acetaminophen (TYLENOL) tablet 975 mg  975 mg Oral Q6H PRN   • albuterol (PROVENTIL HFA,VENTOLIN HFA) inhaler 2 puff  2 puff Inhalation Q6H PRN   • [START ON 8/6/2023] allopurinol (ZYLOPRIM) tablet 100 mg  100 mg Oral Daily   • alteplase (CATHFLO) injection 2 mg  2 mg Intracatheter Q1MIN PRN   • aluminum-magnesium hydroxide-simethicone (MAALOX) oral suspension 30 mL  30 mL Oral Q6H PRN   • amLODIPine (NORVASC) tablet 2.5 mg  2.5 mg Oral Daily   • dicyclomine (BENTYL) tablet 20 mg  20 mg Oral BID   • [START ON 8/6/2023] Filgrastim-aafi (NIVESTYM) subcutaneous injection 480 mcg  480 mcg Subcutaneous Daily   • heparin (porcine) subcutaneous injection 5,000 Units  5,000 Units Subcutaneous Q8H [address]   • ketorolac (TORADOL) injection 30 mg  30 mg Intravenous Q8H   • levothyroxine tablet 150 mcg  150 mcg Oral Early Morning   • loratadine (CLARITIN) tablet 10 mg  10 mg Oral HS   • LORazepam (ATIVAN) tablet 1 mg  1 mg Oral Q6H PRN   • montelukast (SINGULAIR) tablet 10 mg  10 mg Oral HS   • multi-electrolyte (PLASMALYTE-A/ISOLYTE-S PH 7.4) IV solution  125 mL/hr Intravenous Continuous   • ondansetron (ZOFRAN) injection 4 mg  4 mg Intravenous Q6H PRN   • ondansetron (ZOFRAN) injection 4 mg  4 mg Intravenous Q8H   • pantoprazole (PROTONIX) EC tablet 40 mg  40 mg Oral BID AC   • [START ON 8/6/2023] predniSONE tablet 20 mg  20 mg Oral Daily   • valproate (DEPACON) 1,000 mg in sodium chloride 0.9 % 50 mL IVPB  1,000 mg Intravenous Q8H [address]       Prior to Admission medications    Medication Sig Start Date End Date Taking?  Authorizing Provider   albuterol (Proventil HFA) 90 mcg/act inhaler Inhale 2 puffs every 6 (six) hours as needed for wheezing 4/6/23  Yes Amarilys Bustamante, DO   allopurinol (ZYLOPRIM) 300 mg tablet Take 1 tablet (300 mg total) by mouth daily 8/4/23  Yes Yonathan Wilkinson, DO   dicyclomine (BENTYL) 20 mg tablet Take 1 tablet (20 mg total) by mouth 2 (two) times a day 6/24/23  Yes Thomas Villafana MD   diphenhydrAMINE (BENADRYL) 25 mg capsule Take 1 capsule (25 mg total) by mouth every 6 (six) hours as needed for itching 11/19/18  Yes Margaret Newberry MD   fluticasone (FLONASE) 50 mcg/act nasal spray SPRAY 1 SPRAY INTO EACH NOSTRIL EVERY DAY 6/29/23  Yes Galilea Valdovinos DO   levothyroxine 150 mcg tablet Take 1 tablet (150 mcg total) by mouth daily 7/12/23 10/10/23 Yes Juliann Hughes MD   ondansetron [facility]) 4 mg tablet Take 1 tablet (4 mg total) by mouth every 6 (six) hours 2/12/23  Yes Milton Jules MD   predniSONE 20 mg tablet TAKE 1 TABLET BY MOUTH EVERY DAY  Patient taking differently: Take 10 mg by mouth daily 6/29/23  Yes Juliann Hughes MD   benzonatate (TESSALON) 200 MG capsule Take 1 capsule (200 mg total) by mouth 3 (three) times a day as needed for cough  Patient not taking: Reported on 7/23/2023 4/6/23   Galilea Valdovinos DO   ibuprofen (MOTRIN) 400 mg tablet Take 1 tablet (400 mg total) by mouth every 6 (six) hours as needed for mild pain  Patient not taking: Reported on 7/23/2023 4/16/19   Yana Noland MD   methocarbamol (ROBAXIN) 500 mg tablet Take 1 tablet (500 mg total) by mouth 3 (three) times a day as needed for muscle spasms  Patient not taking: Reported on 4/6/2023 8/2/22   Tanisha New PA-C   montelukast (SINGULAIR) 10 mg tablet Take 10 mg by mouth daily at bedtime  Patient not taking: Reported on 7/23/2023    Historical Provider, MD   naproxen (NAPROSYN) 500 mg tablet Take 1 tablet by mouth every 12 (twelve) hours as needed for mild pain or moderate pain  Patient not taking: Reported on 7/23/2023 8/12/17   [facility],    ondansetron (ZOFRAN-ODT) 4 mg disintegrating tablet Take 1 tablet by mouth every 6 (six) hours as needed for nausea or vomiting  Patient not taking: Reported on 7/23/2023 8/12/17   [facility]   [facility] (ZOFRAN-ODT) 4 mg disintegrating tablet Take 1 tablet (4 mg total) by mouth every 8 (eight) hours as needed for nausea 6/24/23   Mo Benton MD   predniSONE 50 mg tablet Take 2 tablets (100 mg total) by mouth daily for 5 days Take 100mg by mouth daily on days 1 through 5 of current chemotherapy cycle. 8/2/23 8/7/23  Kirill Price DO         VTE Pharmacologic Prophylaxis: Heparin  VTE Mechanical Prophylaxis: sequential compression device and foot pump applied    ==  DO [facility], PGY-2

## 2023-08-05 NOTE — PROGRESS NOTES
Hematology - Oncology Progress Note    Jitendra Moffett, 2000, [id number]  [facility] 913/[facility] 913-01    Please refer to the consult/prior notes for more details    In summary, Winston Clemente is 26years old female was recently diagnosed diffuse large B-cell lymphoma of the thyroid, grade 2 E    S/p R-CHOP on 8/2, with rituximab only one third of the dose given due to stress reaction/side effect including rigors and tachycardia. 8/4 patient complaining of headache, severe, and eyes pain, bilaterally, was evaluated by ophthalmology Dr. No Diego yesterday, unremarkable exam, neurology following and evaluating with the primary team for suspected migraine, (has history), +/- anxiety/psych component    Today morning patient seen and examined twice with my attending Dr. Ryan Rodriguez, she is screaming and bad headache, photophobia, pain medications given for symptoms, and undergoing evaluation and management as above    Impression and plan:  1. Diffuse large B-cell lymphoma of the thyroid s/p R-CHOP for cycle on 8/2  2.   Ocular migraine    Plan  - Neurology following input appreciated, plan for MRI brain, Zyprexa added, Toradol + valproate  - Okay to decrease prednisone to 20 mg daily for day 4 and 5 of the R-CHOP as suspected contributing to the headache per neurology  - On prophylactic Claritin, allopurinol, IV fluids    __________________________________________________________________________________________    Chief complaint:     History of present illness:     Hospital medications list:  Current Facility-Administered Medications   Medication Dose Route Frequency Provider Last Rate   • acetaminophen  975 mg Oral Q6H PRN Vini Moreau PA-C     • albuterol  2 puff Inhalation Q6H PRN Claudia Dobbs MD     • [START ON 8/6/2023] allopurinol  100 mg Oral Daily Yonathan Wilkinson DO     • alteplase  2 mg Intracatheter Q1MIN PRN Ad Miguel MD     • aluminum-magnesium hydroxide-simethicone  30 mL Oral Q6H PRN Claudia Dobbs MD     • amLODIPine 2.5 mg Oral Daily Arielle Cabezas DO     • dicyclomine  20 mg Oral BID Hanny Burrows MD     • [START ON 8/6/2023] Filgrastim-aafi  480 mcg Subcutaneous Daily Yonathan Wilkinson DO     • heparin (porcine)  5,000 Units Subcutaneous [facility] Yonathansergei Wilkinson, DO     • levothyroxine  150 mcg Oral Early Morning Hanny Burrows MD     • loratadine  10 mg Oral HS Yonathan Wilkinson DO     • LORazepam  0.5 mg Oral Q8H PRN Arielle Cabezas DO     • montelukast  10 mg Oral HS Hanny Burrows MD     • multi-electrolyte  125 mL/hr Intravenous Continuous Yonathan Wilkinson  mL/hr (08/05/23 0556)   • OLANZapine  5 mg Oral BID Lukas Wallace DO     • ondansetron  4 mg Intravenous Q6H PRN Hanny Burrows MD     • pantoprazole  40 mg Oral BID AC Juana Coughlin PA-C     • [START ON 8/6/2023] predniSONE  20 mg Oral Daily Juana Coughlin PA-C     • valproate sodium  500 mg Intravenous [facility] Kenan Isaac  mg (08/05/23 5081)            Physical exam  /100   Pulse 96   Temp (!) 97.4 °F (36.3 °C)   Resp 16   Ht 5' 3" (1.6 m)   Wt 124 kg (274 lb 0.5 oz)   LMP 06/29/2023 (Approximate)   SpO2 91%   BMI 48.54 kg/m²     - GEN: Appears ill, in acute distress due to the pain, screaming, eyes closed, avoiding lights,    - HEENT: Positive photophobia, anicteric, no conjunctivitis, no secretion, PERRL & EOMI, mucous membranes moist  - NECK: No lymphadenopathy, JVD or carotid bruits   - HEART: RRR, normal S1 and S2, no murmurs, clicks, gallops or rubs   - LUNGS: Clear to auscultation bilaterally; no wheezes, rales, or rhonchi  - ABDOMEN: Normal bowel sounds, soft, no tenderness, no distention, no organomegaly or masses felt on exam.   - EXTREMITIES: Peripheral pulses normal; no clubbing, cyanosis, or edema  - NEURO: No focal findings, CN II-XII are grossly intact. - Musculoskeletal: 5/5 strength, normal ROM, no swollen or erythematous joints.    - SKIN: Normal without suspicious lesions on exposed skin    Donna Jimenez DO   Hematology and Medical Oncology - PGY 6 [facility]

## 2023-08-05 NOTE — PLAN OF CARE
Problem: PAIN - ADULT  Goal: Verbalizes/displays adequate comfort level or baseline comfort level  Description: Interventions:  - Encourage patient to monitor pain and request assistance  - Assess pain using appropriate pain scale  - Administer analgesics based on type and severity of pain and evaluate response  - Implement non-pharmacological measures as appropriate and evaluate response  - Consider cultural and social influences on pain and pain management  - Notify physician/advanced practitioner if interventions unsuccessful or patient reports new pain  Outcome: Progressing     Problem: SAFETY ADULT  Goal: Patient will remain free of falls  Description: INTERVENTIONS:  - Educate patient/family on patient safety including physical limitations  - Instruct patient to call for assistance with activity   - Keep Call bell within reach  - Keep bed low and locked with side rails adjusted as appropriate  - Keep care items and personal belongings within reach  - Initiate and maintain comfort rounds  Outcome: Progressing     Problem: DISCHARGE PLANNING  Goal: Discharge to home or other facility with appropriate resources  Description: INTERVENTIONS:  - Identify barriers to discharge w/patient and caregiver  - Arrange for needed discharge resources and transportation as appropriate  - Identify discharge learning needs (meds, wound care, etc.)  - Arrange for interpretive services to assist at discharge as needed  - Refer to Case Management Department for coordinating discharge planning if the patient needs post-hospital services based on physician/advanced practitioner order or complex needs related to functional status, cognitive ability, or social support system  Outcome: Progressing

## 2023-08-05 NOTE — PROGRESS NOTES
[address] [facility]  Progress Note  Name: Promise Cooper  MRN: [id number]  Unit/Bed#: [facility] 913-01 I Date of Admission: 7/22/2023   Date of Service: 8/5/2023 I Hospital Day: 14    Assessment/Plan   Elevated blood pressure reading  Assessment & Plan  · Noted this hospitalization, started on Norvasc 2.5 mg p.o. daily with overall improvement. · Monitor, adjust as needed     Hypothyroidism  Assessment & Plan  · TSH within normal limits  · Continue Levothyroxine    Morbid obesity (HCC)  Assessment & Plan  · Body mass index is 48.54 kg/m². · Diet and lifestyle modifications    Subacute thyroiditis  Assessment & Plan  · Working diagnosis before found to have lymphoma with subacute thyroiditis   · Continue levothyroxine 125 mcg daily    Lymphoma of thyroid gland ([address])  Assessment & Plan  · History of asthma recently found to have thyroid enlargement thought to have subacute thyroiditis and started on prednisone/levothyroxine eventually biopsy found to have thyroid lymphoma. · Echo done in preparation for chemotherapy, unremarkable. · Seen by endocrinology  · Continuing levothyroxine. Now on prednisone per Heme/Onc as below. Was initially ordered prednisone 100mg daily for 5 days starting 8/3. Patient has been screaming out, agitated, complaining of severe headaches, nausea and room spinning. Per neurology, may be having side effects from high dose steroids. Discussed with heme-onc. Prednisone dose decreased to 20 mg daily for remaining 2 days  · Oncology following, recommended bone marrow biopsy was done in IR 7/26 which does not show any involvement by B-cell lymphoma. Flow cytometry negative. Did show reduced iron stores. · Per pathology, Burkitt lymphoma ruled out, at this time best classified as diffuse large B cell lymphoma. · Molecular testing remains pending.    · PICC line was placed  · Patient was started on R-CHOP on 8/2/23 inpatient   · Heme/Onc started filgrastim Mild intermittent asthma without complication  Assessment & Plan  · No exacerbation   · Continue Albuterol PRN  · Continue montelukast    * Headache  Assessment & Plan  · Patient reports intermittent and worsening headache since the chemo  · Headache so severe last night a rapid response had to be called. · Seen by neuro this AM but patient was sleeping comfortably at that time  · Patient now awake and agitated, screaming. · Complaining of headache, nausea, room spinning sensation  · Per boyfriend at bedside, this seems to start again as soon as she wakes up. May be having a component of rebound headache from the narcotics. Per neuro, avoid additional doses of dilaudid. · Patient received, toradol, ativan, depacon, dilaudid and zyprexa this AM. Additional dilaudid will be avoided per neuro  · Appreciate neuro recommendations  · Planning for MRI brain when patient can tolerate it. VTE Pharmacologic Prophylaxis:   Pharmacologic: Heparin  Mechanical VTE Prophylaxis in Place: No    Patient Centered Rounds: I have performed bedside rounds with nursing staff today. Discussions with Specialists or Other Care Team Provider: discussed with neurology and heme/onc    Education and Discussions with Family / Patient: patient's boyfriend at bedside, updated    Time Spent for Care: 1 hour. More than 50% of total time spent on counseling and coordination of care as described above. Current Length of Stay: 14 day(s)    Current Patient Status: Inpatient   Certification Statement: The patient will continue to require additional inpatient hospital stay due to MRI pending    Discharge Plan: pending    Code Status: Level 1 - Full Code      Subjective:   Patient with severe headache again this AM. Boyfriend at bedside, states the medications help and she sleeps but as soon as she wakes up she is screaming in pain again. She also complains of nausea and room spinning sensation.      Objective:     Vitals: Temp (24hrs), Av.5 °F (36.4 °C), Min:97.4 °F (36.3 °C), Max:97.5 °F (36.4 °C)    Temp:  [97.4 °F (36.3 °C)-97.5 °F (36.4 °C)] 97.4 °F (36.3 °C)  HR:  [] 96  Resp:  [16] 16  BP: (136-154)/() 136/100  SpO2:  [82 %-100 %] 91 %  Body mass index is 48.54 kg/m². Input and Output Summary (last 24 hours): Intake/Output Summary (Last 24 hours) at 2023 1021  Last data filed at 2023 0556  Gross per 24 hour   Intake 2570.83 ml   Output --   Net 2570.83 ml       Physical Exam:     Physical Exam  Vitals reviewed. Constitutional:       Appearance: She is not diaphoretic. Comments: Agitated and screaming, difficult to re-direct. HENT:      Head: Normocephalic and atraumatic. Cardiovascular:      Rate and Rhythm: Tachycardia present. Pulmonary:      Effort: Pulmonary effort is normal. No respiratory distress. Skin:     General: Skin is warm and dry. Findings: No bruising or erythema. Additional Data:     Labs:    Results from last 7 days   Lab Units 23  0602 23  0649 23  0513   WBC Thousand/uL 25.42*   < > 9.47   HEMOGLOBIN g/dL 9.4*   < > 12.2   HEMATOCRIT % 30.0*   < > 38.6   PLATELETS Thousands/uL 289   < > 376   NEUTROS PCT %  --   --  94*   LYMPHS PCT %  --   --  4*   MONOS PCT %  --   --  1*   EOS PCT %  --   --  0    < > = values in this interval not displayed. Results from last 7 days   Lab Units 23  0602   SODIUM mmol/L 140   POTASSIUM mmol/L 3.5   CHLORIDE mmol/L 108   CO2 mmol/L 26   BUN mg/dL 16   CREATININE mg/dL 0.71   ANION GAP mmol/L 6   CALCIUM mg/dL 8.2*   ALBUMIN g/dL 2.9*   TOTAL BILIRUBIN mg/dL 0.25   ALK PHOS U/L 61   ALT U/L 18   AST U/L 7   GLUCOSE RANDOM mg/dL 109                           * I Have Reviewed All Lab Data Listed Above. * Additional Pertinent Lab Tests Reviewed:  All [facility] Admission Reviewed        Recent Cultures (last 7 days):     Results from last 7 days   Lab Units 08/02/23 2042   BLOOD CULTURE  No Growth at 48 hrs. No Growth at 48 hrs. Last 24 Hours Medication List:   Current Facility-Administered Medications   Medication Dose Route Frequency Provider Last Rate   • acetaminophen  975 mg Oral Q6H PRN Sotero Pavon PA-C     • albuterol  2 puff Inhalation Q6H PRN Jenelle Cade MD     • [START ON 8/6/2023] allopurinol  100 mg Oral Daily Yonathan Wilkinson DO     • alteplase  2 mg Intracatheter Q1MIN PRN Caron Faulkner MD     • aluminum-magnesium hydroxide-simethicone  30 mL Oral Q6H PRN Jenelle Cade MD     • amLODIPine  2.5 mg Oral Daily Arielle Cabezas DO     • dicyclomine  20 mg Oral BID Jenelle Cade MD     • [START ON 8/6/2023] Filgrastim-aafi  480 mcg Subcutaneous Daily Yonathan Wilkinson DO     • heparin (porcine)  5,000 Units Subcutaneous [facility] Yonathan Wilkinson DO     • levothyroxine  150 mcg Oral Early Morning Perry Lucas MD     • loratadine  10 mg Oral HS Yonathan Wilkinson DO     • LORazepam  0.5 mg Oral Q8H PRN Arielle Cabezas DO     • montelukast  10 mg Oral HS Jenelle Cade MD     • multi-electrolyte  125 mL/hr Intravenous Continuous Yonathan Aiad  mL/hr (08/05/23 0556)   • OLANZapine  5 mg Oral BID Lukas Wallace DO     • ondansetron  4 mg Intravenous Q6H PRN Jenelle Cade MD     • pantoprazole  40 mg Oral BID AC Mitzi Thomason PA-C     • [START ON 8/6/2023] predniSONE  20 mg Oral Daily Mitzi Thomason PA-C     • valproate sodium  500 mg Intravenous Q8H [address] Kenan Winchesterer,  mg (08/05/23 0362)        Today, Patient Was Seen By: Mary Crenshaw PA-C    ** Please Note: Dictation voice to text software may have been used in the creation of this document.  **

## 2023-08-05 NOTE — PROGRESS NOTES
Oncoming nurse was informed by previous nurse that patient was ordered MRI. Oncoming nurse was informed that MRI screening form was completed and patient ready for MRI. Nurse was called by MRI technician who stated patient was not being compliant with scan due to nausea and pain and that scan should be reattempted in the morning. Nurse was notified of patient return and saw patient. Patient was screaming in pain and did not have any PRN pain medications available. Nurse was told in report Neurology was covering pain medications for this patient due to most of her pain being from a persistent headache. Neurology made aware of patient in severe pain. SLIM also notified for pain management. Charge nurse made aware of situation. Upon entering patient room, patient had eyes rolling back into head and less responsive than previously assessed. Rapid response called. Patient put on nasal cannula and providers evaluated situation. Providers placed new orders to help with pain management.

## 2023-08-05 NOTE — ASSESSMENT & PLAN NOTE
· History of asthma recently found to have thyroid enlargement thought to have subacute thyroiditis and started on prednisone/levothyroxine eventually biopsy found to have thyroid lymphoma. · Echo done in preparation for chemotherapy, unremarkable. · Seen by endocrinology  · Continuing levothyroxine. Now on prednisone per Heme/Onc as below. Was initially ordered prednisone 100mg daily for 5 days starting 8/3. Patient has been screaming out, agitated, complaining of severe headaches, nausea and room spinning. Per neurology, may be having side effects from high dose steroids. Discussed with heme-onc. Prednisone dose decreased to 20 mg daily for remaining 2 days  · Oncology following, recommended bone marrow biopsy was done in IR 7/26 which does not show any involvement by B-cell lymphoma. Flow cytometry negative. Did show reduced iron stores. · Per pathology, Burkitt lymphoma ruled out, at this time best classified as diffuse large B cell lymphoma. · Molecular testing remains pending.    · PICC line was placed  · Patient was started on R-CHOP on 8/2/23 inpatient   · Heme/Onc started filgrastim

## 2023-08-05 NOTE — ASSESSMENT & PLAN NOTE
· Patient reports intermittent and worsening headache since the chemo  · Headache so severe last night a rapid response had to be called. · Seen by neuro this AM but patient was sleeping comfortably at that time  · Patient now awake and agitated, screaming. · Complaining of headache, nausea, room spinning sensation  · Per boyfriend at bedside, this seems to start again as soon as she wakes up. May be having a component of rebound headache from the narcotics. Per neuro, avoid additional doses of dilaudid. · Patient received, toradol, ativan, depacon, dilaudid and zyprexa this AM. Additional dilaudid will be avoided per neuro  · Appreciate neuro recommendations  · Planning for MRI brain when patient can tolerate it.

## 2023-08-05 NOTE — PROGRESS NOTES
2823 pt crying out in pain, nauseated, & covering her eyes from the light in her darkened room. Dr. Chary Jarvis in room with me assessing her. I gave her her scheduled depacon & then gave her the zofran & toradol he ordered. Nothing is working so he ordered her dilaudid & it was given. Pt. Also given xyprexa that was ordered & her morning meds. Pt continuing to cry out in pain. PRN PO Ativan given. Alicia Lopez then ordered zofran & dilaudid 1 x doses & were given. 1000 pt is now sleeping comfortably. Will continue to monitor.

## 2023-08-05 NOTE — QUICK NOTE
Called to re-eval patient. She was complaining of pain again. Boyfriend who has been at bedside reports that she has "panic attacks" at home as well but was not on medication for this. Boyfriend states last night she started doing things like ripping off her telemetry leads and tried to pull out her PICC. This afternoon she woke up from sleeping and insisted that she needed to go to the bathroom. The family started to get her up on their own. When staff got into the room, she was already on her way to the bathroom, still had her eye mask over her eyes. Family states they would not take it off because "it would make her start screaming again." In the bathroom staff noticed that someone had disconnected her PICC and there was bleeding. Patient then got dizzy and was assisted back to bed. The boyfriend stated when she got back to bed she was "breathing fast and then stopped breathing for a couple seconds". On my evaluation she is laying in bed with her eyes closed but answering questions. She has not gotten narcotics or benzos since earlier this morning. Pulse ox stable on room air. Will continue to monitor closely. Advised family not to get her out of bed.

## 2023-08-06 ENCOUNTER — APPOINTMENT (OUTPATIENT)
Dept: RADIOLOGY | Facility: HOSPITAL | Age: 23
DRG: 691 | End: 2023-08-06
Payer: COMMERCIAL

## 2023-08-06 LAB
ALBUMIN SERPL BCP-MCNC: 2.2 G/DL (ref 3.5–5)
ALP SERPL-CCNC: 51 U/L (ref 46–116)
ALT SERPL W P-5'-P-CCNC: 13 U/L (ref 12–78)
ANION GAP SERPL CALCULATED.3IONS-SCNC: 10 MMOL/L
ANISOCYTOSIS BLD QL SMEAR: PRESENT
AST SERPL W P-5'-P-CCNC: 6 U/L (ref 5–45)
BASOPHILS # BLD MANUAL: 0 THOUSAND/UL (ref 0–0.1)
BASOPHILS NFR MAR MANUAL: 0 % (ref 0–1)
BILIRUB SERPL-MCNC: 0.16 MG/DL (ref 0.2–1)
BUN SERPL-MCNC: 12 MG/DL (ref 5–25)
CALCIUM ALBUM COR SERPL-MCNC: 8.3 MG/DL (ref 8.3–10.1)
CALCIUM SERPL-MCNC: 6.9 MG/DL (ref 8.3–10.1)
CHLORIDE SERPL-SCNC: 107 MMOL/L (ref 96–108)
CO2 SERPL-SCNC: 25 MMOL/L (ref 21–32)
CREAT SERPL-MCNC: 0.49 MG/DL (ref 0.6–1.3)
EOSINOPHIL # BLD MANUAL: 0 THOUSAND/UL (ref 0–0.4)
EOSINOPHIL NFR BLD MANUAL: 0 % (ref 0–6)
ERYTHROCYTE [DISTWIDTH] IN BLOOD BY AUTOMATED COUNT: 12.8 % (ref 11.6–15.1)
GFR SERPL CREATININE-BSD FRML MDRD: 138 ML/MIN/1.73SQ M
GLUCOSE SERPL-MCNC: 88 MG/DL (ref 65–140)
HCT VFR BLD AUTO: 28.5 % (ref 34.8–46.1)
HGB BLD-MCNC: 9.2 G/DL (ref 11.5–15.4)
LDH SERPL-CCNC: 106 U/L (ref 81–234)
LYMPHOCYTES # BLD AUTO: 1.3 THOUSAND/UL (ref 0.6–4.47)
LYMPHOCYTES # BLD AUTO: 6 % (ref 14–44)
MCH RBC QN AUTO: 28.4 PG (ref 26.8–34.3)
MCHC RBC AUTO-ENTMCNC: 32.3 G/DL (ref 31.4–37.4)
MCV RBC AUTO: 88 FL (ref 82–98)
MONOCYTES # BLD AUTO: 0 THOUSAND/UL (ref 0–1.22)
MONOCYTES NFR BLD: 0 % (ref 4–12)
NEUTROPHILS # BLD MANUAL: 17.28 THOUSAND/UL (ref 1.85–7.62)
NEUTS BAND NFR BLD MANUAL: 3 % (ref 0–8)
NEUTS SEG NFR BLD AUTO: 90 % (ref 43–75)
PHOSPHATE SERPL-MCNC: 3.3 MG/DL (ref 2.7–4.5)
PLATELET # BLD AUTO: 247 THOUSANDS/UL (ref 149–390)
PLATELET BLD QL SMEAR: ADEQUATE
PMV BLD AUTO: 9.4 FL (ref 8.9–12.7)
POIKILOCYTOSIS BLD QL SMEAR: PRESENT
POLYCHROMASIA BLD QL SMEAR: PRESENT
POTASSIUM SERPL-SCNC: 4 MMOL/L (ref 3.5–5.3)
PROT SERPL-MCNC: 5.4 G/DL (ref 6.4–8.4)
RBC # BLD AUTO: 3.24 MILLION/UL (ref 3.81–5.12)
RBC MORPH BLD: PRESENT
SODIUM SERPL-SCNC: 142 MMOL/L (ref 135–147)
URATE SERPL-MCNC: 4.7 MG/DL (ref 2–7.5)
VARIANT LYMPHS # BLD AUTO: 1 %
WBC # BLD AUTO: 18.58 THOUSAND/UL (ref 4.31–10.16)

## 2023-08-06 PROCEDURE — 85027 COMPLETE CBC AUTOMATED: CPT | Performed by: STUDENT IN AN ORGANIZED HEALTH CARE EDUCATION/TRAINING PROGRAM

## 2023-08-06 PROCEDURE — 70551 MRI BRAIN STEM W/O DYE: CPT

## 2023-08-06 PROCEDURE — 80053 COMPREHEN METABOLIC PANEL: CPT | Performed by: STUDENT IN AN ORGANIZED HEALTH CARE EDUCATION/TRAINING PROGRAM

## 2023-08-06 PROCEDURE — 84100 ASSAY OF PHOSPHORUS: CPT | Performed by: STUDENT IN AN ORGANIZED HEALTH CARE EDUCATION/TRAINING PROGRAM

## 2023-08-06 PROCEDURE — 99232 SBSQ HOSP IP/OBS MODERATE 35: CPT | Performed by: PSYCHIATRY & NEUROLOGY

## 2023-08-06 PROCEDURE — 83615 LACTATE (LD) (LDH) ENZYME: CPT | Performed by: STUDENT IN AN ORGANIZED HEALTH CARE EDUCATION/TRAINING PROGRAM

## 2023-08-06 PROCEDURE — 85007 BL SMEAR W/DIFF WBC COUNT: CPT | Performed by: STUDENT IN AN ORGANIZED HEALTH CARE EDUCATION/TRAINING PROGRAM

## 2023-08-06 PROCEDURE — 99232 SBSQ HOSP IP/OBS MODERATE 35: CPT | Performed by: INTERNAL MEDICINE

## 2023-08-06 PROCEDURE — 99233 SBSQ HOSP IP/OBS HIGH 50: CPT | Performed by: PHYSICIAN ASSISTANT

## 2023-08-06 PROCEDURE — 84550 ASSAY OF BLOOD/URIC ACID: CPT | Performed by: STUDENT IN AN ORGANIZED HEALTH CARE EDUCATION/TRAINING PROGRAM

## 2023-08-06 RX ORDER — AMLODIPINE BESYLATE 5 MG/1
5 TABLET ORAL DAILY
Status: DISCONTINUED | OUTPATIENT
Start: 2023-08-06 | End: 2023-08-07 | Stop reason: HOSPADM

## 2023-08-06 RX ORDER — KETOROLAC TROMETHAMINE 30 MG/ML
30 INJECTION, SOLUTION INTRAMUSCULAR; INTRAVENOUS ONCE
Status: COMPLETED | OUTPATIENT
Start: 2023-08-06 | End: 2023-08-06

## 2023-08-06 RX ORDER — AMLODIPINE BESYLATE 5 MG/1
5 TABLET ORAL DAILY
Status: DISCONTINUED | OUTPATIENT
Start: 2023-08-07 | End: 2023-08-06

## 2023-08-06 RX ORDER — POLYETHYLENE GLYCOL 3350 17 G/17G
17 POWDER, FOR SOLUTION ORAL DAILY
Status: DISCONTINUED | OUTPATIENT
Start: 2023-08-07 | End: 2023-08-07 | Stop reason: HOSPADM

## 2023-08-06 RX ORDER — AMOXICILLIN 250 MG
1 CAPSULE ORAL
Status: DISCONTINUED | OUTPATIENT
Start: 2023-08-06 | End: 2023-08-07 | Stop reason: HOSPADM

## 2023-08-06 RX ADMIN — SODIUM CHLORIDE, SODIUM GLUCONATE, SODIUM ACETATE, POTASSIUM CHLORIDE, MAGNESIUM CHLORIDE, SODIUM PHOSPHATE, DIBASIC, AND POTASSIUM PHOSPHATE 125 ML/HR: .53; .5; .37; .037; .03; .012; .00082 INJECTION, SOLUTION INTRAVENOUS at 14:37

## 2023-08-06 RX ADMIN — VALPROATE SODIUM 1000 MG: 100 INJECTION, SOLUTION INTRAVENOUS at 14:43

## 2023-08-06 RX ADMIN — AMLODIPINE BESYLATE 5 MG: 5 TABLET ORAL at 16:51

## 2023-08-06 RX ADMIN — KETOROLAC TROMETHAMINE 30 MG: 30 INJECTION, SOLUTION INTRAMUSCULAR; INTRAVENOUS at 14:23

## 2023-08-06 RX ADMIN — ONDANSETRON 4 MG: 2 INJECTION INTRAMUSCULAR; INTRAVENOUS at 14:24

## 2023-08-06 RX ADMIN — ONDANSETRON 4 MG: 2 INJECTION INTRAMUSCULAR; INTRAVENOUS at 05:59

## 2023-08-06 RX ADMIN — AMLODIPINE BESYLATE 2.5 MG: 2.5 TABLET ORAL at 10:16

## 2023-08-06 RX ADMIN — LORATADINE 10 MG: 10 TABLET ORAL at 22:07

## 2023-08-06 RX ADMIN — LORAZEPAM 1 MG: 1 TABLET ORAL at 20:38

## 2023-08-06 RX ADMIN — ONDANSETRON 4 MG: 2 INJECTION INTRAMUSCULAR; INTRAVENOUS at 22:07

## 2023-08-06 RX ADMIN — DICYCLOMINE HYDROCHLORIDE 20 MG: 20 TABLET ORAL at 10:15

## 2023-08-06 RX ADMIN — MONTELUKAST 10 MG: 10 TABLET, FILM COATED ORAL at 22:07

## 2023-08-06 RX ADMIN — VALPROATE SODIUM 1000 MG: 100 INJECTION, SOLUTION INTRAVENOUS at 06:07

## 2023-08-06 RX ADMIN — PREDNISONE 20 MG: 20 TABLET ORAL at 10:15

## 2023-08-06 RX ADMIN — SENNOSIDES AND DOCUSATE SODIUM 1 TABLET: 50; 8.6 TABLET ORAL at 22:07

## 2023-08-06 RX ADMIN — KETOROLAC TROMETHAMINE 30 MG: 30 INJECTION, SOLUTION INTRAMUSCULAR; INTRAVENOUS at 05:59

## 2023-08-06 RX ADMIN — KETOROLAC TROMETHAMINE 30 MG: 30 INJECTION, SOLUTION INTRAMUSCULAR; INTRAVENOUS at 20:04

## 2023-08-06 RX ADMIN — ALLOPURINOL 100 MG: 100 TABLET ORAL at 10:15

## 2023-08-06 RX ADMIN — FILGRASTIM-AAFI 480 MCG: 480 INJECTION, SOLUTION SUBCUTANEOUS at 14:47

## 2023-08-06 RX ADMIN — LEVOTHYROXINE SODIUM 150 MCG: 75 TABLET ORAL at 06:02

## 2023-08-06 RX ADMIN — DICYCLOMINE HYDROCHLORIDE 20 MG: 20 TABLET ORAL at 16:48

## 2023-08-06 RX ADMIN — HEPARIN SODIUM 5000 UNITS: 5000 INJECTION INTRAVENOUS; SUBCUTANEOUS at 22:07

## 2023-08-06 RX ADMIN — PANTOPRAZOLE SODIUM 40 MG: 40 TABLET, DELAYED RELEASE ORAL at 16:48

## 2023-08-06 RX ADMIN — SODIUM CHLORIDE, SODIUM GLUCONATE, SODIUM ACETATE, POTASSIUM CHLORIDE, MAGNESIUM CHLORIDE, SODIUM PHOSPHATE, DIBASIC, AND POTASSIUM PHOSPHATE 125 ML/HR: .53; .5; .37; .037; .03; .012; .00082 INJECTION, SOLUTION INTRAVENOUS at 06:06

## 2023-08-06 RX ADMIN — HEPARIN SODIUM 5000 UNITS: 5000 INJECTION INTRAVENOUS; SUBCUTANEOUS at 14:29

## 2023-08-06 RX ADMIN — HEPARIN SODIUM 5000 UNITS: 5000 INJECTION INTRAVENOUS; SUBCUTANEOUS at 05:59

## 2023-08-06 RX ADMIN — PANTOPRAZOLE SODIUM 40 MG: 40 TABLET, DELAYED RELEASE ORAL at 06:03

## 2023-08-06 NOTE — PROGRESS NOTES
[address] [facility]  Progress Note  Name: Basil Redding  MRN: [id number]  Unit/Bed#: [facility] 913-01 I Date of Admission: 7/22/2023   Date of Service: 8/6/2023 I Hospital Day: 15    Assessment/Plan   Elevated blood pressure reading  Assessment & Plan  · Noted this hospitalization, started on Norvasc 2.5 mg p.o. daily with overall improvement. · Monitor, adjust as needed   · 136/85 this AM    Hypothyroidism  Assessment & Plan  · TSH within normal limits  · Continue Levothyroxine    Morbid obesity (HCC)  Assessment & Plan  · Body mass index is 48.54 kg/m². · Diet and lifestyle modifications    Subacute thyroiditis  Assessment & Plan  · Working diagnosis before found to have lymphoma with subacute thyroiditis   · Continue levothyroxine 125 mcg daily    Lymphoma of thyroid gland ([address])  Assessment & Plan  · History of asthma recently found to have thyroid enlargement thought to have subacute thyroiditis and started on prednisone/levothyroxine eventually biopsy found to have thyroid lymphoma. · Echo done in preparation for chemotherapy, unremarkable. · Seen by endocrinology  · Continuing levothyroxine. Now on prednisone per Heme/Onc as below. Was initially ordered prednisone 100mg daily for 5 days starting 8/3. Patient has been screaming out, agitated, complaining of severe headaches, nausea and room spinning. Per neurology, may be having side effects from high dose steroids. Discussed with heme-onc. Prednisone dose decreased to 20 mg daily for remaining 2 days  · Oncology following, recommended bone marrow biopsy was done in IR 7/26 which does not show any involvement by B-cell lymphoma. Flow cytometry negative. Did show reduced iron stores. · Per pathology, Burkitt lymphoma ruled out, at this time best classified as diffuse large B cell lymphoma. · Molecular testing remains pending.    · PICC line was placed  · Patient was started on R-CHOP on 8/2/23 inpatient   · Heme/Onc started filgrastim     Mild intermittent asthma without complication  Assessment & Plan  · No exacerbation   · Continue Albuterol PRN  · Continue montelukast    * Headache  Assessment & Plan  · Patient reports intermittent and worsening headache since the chemo  · Headache so severe Friday night a rapid response had to be called. · Had ongoing severe HA, room spinning sensation, nausea most of the day 8/5. Was agitated and screaming at times, difficult to re-direct. · Was seen by neurology, thought that patient was experiencing side effects from high dose steroids and rebound headaches from dilaudid. Dilaudid discontinued, continue to avoid narcotics. Steroid dose decreased from prednisone 100mg daily to 20mg daily, reviewed with heme/onc. · Patient much better this AM. Headache 2-4/10. Boyfriend states she was able to eat dinner last night and she slept well overnight. · Planning for MRI brain when patient can tolerate it. Has PRN ativan and toradol ordered for before MRI             VTE Pharmacologic Prophylaxis:   Pharmacologic: Heparin  Mechanical VTE Prophylaxis in Place: No    Patient Centered Rounds: I have performed bedside rounds with nursing staff today. Discussions with Specialists or Other Care Team Provider: spoke with heme/onc    Education and Discussions with Family / Patient: boyfriend/family at bedside, updated    Time Spent for Care: 45 minutes. More than 50% of total time spent on counseling and coordination of care as described above. Current Length of Stay: 15 day(s)    Current Patient Status: Inpatient   Certification Statement: The patient will continue to require additional inpatient hospital stay due to MRI brain pending    Discharge Plan: home when ready    Code Status: Level 1 - Full Code      Subjective:   26year old female with recent dx of diffuse large B-cell lymphoma of the thyroid. S/p R-CHOP on 8/2 with ritaximab, only 1/3 dose given due to reaction.  Had been started on prednisone 100mg daily. Patient developed severe intractable headache Friday night and Saturday. Today she is much improved. Awake and alert, answering questions. Boyfriend states she ate dinner last night. Headache currently 2 to 4 out of 10. Objective:     Vitals:   Temp (24hrs), Av °F (36.7 °C), Min:97.7 °F (36.5 °C), Max:98.2 °F (36.8 °C)    Temp:  [97.7 °F (36.5 °C)-98.2 °F (36.8 °C)] 97.7 °F (36.5 °C)  HR:  [] 79  Resp:  [14-17] 16  BP: (116-144)/(78-99) 136/85  SpO2:  [93 %-94 %] 93 %  Body mass index is 48.54 kg/m². Input and Output Summary (last 24 hours): Intake/Output Summary (Last 24 hours) at 2023 1041  Last data filed at 2023 0606  Gross per 24 hour   Intake 1787.5 ml   Output --   Net 1787.5 ml       Physical Exam:     Physical Exam  Vitals reviewed. Constitutional:       General: She is not in acute distress. Appearance: She is not ill-appearing or diaphoretic. HENT:      Head: Normocephalic and atraumatic. Nose: Nose normal.      Mouth/Throat:      Pharynx: Oropharynx is clear. Eyes:      Conjunctiva/sclera: Conjunctivae normal.   Cardiovascular:      Rate and Rhythm: Normal rate. Pulmonary:      Effort: Pulmonary effort is normal. No respiratory distress. Abdominal:      General: There is no distension. Palpations: Abdomen is soft. Tenderness: There is no abdominal tenderness. Musculoskeletal:         General: No deformity or signs of injury. Skin:     General: Skin is warm and dry. Findings: No bruising or erythema. Neurological:      Mental Status: She is alert and oriented to person, place, and time.    Psychiatric:         Mood and Affect: Mood normal.         Behavior: Behavior normal.           Additional Data:     Labs:    Results from last 7 days   Lab Units 23  0604 23  0602   WBC Thousand/uL 18.58* 25.42*   HEMOGLOBIN g/dL 9.2* 9.4*   HEMATOCRIT % 28.5* 30.0*   PLATELETS Thousands/uL 247 289   BANDS PCT % 3  --    NEUTROS PCT %  --  90*   LYMPHS PCT %  --  4*   LYMPHO PCT % 6*  --    MONOS PCT %  --  4   MONO PCT % 0*  --    EOS PCT % 0 0     Results from last 7 days   Lab Units 08/06/23  0604   SODIUM mmol/L 142   POTASSIUM mmol/L 4.0   CHLORIDE mmol/L 107   CO2 mmol/L 25   BUN mg/dL 12   CREATININE mg/dL 0.49*   ANION GAP mmol/L 10   CALCIUM mg/dL 6.9*   ALBUMIN g/dL 2.2*   TOTAL BILIRUBIN mg/dL 0.16*   ALK PHOS U/L 51   ALT U/L 13   AST U/L 6   GLUCOSE RANDOM mg/dL 88                           * I Have Reviewed All Lab Data Listed Above. * Additional Pertinent Lab Tests Reviewed: [address] Admission Reviewed    Imaging:    MRI brain pending    Recent Cultures (last 7 days):     Results from last 7 days   Lab Units 08/02/23 2042   BLOOD CULTURE  No Growth at 72 hrs. No Growth at 72 hrs.        Last 24 Hours Medication List:   Current Facility-Administered Medications   Medication Dose Route Frequency Provider Last Rate   • acetaminophen  975 mg Oral Q6H PRN Gricelda Gallego PA-C     • albuterol  2 puff Inhalation Q6H PRN Siomara Jacques MD     • allopurinol  100 mg Oral Daily Yonathan Wilkinson DO     • alteplase  2 mg Intracatheter Q1MIN PRN Uziel Reveles MD     • aluminum-magnesium hydroxide-simethicone  30 mL Oral Q6H PRN Siomara Jacques MD     • amLODIPine  2.5 mg Oral Daily Arielle Cabezas, DO     • dicyclomine  20 mg Oral BID Siomara Jacques MD     • Filgrastim-aafi  480 mcg Subcutaneous Daily Yonathan Wilkinson DO     • heparin (porcine)  5,000 Units Subcutaneous [facility] [address] Yonathan Wilkinson, DO     • ketorolac  30 mg Intravenous Q8H Lukas Wallace, DO     • ketorolac  30 mg Intravenous Once [employer], PA-C     • levothyroxine  150 mcg Oral Early Morning Perry Lucas MD     • loratadine  10 mg Oral HS Yonathan Wilkinson DO     • LORazepam  1 mg Oral Q6H PRN [employer], PAYanethC     • montelukast  10 mg Oral HS Perry Lucas MD     • multi-electrolyte  125 mL/hr Intravenous Continuous Yonathan Aiad,  mL/hr (08/06/23 6832)   • OLANZapine  2.5 mg Intramuscular Once [employer] OLVIN Colon     • ondansetron  4 mg Intravenous Q6H PRN Kirby Swan MD     • ondansetron  4 mg Intravenous Q8H Tami Wilson PA-C     • pantoprazole  40 mg Oral BID AC Tami Wilson PA-C     • valproate sodium  1,000 mg Intravenous Q8H [address] Nguyen Quale, DO 1,000 mg (08/06/23 9522)        Today, Patient Was Seen By: Julia Delacruz PA-C    ** Please Note: Dictation voice to text software may have been used in the creation of this document.  **

## 2023-08-06 NOTE — PROGRESS NOTES
NEUROLOGY RESIDENCY PROGRESS NOTE     Name: Tiera Rubin   Age & Sex: 26 y.o. female   MRN: [id number]  Unit/Bed#: [facility] 913-01   Encounter: [id number]    Recommendations for outpatient neurological follow up follow up with outpatient headache in  8-12 weeks  Pending for discharge: Clinical Improvement    ASSESSMENT & PLAN     * Headache  Assessment & Plan  81-year-old female history of prior migraines, concussion, and recently diagnosed thyroid lymphoma suspected to be diffuse large B-cell lymphoma. Was started on R-CHOP this admission on 8/2/2023 however Rituxan held secondary to adverse reaction but continued on rest of chemotherapy agents without difficulty. Today developed acute onset right frontal and periorbital sharp headache that progressed to 10/10 severity required Dilaudid with resolution. Headache not similar to prior migraines. Heme-onc concern for possible vincristine reaction. Impression: 81-year-old female with recently diagnosed diffuse large B-cell lymphoma started on R-CHOP during hospitalization with new onset right parietal headaches. Now improved. Could be multifocal in etiology in the setting of high-dose steroids, RCHOP reactions. Plan:  · MRI can be done as outpatient   · Headache control:   · Recommended holding off on narcotics  · Depacon increased to 1 g for   · IV fluid hydration  · Toradol 30 mg scheduled every 8 hours   · Zofran 4 mg IV scheduled every 8 hours with Toradol        SUBJECTIVE     Patient is 81-year-old female who presented to the ED as a result of lymphoma of the thyroid gland. Neurology was consulted due to the significant headache that the patient was experiencing. Patient was seen and examined. Overnight there was a rapid response that was called on the patient due to the patient in significant pain for the headache. During that time, neurology saw the patient and the patient received Toradol and Zyprexa.   After that, patient did not complain and neurology was not called at bedside. Today, patient sleeping when I arrived for examination. Upon waking up, patient was crying due to the severe headache that she was experiencing. She stated that she has a history of migraines and usually just takes Tylenol for them. Patient states she was nauseous and wanted to vomit but has not vomited. She denied any neck pain. Review of Systems   Gastrointestinal: Positive for nausea. Neurological: Positive for headaches. Negative for dizziness, tremors, seizures, syncope, facial asymmetry, speech difficulty, weakness, light-headedness and numbness. OBJECTIVE     Patient ID: Rosemary Ochoa is a 26 y.o. female. Vitals:    23 0726 23 1108 23 1240 23 1424   BP: 136/85 (!) 145/102 (!) 146/102 145/100   Pulse: 79 95 103    Resp: 16 18 18    Temp: 97.7 °F (36.5 °C) 98.1 °F (36.7 °C) 97.9 °F (36.6 °C)    TempSrc:       SpO2: 93% 95% 93%    Weight:       Height:          Temperature:   Temp (24hrs), Av °F (36.7 °C), Min:97.7 °F (36.5 °C), Max:98.2 °F (36.8 °C)    Temperature: 97.9 °F (36.6 °C)      Physical Exam  Constitutional:       General: She is in acute distress. Comments: Crying during examination   HENT:      Head: Normocephalic and atraumatic. Mouth/Throat:      Mouth: Mucous membranes are moist.   Eyes:      Extraocular Movements: Extraocular movements intact and EOM normal.      Conjunctiva/sclera: Conjunctivae normal.   Cardiovascular:      Rate and Rhythm: Tachycardia present. Pulmonary:      Effort: Pulmonary effort is normal.   Skin:     General: Skin is warm. Neurological:      Mental Status: She is oriented to person, place, and time. Motor: Motor strength is normal.     Coordination: Finger-Nose-Finger Test normal.      Deep Tendon Reflexes:      Reflex Scores:       Tricep reflexes are 2+ on the right side and 2+ on the left side.        Bicep reflexes are 2+ on the right side and 2+ on the left side. Brachioradialis reflexes are 2+ on the right side and 2+ on the left side. Patellar reflexes are 2+ on the right side and 2+ on the left side. Achilles reflexes are 2+ on the right side and 2+ on the left side. Psychiatric:         Speech: Speech normal.          Neurologic Exam     Mental Status   Oriented to person, place, and time. Attention: normal. Concentration: normal.   Speech: speech is normal   Level of consciousness: alert  Knowledge: good. Cranial Nerves     CN II   Visual fields full to confrontation. CN III, IV, VI   Extraocular motions are normal.     CN V   Facial sensation intact. CN VII   Facial expression full, symmetric. CN VIII   CN VIII normal.     CN IX, X   CN IX normal.   CN X normal.     CN XI   CN XI normal.     CN XII   CN XII normal.     Motor Exam   Muscle bulk: normal  Overall muscle tone: normal  Right arm tone: normal  Left arm tone: normal  Right arm pronator drift: absent  Left arm pronator drift: absent  Right leg tone: normal  Left leg tone: normal    Strength   Strength 5/5 throughout. Sensory Exam   Light touch normal.     Gait, Coordination, and Reflexes     Coordination   Finger to nose coordination: normal    Reflexes   Right brachioradialis: 2+  Left brachioradialis: 2+  Right biceps: 2+  Left biceps: 2+  Right triceps: 2+  Left triceps: 2+  Right patellar: 2+  Left patellar: 2+  Right achilles: 2+  Left achilles: 2+      LABORATORY DATA     Labs: I have personally reviewed pertinent reports.     Results from last 7 days   Lab Units 08/06/23  0604 08/05/23  0602 08/04/23  0649 08/03/23  0513 08/02/23  0539   WBC Thousand/uL 18.58* 25.42* 11.55* 9.47 9.22   HEMOGLOBIN g/dL 9.2* 9.4* 10.8* 12.2 11.9   HEMATOCRIT % 28.5* 30.0* 33.2* 38.6 37.5   PLATELETS Thousands/uL 247 289 319 376 392*   NEUTROS PCT %  --  90*  --  94* 61   MONOS PCT %  --  4  --  1* 6   MONO PCT % 0*  --   --   --   --    EOS PCT % 0 0  --  0 4 Results from last 7 days   Lab Units 08/06/23  0604 08/05/23  0602 08/04/23  0649   SODIUM mmol/L 142 140 143   POTASSIUM mmol/L 4.0 3.5 3.9   CHLORIDE mmol/L 107 108 112*   CO2 mmol/L 25 26 26   BUN mg/dL 12 16 12   CREATININE mg/dL 0.49* 0.71 0.64   CALCIUM mg/dL 6.9* 8.2* 8.5   ALK PHOS U/L 51 61 59   ALT U/L 13 18 20   AST U/L 6 7 10     Results from last 7 days   Lab Units 08/05/23  0602   MAGNESIUM mg/dL 3.0*     Results from last 7 days   Lab Units 08/06/23  0604 08/05/23  0602 08/04/23  0649   PHOSPHORUS mg/dL 3.3 3.6 2.7                    IMAGING & DIAGNOSTIC TESTING     Radiology Results: I have personally reviewed pertinent reports. IR biopsy bone marrow   Final Result by Marshall Elmore MD (07/26 1303)   Impression: Successful percutaneous diagnostic bone marrow aspiration and bone core biopsy. A full pathology report will follow. Workstation performed: [id number]         CTA ED chest PE study   Final Result by Minnette Boxer, MD (07/22 1734)      No pulmonary embolus. No acute pulmonary disease. Severe enlargement of the thyroid gland with recent biopsy showing lymphoma, with increased tracheal narrowing compared with the neck CT from 6/1/2023 with no obvious postprocedural hemorrhage. See neck CT from today. Hepatic steatosis. Workstation performed: [id number]         CT soft tissue neck with contrast   Final Result by Josi Roe MD (07/22 1934)      Significantly increased size of thyroid tissue compared to the prior exam compressing the airway/trachea. This is consistent with patient's known history of thyroid cancer. Surgical consult recommended for further evaluation. Workstation performed: [id number]         MRI brain w wo contrast    (Results Pending)       Other Diagnostic Testing: I have personally reviewed pertinent reports.       ACTIVE MEDICATIONS     Current Facility-Administered Medications   Medication Dose Route Frequency   • acetaminophen (TYLENOL) tablet 975 mg  975 mg Oral Q6H PRN   • albuterol (PROVENTIL HFA,VENTOLIN HFA) inhaler 2 puff  2 puff Inhalation Q6H PRN   • allopurinol (ZYLOPRIM) tablet 100 mg  100 mg Oral Daily   • alteplase (CATHFLO) injection 2 mg  2 mg Intracatheter Q1MIN PRN   • aluminum-magnesium hydroxide-simethicone (MAALOX) oral suspension 30 mL  30 mL Oral Q6H PRN   • amLODIPine (NORVASC) tablet 2.5 mg  2.5 mg Oral Daily   • dicyclomine (BENTYL) tablet 20 mg  20 mg Oral BID   • Filgrastim-aafi (NIVESTYM) subcutaneous injection 480 mcg  480 mcg Subcutaneous Daily   • heparin (porcine) subcutaneous injection 5,000 Units  5,000 Units Subcutaneous Q8H [facility]   • ketorolac (TORADOL) injection 30 mg  30 mg Intravenous Q8H   • ketorolac (TORADOL) injection 30 mg  30 mg Intravenous Once   • levothyroxine tablet 150 mcg  150 mcg Oral Early Morning   • loratadine (CLARITIN) tablet 10 mg  10 mg Oral HS   • LORazepam (ATIVAN) tablet 1 mg  1 mg Oral Q6H PRN   • montelukast (SINGULAIR) tablet 10 mg  10 mg Oral HS   • multi-electrolyte (PLASMALYTE-A/ISOLYTE-S PH 7.4) IV solution  125 mL/hr Intravenous Continuous   • OLANZapine (ZyPREXA) IM injection 2.5 mg  2.5 mg Intramuscular Once   • ondansetron (ZOFRAN) injection 4 mg  4 mg Intravenous Q6H PRN   • ondansetron (ZOFRAN) injection 4 mg  4 mg Intravenous Q8H   • pantoprazole (PROTONIX) EC tablet 40 mg  40 mg Oral BID AC   • valproate (DEPACON) 1,000 mg in sodium chloride 0.9 % 50 mL IVPB  1,000 mg Intravenous Q8H [facility]       Prior to Admission medications    Medication Sig Start Date End Date Taking?  Authorizing Provider   albuterol (Proventil HFA) 90 mcg/act inhaler Inhale 2 puffs every 6 (six) hours as needed for wheezing 4/6/23  Yes Francisco Hanson DO   allopurinol (ZYLOPRIM) 300 mg tablet Take 1 tablet (300 mg total) by mouth daily 8/4/23  Yes Yonathan Wilkinson DO   dicyclomine (BENTYL) 20 mg tablet Take 1 tablet (20 mg total) by mouth 2 (two) times a day 6/24/23  Yes Mak Raines MD   diphenhydrAMINE (BENADRYL) 25 mg capsule Take 1 capsule (25 mg total) by mouth every 6 (six) hours as needed for itching 11/19/18  Yes Ewelina Fleming MD   fluticasone Christina Fontana) 50 mcg/act nasal spray SPRAY 1 SPRAY INTO EACH NOSTRIL EVERY DAY 6/29/23  Yes Carmen Cabral, DO   levothyroxine 150 mcg tablet Take 1 tablet (150 mcg total) by mouth daily 7/12/23 10/10/23 Yes Gian Sweet MD   ondansetron [facility] 4 mg tablet Take 1 tablet (4 mg total) by mouth every 6 (six) hours 2/12/23  Yes Milton Ley MD   predniSONE 20 mg tablet TAKE 1 TABLET BY MOUTH EVERY DAY  Patient taking differently: Take 10 mg by mouth daily 6/29/23  Yes Gian Sweet MD   benzonatate (TESSALON) 200 MG capsule Take 1 capsule (200 mg total) by mouth 3 (three) times a day as needed for cough  Patient not taking: Reported on 7/23/2023 4/6/23   Carmen Cabral DO   ibuprofen (MOTRIN) 400 mg tablet Take 1 tablet (400 mg total) by mouth every 6 (six) hours as needed for mild pain  Patient not taking: Reported on 7/23/2023 4/16/19   Palmira Hodgson MD   methocarbamol (ROBAXIN) 500 mg tablet Take 1 tablet (500 mg total) by mouth 3 (three) times a day as needed for muscle spasms  Patient not taking: Reported on 4/6/2023 8/2/22   Tanisha New PA-C   montelukast (SINGULAIR) 10 mg tablet Take 10 mg by mouth daily at bedtime  Patient not taking: Reported on 7/23/2023    Historical Provider, MD   naproxen (NAPROSYN) 500 mg tablet Take 1 tablet by mouth every 12 (twelve) hours as needed for mild pain or moderate pain  Patient not taking: Reported on 7/23/2023 8/12/17   Jocy Willis DO   ondansetron (ZOFRAN-ODT) 4 mg disintegrating tablet Take 1 tablet by mouth every 6 (six) hours as needed for nausea or vomiting  Patient not taking: Reported on 7/23/2023 8/12/17   Jocy Willis,    ondansetron (ZOFRAN-ODT) 4 mg disintegrating tablet Take 1 tablet (4 mg total) by mouth every 8 (eight) hours as needed for nausea 6/24/23   Hilary Lagunas MD   predniSONE 50 mg tablet Take 2 tablets (100 mg total) by mouth daily for 5 days Take 100mg by mouth daily on days 1 through 5 of current chemotherapy cycle.  8/2/23 8/7/23  Elle Paredes DO         VTE Pharmacologic Prophylaxis: Heparin  VTE Mechanical Prophylaxis: sequential compression device and foot pump applied    ==  Nathalie Panda DO  [employer] [facility] Neurology Res[facility], PGY-2

## 2023-08-06 NOTE — ASSESSMENT & PLAN NOTE
· Patient reports intermittent and worsening headache since the chemo  · Headache so severe Friday night a rapid response had to be called. · Had ongoing severe HA, room spinning sensation, nausea most of the day 8/5. Was agitated and screaming at times, difficult to re-direct. · Was seen by neurology, thought that patient was experiencing side effects from high dose steroids and rebound headaches from dilaudid. Dilaudid discontinued, continue to avoid narcotics. Steroid dose decreased from prednisone 100mg daily to 20mg daily, reviewed with heme/onc. · Patient much better this AM. Headache 2-4/10. Boyfriend states she was able to eat dinner last night and she slept well overnight. · Planning for MRI brain when patient can tolerate it.  Has PRN ativan and toradol ordered for before MRI

## 2023-08-06 NOTE — QUICK NOTE
Call from nurse, patient's blood pressure was 146/102 while she was up and moving around. Repeat was 145/100. Not in pain or anxious. Was started on norvasc 2.5mg daily on admission, will increase to 5mg daily.  Monitor closely

## 2023-08-06 NOTE — PROGRESS NOTES
HPI:   Iris Macedo is a 26 y.o. female. Patient by friend in the room. Patient is feeling much better today. Much less headache. Not crying. She can open her eyes. No nausea and vomiting. She ate breakfast.  She had bowel movement yesterday. No cardiac and pulmonary symptoms at rest.  Patient is resting comfortably in bed. History of diffuse large B-cell lymphoma post 1 cycle of R-CHOP and she had reaction to Rituxan. History of migraine. Prednisone dose was lowered yesterday and may be that made the difference in her symptoms of headache, nausea and sensitivity to light.         Current Facility-Administered Medications:   •  acetaminophen (TYLENOL) tablet 975 mg, 975 mg, Oral, Q6H PRN, Caridad Ceron PA-C  •  albuterol (PROVENTIL HFA,VENTOLIN HFA) inhaler 2 puff, 2 puff, Inhalation, Q6H PRN, Shahana Naik MD  •  allopurinol (ZYLOPRIM) tablet 100 mg, 100 mg, Oral, Daily, Yonathan Wilkinson DO, 100 mg at 08/06/23 1015  •  alteplase (CATHFLO) injection 2 mg, 2 mg, Intracatheter, Q1MIN PRN, Clarise Fabry, MD  •  aluminum-magnesium hydroxide-simethicone (MAALOX) oral suspension 30 mL, 30 mL, Oral, Q6H PRN, Shahana Naik MD  •  amLODIPine (NORVASC) tablet 2.5 mg, 2.5 mg, Oral, Daily, Arielle Cabezas DO, 2.5 mg at 08/06/23 1016  •  dicyclomine (BENTYL) tablet 20 mg, 20 mg, Oral, BID, Perry Lucas MD, 20 mg at 08/06/23 1015  •  Filgrastim-aafi (NIVESTYM) subcutaneous injection 480 mcg, 480 mcg, Subcutaneous, Daily, Yonathan Wilkinson DO  •  heparin (porcine) subcutaneous injection 5,000 Units, 5,000 Units, Subcutaneous, Q8H [address], Yonathan Wilkinson DO, 5,000 Units at 08/06/23 0559  •  ketorolac (TORADOL) injection 30 mg, 30 mg, Intravenous, Q8H, Lukas Wallace DO, 30 mg at 08/06/23 0559  •  ketorolac (TORADOL) injection 30 mg, 30 mg, Intravenous, Once, Erendira Hunt PA-C  •  levothyroxine tablet 150 mcg, 150 mcg, Oral, Early Morning, Perry Lucas MD, 150 mcg at 08/06/23 0602  •  loratadine (CLARITIN) tablet 10 mg, 10 mg, Oral, HS, Yonathan Wilkinson DO, 10 mg at 08/05/23 2223  •  LORazepam (ATIVAN) tablet 1 mg, 1 mg, Oral, Q6H PRN, Henrry Vazquez PA-C, 1 mg at 08/05/23 2223  •  montelukast (SINGULAIR) tablet 10 mg, 10 mg, Oral, HS, Perry Lucas MD, 10 mg at 08/05/23 2223  •  multi-electrolyte (PLASMALYTE-A/ISOLYTE-S PH 7.4) IV solution, 125 mL/hr, Intravenous, Continuous, Yonathan Wilkinson DO, Last Rate: 125 mL/hr at 08/06/23 0606, 125 mL/hr at 08/06/23 0606  •  OLANZapine (ZyPREXA) IM injection 2.5 mg, 2.5 mg, Intramuscular, Once, [employer], PA-C  •  ondansetron (ZOFRAN) injection 4 mg, 4 mg, Intravenous, Q6H PRN, Marquez Nava MD, 4 mg at 08/05/23 0841  •  ondansetron (ZOFRAN) injection 4 mg, 4 mg, Intravenous, Q8H, Henrry Vazquez PA-C, 4 mg at 08/06/23 0559  •  pantoprazole (PROTONIX) EC tablet 40 mg, 40 mg, Oral, BID AC, Henrry Vazquez PA-C, 40 mg at 08/06/23 5961  •  valproate (DEPACON) 1,000 mg in sodium chloride 0.9 % 50 mL IVPB, 1,000 mg, Intravenous, Q8H [address], Lukas Wallace DO, Last Rate: 120 mL/hr at 08/06/23 0607, 1,000 mg at 08/06/23 0607    Allergies   Allergen Reactions   • Pollen Extract    • Shrimp Extract Allergy Skin Test - Food Allergy Hives       Oncology History   Lymphoma of thyroid gland ([address])   7/22/2023 Initial Diagnosis    Lymphoma of thyroid gland ([address])     8/2/2023 -  Chemotherapy    DOXOrubicin (ADRIAMYCIN), 50 mg/m2 = 110 mg, Intravenous, Once, 1 of 6 cycles  Administration: 110 mg (8/3/2023)  alteplase (CATHFLO), 2 mg, Intracatheter, Every 1 Minute as needed, 1 of 6 cycles  vincristine (ONCOVIN) chemo infusion, 2 mg (original dose 1.4 mg/m2), Intravenous, Once, 1 of 6 cycles  Dose modification: 2 mg (original dose 1.4 mg/m2, Cycle 1, Reason: Max Dose Reached)  Administration: 2 mg (8/3/2023)  cyclophosphamide (CYTOXAN) IVPB, 750 mg/m2 = 1,650 mg, Intravenous, Once, 1 of 6 cycles  Administration: 1,650 mg (8/2/2023)  fosaprepitant (EMEND) IVPB, 150 mg, Intravenous, Once, 1 of 6 cycles  Administration: 150 mg (8/2/2023)  riTUXimab (RITUXAN) subsequent titrated chemo infusion, 375 mg/m2, Intravenous, Once, 0 of 5 cycles  riTUXimab (RITUXAN) first titrated chemo infusion, 825 mg, Intravenous, Once, 1 of 1 cycle  Administration: 800 mg (8/2/2023)         ROS:  08/06/23 Reviewed 12 systems: See symptoms in HPI  Presently no other neurological, cardiac, pulmonary, GI and  symptoms other than mentioned in HPI. Other symptoms are in HPI. No fever, chills, bleeding, bone pains, skin rash, weight loss , night sweats, arthritic symptoms, numbness,  claudication and gait problem. No frequent infections. Not unusually sensitive to heat or cold. No swelling of the ankles. No swollen glands. Patient is anxious. BP (!) 145/102   Pulse 95   Temp 98.1 °F (36.7 °C)   Resp 18   Ht 5' 3" (1.6 m)   Wt 124 kg (274 lb 0.5 oz)   LMP 06/29/2023 (Approximate)   SpO2 95%   BMI 48.54 kg/m²     Physical Exam:  Alert, oriented, not in distress, vitals are above, no icterus, no oral thrush, decreased lymphadenopathy in the neck. Clear lung fields to percussion and auscultation, regular heart rate, abdomen  soft and non tender, no palpable abdominal mass, no ascites, no edema of ankles, no calf tenderness, no focal neurological deficit, no skin rash. Patient is anxious. IMAGING:  IR biopsy bone marrow   Final Result by Jsutina Weldon MD (07/26 1303)   Impression: Successful percutaneous diagnostic bone marrow aspiration and bone core biopsy. A full pathology report will follow. Workstation performed: [id number]         CTA ED chest PE study   Final Result by Jaiden Barnes MD (07/22 3084)      No pulmonary embolus. No acute pulmonary disease. Severe enlargement of the thyroid gland with recent biopsy showing lymphoma, with increased tracheal narrowing compared with the neck CT from 6/1/2023 with no obvious postprocedural hemorrhage. See neck CT from today. Hepatic steatosis. Workstation performed: [id number]         CT soft tissue neck with contrast   Final Result by Jose Stapleton MD (07/22 1934)      Significantly increased size of thyroid tissue compared to the prior exam compressing the airway/trachea. This is consistent with patient's known history of thyroid cancer. Surgical consult recommended for further evaluation. Workstation performed: [id number]         MRI brain w wo contrast    (Results Pending)       LABS:  Results for orders placed or performed during the hospital encounter of 07/22/23   Blood culture    Specimen: Arm, Left; Blood   Result Value Ref Range    Blood Culture No Growth at 72 hrs. Blood culture    Specimen: Arm, Right; Blood   Result Value Ref Range    Blood Culture No Growth at 72 hrs.     CBC and differential   Result Value Ref Range    WBC 10.12 4.31 - 10.16 Thousand/uL    RBC 4.38 3.81 - 5.12 Million/uL    Hemoglobin 12.0 11.5 - 15.4 g/dL    Hematocrit 37.5 34.8 - 46.1 %    MCV 86 82 - 98 fL    MCH 27.4 26.8 - 34.3 pg    MCHC 32.0 31.4 - 37.4 g/dL    RDW 12.9 11.6 - 15.1 %    MPV 9.3 8.9 - 12.7 fL    Platelets 289 280 - 570 Thousands/uL    nRBC 0 /100 WBCs    Neutrophils Relative 70 43 - 75 %    Immat GRANS % 0 0 - 2 %    Lymphocytes Relative 21 14 - 44 %    Monocytes Relative 7 4 - 12 %    Eosinophils Relative 2 0 - 6 %    Basophils Relative 0 0 - 1 %    Neutrophils Absolute 7.04 1.85 - 7.62 Thousands/µL    Immature Grans Absolute 0.03 0.00 - 0.20 Thousand/uL    Lymphocytes Absolute 2.11 0.60 - 4.47 Thousands/µL    Monocytes Absolute 0.70 0.17 - 1.22 Thousand/µL    Eosinophils Absolute 0.20 0.00 - 0.61 Thousand/µL    Basophils Absolute 0.04 0.00 - 0.10 Thousands/µL   Comprehensive metabolic panel   Result Value Ref Range    Sodium 142 135 - 147 mmol/L    Potassium 4.1 3.5 - 5.3 mmol/L    Chloride 112 (H) 96 - 108 mmol/L    CO2 26 21 - 32 mmol/L    ANION GAP 4 mmol/L    BUN 13 5 - 25 mg/dL    Creatinine 0.76 0.60 - 1.30 mg/dL    Glucose 104 65 - 140 mg/dL    Calcium 8.9 8.3 - 10.1 mg/dL    Corrected Calcium 9.5 8.3 - 10.1 mg/dL    AST 9 5 - 45 U/L    ALT 16 12 - 78 U/L    Alkaline Phosphatase 68 46 - 116 U/L    Total Protein 7.5 6.4 - 8.4 g/dL    Albumin 3.2 (L) 3.5 - 5.0 g/dL    Total Bilirubin 0.19 (L) 0.20 - 1.00 mg/dL    eGFR 111 ml/min/1.73sq m   D-Dimer   Result Value Ref Range    D-Dimer, Quant 1.06 (H) <0.50 ug/ml FEU   TSH, 3rd generation with Free T4 reflex   Result Value Ref Range    TSH 3RD GENERATON 0.970 0.450 - 4.500 uIU/mL   hCG, qualitative pregnancy   Result Value Ref Range    Preg, Serum Negative Negative   Chronic Hepatitis Panel   Result Value Ref Range    Hepatitis B Surface Ag Non-reactive Non-Reactive    Hepatitis C Ab Non-reactive Non-Reactive    Hep B C IgM Non-reactive Non-Reactive    Hep B Core Total Ab Non-reactive Non-Reactive   LD,Blood   Result Value Ref Range     81 - 234 U/L   Comprehensive metabolic panel   Result Value Ref Range    Sodium 141 135 - 147 mmol/L    Potassium 4.0 3.5 - 5.3 mmol/L    Chloride 111 (H) 96 - 108 mmol/L    CO2 24 21 - 32 mmol/L    ANION GAP 6 mmol/L    BUN 15 5 - 25 mg/dL    Creatinine 0.69 0.60 - 1.30 mg/dL    Glucose 96 65 - 140 mg/dL    Calcium 9.0 8.3 - 10.1 mg/dL    Corrected Calcium 9.7 8.3 - 10.1 mg/dL    AST 8 5 - 45 U/L    ALT 15 12 - 78 U/L    Alkaline Phosphatase 74 46 - 116 U/L    Total Protein 7.4 6.4 - 8.4 g/dL    Albumin 3.1 (L) 3.5 - 5.0 g/dL    Total Bilirubin 0.22 0.20 - 1.00 mg/dL    eGFR 123 ml/min/1.73sq m   CBC   Result Value Ref Range    WBC 9.49 4.31 - 10.16 Thousand/uL    RBC 4.51 3.81 - 5.12 Million/uL    Hemoglobin 12.1 11.5 - 15.4 g/dL    Hematocrit 40.5 34.8 - 46.1 %    MCV 90 82 - 98 fL    MCH 26.8 26.8 - 34.3 pg    MCHC 29.9 (L) 31.4 - 37.4 g/dL    RDW 12.8 11.6 - 15.1 %    Platelets 937 977 - 063 Thousands/uL    MPV 9.6 8.9 - 12.7 fL   Uric acid   Result Value Ref Range    Uric Acid 5.8 2.0 - 7.5 mg/dL   LD,Blood   Result Value Ref Range     81 - 234 U/L   Protime-INR   Result Value Ref Range    Protime 12.6 11.6 - 14.5 seconds    INR 0.92 0.84 - 1.19   CBC and differential   Result Value Ref Range    WBC 12.02 (H) 4.31 - 10.16 Thousand/uL    RBC 4.46 3.81 - 5.12 Million/uL    Hemoglobin 12.0 11.5 - 15.4 g/dL    Hematocrit 38.4 34.8 - 46.1 %    MCV 86 82 - 98 fL    MCH 26.9 26.8 - 34.3 pg    MCHC 31.3 (L) 31.4 - 37.4 g/dL    RDW 12.8 11.6 - 15.1 %    MPV 9.5 8.9 - 12.7 fL    Platelets 925 006 - 658 Thousands/uL    nRBC 0 /100 WBCs    Neutrophils Relative 63 43 - 75 %    Immat GRANS % 1 0 - 2 %    Lymphocytes Relative 25 14 - 44 %    Monocytes Relative 7 4 - 12 %    Eosinophils Relative 3 0 - 6 %    Basophils Relative 1 0 - 1 %    Neutrophils Absolute 7.61 1.85 - 7.62 Thousands/µL    Immature Grans Absolute 0.07 0.00 - 0.20 Thousand/uL    Lymphocytes Absolute 3.03 0.60 - 4.47 Thousands/µL    Monocytes Absolute 0.85 0.17 - 1.22 Thousand/µL    Eosinophils Absolute 0.40 0.00 - 0.61 Thousand/µL    Basophils Absolute 0.06 0.00 - 0.10 Thousands/µL   Basic metabolic panel   Result Value Ref Range    Sodium 139 135 - 147 mmol/L    Potassium 3.7 3.5 - 5.3 mmol/L    Chloride 109 (H) 96 - 108 mmol/L    CO2 24 21 - 32 mmol/L    ANION GAP 6 mmol/L    BUN 17 5 - 25 mg/dL    Creatinine 0.69 0.60 - 1.30 mg/dL    Glucose 92 65 - 140 mg/dL    Calcium 8.8 8.3 - 10.1 mg/dL    eGFR 123 ml/min/1.73sq m   FLOW- EXTENDED L/L PANEL (31 MARKERS) - BONE MARROW- RIGHT ILIAC CREST; ASHLEE - Miscellaneous Test   Result Value Ref Range    Miscellaneous Lab Test Result flow cytometry  bone marrow    CYTOGENETICS- ONCOLOGY CHROMOSOME ANALYSIS- BONE MARROW- RIGHT ILIAC CREST; ASHLEE - Miscellaneous Test   Result Value Ref Range    Miscellaneous Lab Test Result see written report    HS Troponin 0hr (reflex protocol)   Result Value Ref Range    hs TnI 0hr 5 "Refer to ACS Flowchart"- see link ng/L   HS Troponin I 2hr   Result Value Ref Range    hs TnI 2hr 4 "Refer to ACS Flowchart"- see link ng/L    Delta 2hr hsTnI -1 <20 ng/L   CBC and differential   Result Value Ref Range    WBC 10.10 4.31 - 10.16 Thousand/uL    RBC 4.32 3.81 - 5.12 Million/uL    Hemoglobin 12.0 11.5 - 15.4 g/dL    Hematocrit 37.1 34.8 - 46.1 %    MCV 86 82 - 98 fL    MCH 27.8 26.8 - 34.3 pg    MCHC 32.3 31.4 - 37.4 g/dL    RDW 12.6 11.6 - 15.1 %    MPV 9.3 8.9 - 12.7 fL    Platelets 977 766 - 367 Thousands/uL    nRBC 0 /100 WBCs    Neutrophils Relative 60 43 - 75 %    Immat GRANS % 1 0 - 2 %    Lymphocytes Relative 27 14 - 44 %    Monocytes Relative 7 4 - 12 %    Eosinophils Relative 4 0 - 6 %    Basophils Relative 1 0 - 1 %    Neutrophils Absolute 6.11 1.85 - 7.62 Thousands/µL    Immature Grans Absolute 0.12 0.00 - 0.20 Thousand/uL    Lymphocytes Absolute 2.68 0.60 - 4.47 Thousands/µL    Monocytes Absolute 0.68 0.17 - 1.22 Thousand/µL    Eosinophils Absolute 0.44 0.00 - 0.61 Thousand/µL    Basophils Absolute 0.07 0.00 - 0.10 Thousands/µL   Comprehensive metabolic panel   Result Value Ref Range    Sodium 137 135 - 147 mmol/L    Potassium 3.8 3.5 - 5.3 mmol/L    Chloride 108 96 - 108 mmol/L    CO2 24 21 - 32 mmol/L    ANION GAP 5 mmol/L    BUN 18 5 - 25 mg/dL    Creatinine 0.70 0.60 - 1.30 mg/dL    Glucose 104 65 - 140 mg/dL    Calcium 8.9 8.3 - 10.1 mg/dL    Corrected Calcium 9.6 8.3 - 10.1 mg/dL    AST 11 5 - 45 U/L    ALT 17 12 - 78 U/L    Alkaline Phosphatase 79 46 - 116 U/L    Total Protein 7.5 6.4 - 8.4 g/dL    Albumin 3.1 (L) 3.5 - 5.0 g/dL    Total Bilirubin 0.35 0.20 - 1.00 mg/dL    eGFR 123 ml/min/1.73sq m   CBC and differential   Result Value Ref Range    WBC 9.22 4.31 - 10.16 Thousand/uL    RBC 4.36 3.81 - 5.12 Million/uL    Hemoglobin 11.9 11.5 - 15.4 g/dL    Hematocrit 37.5 34.8 - 46.1 %    MCV 86 82 - 98 fL    MCH 27.3 26.8 - 34.3 pg    MCHC 31.7 31.4 - 37.4 g/dL    RDW 12.6 11.6 - 15.1 %    MPV 9.5 8.9 - 12.7 fL    Platelets 243 (H) 674 - 390 Thousands/uL    nRBC 0 /100 WBCs    Neutrophils Relative 61 43 - 75 %    Immat GRANS % 1 0 - 2 %    Lymphocytes Relative 28 14 - 44 %    Monocytes Relative 6 4 - 12 %    Eosinophils Relative 4 0 - 6 %    Basophils Relative 0 0 - 1 %    Neutrophils Absolute 5.56 1.85 - 7.62 Thousands/µL    Immature Grans Absolute 0.08 0.00 - 0.20 Thousand/uL    Lymphocytes Absolute 2.58 0.60 - 4.47 Thousands/µL    Monocytes Absolute 0.58 0.17 - 1.22 Thousand/µL    Eosinophils Absolute 0.38 0.00 - 0.61 Thousand/µL    Basophils Absolute 0.04 0.00 - 0.10 Thousands/µL   Comprehensive metabolic panel   Result Value Ref Range    Sodium 138 135 - 147 mmol/L    Potassium 3.9 3.5 - 5.3 mmol/L    Chloride 107 96 - 108 mmol/L    CO2 26 21 - 32 mmol/L    ANION GAP 5 mmol/L    BUN 13 5 - 25 mg/dL    Creatinine 0.82 0.60 - 1.30 mg/dL    Glucose 107 65 - 140 mg/dL    Calcium 8.9 8.3 - 10.1 mg/dL    Corrected Calcium 9.6 8.3 - 10.1 mg/dL    AST 12 5 - 45 U/L    ALT 17 12 - 78 U/L    Alkaline Phosphatase 76 46 - 116 U/L    Total Protein 7.4 6.4 - 8.4 g/dL    Albumin 3.1 (L) 3.5 - 5.0 g/dL    Total Bilirubin 0.17 (L) 0.20 - 1.00 mg/dL    eGFR 101 ml/min/1.73sq m   LD,Blood   Result Value Ref Range     (H) 81 - 234 U/L   Uric acid   Result Value Ref Range    Uric Acid 5.8 2.0 - 7.5 mg/dL   Iron Saturation %   Result Value Ref Range    Iron Saturation 13 (L) 15 - 50 %    TIBC 319 250 - 450 ug/dL    Iron 42 (L) 50 - 170 ug/dL   Ferritin   Result Value Ref Range    Ferritin 82 11 - 307 ng/mL   hCG, serum, qualitative   Result Value Ref Range    Preg, Serum Negative Negative   CBC and differential   Result Value Ref Range    WBC 9.47 4.31 - 10.16 Thousand/uL    RBC 4.50 3.81 - 5.12 Million/uL    Hemoglobin 12.2 11.5 - 15.4 g/dL    Hematocrit 38.6 34.8 - 46.1 %    MCV 86 82 - 98 fL    MCH 27.1 26.8 - 34.3 pg    MCHC 31.6 31.4 - 37.4 g/dL    RDW 12.5 11.6 - 15.1 %    MPV 9.3 8.9 - 12.7 fL    Platelets 969 721 - 289 Thousands/uL    nRBC 0 /100 WBCs    Neutrophils Relative 94 (H) 43 - 75 %    Immat GRANS % 1 0 - 2 %    Lymphocytes Relative 4 (L) 14 - 44 %    Monocytes Relative 1 (L) 4 - 12 %    Eosinophils Relative 0 0 - 6 %    Basophils Relative 0 0 - 1 %    Neutrophils Absolute 8.98 (H) 1.85 - 7.62 Thousands/µL    Immature Grans Absolute 0.06 0.00 - 0.20 Thousand/uL    Lymphocytes Absolute 0.34 (L) 0.60 - 4.47 Thousands/µL    Monocytes Absolute 0.07 (L) 0.17 - 1.22 Thousand/µL    Eosinophils Absolute 0.01 0.00 - 0.61 Thousand/µL    Basophils Absolute 0.01 0.00 - 0.10 Thousands/µL   Comprehensive metabolic panel   Result Value Ref Range    Sodium 140 135 - 147 mmol/L    Potassium 3.7 3.5 - 5.3 mmol/L    Chloride 108 96 - 108 mmol/L    CO2 24 21 - 32 mmol/L    ANION GAP 8 mmol/L    BUN 11 5 - 25 mg/dL    Creatinine 0.73 0.60 - 1.30 mg/dL    Glucose 161 (H) 65 - 140 mg/dL    Calcium 8.7 8.3 - 10.1 mg/dL    Corrected Calcium 9.4 8.3 - 10.1 mg/dL    AST 17 5 - 45 U/L    ALT 22 12 - 78 U/L    Alkaline Phosphatase 75 46 - 116 U/L    Total Protein 7.8 6.4 - 8.4 g/dL    Albumin 3.1 (L) 3.5 - 5.0 g/dL    Total Bilirubin 0.25 0.20 - 1.00 mg/dL    eGFR 117 ml/min/1.73sq m   HIV 1/2 AG/AB w [facility] for 2 yr old and above   Result Value Ref Range    HIV-1 p24 Antigen Non-Reactive Non-Reactive    HIV-1 Antibody Non-Reactive Non-Reactive    HIV-2 Antibody Non-Reactive Non-Reactive    HIV Ag-Ab 5th Gen Non-Reactive Non-Reactive   Uric acid   Result Value Ref Range    Uric Acid 4.4 2.0 - 7.5 mg/dL   Phosphorus   Result Value Ref Range    Phosphorus 2.6 (L) 2.7 - 4.5 mg/dL   LD,Blood   Result Value Ref Range     81 - 234 U/L   Uric acid   Result Value Ref Range    Uric Acid 3.6 2.0 - 7.5 mg/dL   Phosphorus   Result Value Ref Range    Phosphorus 2.7 2.7 - 4.5 mg/dL   LD,Blood   Result Value Ref Range     81 - 234 U/L   CBC   Result Value Ref Range    WBC 11.55 (H) 4.31 - 10.16 Thousand/uL    RBC 3.87 3.81 - 5.12 Million/uL    Hemoglobin 10.8 (L) 11.5 - 15.4 g/dL    Hematocrit 33.2 (L) 34.8 - 46.1 %    MCV 86 82 - 98 fL    MCH 27.9 26.8 - 34.3 pg    MCHC 32.5 31.4 - 37.4 g/dL    RDW 12.6 11.6 - 15.1 %    Platelets 777 342 - 423 Thousands/uL    MPV 9.3 8.9 - 12.7 fL   Comprehensive metabolic panel   Result Value Ref Range    Sodium 143 135 - 147 mmol/L    Potassium 3.9 3.5 - 5.3 mmol/L    Chloride 112 (H) 96 - 108 mmol/L    CO2 26 21 - 32 mmol/L    ANION GAP 5 mmol/L    BUN 12 5 - 25 mg/dL    Creatinine 0.64 0.60 - 1.30 mg/dL    Glucose 144 (H) 65 - 140 mg/dL    Calcium 8.5 8.3 - 10.1 mg/dL    Corrected Calcium 9.5 8.3 - 10.1 mg/dL    AST 10 5 - 45 U/L    ALT 20 12 - 78 U/L    Alkaline Phosphatase 59 46 - 116 U/L    Total Protein 6.5 6.4 - 8.4 g/dL    Albumin 2.7 (L) 3.5 - 5.0 g/dL    Total Bilirubin 0.13 (L) 0.20 - 1.00 mg/dL    eGFR 127 ml/min/1.73sq m   CBC and differential   Result Value Ref Range    WBC 25.42 (H) 4.31 - 10.16 Thousand/uL    RBC 3.37 (L) 3.81 - 5.12 Million/uL    Hemoglobin 9.4 (L) 11.5 - 15.4 g/dL    Hematocrit 30.0 (L) 34.8 - 46.1 %    MCV 89 82 - 98 fL    MCH 27.9 26.8 - 34.3 pg    MCHC 31.3 (L) 31.4 - 37.4 g/dL    RDW 13.0 11.6 - 15.1 %    MPV 9.6 8.9 - 12.7 fL    Platelets 632 105 - 031 Thousands/uL    nRBC 0 /100 WBCs    Neutrophils Relative 90 (H) 43 - 75 %    Immat GRANS % 2 0 - 2 %    Lymphocytes Relative 4 (L) 14 - 44 %    Monocytes Relative 4 4 - 12 %    Eosinophils Relative 0 0 - 6 %    Basophils Relative 0 0 - 1 %    Neutrophils Absolute 22.81 (H) 1.85 - 7.62 Thousands/µL    Immature Grans Absolute 0.39 (H) 0.00 - 0.20 Thousand/uL    Lymphocytes Absolute 1.12 0.60 - 4.47 Thousands/µL    Monocytes Absolute 1.06 0.17 - 1.22 Thousand/µL    Eosinophils Absolute 0.01 0.00 - 0.61 Thousand/µL    Basophils Absolute 0.03 0.00 - 0.10 Thousands/µL   Comprehensive metabolic panel   Result Value Ref Range    Sodium 140 135 - 147 mmol/L    Potassium 3.5 3.5 - 5.3 mmol/L    Chloride 108 96 - 108 mmol/L    CO2 26 21 - 32 mmol/L    ANION GAP 6 mmol/L    BUN 16 5 - 25 mg/dL    Creatinine 0.71 0.60 - 1.30 mg/dL    Glucose 109 65 - 140 mg/dL    Calcium 8.2 (L) 8.3 - 10.1 mg/dL    Corrected Calcium 9.1 8.3 - 10.1 mg/dL    AST 7 5 - 45 U/L    ALT 18 12 - 78 U/L    Alkaline Phosphatase 61 46 - 116 U/L    Total Protein 6.7 6.4 - 8.4 g/dL    Albumin 2.9 (L) 3.5 - 5.0 g/dL    Total Bilirubin 0.25 0.20 - 1.00 mg/dL    eGFR 121 ml/min/1.73sq m   Uric acid   Result Value Ref Range    Uric Acid 5.6 2.0 - 7.5 mg/dL   Magnesium   Result Value Ref Range    Magnesium 3.0 (H) 1.6 - 2.6 mg/dL   Phosphorus   Result Value Ref Range    Phosphorus 3.6 2.7 - 4.5 mg/dL   CBC and differential   Result Value Ref Range    WBC 18.58 (H) 4.31 - 10.16 Thousand/uL    RBC 3.24 (L) 3.81 - 5.12 Million/uL    Hemoglobin 9.2 (L) 11.5 - 15.4 g/dL    Hematocrit 28.5 (L) 34.8 - 46.1 %    MCV 88 82 - 98 fL    MCH 28.4 26.8 - 34.3 pg    MCHC 32.3 31.4 - 37.4 g/dL    RDW 12.8 11.6 - 15.1 %    MPV 9.4 8.9 - 12.7 fL    Platelets 007 534 - 309 Thousands/uL   Comprehensive metabolic panel   Result Value Ref Range    Sodium 142 135 - 147 mmol/L    Potassium 4.0 3.5 - 5.3 mmol/L    Chloride 107 96 - 108 mmol/L    CO2 25 21 - 32 mmol/L    ANION GAP 10 mmol/L    BUN 12 5 - 25 mg/dL    Creatinine 0.49 (L) 0.60 - 1.30 mg/dL    Glucose 88 65 - 140 mg/dL    Calcium 6.9 (L) 8.3 - 10.1 mg/dL    Corrected Calcium 8.3 8.3 - 10.1 mg/dL    AST 6 5 - 45 U/L    ALT 13 12 - 78 U/L    Alkaline Phosphatase 51 46 - 116 U/L    Total Protein 5.4 (L) 6.4 - 8.4 g/dL    Albumin 2.2 (L) 3.5 - 5.0 g/dL    Total Bilirubin 0.16 (L) 0.20 - 1.00 mg/dL    eGFR 138 ml/min/1.73sq m   LD,Blood   Result Value Ref Range     81 - 234 U/L   Uric acid   Result Value Ref Range    Uric Acid 4.7 2.0 - 7.5 mg/dL   Phosphorus   Result Value Ref Range    Phosphorus 3.3 2.7 - 4.5 mg/dL   ECG 12 lead   Result Value Ref Range    Ventricular Rate 106 BPM    Atrial Rate 106 BPM    CO Interval 134 ms    QRSD Interval 80 ms    QT Interval 338 ms    QTC Interval 448 ms    P Axis 65 degrees    QRS Axis 72 degrees    T Wave Axis 35 degrees   ECG 12 lead Result Value Ref Range    Ventricular Rate 104 BPM    Atrial Rate 104 BPM    NV Interval 134 ms    QRSD Interval 80 ms    QT Interval 338 ms    QTC Interval 444 ms    P Axis 63 degrees    QRS Axis 71 degrees    T Wave Axis 35 degrees   ECG 12 lead   Result Value Ref Range    Ventricular Rate 107 BPM    Atrial Rate 107 BPM    NV Interval 134 ms    QRSD Interval 80 ms    QT Interval 350 ms    QTC Interval 467 ms    P Axis 75 degrees    QRS Axis 90 degrees    T Wave Axis 23 degrees   ECG 12 lead   Result Value Ref Range    Ventricular Rate 118 BPM    Atrial Rate 118 BPM    NV Interval 134 ms    QRSD Interval 86 ms    QT Interval 334 ms    QTC Interval 468 ms    P Axis 65 degrees    QRS Axis 85 degrees    T Wave Axis 9 degrees   ECG 12 lead   Result Value Ref Range    Ventricular Rate 113 BPM    Atrial Rate 113 BPM    NV Interval 132 ms    QRSD Interval 70 ms    QT Interval 334 ms    QTC Interval 458 ms    P Axis 72 degrees    QRS Axis 84 degrees    T Wave Axis 12 degrees   ECG 12 lead   Result Value Ref Range    Ventricular Rate 116 BPM    Atrial Rate 116 BPM    NV Interval 136 ms    QRSD Interval 70 ms    QT Interval 338 ms    QTC Interval 469 ms    P Axis 67 degrees    QRS Axis 85 degrees    T Wave Axis 16 degrees   ECG 12 lead   Result Value Ref Range    Ventricular Rate 118 BPM    Atrial Rate 118 BPM    NV Interval 136 ms    QRSD Interval 80 ms    QT Interval 336 ms    QTC Interval 470 ms    P Axis 68 degrees    QRS Axis 84 degrees    T Wave Axis 7 degrees   ECG 12 lead   Result Value Ref Range    Ventricular Rate 134 BPM    Atrial Rate 134 BPM    NV Interval 128 ms    QRSD Interval 68 ms    QT Interval 314 ms    QTC Interval 468 ms    P Axis 53 degrees    QRS Axis 80 degrees    T Wave Axis -5 degrees   ECG 12 lead   Result Value Ref Range    Ventricular Rate 131 BPM    Atrial Rate 131 BPM    NV Interval 150 ms    QRSD Interval 68 ms    QT Interval 308 ms    QTC Interval 454 ms    P Axis 52 degrees    QRS Axis 79 degrees    T Wave Axis -4 degrees   Echo complete w/ contrast if indicated   Result Value Ref Range    A4C EF 54 %    LVIDd 5.20 cm    LVIDS 3.20 cm    IVSd 1.00 cm    LVPWd 0.90 cm    FS 38 28 - 44    MV E' Tissue Velocity Septal 12 cm/s    MV E' Tissue Velocity Lateral 12 cm/s    E wave deceleration time 134 ms    MV Peak E Duong 97 cm/s    MV Peak A Duong 0.81 m/s    RVID d 3.4 cm    Tricuspid annular plane systolic excursion 6.95 cm    LA size 3.3 cm    LA length (A2C) 5.20 cm    LA volume (BP) 43 mL    RAA A4C 14.8 cm2    MV stenosis pressure 1/2 time 39 ms    MV valve area p 1/2 method 5.64     TR Peak Duong 1.0 m/s    Triscuspid Valve Regurgitation Peak Gradient 4.0 mmHg    Ao root 3.10 cm    Asc Ao 3.3 cm    Tricuspid valve peak regurgitation velocity 0.99 m/s    Left ventricular stroke volume (2D) 91.00 mL    IVS 1 cm    LEFT VENTRICLE SYSTOLIC VOLUME (MOD BIPLANE) 2D 40 mL    LV DIASTOLIC VOLUME (MOD BIPLANE) 2D 131 mL    Left Atrium Area-systolic Four Chamber 93.8 cm2    Left Atrium Area-systolic Apical Two Chamber 18.2 cm2    LVSV, 2D 91 mL    LV EF 65    Tissue Exam   Result Value Ref Range    Case Report       Surgical Pathology Report                         Case: [id number]                                   Authorizing Provider:  Meena Benz MD        Collected:           07/26/2023 0915              Ordering Location:     [facility]      Received:            07/26/2023 [address] [place] [address]                                                              Pathologist:           Heriberto Escobar MD                                                          Specimens:   A) - Iliac Crest, Right, core                                                                       B) - Iliac Crest, Right, clot                                                                       C) - Iliac Crest, Right, slide                                                             Final Diagnosis       A -C. Bone marrow,  right iliac crest,  biopsy, clot, and aspirate:  -  No morphologic or immunophenotypic evidence of B-cell lymphoma.  -  Normocellular (~70%) bone marrow with trilineage maturing hematopoiesis with no increase in blasts. -  Reduced iron stores, correlation with serum iron studies is recommended. -  No increase in reticulin fibrosis. Comment: The patient's recently diagnosed B-cell lymphoma pending ancillary work-up is noted ([id number]). The current bone marrow biopsy shows no morphologic or immunophenotypic evidence of involvement by B-cell lymphoma. Flow cytometry is negative for myeloid, lymphoid, and plasma cell abnormalities. Karyotype analysis is pending and will be reported in a separate addendum. If clinically indicated material is available for ancillary molecular studies including B-cell gene rearrangement and lymphoid NGS; if compared to the diagnostic sample these testing modalities may be more sensitive in excluding low level molecular involvement of the bone marrow. Clinical correlation is advised. Microscopic Description       Bone marrow aspirate smears: The aspirate smears are spicular, cellular, and  adequate for evaluation. Cellularity is composed of maturing trilineage hematopoiesis with a myeloid to erythroid ratio of 2.4:1.  Myelopoiesis:  Myeloid elements are present in appropriate quantities and show normal maturation without overt evidence of dysplasia (myeloblasts= <5%). Erythropoiesis:  Eyrthroid elements are present in appropriate quantities and show normal maturation without overt evidence of dysplasia. Megakaryocytes:  Megakaryocytes are present in appropriate quantities and exhibit a spectrum of maturation. Lymphocytes:  Mildly increased with small morphology; no evidence of large cells. Plasma cells:  Rare plasma cells.     200 cell differential:  Blasts: 0%  Pros: 0%  [place]/Myelo: 17.5%  Segs: 39.0%  Eryth: 26.0%  Lymphs: 9.5%  Mono: 2.5%  Eos: 4.0%  Plasma cells: 1.5%  Baso: 0%  Atypical cells: 0%    M:E ratio= 2.4    Bone marrow core biopsy and aspirate clot:  Histologic sections of the aspirate clot and decalcified core biopsy are adequate for evaluation. Marrow space cellularity is normocellular for patient age (~70%). Myelopoiesis:  Exhibit progressive maturation (myeloblasts= <5%). Erythropoiesis:  Exhibit progressive maturation. Megakaryocytes:  Present with a spectrum of normal morphology. Lymphocytes:  Scattered. Plasma cells:  Rare. Special studies:   * Iron stain performed on the aspirate smear, clot, and core biopsy highlights reduced iron stores (1/4) with no evidence of ring sideroblasts; correlation with serum iron studies is recommended. * Reticulin stain performed on the core biopsy shows no significant increase in reticulin fibers. 0 of 3 by the  Consensus grading scheme. * PAS highlights myeloid and megakaryocytic elements confirming a normal M:E ratio. * Immunohistochemical stains were performed with appropriate controls and show the following results:  -  CD34 shows no increase in blasts (<5% of total cellularity) and  shows no increase in immature cells (<5% of total cellularity). Additionally,  highlights scattered mast cells. CD61 highlights scattered megakaryocytes in normal distribution and quantity. CD3 and CD20 highlight interstitially scattered T and B-cells (<10% of total cellularity). Jackson-5 highlights B-cells and is negative for large B-cells. BCL-6 is negative.  highlights scattered plasma cells (<5% of total cellularity) with a polytypic kappa and lambda light chain expression by kappa and lambda in situ hybridization studies.          Note       Component Ref Range & Units 7/25/23 0601 7/24/23 0454 7/22/23 1336 7/20/23 1047 6/23/23 2212 6/1/23 1033 2/12/23 1548   WBC 4.31 - 10.16 Thousand/uL 12.02 High   9.49  10.12  8.64  11.60 High   7.62  5.24    RBC 3.81 - 5.12 Million/uL 4.46  4.51  4.38  4.27  4.62  4.46  4.67    Hemoglobin 11.5 - 15.4 g/dL 12.0  12.1  12.0  11.9  12.9  12.5  13.1    Hematocrit 34.8 - 46.1 % 38.4  40.5  37.5  36.2  39.3  38.4  40.4    MCV 82 - 98 fL 86  90  86  85  85  86  87    MCH 26.8 - 34.3 pg 26.9  26.8  27.4  27.9  27.9  28.0  28.1    MCHC 31.4 - 37.4 g/dL 31.3 Low   29.9 Low   32.0  32.9  32.8  32.6  32.4    RDW 11.6 - 15.1 % 12.8  12.8  12.9  12.9  12.6  12.6  12.7    MPV 8.9 - 12.7 fL 9.5  9.6  9.3  9.1  9.2  9.5  10.0    Platelets 708 - 808 Thousands/uL 375  372  344  323  440 High   327  320      Flow Cytometry Analysis ([employer] # [id number]; please see separate report for further details): Additional Information       All reported additional testing was performed with appropriately reactive controls. These tests were developed and their performance characteristics determined by [facility] or appropriate performing facility, though some tests may be performed on tissues which have not been validated for performance characteristics (such as staining performed on alcohol exposed cell blocks and decalcified tissues). Results should be interpreted with caution and in the context of the patients’ clinical condition. These tests may not be cleared or approved by the U.S. Food and Drug Administration, though the FDA has determined that such clearance or approval is not necessary. These tests are used for clinical purposes and they should not be regarded as investigational or for research. This laboratory has been approved by CLIA 88, designated as a high-complexity laboratory and is qualified to perform these tests. .Interpretation performed at [facility] [address] [place] [address]        Gross Description       A. The specimen is received in formalin, labeled with the patient's name and hospital number, and is designated " right iliac crest core".   The specimen consists of a tan to brown osseous tissue core measuring 2.9 cm in length and 0.3 cm in diameter. Entirely submitted. One cassette following decalcification in Immunocal. In an embedding bag.  B. The specimen is received in formalin, labeled with the patient's name and hospital number, and is designated " right iliac crest clot". The specimen consists of a brown to red soft tissue fragment measuring 2.8 x 1.7 x 0.2 cm. Entirely submitted. One cassette. In an embedding bag.  C. The specimen consists of microscopic aspirate slides of the right iliac crest, labeled with the patient's name and hospital number. Slides are submitted for staining and interpretation. Note: The estimated total formalin fixation time based upon information provided by the submitting clinician and the standard processing schedule is under 72 hours.     SSarginson      Clinical Information       suspect Burkitt's lymphoma; recent right thyroid core biopsy   Smear Review(Phlebs Do Not Order)   Result Value Ref Range    RBC Morphology Normal     Platelet Estimate Adequate Adequate   Manual Differential(PHLEBS Do Not Order)   Result Value Ref Range    Segmented % 90 (H) 43 - 75 %    Bands % 3 0 - 8 %    Lymphocytes % 6 (L) 14 - 44 %    Monocytes % 0 (L) 4 - 12 %    Eosinophils, % 0 0 - 6 %    Basophils % 0 0 - 1 %    Atypical Lymphocytes % 1 (H) <=0 %    Absolute Neutrophils 17.28 (H) 1.85 - 7.62 Thousand/uL    Lymphocytes Absolute 1.30 0.60 - 4.47 Thousand/uL    Monocytes Absolute 0.00 0.00 - 1.22 Thousand/uL    Eosinophils Absolute 0.00 0.00 - 0.40 Thousand/uL    Basophils Absolute 0.00 0.00 - 0.10 Thousand/uL    Total Counted      RBC Morphology Present     Platelet Estimate Adequate Adequate    Anisocytosis Present     Poikilocytes Present     Polychromasia Present      Labs, Imaging, & Other studies:   All pertinent labs and imaging studies were personally reviewed    Lab Results   Component Value Date    K 4.0 08/06/2023     08/06/2023    CO2 25 08/06/2023    BUN 12 08/06/2023    CREATININE 0.49 (L) 08/06/2023    CALCIUM 6.9 (L) 08/06/2023    CORRECTEDCA 8.3 08/06/2023    AST 6 08/06/2023    ALT 13 08/06/2023    ALKPHOS 51 08/06/2023    EGFR 138 08/06/2023     Lab Results   Component Value Date    WBC 18.58 (H) 08/06/2023    HGB 9.2 (L) 08/06/2023    HCT 28.5 (L) 08/06/2023    MCV 88 08/06/2023     08/06/2023       Reviewed test results and discussed with patient. Assessment and plan: See diagnoses, orders and instructions below. Diffuse large B-cell lymphoma status post R-CHOP. Responding to therapy. Leukocytosis probably secondary to steroid. Expected WBC counts to drop and she will be needing G-CSF. Counts are being monitored in the meantime. .    Migraine headaches. Anxiety. .  Patient will continue to follow with  primary physician and other consultants postdischarge. Patient  voiced understanding and agrees      Disclaimer: This document was prepared using a dictation device. If a word or phrase is confusing, or does not make sense, this is likely due to recognition error which was not discovered during the providers review. If you believe an error has occurred, please Contact me through [employer] service for kayce? cation. Counseling / Coordination of Care   .   Provided counseling and support

## 2023-08-06 NOTE — ASSESSMENT & PLAN NOTE
· Noted this hospitalization, started on Norvasc 2.5 mg p.o. daily with overall improvement.   · Monitor, adjust as needed   · 136/85 this AM

## 2023-08-07 VITALS
WEIGHT: 274.03 LBS | BODY MASS INDEX: 48.55 KG/M2 | TEMPERATURE: 98.1 F | DIASTOLIC BLOOD PRESSURE: 79 MMHG | HEART RATE: 94 BPM | RESPIRATION RATE: 16 BRPM | HEIGHT: 63 IN | OXYGEN SATURATION: 95 % | SYSTOLIC BLOOD PRESSURE: 131 MMHG

## 2023-08-07 PROBLEM — F41.9 ANXIETY: Status: ACTIVE | Noted: 2023-08-07

## 2023-08-07 LAB
ANION GAP SERPL CALCULATED.3IONS-SCNC: 4 MMOL/L
BASOPHILS # BLD MANUAL: 0 THOUSAND/UL (ref 0–0.1)
BASOPHILS NFR MAR MANUAL: 0 % (ref 0–1)
BUN SERPL-MCNC: 13 MG/DL (ref 5–25)
CALCIUM SERPL-MCNC: 8.2 MG/DL (ref 8.3–10.1)
CHLORIDE SERPL-SCNC: 107 MMOL/L (ref 96–108)
CO2 SERPL-SCNC: 28 MMOL/L (ref 21–32)
CREAT SERPL-MCNC: 0.66 MG/DL (ref 0.6–1.3)
EOSINOPHIL # BLD MANUAL: 0 THOUSAND/UL (ref 0–0.4)
EOSINOPHIL NFR BLD MANUAL: 0 % (ref 0–6)
ERYTHROCYTE [DISTWIDTH] IN BLOOD BY AUTOMATED COUNT: 12.7 % (ref 11.6–15.1)
GFR SERPL CREATININE-BSD FRML MDRD: 125 ML/MIN/1.73SQ M
GLUCOSE SERPL-MCNC: 83 MG/DL (ref 65–140)
HCT VFR BLD AUTO: 34.6 % (ref 34.8–46.1)
HGB BLD-MCNC: 10.7 G/DL (ref 11.5–15.4)
LYMPHOCYTES # BLD AUTO: 3.16 THOUSAND/UL (ref 0.6–4.47)
LYMPHOCYTES # BLD AUTO: 8 % (ref 14–44)
MCH RBC QN AUTO: 27.4 PG (ref 26.8–34.3)
MCHC RBC AUTO-ENTMCNC: 30.9 G/DL (ref 31.4–37.4)
MCV RBC AUTO: 89 FL (ref 82–98)
MONOCYTES # BLD AUTO: 0 THOUSAND/UL (ref 0–1.22)
MONOCYTES NFR BLD: 0 % (ref 4–12)
NEUTROPHILS # BLD MANUAL: 23.17 THOUSAND/UL (ref 1.85–7.62)
NEUTS BAND NFR BLD MANUAL: 9 % (ref 0–8)
NEUTS SEG NFR BLD AUTO: 79 % (ref 43–75)
PLATELET # BLD AUTO: 275 THOUSANDS/UL (ref 149–390)
PLATELET BLD QL SMEAR: ADEQUATE
PMV BLD AUTO: 9.2 FL (ref 8.9–12.7)
POLYCHROMASIA BLD QL SMEAR: PRESENT
POTASSIUM SERPL-SCNC: 3.7 MMOL/L (ref 3.5–5.3)
RBC # BLD AUTO: 3.9 MILLION/UL (ref 3.81–5.12)
RBC MORPH BLD: PRESENT
SODIUM SERPL-SCNC: 139 MMOL/L (ref 135–147)
URATE SERPL-MCNC: 4.7 MG/DL (ref 2–7.5)
VARIANT LYMPHS # BLD AUTO: 4 %
WBC # BLD AUTO: 26.33 THOUSAND/UL (ref 4.31–10.16)

## 2023-08-07 PROCEDURE — 99253 IP/OBS CNSLTJ NEW/EST LOW 45: CPT | Performed by: PSYCHIATRY & NEUROLOGY

## 2023-08-07 PROCEDURE — 85027 COMPLETE CBC AUTOMATED: CPT | Performed by: PHYSICIAN ASSISTANT

## 2023-08-07 PROCEDURE — 99239 HOSP IP/OBS DSCHRG MGMT >30: CPT | Performed by: NURSE PRACTITIONER

## 2023-08-07 PROCEDURE — 80048 BASIC METABOLIC PNL TOTAL CA: CPT | Performed by: PHYSICIAN ASSISTANT

## 2023-08-07 PROCEDURE — 84550 ASSAY OF BLOOD/URIC ACID: CPT | Performed by: INTERNAL MEDICINE

## 2023-08-07 PROCEDURE — 85007 BL SMEAR W/DIFF WBC COUNT: CPT | Performed by: PHYSICIAN ASSISTANT

## 2023-08-07 PROCEDURE — 99232 SBSQ HOSP IP/OBS MODERATE 35: CPT | Performed by: INTERNAL MEDICINE

## 2023-08-07 RX ORDER — AMOXICILLIN 250 MG
1 CAPSULE ORAL
Refills: 0
Start: 2023-08-07

## 2023-08-07 RX ORDER — PANTOPRAZOLE SODIUM 40 MG/1
40 TABLET, DELAYED RELEASE ORAL
Qty: 60 TABLET | Refills: 0 | Status: SHIPPED | OUTPATIENT
Start: 2023-08-07

## 2023-08-07 RX ORDER — AMLODIPINE BESYLATE 5 MG/1
5 TABLET ORAL DAILY
Qty: 30 TABLET | Refills: 0 | Status: SHIPPED | OUTPATIENT
Start: 2023-08-08

## 2023-08-07 RX ORDER — POLYETHYLENE GLYCOL 3350 17 G/17G
17 POWDER, FOR SOLUTION ORAL DAILY
Refills: 0
Start: 2023-08-08

## 2023-08-07 RX ADMIN — SODIUM CHLORIDE, SODIUM GLUCONATE, SODIUM ACETATE, POTASSIUM CHLORIDE, MAGNESIUM CHLORIDE, SODIUM PHOSPHATE, DIBASIC, AND POTASSIUM PHOSPHATE 125 ML/HR: .53; .5; .37; .037; .03; .012; .00082 INJECTION, SOLUTION INTRAVENOUS at 00:59

## 2023-08-07 RX ADMIN — LEVOTHYROXINE SODIUM 150 MCG: 75 TABLET ORAL at 06:02

## 2023-08-07 RX ADMIN — ONDANSETRON 4 MG: 2 INJECTION INTRAMUSCULAR; INTRAVENOUS at 06:02

## 2023-08-07 RX ADMIN — POLYETHYLENE GLYCOL 3350 17 G: 17 POWDER, FOR SOLUTION ORAL at 10:34

## 2023-08-07 RX ADMIN — KETOROLAC TROMETHAMINE 30 MG: 30 INJECTION, SOLUTION INTRAMUSCULAR; INTRAVENOUS at 06:02

## 2023-08-07 RX ADMIN — ALLOPURINOL 100 MG: 100 TABLET ORAL at 10:35

## 2023-08-07 RX ADMIN — DICYCLOMINE HYDROCHLORIDE 20 MG: 20 TABLET ORAL at 10:35

## 2023-08-07 RX ADMIN — AMLODIPINE BESYLATE 5 MG: 5 TABLET ORAL at 10:35

## 2023-08-07 RX ADMIN — HEPARIN SODIUM 5000 UNITS: 5000 INJECTION INTRAVENOUS; SUBCUTANEOUS at 06:02

## 2023-08-07 RX ADMIN — ONDANSETRON 4 MG: 2 INJECTION INTRAMUSCULAR; INTRAVENOUS at 13:35

## 2023-08-07 RX ADMIN — PANTOPRAZOLE SODIUM 40 MG: 40 TABLET, DELAYED RELEASE ORAL at 06:02

## 2023-08-07 RX ADMIN — PANTOPRAZOLE SODIUM 40 MG: 40 TABLET, DELAYED RELEASE ORAL at 16:20

## 2023-08-07 RX ADMIN — HEPARIN SODIUM 5000 UNITS: 5000 INJECTION INTRAVENOUS; SUBCUTANEOUS at 13:35

## 2023-08-07 RX ADMIN — FILGRASTIM-AAFI 480 MCG: 480 INJECTION, SOLUTION SUBCUTANEOUS at 16:08

## 2023-08-07 NOTE — DISCHARGE SUMMARY
[address] [facility]  Discharge- Ellery Scheuermann 2000, 26 y.o. female MRN: [id number]  Unit/Bed#: [facility] 913-01 Encounter: [id number]  Primary Care Provider: Braden Washington DO   Date and time admitted to hospital: 7/22/2023 12:26 PM    * Headache  Assessment & Plan  · Patient reports intermittent and worsening headache since the chemo  · Headache so severe Friday night a rapid response had to be called. · Had ongoing severe HA, room spinning sensation, nausea most of the day 8/5. Was agitated and screaming at times, difficult to re-direct. · Was seen by neurology, thought that patient was experiencing side effects from high dose steroids and rebound headaches from dilaudid. Dilaudid discontinued, continue to avoid narcotics. Steroid dose decreased from prednisone 100mg daily to 20mg daily, reviewed with heme/onc. · Patient much better this AM. Headache 2-4/10. Boyfriend states she was able to eat dinner last night and she slept well overnight. · MRI brain completed 8/6 no evidence of brain mets normal MRI     Anxiety  Assessment & Plan  · Patient having increased anxiety with her current treatment and diagnosis  · Seen by psych and feeling better. · Recommend no meds a discharge and stable for discharge from their perspective. Elevated blood pressure reading  Assessment & Plan  · Noted this hospitalization, started on Norvasc 2.5 mg p.o. daily with overall improvement. · BP with elevation 140's/100's norvasc increased to 5 mg would continue on discharge     Hypothyroidism  Assessment & Plan  · TSH within normal limits  · Continue Levothyroxine    Morbid obesity (HCC)  Assessment & Plan  · Body mass index is 48.54 kg/m².    · Diet and lifestyle modifications    Subacute thyroiditis  Assessment & Plan  · Working diagnosis before found to have lymphoma with subacute thyroiditis   · Continue levothyroxine 125 mcg daily    Lymphoma of thyroid gland ([address])  Assessment & Plan  · History of asthma recently found to have thyroid enlargement thought to have subacute thyroiditis and started on prednisone/levothyroxine eventually biopsy found to have thyroid lymphoma. · Echo done in preparation for chemotherapy, unremarkable. · Seen by endocrinology  · Continuing levothyroxine. Now on prednisone per Heme/Onc as below. Was initially ordered prednisone 100mg daily for 5 days starting 8/3. Patient has been screaming out, agitated, complaining of severe headaches, nausea and room spinning. Per neurology, may be having side effects from high dose steroids. Discussed with heme-onc. Prednisone dose decreased to 20 mg daily for remaining 2 days  · Oncology following, recommended bone marrow biopsy was done in IR 7/26 which does not show any involvement by B-cell lymphoma. Flow cytometry negative. Did show reduced iron stores. · Per pathology, Burkitt lymphoma ruled out, at this time best classified as diffuse large B cell lymphoma. · Molecular testing remains pending.    · PICC line was placed can be discontinued per oncology  · Patient was started on R-CHOP on 8/2/23 inpatient   · Heme/Onc started filgrastim   · Can discharge from their perspective     Mild intermittent asthma without complication  Assessment & Plan  · No exacerbation   · Continue Albuterol PRN  · Continue montelukast        Medical Problems     Resolved Problems  Date Reviewed: 8/6/2023          Resolved    Dysphagia 8/1/2023     Resolved by  TATI Ledezma        Discharging Physician / Practitioner: TATI Ryan  PCP: Radha Meyer DO  Admission Date:   Admission Orders (From admission, onward)     Ordered        07/22/23 [address]  Once                      Discharge Date: 08/07/23    Consultations During Hospital Stay:  · ENT  · Medical oncology  · Endocrinology  · Ophthalmology  · Neurology  · Behavioral health    Procedures Performed:   · 8/2: PICC placement     Significant Findings / Test Results:   · b-cell lymphoma/thyroid status post R-CHOP  · Leukocytosis probably secondary to steroids  · Migraine headache  · anxiety    Incidental Findings:   · none     Test Results Pending at Discharge (will require follow up): · Biopsy/tissue results      Outpatient Tests Requested:  · Per oncology    Complications:  Migraine/cancer tx airway management    Reason for Admission: airway management in patient with lymphoma thyroid status    Hospital Course:   Jitendra Moffett is a 26 y.o. female patient who originally presented to the hospital on 7/22/2023 due to pmh asthma, obesity, bilateral thyroid enlargement, with recent dx of thyroid lymphoma by the tissue biopsy was sent to ED by her endocrinologist office due to concern for airway compromise. Seen by ENT with no airway concerns signed off. Endocrinology evaluated recommended steroids and signed off. Seen by medical oncology who started patient on R-CHOP with adequate response. Patient received 2 doses of granix and was cleared for discharge. See chart for discharge recs. Patient further had rapid response due intractable migraine seen by neurology gave cocktail with relieve of symptoms. Patient had MRI to rule out mets that was different. Please see above list of diagnoses and related plan for additional information. Condition at Discharge: good    Discharge Day Visit / Exam:   Subjective:  Patient pleasant denies pain or headache. Vitals: Blood Pressure: 131/79 (08/07/23 1516)  Pulse: 94 (08/07/23 0701)  Temperature: 98.1 °F (36.7 °C) (08/07/23 1516)  Temp Source: Oral (08/07/23 1516)  Respirations: 16 (08/07/23 1516)  Height: 5' 3" (160 cm) (08/02/23 1603)  Weight - Scale: 124 kg (274 lb 0.5 oz) (08/02/23 1610)  SpO2: 95 % (08/07/23 0701)  Exam:   Physical Exam  Vitals and nursing note reviewed. Constitutional:       General: She is not in acute distress. Appearance: She is well-developed. She is obese.    HENT:      Head: Normocephalic and atraumatic. Eyes:      Conjunctiva/sclera: Conjunctivae normal.   Cardiovascular:      Rate and Rhythm: Normal rate and regular rhythm. Heart sounds: No murmur heard. Pulmonary:      Effort: Pulmonary effort is normal. No respiratory distress. Breath sounds: Normal breath sounds. Abdominal:      Palpations: Abdomen is soft. Tenderness: There is no abdominal tenderness. Musculoskeletal:         General: No swelling. Cervical back: Neck supple. Skin:     General: Skin is warm and dry. Capillary Refill: Capillary refill takes less than 2 seconds. Neurological:      Mental Status: She is alert and oriented to person, place, and time. Psychiatric:         Mood and Affect: Mood normal.          Discussion with Family: Updated  (significant other) at bedside. Discharge instructions/Information to patient and family:   See after visit summary for information provided to patient and family. Provisions for Follow-Up Care:  See after visit summary for information related to follow-up care and any pertinent home health orders. Disposition:   Home    Planned Readmission: none     Discharge Statement:  I spent 40 minutes discharging the patient. This time was spent on the day of discharge. I had direct contact with the patient on the day of discharge. Greater than 50% of the total time was spent examining patient, answering all patient questions, arranging and discussing plan of care with patient as well as directly providing post-discharge instructions. Additional time then spent on discharge activities. Discharge Medications:  See after visit summary for reconciled discharge medications provided to patient and/or family.       **Please Note: This note may have been constructed using a voice recognition system**

## 2023-08-07 NOTE — ASSESSMENT & PLAN NOTE
· Noted this hospitalization, started on Norvasc 2.5 mg p.o. daily with overall improvement.   · BP with elevation 140's/100's norvasc increased to 5 mg would continue on discharge

## 2023-08-07 NOTE — ASSESSMENT & PLAN NOTE
· Patient reports intermittent and worsening headache since the chemo  · Headache so severe Friday night a rapid response had to be called. · Had ongoing severe HA, room spinning sensation, nausea most of the day 8/5. Was agitated and screaming at times, difficult to re-direct. · Was seen by neurology, thought that patient was experiencing side effects from high dose steroids and rebound headaches from dilaudid. Dilaudid discontinued, continue to avoid narcotics. Steroid dose decreased from prednisone 100mg daily to 20mg daily, reviewed with heme/onc. · Patient much better this AM. Headache 2-4/10. Boyfriend states she was able to eat dinner last night and she slept well overnight.    · MRI brain completed 8/6 no evidence of brain mets normal MRI

## 2023-08-07 NOTE — ASSESSMENT & PLAN NOTE
· History of asthma recently found to have thyroid enlargement thought to have subacute thyroiditis and started on prednisone/levothyroxine eventually biopsy found to have thyroid lymphoma. · Echo done in preparation for chemotherapy, unremarkable. · Seen by endocrinology  · Continuing levothyroxine. Now on prednisone per Heme/Onc as below. Was initially ordered prednisone 100mg daily for 5 days starting 8/3. Patient has been screaming out, agitated, complaining of severe headaches, nausea and room spinning. Per neurology, may be having side effects from high dose steroids. Discussed with heme-onc. Prednisone dose decreased to 20 mg daily for remaining 2 days  · Oncology following, recommended bone marrow biopsy was done in IR 7/26 which does not show any involvement by B-cell lymphoma. Flow cytometry negative. Did show reduced iron stores. · Per pathology, Burkitt lymphoma ruled out, at this time best classified as diffuse large B cell lymphoma. · Molecular testing remains pending.    · PICC line was placed can be discontinued per oncology  · Patient was started on R-CHOP on 8/2/23 inpatient   · Heme/Onc started filgrastim   · Can discharge from their perspective

## 2023-08-07 NOTE — PLAN OF CARE
Problem: SAFETY ADULT  Goal: Patient will remain free of falls  Description: INTERVENTIONS:  - Educate patient/family on patient safety including physical limitations  - Instruct patient to call for assistance with activity   - Keep Call bell within reach  - Keep bed low and locked with side rails adjusted as appropriate  - Keep care items and personal belongings within reach  - Initiate and maintain comfort rounds  Outcome: Progressing

## 2023-08-07 NOTE — ASSESSMENT & PLAN NOTE
· Patient having increased anxiety with her current treatment and diagnosis  · Seen by psych and feeling better. · Recommend no meds a discharge and stable for discharge from their perspective.

## 2023-08-07 NOTE — CONSULTS
Consultation - [address] [place] [address] 107 26 y.o. female MRN: [id number]  Unit/Bed#: [facility] 913-01 Encounter: [id number]      Chief Complaint: I am feeling better    History of Present Illness   Physician Requesting Consult: Kyler Yu MD  Reason for Consult / Principal Problem: Geoffrey Salazar is a 26 y.o. 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 She has been evaluated for anxiety. According to her and her boyfriend who is in the room with her she was having severe headache and this creates increased anxiety. She does not recall most of the event of the few days ago. Today she  stated that she is feeling better, she is looking forward to be released. She feels her anxiety was more secondary to her headache and her headache improved and her anxiety to. She states that her mood is stable, has being  sleeping well, her appetite has been fair but she is looking forward to eat food outside. She states the only psychiatric treatment that she had was therapy when she was in high school, denies any other encounter with mental health. She denies any suicidal homicidal thoughts plan or intent,she is not psychotic. Psychiatric Review Of Systems:  sleep: no  appetite changes: no  weight changes: no  energy/anergy: no  interest/pleasure/anhedonia: no  somatic symptoms: no  anxiety/panic: no  farideh: no  guilty/hopeless: no  self injurious behavior/risky behavior: no    Historical Information   Past Psychiatric History:   None  Currently in treatment with none.   Past Suicide attempts: None  Past Violent behavior: None  Past Psychiatric medication trial: None    Substance Abuse History:   No drugs or alcohol history     I have assessed this patient for substance use within the past 12 months History of IP/OP rehabilitation program: none  Smoking history: Never smoked  Family Psychiatric History:   No family history of mental illness or substance abuse    Social History  Education: high school diploma/GED  Learning Disabilities: None  Marital history: single  Living arrangement, social support: She lives with her parents and sibling. Occupational History: Employed in retail  Functioning Relationships: good support system.   Other Pertinent History: None    Traumatic History:   Abuse: None  Other Traumatic Events: None    Past Medical History:   Diagnosis Date   • Asthma    • Disease of thyroid gland    • Ear infection        Medical Review Of Systems:  Review of Systems -all systems are reviewed and are negative    Meds/Allergies   current meds:   Current Facility-Administered Medications   Medication Dose Route Frequency   • acetaminophen (TYLENOL) tablet 975 mg  975 mg Oral Q6H PRN   • albuterol (PROVENTIL HFA,VENTOLIN HFA) inhaler 2 puff  2 puff Inhalation Q6H PRN   • allopurinol (ZYLOPRIM) tablet 100 mg  100 mg Oral Daily   • alteplase (CATHFLO) injection 2 mg  2 mg Intracatheter Q1MIN PRN   • aluminum-magnesium hydroxide-simethicone (MAALOX) oral suspension 30 mL  30 mL Oral Q6H PRN   • amLODIPine (NORVASC) tablet 5 mg  5 mg Oral Daily   • dicyclomine (BENTYL) tablet 20 mg  20 mg Oral BID   • Filgrastim-aafi (NIVESTYM) subcutaneous injection 480 mcg  480 mcg Subcutaneous Daily   • heparin (porcine) subcutaneous injection 5,000 Units  5,000 Units Subcutaneous Q8H [facility]   • ketorolac (TORADOL) injection 30 mg  30 mg Intravenous Q8H   • levothyroxine tablet 150 mcg  150 mcg Oral Early Morning   • loratadine (CLARITIN) tablet 10 mg  10 mg Oral HS   • LORazepam (ATIVAN) tablet 1 mg  1 mg Oral Q6H PRN   • montelukast (SINGULAIR) tablet 10 mg  10 mg Oral HS   • multi-electrolyte (PLASMALYTE-A/ISOLYTE-S PH 7.4) IV solution  125 mL/hr Intravenous Continuous   • OLANZapine (ZyPREXA) IM injection 2.5 mg  2.5 mg Intramuscular Once   • ondansetron (ZOFRAN) injection 4 mg  4 mg Intravenous Q6H PRN   • ondansetron (ZOFRAN) injection 4 mg  4 mg Intravenous Q8H   • pantoprazole (PROTONIX) EC tablet 40 mg  40 mg Oral BID AC   • polyethylene glycol (MIRALAX) packet 17 g  17 g Oral Daily   • senna-docusate sodium (SENOKOT S) 8.6-50 mg per tablet 1 tablet  1 tablet Oral HS     Allergies   Allergen Reactions   • Pollen Extract    • Shrimp Extract Allergy Skin Test - Food Allergy Hives       Objective   Vital signs in last 24 hours:  Temp:  [97.3 °F (36.3 °C)-99.3 °F (37.4 °C)] 97.3 °F (36.3 °C)  HR:  [] 94  Resp:  [14-18] 15  BP: (122-145)/() 122/81      Intake/Output Summary (Last 24 hours) at 8/7/2023 1240  Last data filed at 8/7/2023 0900  Gross per 24 hour   Intake 3170 ml   Output --   Net 3170 ml       Mental Status Evaluation:  Appearance:  age appropriate   Behavior:  Cooperative   Speech:  normal pitch and normal volume   Mood:  euthymic   Affect:  mood-congruent   Language: naming objects and repeating phrases   Thought Process:  goal directed   Associations: intact associations   Thought Content:  normal   Perceptual Disturbances: None   Risk Potential: Suicidal Ideations none, Homicidal Ideations none and Potential for Aggression No   Sensorium:  person, place, time/date and situation   Memory:  recent and remote memory grossly intact   Cognition:  recent and remote memory grossly intact   Consciousness:  alert and awake    Attention: attention span and concentration were age appropriate   Intellect: within normal limits   Fund of Knowledge: awareness of current events: Fair, past history: Fair and vocabulary: Fair   Insight:  good   Judgment: good   Muscle Strength and Tone: Within normal limits   Gait/Station: Unable to see patient is in bed   Motor Activity: no abnormal movements     Lab Results:    I have personally reviewed all pertinent laboratory/tests results.   Labs in last 72 hours:   Recent Labs 08/05/23  0602 08/05/23  0602 08/06/23  0604 08/07/23  0613   WBC 25.42*  --  18.58* 26.33*   RBC 3.37*  --  3.24* 3.90   HGB 9.4*  --  9.2* 10.7*   HCT 30.0*  --  28.5* 34.6*     --  247 275   RDW 13.0  --  12.8 12.7   TOTANEUTABS  --    < > 17.28* 23.17*   NEUTROABS 22.81*  --   --   --    SODIUM 140  --  142 139   K 3.5  --  4.0 3.7     --  107 107   CO2 26  --  25 28   BUN 16  --  12 13   CREATININE 0.71  --  0.49* 0.66   GLUC 109  --  88 83   CALCIUM 8.2*  --  6.9* 8.2*   AST 7  --  6  --    ALT 18  --  13  --    ALKPHOS 61  --  51  --    TP 6.7  --  5.4*  --    ALB 2.9*  --  2.2*  --    TBILI 0.25  --  0.16*  --     < > = values in this interval not displayed. Code Status: )Level 1 - Full Code    Assessment/Plan     Assessment:  Carlie Holguin is a 26 y.o. 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 She has been evaluated for anxiety. She states that her anxiety was very high when she has severe headache but her headache improved with treatment and her anxiety also improved. She denies any other issues at this moment. She is looking forward to be released and continue treatment in outpatient. She does not have any suicidal missile thoughts plan or intent, she does not have any psychotic at this time. 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  At [phone]  At this time she does not need any medication for anxiety she feels better  Discussed with the primary team  No other intervention at this time  I will sign off  Risks, benefits and possible side effects of Medications:   No medication recommended by marita Mckenzie MD

## 2023-08-07 NOTE — PROGRESS NOTES
Hematology - Oncology Progress Note    Carlie Holguin, 2000, [id number]  [facility]      Reviewed test results and discussed with patient.     Assessment and plan: See diagnoses, orders and instructions below. Diffuse large B-cell lymphoma status post R-CHOP. Responding to therapy. Patient is stable and ready for discharge. She can receive her next dose of Granix today before discharge per primary team. She can continue her R-CHOP chemo regimen and follow up with oncology outpatient. Work papers will be filled out later this week. Leukocytosis probably secondary to steroid. Expected WBC counts to drop and she will be needing G-CSF. Counts are being monitored in the meantime. .      Migraine headaches. Neurology following. She will follow up with them outpatient. Anxiety.            .  Patient will continue to follow with  primary physician and other consultants postdischarge. Patient  voiced understanding and agrees      __________________________________________________________________________________________    Chief complaint: Diffuse large B cell lymphoma on R-chop    History of present illness: The plan was to discharge the patient last Friday 8/4/23, but the patient developed migraines which delayed the process. Neurology saw and treated the patient over the weekend. She denies any headaches today. Patient denies any new symptoms. She feels well and is ready to go home. Patient denies: chest pain, shortness of breath, abdominal pain.       Hospital medications list:  Current Facility-Administered Medications   Medication Dose Route Frequency Provider Last Rate   • acetaminophen  975 mg Oral Q6H PRN Myla Garcia PA-C     • albuterol  2 puff Inhalation Q6H PRN Gardenia Weaver MD     • allopurinol  100 mg Oral Daily Yonathan Wilkinson DO     • alteplase  2 mg Intracatheter Q1MIN PRN Savanna Lechuga MD     • aluminum-magnesium hydroxide-simethicone  30 mL Oral Q6H PRN Gardenia Weaver MD     • amLODIPine  5 mg Oral Daily Ruba Villalba PA-C     • dicyclomine  20 mg Oral BID Klaus Villareal MD     • Filgrastim-aafi  480 mcg Subcutaneous Daily Yonathan Wilkinson DO     • heparin (porcine)  5,000 Units Subcutaneous [facility] Yonathan Wilkinson DO     • ketorolac  30 mg Intravenous Q8H Lukas Wallace DO     • levothyroxine  150 mcg Oral Early Morning Klaus Villareal MD     • loratadine  10 mg Oral HS Yonathan Wilkinson DO     • LORazepam  1 mg Oral Q6H PRN Ruba Villalba PA-C     • montelukast  10 mg Oral HS Klaus Villareal MD     • multi-electrolyte  125 mL/hr Intravenous Continuous Yonathan Wilkinson DO Stopped (08/07/23 0900)   • OLANZapine  2.5 mg Intramuscular Once [facility] O[facility] Colon     • ondansetron  4 mg Intravenous Q6H PRN Klaus Villareal MD     • ondansetron  4 mg Intravenous Q8H Ruba Villalba PA-C     • pantoprazole  40 mg Oral BID AC Ruba Villalba PA-C     • polyethylene glycol  17 g Oral Daily Myla Terrazas PA-C     • senna-docusate sodium  1 tablet Oral HS [facility]              Physical exam  /81   Pulse 94   Temp (!) 97.3 °F (36.3 °C)   Resp 15   Ht 5' 3" (1.6 m)   Wt 124 kg (274 lb 0.5 oz)   LMP 06/29/2023 (Approximate)   SpO2 95%   BMI 48.54 kg/m²   Constitutional: Well formed, well-nourished, obese,  in no apparent distress  Eyes: PERRL, conjunctiva , anicteric    HENT: Atraumatic, external ears normal, nose normal,    Neck: Good range of motion  Respiratory: No signs of respiratory distress  Cardiovascular: Normal rate, normal rhythm, no murmurs, no gallops, no rubs    GI: Soft, nondistended, normal bowel sounds, nontender, no organomegaly, no mass, no rebound, no guarding    Musculoskeletal: No pain or tenderness with palpation of joints, muscles or bones  Extremities: Normal range of motion  Neurologic: Alert & oriented x 3, CN 2-12 normal, normal motor function, normal sensory function, no focal deficits noted  Psychiatric: Normal mood and behavior    Laboratory test results

## 2023-08-08 ENCOUNTER — TELEPHONE (OUTPATIENT)
Dept: HEMATOLOGY ONCOLOGY | Facility: CLINIC | Age: 23
End: 2023-08-08

## 2023-08-08 ENCOUNTER — TRANSITIONAL CARE MANAGEMENT (OUTPATIENT)
Dept: FAMILY MEDICINE CLINIC | Facility: CLINIC | Age: 23
End: 2023-08-08

## 2023-08-08 ENCOUNTER — TELEPHONE (OUTPATIENT)
Dept: SURGERY | Facility: CLINIC | Age: 23
End: 2023-08-08

## 2023-08-08 LAB
BACTERIA BLD CULT: NORMAL
BACTERIA BLD CULT: NORMAL

## 2023-08-08 NOTE — TELEPHONE ENCOUNTER
Patient's mother called inquiring if she is to be taking the 50 mg Prednisone she was sent home from the hospital with or the 20 mg she has been prescribed from Dr. Raoul Trujillo. Per hospital discharge note "was seen by neurology, thought that patient was experiencing side effects from high dose steroids and rebound headaches from dilaudid. Dilaudid discontinued, continue to avoid narcotics. Steroid dose decreased from prednisone 100mg daily to 20mg daily, reviewed with heme/onc" I just want to confirm across the board that she is to be on 20 mg daily of the Prednisone before instructing her mother. Sending to hem onc as they are the ones it stated reviewed the decrease with the hospital but she has not established with them outpatient wise but is scheduled to be seen and endocrinology Rashel Butt as Dr. Raoul Trujillo is out of office) as we prescribe it.

## 2023-08-08 NOTE — UTILIZATION REVIEW
NOTIFICATION OF ADMISSION DISCHARGE   This is a Notification of Discharge from [facility]. Please be advised that this patient has been discharge from our facility. Below you will find the admission and discharge date and time including the patient’s disposition. UTILIZATION REVIEW CONTACT:  Jose A Guillen  Utilization   Network Utilization Review Department  Phone: [phone] x carefully listen to the prompts. All voicemails are confidential.  Email: [email or web address]     ADMISSION INFORMATION  PRESENTATION DATE: 7/22/2023 12:26 PM  OBERVATION ADMISSION DATE:  INPATIENT ADMISSION DATE: 7/22/23  5:56 PM   DISCHARGE DATE: 8/7/2023  4:44 PM   DISPOSITION:Home/Self Care    IMPORTANT INFORMATION:  Send all requests for admission clinical reviews, approved or denied determinations and any other requests to dedicated fax number below belonging to the campus where the patient is receiving treatment.  List of dedicated fax numbers:  [place] DENIALS (Administrative/Medical Necessity) [phone] [address] [facility] (Maternity/NICU/Pediatrics) [phone]   [facility] [phone]   [facility] [phone] [address] [facility] [phone]   [address] [place] [address] [phone]   [facility] [phone]   [address] [place] [address] [address] [facility] [phone]   [address] [place] [address] [phone]   [address] [place] [address] [phone] [address] [facility] [address] [facility] [phone]

## 2023-08-08 NOTE — TELEPHONE ENCOUNTER
I phoned the patient and left a VM message indicating that I was calling from [facility] regarding reviewing the details of her upcoming appointment and also to review some of the questions on her [facility] paperwork. I provided my direct phone number as a contact and asked that she return my call.

## 2023-08-08 NOTE — TELEPHONE ENCOUNTER
Soft Intake Form   Patient Details   Laurance Sandifer     2000     Reason For Appointment   Who is Calling? Colin Bill   If not patient, Name? NA   DID YOU CONFIRM INSURANCE WITH PATIENT? E Verified, Routed to finance   Who is the Referring Doctor? Dr. Megha Urena   What is the diagnosis? Diffuse Large B Cell Lymphoma   Has this diagnosis been confirmed by a biopsy/surgery? If yes, what is the date it was done? Yes, thyroid bx taken on 7/20, BMBX taken on 7/26   Biopsy done at [facility]? If not, where was it done? Yes   Was imaging done, and was it done at [facility]? If not, where was it done? Yes-[facility]   Have you been seen by another Oncologist?  If so, who and where (name of facility, city and state) No   For 2nd Opinions Only: Are you currently undergoing treatment, or are you scheduled to start treatment? If yes, name of facility, city and state  Yes-inpatient at [facility]   For "History Of" only: Have you completed treatment? No   Have you had Genetic Testing done in the past?  If so, advise to bring test results to their visit No   Record Gathering Information   Did you advise to have records faxed to [phone]? NA   Did you advise to have disks sent to the proper address with imaging? ("History of" Patients)  5 years of imaging for breast patients-Mammos, US etc NA   Scheduling Information   Did you send new patient paperwork? Email or mail? NA   What is the best call back number? (If the [facility] is calling, please use their number) NA   Miscellaneous Information      The patient is scheduled for a HFU appointment with Dr. Lo Nieves in the [place] office on 8/15 at 1520.

## 2023-08-08 NOTE — TELEPHONE ENCOUNTER
Based on chart review- including progress note from Dr. Kaylen Ospina and hospital discharge note- I believe 20 mg is appropriate until she follows up in the office. Thank you.

## 2023-08-09 ENCOUNTER — TELEPHONE (OUTPATIENT)
Dept: HEMATOLOGY ONCOLOGY | Facility: CLINIC | Age: 23
End: 2023-08-09

## 2023-08-09 NOTE — TELEPHONE ENCOUNTER
I phoned the patient's mother, Jeanna Jean, and introduced myself and explained that I was calling from [facility] regarding Coral's FMLA paperwork and to review the details of her upcoming appointment. Jeanna Jean was able to provide information to allow me to complete the FMLA paperwork, including the type of leave (continuous) and Coral's job duties. I reviewed with Jeanna Jean that I would complete the FMLA paperwork today, have Dr. Malcolm Rondon sign it, and fax the completed and signed copy to the fax number on the cover sheet. Additionally, I indicated that I could email the completed copy to [place]. Jeanna Jean indicated that I could email it to her email ([email or web address] We then reviewed the follow up appointment details: Dr. Dennis Serna, [facility] office, 8/15 at [phone]. Jeanna Jean indicated that this office is right near their home and that they would be able to make the appointment. I also reviewed that the appointment details, including the office phone number are on Coral's [facility]. Jeanna Jean expressed understanding and was appreciative of the call.

## 2023-08-11 LAB — MISCELLANEOUS LAB TEST RESULT: NORMAL

## 2023-08-12 DIAGNOSIS — C83.31 DIFFUSE LARGE B-CELL LYMPHOMA OF LYMPH NODES OF NECK (HCC): Primary | ICD-10-CM

## 2023-08-13 DIAGNOSIS — E06.1 SUBACUTE THYROIDITIS: ICD-10-CM

## 2023-08-14 ENCOUNTER — TELEPHONE (OUTPATIENT)
Dept: HEMATOLOGY ONCOLOGY | Facility: CLINIC | Age: 23
End: 2023-08-14

## 2023-08-14 NOTE — TELEPHONE ENCOUNTER
I called [facility] regarding an appointment that they have scheduled with Dr. Melissa Mckenzie scheduled on 08/15/23     I left a voicemail explaining to patient that this appointment will need to be rescheduled due to a change in the providers schedule. Patient was advised to call [facility] to reschedule. A [employer] message (if applicable) has been sent to patient relaying the above information and advising patient to call [facility] and reschedule their appointment.

## 2023-08-14 NOTE — TELEPHONE ENCOUNTER
Appointment Change  Cancel, Reschedule, Change to Virtual      Who are you speaking with? Parent   If it is not the patient, are they listed on an active communication consent form? Yes   Which provider is the appointment scheduled with? Dr. Slim Elaine   When is the appointment scheduled? Please list date and time 8/15/23 @ 3:20pm   At which location is the appointment scheduled to take place? [facility]   Was the appointment rescheduled or changed from an in person visit to a virtual visit? If so, please list the details of the change. Yes 8/21/23 @ 8:40am   What is the reason for the appointment change? Dr. Slim Elaine is unavailable   Was [facility] transport scheduled for this visit? no   Does [facility] transport need to be scheduled for the new visit (if applicable) no   Does the patient need an infusion appointment rescheduled? no   Does the patient have an infusion appointment scheduled? If so, when? no   Is the patient undergoing chemotherapy? no   Was the no-show policy reviewed for appointments being changed with less then 24 hours of notice?  yes

## 2023-08-15 ENCOUNTER — TELEMEDICINE (OUTPATIENT)
Dept: ENDOCRINOLOGY | Facility: CLINIC | Age: 23
End: 2023-08-15
Payer: COMMERCIAL

## 2023-08-15 DIAGNOSIS — C85.99 LYMPHOMA OF THYROID GLAND (HCC): ICD-10-CM

## 2023-08-15 DIAGNOSIS — E06.3 HYPOTHYROIDISM DUE TO HASHIMOTO'S THYROIDITIS: Primary | ICD-10-CM

## 2023-08-15 DIAGNOSIS — E03.8 HYPOTHYROIDISM DUE TO HASHIMOTO'S THYROIDITIS: Primary | ICD-10-CM

## 2023-08-15 PROCEDURE — 99214 OFFICE O/P EST MOD 30 MIN: CPT | Performed by: STUDENT IN AN ORGANIZED HEALTH CARE EDUCATION/TRAINING PROGRAM

## 2023-08-15 NOTE — PROGRESS NOTES
Virtual Regular Visit    Verification of patient location:    Patient is located at Home in the following state in which I hold an active license [place]      Assessment/Plan:    Problem List Items Addressed This Visit        Endocrine    Lymphoma of thyroid gland ([address])    Hypothyroidism - Primary    Relevant Orders    T4, free Lab Collect (Completed)    TSH, 3rd generation Lab Collect (Completed)    T3 Lab Collect (Completed)     - Christopher Perez was seen and evaluated for rapidly enlarged thyroid with compressive symptoms, non responding with steroid, thyroid core biopsy showed diffuse large B cell lymphoma, Patient was started on R-CHOP on 8/2/23, goiter/ thyromegaly has improved significantly and compressive symptoms has resolved,   She is following with Oncology. For hypothyroidism due to Hashimoto's thyroiditis, she is taking Levothyroxine 150 mcg once daily,   She is clinically euthyroid, continue levothyroxine at current dose,  Check Tsh and Free T4,  Follow up in 3 months,          Reason for visit is   Chief Complaint   Patient presents with   • Virtual Regular Visit        Encounter provider Anita Arteaga MD    Provider located at [address] [place] [address] [address] [address] [phone]      Recent Visits  No visits were found meeting these conditions. Showing recent visits within past 7 days and meeting all other requirements  Today's Visits  Date Type Provider Dept   08/15/23 Telemedicine Anita Arteaga MD [facility] [place]   Showing today's visits and meeting all other requirements  Future Appointments  No visits were found meeting these conditions. Showing future appointments within next 150 days and meeting all other requirements       The patient was identified by name and date of birth.  Flynn Lincoln was informed that this is a telemedicine visit and that the visit is being conducted through the [employer]. She agrees to proceed. .  My office door was closed. No one else was in the room. She acknowledged consent and understanding of privacy and security of the video platform. The patient has agreed to participate and understands they can discontinue the visit at any time. Patient is aware this is a billable service. Damaris Cunningham is a 26 y.o. female who is seen virtually for thyroid lymphoma . LUCIANA Rush is a 26 year who female who is seen virtually for thyroid lymphoma,  Crista Benton was seen and evaluated for diffuse bilateral thyroid enlargement right more than left with tenderness, with ultrasound evidence of subacute thyroiditis( Enlarged hypoechoic thyroid with heterogeneity and diminished Doppler flow), she received steroid for possible sub acute thyroiditis, however given unusual presentation and rapid growing goiter with compressive symptoms, thyroid core biopsy was ordered at her first visit which eventually reported thyroid lymphoma,  She was sent to ED for airway monitoring and to start chemotherapy by hem / oncology,  Final pathology showed diffuse large B cell lymphoma, Patient was started on R-CHOP on 8/2/23.          Past Medical History:   Diagnosis Date   • Asthma    • Disease of thyroid gland    • Ear infection        Past Surgical History:   Procedure Laterality Date   • IR BIOPSY BONE MARROW  7/26/2023   • IR BIOPSY NECK  7/20/2023       Current Outpatient Medications   Medication Sig Dispense Refill   • albuterol (Proventil HFA) 90 mcg/act inhaler Inhale 2 puffs every 6 (six) hours as needed for wheezing 6.7 g 5   • allopurinol (ZYLOPRIM) 300 mg tablet Take 1 tablet (300 mg total) by mouth daily 90 tablet 0   • amLODIPine (NORVASC) 5 mg tablet Take 1 tablet (5 mg total) by mouth daily Do not start before August 8, 2023. 30 tablet 0   • dicyclomine (BENTYL) 20 mg tablet Take 1 tablet (20 mg total) by mouth 2 (two) times a day 20 tablet 0   • diphenhydrAMINE (BENADRYL) 25 mg capsule Take 1 capsule (25 mg total) by mouth every 6 (six) hours as needed for itching 20 capsule 0   • fluticasone (FLONASE) 50 mcg/act nasal spray SPRAY 1 SPRAY INTO EACH NOSTRIL EVERY DAY 16 mL 2   • levothyroxine 150 mcg tablet Take 1 tablet (150 mcg total) by mouth daily 90 tablet 0   • montelukast (SINGULAIR) 10 mg tablet Take 10 mg by mouth daily at bedtime     • ondansetron (ZOFRAN) 4 mg tablet Take 1 tablet (4 mg total) by mouth every 6 (six) hours 12 tablet 0   • pantoprazole (PROTONIX) 40 mg tablet Take 1 tablet (40 mg total) by mouth 2 (two) times a day before meals 60 tablet 0   • polyethylene glycol (MIRALAX) 17 g packet Take 17 g by mouth daily Do not start before August 8, 2023.  0   • predniSONE 20 mg tablet TAKE 1 TABLET BY MOUTH EVERY DAY (Patient taking differently: Take 10 mg by mouth daily) 30 tablet 0   • senna-docusate sodium (SENOKOT S) 8.6-50 mg per tablet Take 1 tablet by mouth daily at bedtime  0     No current facility-administered medications for this visit. Allergies   Allergen Reactions   • Pollen Extract    • Shrimp Extract Allergy Skin Test - Food Allergy Hives       Review of Systems   Constitutional: Positive for appetite change, fatigue and unexpected weight change. HENT: Negative for trouble swallowing and voice change. Eyes: Negative for visual disturbance. Respiratory: Negative for cough, shortness of breath and wheezing. Cardiovascular: Negative for palpitations and leg swelling. Gastrointestinal: Negative for abdominal pain, constipation, diarrhea, nausea and vomiting. Endocrine: Negative for cold intolerance, heat intolerance, polyphagia and polyuria. Musculoskeletal: Negative for arthralgias. Skin: Negative for color change, rash and wound. Neurological: Negative for dizziness, tremors, weakness, light-headedness, numbness and headaches.    Psychiatric/Behavioral: Negative for agitation and sleep disturbance. The patient is not nervous/anxious. Video Exam    Vitals:       Physical Exam  Constitutional:       General: She is not in acute distress. Appearance: She is obese. She is not ill-appearing, toxic-appearing or diaphoretic. Pulmonary:      Effort: Pulmonary effort is normal. No respiratory distress. Neurological:      Mental Status: She is oriented to person, place, and time. Final Diagnosis   THYROID, BIOPSY: DIFFUSE LARGE B-CELL LYMPHOMA, GERMINAL CENTER SUBTYPE, NON-DOUBLE EXPRESSOR PHENOTYPE; ADDITIONAL ANCILLARY TESTING PENDING; SEE IMPRESSION AND COMMENT     IMPRESSION:  The preliminary findings are of thyroid biopsies involved by a diffuse large B-cell lymphoma with an elevated proliferation index (%). Cytogenetic FISH studies for MYC, BCL2, BCL6 and IRF4 rearrangements as well as 11q aberrations are reportedly negative. The BCL2 positivity by immunohistochemistry and the absence of MYC rearrangements rules out Burkitt lymphoma and high-grade B-cell lymphoma with MYC and BCL2 and / or BCL6 rearrangements. The absence of 11q aberrations rules out high-grade / large B-cell lymphoma with 11q aberrations and the absence of IRF4 rearrangements rules out large B-cell lymphoma with IRF4 rearrangements. The overall findings are most compatible with a diffuse large B-cell lymphoma (DLBCL), NOS, germinal center subtype, per the [facility] criteria, and non-double-expressor phenotype by immunohistochemistry. Correlation with systemic (PET/CT) imaging, SPEP/UPEP with serum immunoglobulin heavy chains (IgG, IgA, IgM, IgE, IgD) and serum and urine free light chains (kappa/lambda) to evaluate for systemic involvement, is recommended, if clinically indicated.  Additional studies (molecular and FISH) are pending and will be added to the final report when available.      Subtype: Germinal Center Subtype: (CD10+/BCL6+/MUM1+)  Phenotype: Non-Double-Expressor Phenotype (BCL2+/MYC-)  Ki-67 Proliferation index: % within neoplastic cells  Cytogenetics: MYC, BCL2, BCL6, IRF4 rearrangements and 11q aberrations are NOT DETECTED; ([employer] #[id number], [id number], [id number]; see separate reports).      Reviewed with agreement in intradepartmental consensus. Preliminary results communicated to Sonya Castanon, Jen Mireles and GALI Woodruff by Dr. Alex Mccarthy via [employer] on 07/21/2023, at [phone]. Updated results communicated to Dr. Shaq Rico by Dr. Alex Mccarthy via [employer] on 07/24/2023, at [phone]. Updated FISH results communicated to Sonya Bonds and LEIGH ANN Hoffman by Dr. Alex Mccarthy via [employer] on 08/01/2023, at [phone].      COMMENT:  H&E stained sections show thyroid tissue diffusely involved by an atypical lymphoid infiltrate. By cytomorphology, the infiltrate shows sheets of intermediate to large cells with variably increased amounts of cytoplasm, large, irregular nuclei with irregular nuclear contours, vesicular chromatin and variably prominent nucleoli. There are frequent mitotic figures and abundant apoptotic bodies. There are no areas of necrosis, granulomata or overt viral cytopathic changes.      By immunohistochemistry, CD20 and PAX5 show that the large cells are B-cells, which co-express CD10, BCL6, BCL2, MUM-1 and MYC (10-20%), and are negative for CD3, CD5, BCL1, CD43, CD23, CD30, p53, TdT, CMV, HSV and EBV encoded RNA in situ hybridization (MAINE JEMIMA). The Ki-67 proliferation index is overall high (%). CD3, CD5 and CD43 highlight background small T-cells. A pan-cytokeratin AE1/3 stain highlights background benign epithelium. All stains are performed with appropriate controls.       FLOW CYTOMETRY STUDIES:  Flow cytometry studies are performed and reportedly show involvement by a monotypic-kappa, CD10-positive B-cell population (48-49%) with variable side scatter, in a background of mixed T-cell subsets (32.28%); ([facility] #[id number]; see separate report).      Preliminary result electronically signed by Wisam Pierre MD on 8/1/2023 at  4:24 PM   Preliminary result electronically signed by Wisam Pierre MD on 7/24/2023 at  5:35 PM         Visit Time  Total Visit Duration: 20

## 2023-08-16 ENCOUNTER — OFFICE VISIT (OUTPATIENT)
Dept: FAMILY MEDICINE CLINIC | Facility: CLINIC | Age: 23
End: 2023-08-16
Payer: COMMERCIAL

## 2023-08-16 ENCOUNTER — APPOINTMENT (OUTPATIENT)
Dept: LAB | Facility: HOSPITAL | Age: 23
End: 2023-08-16
Attending: STUDENT IN AN ORGANIZED HEALTH CARE EDUCATION/TRAINING PROGRAM
Payer: COMMERCIAL

## 2023-08-16 VITALS
HEIGHT: 63 IN | DIASTOLIC BLOOD PRESSURE: 70 MMHG | HEART RATE: 98 BPM | SYSTOLIC BLOOD PRESSURE: 112 MMHG | WEIGHT: 270 LBS | OXYGEN SATURATION: 98 % | TEMPERATURE: 97.5 F | BODY MASS INDEX: 47.84 KG/M2

## 2023-08-16 DIAGNOSIS — C85.99 LYMPHOMA OF THYROID GLAND (HCC): Primary | ICD-10-CM

## 2023-08-16 DIAGNOSIS — E03.8 HYPOTHYROIDISM DUE TO HASHIMOTO'S THYROIDITIS: ICD-10-CM

## 2023-08-16 DIAGNOSIS — E06.3 HYPOTHYROIDISM DUE TO HASHIMOTO'S THYROIDITIS: ICD-10-CM

## 2023-08-16 LAB
T3 SERPL-MCNC: 1.2 NG/ML
T4 FREE SERPL-MCNC: 0.94 NG/DL (ref 0.61–1.12)
TSH SERPL DL<=0.05 MIU/L-ACNC: 2.83 UIU/ML (ref 0.45–4.5)

## 2023-08-16 PROCEDURE — 36415 COLL VENOUS BLD VENIPUNCTURE: CPT

## 2023-08-16 PROCEDURE — 84480 ASSAY TRIIODOTHYRONINE (T3): CPT

## 2023-08-16 PROCEDURE — 99495 TRANSJ CARE MGMT MOD F2F 14D: CPT | Performed by: FAMILY MEDICINE

## 2023-08-16 PROCEDURE — 84439 ASSAY OF FREE THYROXINE: CPT

## 2023-08-16 PROCEDURE — 84443 ASSAY THYROID STIM HORMONE: CPT

## 2023-08-16 NOTE — PROGRESS NOTES
Assessment & Plan     1. Lymphoma of thyroid gland [facility])    Patient with new diagnosis of lymphoma, following with heme/onc and endocrinology. Subjective     Transitional Care Management Review:   Cr Macias is a 26 y.o. female here for TCM follow up. During the TCM phone call patient stated:  TCM Call     Date and time call was made  8/8/2023  [address] [facility] reviewed  Records reviewed    Patient was hospitialized at  [address] [place] [address]    Date of Admission  07/22/23    Date of discharge  08/07/23    Diagnosis  Headache    Disposition  Home    Were the patients medications reviewed and updated  Yes    Current Symptoms  None      TCM Call     Post hospital issues  None    Should patient be enrolled in anticoag monitoring? No    Scheduled for follow up? Yes    Did you obtain your prescribed medications  Yes    Do you need help managing your prescriptions or medications  No    Is transportation to your appointment needed  No    I have advised the patient to call PCP with any new or worsening symptoms  Joy Israel    Living Arrangements  Family members    Support System  Family    The type of support provided  Emotional; Physical    Do you have social support  Yes, as much as I need    Are you recieving any outpatient services  Yes    What type of services  Chemotherapy    Are you recieving home care services  No    Are you using any community resources  No    Current waiver services  No    Have you fallen in the last 12 months  No    Interperter language line needed  No    Counseling  Patient    Counseling topics  patient and family education; instructions for management    Comments  I spoke with pt to follow up with her regarding her recent hospital discharge. Pt states that she is managing well at home and has no concerns. Appt scheduled for 8/16/23. HPI     Patient presents to the office for TCM. States that she has been coping with her diagnosis well.  Reports that she feels down at times but is able to continue doing the things she enjoys. Notes that she has been tired/fatigued with some achy joints. Notes taht her swallowing and breathing is much better now. Denies any pain or discomfort with breathing or swallowing liquids or solids. Has oncology appointment on Monday. Was started on chemo inpatinet but does not know the plan moving forward. Following with endocrinology. She is here with her mother. Review of Systems    Objective     /70   Pulse 98   Temp 97.5 °F (36.4 °C)   Ht 5' 3" (1.6 m)   Wt 122 kg (270 lb)   LMP 06/29/2023 (Approximate)   SpO2 98%   BMI 47.83 kg/m²      Physical Exam  Vitals reviewed. Constitutional:       General: She is not in acute distress. Appearance: Normal appearance. She is obese. HENT:      Head: Normocephalic and atraumatic. Right Ear: External ear normal.      Left Ear: External ear normal.      Nose: Nose normal.      Mouth/Throat:      Mouth: Mucous membranes are moist.   Eyes:      Extraocular Movements: Extraocular movements intact. Conjunctiva/sclera: Conjunctivae normal.   Cardiovascular:      Rate and Rhythm: Normal rate and regular rhythm. Heart sounds: Normal heart sounds. Pulmonary:      Effort: Pulmonary effort is normal.      Breath sounds: Normal breath sounds. Abdominal:      General: Bowel sounds are normal. There is no distension. Palpations: Abdomen is soft. Tenderness: There is no abdominal tenderness. Musculoskeletal:      Cervical back: Neck supple. No tenderness. Right lower leg: No edema. Left lower leg: No edema. Skin:     General: Skin is warm. Capillary Refill: Capillary refill takes less than 2 seconds. Findings: No rash. Neurological:      Mental Status: She is alert. Mental status is at baseline.             Medications have been reviewed by provider in current encounter    Pam Gould DO

## 2023-08-17 PROBLEM — E06.1 SUBACUTE THYROIDITIS: Status: RESOLVED | Noted: 2023-07-22 | Resolved: 2023-08-17

## 2023-08-21 ENCOUNTER — TELEPHONE (OUTPATIENT)
Dept: RADIOLOGY | Facility: HOSPITAL | Age: 23
End: 2023-08-21

## 2023-08-21 ENCOUNTER — APPOINTMENT (OUTPATIENT)
Dept: LAB | Facility: HOSPITAL | Age: 23
End: 2023-08-21
Attending: INTERNAL MEDICINE
Payer: COMMERCIAL

## 2023-08-21 ENCOUNTER — TELEPHONE (OUTPATIENT)
Dept: INFUSION CENTER | Facility: CLINIC | Age: 23
End: 2023-08-21

## 2023-08-21 ENCOUNTER — OFFICE VISIT (OUTPATIENT)
Dept: HEMATOLOGY ONCOLOGY | Facility: CLINIC | Age: 23
End: 2023-08-21
Payer: COMMERCIAL

## 2023-08-21 ENCOUNTER — TELEPHONE (OUTPATIENT)
Dept: HEMATOLOGY ONCOLOGY | Facility: CLINIC | Age: 23
End: 2023-08-21

## 2023-08-21 VITALS
BODY MASS INDEX: 47.75 KG/M2 | OXYGEN SATURATION: 97 % | HEIGHT: 63 IN | WEIGHT: 269.5 LBS | SYSTOLIC BLOOD PRESSURE: 126 MMHG | RESPIRATION RATE: 16 BRPM | DIASTOLIC BLOOD PRESSURE: 82 MMHG | HEART RATE: 58 BPM | TEMPERATURE: 97.6 F

## 2023-08-21 DIAGNOSIS — C85.99 LYMPHOMA OF THYROID GLAND (HCC): ICD-10-CM

## 2023-08-21 DIAGNOSIS — C85.99 LYMPHOMA OF THYROID GLAND (HCC): Primary | ICD-10-CM

## 2023-08-21 LAB
25(OH)D3 SERPL-MCNC: 22 NG/ML (ref 30–100)
ALBUMIN SERPL BCP-MCNC: 4.6 G/DL (ref 3.5–5)
ALP SERPL-CCNC: 61 U/L (ref 34–104)
ALT SERPL W P-5'-P-CCNC: 22 U/L (ref 7–52)
ANION GAP SERPL CALCULATED.3IONS-SCNC: 8 MMOL/L
AST SERPL W P-5'-P-CCNC: 13 U/L (ref 13–39)
BASOPHILS # BLD MANUAL: 0 THOUSAND/UL (ref 0–0.1)
BASOPHILS NFR MAR MANUAL: 0 % (ref 0–1)
BILIRUB SERPL-MCNC: 0.36 MG/DL (ref 0.2–1)
BUN SERPL-MCNC: 20 MG/DL (ref 5–25)
CALCIUM SERPL-MCNC: 10 MG/DL (ref 8.4–10.2)
CHLORIDE SERPL-SCNC: 102 MMOL/L (ref 96–108)
CO2 SERPL-SCNC: 30 MMOL/L (ref 21–32)
CREAT SERPL-MCNC: 0.85 MG/DL (ref 0.6–1.3)
EOSINOPHIL # BLD MANUAL: 0.11 THOUSAND/UL (ref 0–0.4)
EOSINOPHIL NFR BLD MANUAL: 1 % (ref 0–6)
ERYTHROCYTE [DISTWIDTH] IN BLOOD BY AUTOMATED COUNT: 13.9 % (ref 11.6–15.1)
GFR SERPL CREATININE-BSD FRML MDRD: 97 ML/MIN/1.73SQ M
GLUCOSE P FAST SERPL-MCNC: 91 MG/DL (ref 65–99)
HCT VFR BLD AUTO: 40.5 % (ref 34.8–46.1)
HGB BLD-MCNC: 12.9 G/DL (ref 11.5–15.4)
LDH SERPL-CCNC: 130 U/L (ref 140–271)
LYMPHOCYTES # BLD AUTO: 1.91 THOUSAND/UL (ref 0.6–4.47)
LYMPHOCYTES # BLD AUTO: 18 % (ref 14–44)
MAGNESIUM SERPL-MCNC: 2.2 MG/DL (ref 1.9–2.7)
MCH RBC QN AUTO: 27.6 PG (ref 26.8–34.3)
MCHC RBC AUTO-ENTMCNC: 31.9 G/DL (ref 31.4–37.4)
MCV RBC AUTO: 87 FL (ref 82–98)
MONOCYTES # BLD AUTO: 0.42 THOUSAND/UL (ref 0–1.22)
MONOCYTES NFR BLD: 4 % (ref 4–12)
MYELOCYTES NFR BLD MANUAL: 1 % (ref 0–1)
NEUTROPHILS # BLD MANUAL: 8.06 THOUSAND/UL (ref 1.85–7.62)
NEUTS BAND NFR BLD MANUAL: 1 % (ref 0–8)
NEUTS SEG NFR BLD AUTO: 75 % (ref 43–75)
PHOSPHATE SERPL-MCNC: 4.7 MG/DL (ref 2.7–4.5)
PLATELET # BLD AUTO: 456 THOUSANDS/UL (ref 149–390)
PLATELET BLD QL SMEAR: ABNORMAL
PMV BLD AUTO: 9.2 FL (ref 8.9–12.7)
POTASSIUM SERPL-SCNC: 4.2 MMOL/L (ref 3.5–5.3)
PROT SERPL-MCNC: 8 G/DL (ref 6.4–8.4)
RBC # BLD AUTO: 4.67 MILLION/UL (ref 3.81–5.12)
SODIUM SERPL-SCNC: 140 MMOL/L (ref 135–147)
URATE SERPL-MCNC: 4.8 MG/DL (ref 2–7.5)
WBC # BLD AUTO: 10.6 THOUSAND/UL (ref 4.31–10.16)

## 2023-08-21 PROCEDURE — 85027 COMPLETE CBC AUTOMATED: CPT

## 2023-08-21 PROCEDURE — 82306 VITAMIN D 25 HYDROXY: CPT

## 2023-08-21 PROCEDURE — 99215 OFFICE O/P EST HI 40 MIN: CPT | Performed by: INTERNAL MEDICINE

## 2023-08-21 PROCEDURE — 83735 ASSAY OF MAGNESIUM: CPT

## 2023-08-21 PROCEDURE — 80053 COMPREHEN METABOLIC PANEL: CPT

## 2023-08-21 PROCEDURE — 84100 ASSAY OF PHOSPHORUS: CPT

## 2023-08-21 PROCEDURE — 83615 LACTATE (LD) (LDH) ENZYME: CPT

## 2023-08-21 PROCEDURE — 84550 ASSAY OF BLOOD/URIC ACID: CPT

## 2023-08-21 PROCEDURE — 36415 COLL VENOUS BLD VENIPUNCTURE: CPT

## 2023-08-21 PROCEDURE — 85007 BL SMEAR W/DIFF WBC COUNT: CPT

## 2023-08-21 RX ORDER — SODIUM CHLORIDE 9 MG/ML
75 INJECTION, SOLUTION INTRAVENOUS CONTINUOUS
Status: CANCELLED | OUTPATIENT
Start: 2023-08-21

## 2023-08-21 NOTE — PROGRESS NOTES
[address] [facility] 2001 [facility]  2001 [facility] 5[address] Adalberto Cano  2001      PRIMARY HEMATOLOGIC/ONCOLOGIC DIAGNOSIS:  1. Diffuse Large B Cell Lymphoma, Germinal Center Subtype, Non-double expressor phenotype. Elevated proliferation  index (%). Involvement of thyroid. Date of diagnosis 7/20/23. PATHOLOGY:   Sujata Espinoza MD O: [phone] F: [phone]  5 [facility], [address] [place] [address], [address] [address]  Surgical Pathology Report (Preliminary result) [id number]  Authorizing Provider: Sujata Espinoza MD Ordering Provider: Sujata Espinoza MD  Ordering Location: [address] [place] [address] Cardiac  Cath Lab  Collected: 07/20/2023 [address] [place] [address]  Pathologist: Fernando Bob MD Received: 07/20/2023 7430  . Specimens  A Lymph Node, neck  . Valery Baltazar Final Diagnosis  THYROID, BIOPSY: DIFFUSE LARGE B-CELL LYMPHOMA, GERMINAL CENTER SUBTYPE, NON-DOUBLE EXPRESSOR  PHENOTYPE; ADDITIONAL ANCILLARY TESTING PENDING; SEE IMPRESSION AND COMMENT  IMPRESSION:  The preliminary findings are of thyroid biopsies involved by a diffuse large B-cell lymphoma with an elevated proliferation index (%). Cytogenetic FISH studies for MYC, BCL2, BCL6 and IRF4 rearrangements as well as 11q aberrations are reportedly negative. The BCL2 positivity by immunohistochemistry and the absence of MYC rearrangements rules out Burkitt lymphoma and highgrade B-cell lymphoma with MYC and BCL2 and / or BCL6 rearrangements. The absence of 11q aberrations rules out high-grade / large B-cell lymphoma with 11q aberrations and the absence of IRF4 rearrangements rules out large B-cell lymphoma with IRF4 rearrangements. The overall findings are most compatible with a diffuse large B-cell lymphoma (DLBCL), NOS, germinal center subtype, per the [facility] criteria, and non-double-expressor phenotype by immunohistochemistry.  Correlation with systemic (PET/CT) imaging, SPEP/UPEP with serum immunoglobulin heavy chains (IgG, IgA, IgM, IgE, IgD) and serum and urine free light chains (kappa/lambda) to evaluate for systemic involvement, is recommended, if clinically indicated. Additional studies (molecular and FISH) are pending and will be added to the final report when available. Subtype: Germinal Center Subtype: (CD10+/BCL6+/MUM1+)  Phenotype: Non-Double-Expressor Phenotype (BCL2+/MYC-)  Ki-67 Proliferation index: % within neoplastic cells  Cytogenetics: MYC, BCL2, BCL6, IRF4 rearrangements and 11q aberrations are NOT DETECTED; (Smartesting #[id number],  [id number], [id number]; see separate reports). Surgical Pathology Report                         Case: [id number]                                    Authorizing Provider: Rebeca Cardona MD        Collected:           07/26/2023 0915               Ordering Location:     [facility]      Received:            07/26/2023 [address] [place] [address] 9                                                               Pathologist:           Luzma Simpson MD                                                           Specimens:   A) - Iliac Crest, Right, core                                                                        B) - Iliac Crest, Right, clot                                                                        C) - Iliac Crest, Right, slide                                                             Addendum   Karyotype analysis is within normal limits; the original diagnosis remains unchanged.     Cytogenetics Oncology Chromosome Analysis (Smartesting # [id number]; please see separate report for further details):      Addendum electronically signed by Luzma Simpson MD on 8/14/2023 at  8:10 AM   Final Diagnosis   A -C.  Bone marrow,  right iliac crest,  biopsy, clot, and aspirate:  -  No morphologic or immunophenotypic evidence of B-cell lymphoma.  -  Normocellular (~70%) bone marrow with trilineage maturing hematopoiesis with no increase in blasts. -  Reduced iron stores, correlation with serum iron studies is recommended. -  No increase in reticulin fibrosis.     Comment: The patient's recently diagnosed B-cell lymphoma pending ancillary work-up is noted ([id number]). The current bone marrow biopsy shows no morphologic or immunophenotypic evidence of involvement by B-cell lymphoma. Flow cytometry is negative for myeloid, lymphoid, and plasma cell abnormalities. Karyotype analysis is pending and will be reported in a separate addendum. If clinically indicated material is available for ancillary molecular studies including B-cell gene rearrangement and lymphoid NGS; if compared to the diagnostic sample these testing modalities may be more sensitive in excluding low level molecular involvement of the bone marrow. Clinical correlation is advised. Electronically signed by Hector Tam MD on 7/28/2023 at  3:41 PM   Microscopic Description  [facility]   Bone marrow aspirate smears:  The aspirate smears are spicular, cellular, and  adequate for evaluation. Cellularity is composed of maturing trilineage hematopoiesis with a myeloid to erythroid ratio of 2.4:1.  Myelopoiesis:  Myeloid elements are present in appropriate quantities and show normal maturation without overt evidence of dysplasia (myeloblasts= <5%). Erythropoiesis:  Eyrthroid elements are present in appropriate quantities and show normal maturation without overt evidence of dysplasia. Megakaryocytes:  Megakaryocytes are present in appropriate quantities and exhibit a spectrum of maturation. Lymphocytes:  Mildly increased with small morphology; no evidence of large cells.   Plasma cells:  Rare plasma cells.     200 cell differential:  Blasts: 0%  Pros: 0%  [place]/Myelo: 17.5%  Segs: 39.0%  Eryth: 26.0%  Lymphs: 9.5%  Mono: 2.5%  Eos: 4.0%  Plasma cells: 1.5%  Baso: 0%  Atypical cells: 0%     M:E ratio= 2.4     Bone marrow core biopsy and aspirate clot:  Histologic sections of the aspirate clot and decalcified core biopsy are adequate for evaluation.  Marrow space cellularity is normocellular for patient age (~70%). Myelopoiesis:  Exhibit progressive maturation (myeloblasts= <5%). Erythropoiesis:  Exhibit progressive maturation. Megakaryocytes:  Present with a spectrum of normal morphology. Lymphocytes:  Scattered. Plasma cells:  Rare.     Special studies:   * Iron stain performed on the aspirate smear, clot, and core biopsy highlights reduced iron stores (1/4) with no evidence of ring sideroblasts; correlation with serum iron studies is recommended. * Reticulin stain performed on the core biopsy shows no significant increase in reticulin fibers.  0 of 3 by the  Consensus grading scheme. * PAS highlights myeloid and megakaryocytic elements confirming a normal M:E ratio. * Immunohistochemical stains were performed with appropriate controls and show the following results:  -  CD34 shows no increase in blasts (<5% of total cellularity) and  shows no increase in immature cells (<5% of total cellularity). Additionally,  highlights scattered mast cells.     CD61 highlights scattered megakaryocytes in normal distribution and quantity.      CD3 and CD20 highlight interstitially scattered T and B-cells (<10% of total cellularity). [place]-5 highlights B-cells and is negative for large B-cells. BCL-6 is negative.      highlights scattered plasma cells (<5% of total cellularity) with a polytypic kappa and lambda light chain expression by kappa and lambda in situ hybridization studies.       Note  [facility]   Component Ref Range & Units 7/25/23 0601 7/24/23 0454 7/22/23 1336 7/20/23 1047 6/23/23 2212 6/1/23 1033 2/12/23 1548   WBC 4.31 - 10.16 Thousand/uL 12.02 High   9.49  10.12  8.64  11.60 High   7.62  5.24    RBC 3.81 - 5.12 Million/uL 4.46  4.51  4.38  4.27  4.62  4.46  4.67    Hemoglobin 11.5 - 15.4 g/dL 12.0  12.1  12.0  11.9  12.9  12.5  13.1    Hematocrit 34.8 - 46.1 % 38.4  40.5  37.5  36.2  39.3  38.4  40.4    MCV 82 - 98 fL 86  90  86  85  85  86  87    MCH 26.8 - 34.3 pg 26.9  26.8  27.4  27.9  27.9  28.0  28.1    MCHC 31.4 - 37.4 g/dL 31.3 Low   29.9 Low   32.0  32.9  32.8  32.6  32.4    RDW 11.6 - 15.1 % 12.8  12.8  12.9  12.9  12.6  12.6  12.7    MPV 8.9 - 12.7 fL 9.5  9.6  9.3  9.1  9.2  9.5  10.0    Platelets 563 - 679 Thousands/uL 375  372  344  323  440 High   327  320       Flow Cytometry Analysis (Elephanti # [id number]; please see separate report for further details): Additional Information  [facility]   All reported additional testing was performed with appropriately reactive controls.  These tests were developed and their performance characteristics determined by [facility] or appropriate performing facility, though some tests may be performed on tissues which have not been validated for performance characteristics (such as staining performed on alcohol exposed cell blocks and decalcified tissues).  Results should be interpreted with caution and in the context of the patients’ clinical condition. These tests may not be cleared or approved by the U.S. Food and Drug Administration, though the FDA has determined that such clearance or approval is not necessary. These tests are used for clinical purposes and they should not be regarded as investigational or for research. This laboratory has been approved by CLIA 88, designated as a high-complexity laboratory and is qualified to perform these tests. .Interpretation performed at [facility] [address] [facility], [address] [address] [address]      Gross Description  [facility]   A. The specimen is received in formalin, labeled with the patient's name and hospital number, and is designated " right iliac crest core".   The specimen consists of a tan to brown osseous tissue core measuring 2.9 cm in length and 0.3 cm in diameter. Entirely submitted. One cassette following decalcification in Immunocal. In an embedding bag.  B. The specimen is received in formalin, labeled with the patient's name and hospital number, and is designated " right iliac crest clot". The specimen consists of a brown to red soft tissue fragment measuring 2.8 x 1.7 x 0.2 cm. Entirely submitted. One cassette. In an embedding bag.  C. The specimen consists of microscopic aspirate slides of the right iliac crest, labeled with the patient's name and hospital number. Slides are submitted for staining and interpretation.     Note: The estimated total formalin fixation time based upon information provided by the submitting clinician and the standard processing schedule is under 72 hours. STAGING: Cancer Staging   No matching staging information was found for the patient. Oncology History   Lymphoma of thyroid gland ([address])   7/22/2023 Initial Diagnosis    Lymphoma of thyroid gland ([address])     8/2/2023 -  Chemotherapy    DOXOrubicin (ADRIAMYCIN), 50 mg/m2 = 110 mg, Intravenous, Once, 1 of 6 cycles  Administration: 110 mg (8/3/2023)  alteplase (CATHFLO), 2 mg, Intracatheter, Every 1 Minute as needed, 1 of 6 cycles  vincristine (ONCOVIN) chemo infusion, 2 mg (original dose 1.4 mg/m2), Intravenous, Once, 1 of 6 cycles  Dose modification: 2 mg (original dose 1.4 mg/m2, Cycle 1, Reason: Max Dose Reached)  Administration: 2 mg (8/3/2023)  cyclophosphamide (CYTOXAN) IVPB, 750 mg/m2 = 1,650 mg, Intravenous, Once, 1 of 6 cycles  Administration: 1,650 mg (8/2/2023)  fosaprepitant (EMEND) IVPB, 150 mg, Intravenous, Once, 1 of 6 cycles  Administration: 150 mg (8/2/2023)  riTUXimab (RITUXAN) subsequent titrated chemo infusion, 375 mg/m2, Intravenous, Once, 0 of 5 cycles  riTUXimab (RITUXAN) first titrated chemo infusion, 825 mg, Intravenous, Once, 1 of 1 cycle  Administration: 800 mg (8/2/2023)       CURRENT TREATMENT:  1. R-CHOP.  C1 D1 administered 8/2/23      HISTORY OF PRESENT ILLNESS:  Tatiana José is a 25 yo female who presents to the office for evaluation and management of newly diagnosed DLBCL. The patient presented to the hospital on 7/22/23 after being referred by a provider. She had undergone thyroid biopsy on 7/20/23. Pathology was c/w diffuse Large B Cell Lymphoma, Germinal Center Subtype, Non-double expressor phenotype. In the ED the patient reported SOB. The patient received C1 R-CHOP as inpatient. She developed headache, nausea and sensitivity to light w/ Rituximab administration. The patient reported prior hx of migraines. Today she reports feeling better after treatment initiation. She is able to eat without issues and is breathing normally. The mass has significantly decreased in size clinically. PAST MEDICAL,PAST SURGICAL, FAMILY AND SOCIAL HISTORY:    Patient Active Problem List   Diagnosis   • Mild intermittent asthma without complication   • Seasonal allergic rhinitis due to pollen   • Shrimp allergy   • Enlarged thyroid   • Lymphoma of thyroid gland ([address])   • Morbid obesity ([address])   • Hypothyroidism   • Elevated blood pressure reading   • Headache   • Anxiety     Past Medical History:   Diagnosis Date   • Asthma    • Disease of thyroid gland    • Ear infection      Past Surgical History:   Procedure Laterality Date   • IR BIOPSY BONE MARROW  7/26/2023   • IR BIOPSY NECK  7/20/2023     No family history on file. Social History     Socioeconomic History   • Marital status: Single     Spouse name: Not on file   • Number of children: Not on file   • Years of education: Not on file   • Highest education level: Not on file   Occupational History   • Not on file   Tobacco Use   • Smoking status: Never   • Smokeless tobacco: Never   Vaping Use   • Vaping Use: Never used   Substance and Sexual Activity   • Alcohol use:  Yes     Alcohol/week: 1.0 standard drink of alcohol     Types: 1 Standard drinks or equivalent per week     Comment: socially   • Drug use: Never   • Sexual activity: Yes     Partners: Male     Birth control/protection: Condom Male   Other Topics Concern   • Not on file   Social History Narrative   • Not on file     Social Determinants of Health     Financial Resource Strain: Not on file   Food Insecurity: No Food Insecurity (7/24/2023)    Hunger Vital Sign    • Worried About Running Out of Food in the Last Year: Never true    • Ran Out of Food in the Last Year: Never true   Transportation Needs: No Transportation Needs (7/24/2023)    PRAPARE - Transportation    • Lack of Transportation (Medical): No    • Lack of Transportation (Non-Medical):  No   Physical Activity: Not on file   Stress: Not on file   Social Connections: Not on file   Intimate Partner Violence: Not on file   Housing Stability: Unknown (7/24/2023)    Housing Stability Vital Sign    • Unable to Pay for Housing in the Last Year: No    • Number of Places Lived in the Last Year: Not on file    • Unstable Housing in the Last Year: No       Current Outpatient Medications:   •  albuterol (Proventil HFA) 90 mcg/act inhaler, Inhale 2 puffs every 6 (six) hours as needed for wheezing, Disp: 6.7 g, Rfl: 5  •  allopurinol (ZYLOPRIM) 300 mg tablet, Take 1 tablet (300 mg total) by mouth daily, Disp: 90 tablet, Rfl: 0  •  amLODIPine (NORVASC) 5 mg tablet, Take 1 tablet (5 mg total) by mouth daily Do not start before August 8, 2023., Disp: 30 tablet, Rfl: 0  •  dicyclomine (BENTYL) 20 mg tablet, Take 1 tablet (20 mg total) by mouth 2 (two) times a day, Disp: 20 tablet, Rfl: 0  •  diphenhydrAMINE (BENADRYL) 25 mg capsule, Take 1 capsule (25 mg total) by mouth every 6 (six) hours as needed for itching, Disp: 20 capsule, Rfl: 0  •  fluticasone (FLONASE) 50 mcg/act nasal spray, SPRAY 1 SPRAY INTO EACH NOSTRIL EVERY DAY, Disp: 16 mL, Rfl: 2  •  levothyroxine 150 mcg tablet, Take 1 tablet (150 mcg total) by mouth daily, Disp: 90 tablet, Rfl: 0  •  montelukast (SINGULAIR) 10 mg tablet, Take 10 mg by mouth daily at bedtime, Disp: , Rfl:   •  ondansetron (ZOFRAN) 4 mg tablet, Take 1 tablet (4 mg total) by mouth every 6 (six) hours, Disp: 12 tablet, Rfl: 0  •  pantoprazole (PROTONIX) 40 mg tablet, Take 1 tablet (40 mg total) by mouth 2 (two) times a day before meals, Disp: 60 tablet, Rfl: 0  •  polyethylene glycol (MIRALAX) 17 g packet, Take 17 g by mouth daily Do not start before August 8, 2023., Disp: , Rfl: 0  •  predniSONE 20 mg tablet, TAKE 1 TABLET BY MOUTH EVERY DAY (Patient taking differently: Take 10 mg by mouth daily), Disp: 30 tablet, Rfl: 0  •  senna-docusate sodium (SENOKOT S) 8.6-50 mg per tablet, Take 1 tablet by mouth daily at bedtime, Disp: , Rfl: 0  Allergies   Allergen Reactions   • Pollen Extract    • Shrimp Extract Allergy Skin Test - Food Allergy Hives     There were no vitals filed for this visit. ROS:  CONSTITUTIONAL:  No fever. No chills. No dizziness. No weakness. EYES:  No pain, erythema, or discharge. No blurring of vision. ENT:  No sore throat, URI symptoms. No epistaxis. No tinnitus. CARDIOVASCULAR:  No chest pain. No palpitations. No lower extremity edema. RESPIRATORY:  No shortness of breath, cough, pain with respiration, pleuritic chest pain. No hemoptysis. No dyspnea. No paroxysmal nocturnal dyspnea. GASTROINTESTINAL:  Normal appetite. No nausea, vomiting, diarrhea. No pain. No bloating. No melena. GENITOURINARY:  No frequency, urgency, nocturia. No hematuria or dysuria. MUSCULOSKELETAL:  No arthralgias or myalgias. INTEGUMENTARY:  No swelling. No bruising. No contusions. No abrasions. No lymphangitis. NEUROLOGIC:  No headache. No neck pain. No numbness or tingling of the extremities. No weakness. PSYCHIATRIC:  No confusion. ENDOCRINE:  No fatigue. No weakness. No history of thyroid, diabetes or adrenal problems. HEMATOLOGICAL:  No bleeding. No petechiae. No bruising.     PHYSICAL EXAM:  ECOG 0  GENERAL: AAO x 3  HEENT: AT,NC  CVS: S1S2 RRR  LUNGS: CTA b/l  ABD: NT,ND, +BS  EXTR: no edema  NEURO: CN II-XII grossly intact    LABS:  I have reviewed pertinent labs:  CBC:   Lab Results   Component Value Date    WBC 26.33 (H) 08/07/2023    RBC 3.90 08/07/2023    HGB 10.7 (L) 08/07/2023    HCT 34.6 (L) 08/07/2023    MCV 89 08/07/2023     08/07/2023    MCH 27.4 08/07/2023    MCHC 30.9 (L) 08/07/2023    RDW 12.7 08/07/2023    MPV 9.2 08/07/2023    NEUTROABS 22.81 (H) 08/05/2023     CMP:   Lab Results   Component Value Date    SODIUM 139 08/07/2023    K 3.7 08/07/2023     08/07/2023    CO2 28 08/07/2023    AGAP 4 08/07/2023    BUN 13 08/07/2023    CREATININE 0.66 08/07/2023    GLUC 83 08/07/2023    CALCIUM 8.2 (L) 08/07/2023    AST 6 08/06/2023    ALT 13 08/06/2023    ALKPHOS 51 08/06/2023    TP 5.4 (L) 08/06/2023    ALB 2.2 (L) 08/06/2023    TBILI 0.16 (L) 08/06/2023    EGFR 125 08/07/2023     Liver Enzymes:   Lab Results   Component Value Date    AST 6 08/06/2023    ALT 13 08/06/2023    ALKPHOS 51 08/06/2023    TP 5.4 (L) 08/06/2023    ALB 2.2 (L) 08/06/2023    TBILI 0.16 (L) 08/06/2023     Vitamin B12 No results found for: "[id number]"  Iron Study   Lab Results   Component Value Date    FERRITIN 82 08/02/2023    CONCFE 13 (L) 08/02/2023    TIBC 319 08/02/2023    IRON 42 (L) 08/02/2023     Folate No results found for: "FOLATE"  Magnesium   Lab Results   Component Value Date    MG 3.0 (H) 08/05/2023     Phosphorus   Lab Results   Component Value Date    PHOS 3.3 08/06/2023     Coagulation Panel   Lab Results   Component Value Date    DDIMER 1.06 (H) 07/22/2023    PROTIME 12.6 07/25/2023    INR 0.92 07/25/2023       IMAGING:  MRI brain wo contrast    Result Date: 8/6/2023  Narrative: MRI BRAIN WITHOUT CONTRAST INDICATION: Lymphoma of thyroid gland (HCC. COMPARISON:   None. TECHNIQUE:  Multiplanar, multisequence imaging of the brain was performed. IMAGE QUALITY:  Diagnostic. FINDINGS: BRAIN PARENCHYMA:  There is no discrete mass, mass effect or midline shift. There is no intracranial hemorrhage. There is no evidence of acute infarction and diffusion imaging is unremarkable. There are no white matter changes in the cerebral hemispheres. VENTRICLES:  Normal for the patient's age. SELLA AND PITUITARY GLAND:  Normal. ORBITS:  Normal. PARANASAL SINUSES:  Normal. VASCULATURE:  Evaluation of the major intracranial vasculature demonstrates appropriate flow voids. CALVARIUM AND SKULL BASE:  Normal. EXTRACRANIAL SOFT TISSUES:  Normal.     Impression: Normal. Workstation performed: [id number]     IR biopsy bone marrow    Result Date: 7/26/2023  Narrative: Imaged guided diagnostic bone marrow aspiration and core biopsy History: Lymphoma. Conscious sedation time: 30 minutes Fluoroscopy time: 0.6 minutes. Radiation dose length product (DLP) for this visit:  67 mGy . The patient was brought to the angiography suite and placed prone on the table. Fluoroscopy was used to localize the right iliac bone. The skin was then marked, prepped, and draped in usual sterile fashion. Lidocaine was administered to the skin, and subcutaneous tissues down to the periosteum of the right iliac bone, and a small skin incision was made. An 11-gauge AUM Cardiovascular biopsy needle was advanced percutaneously through the cortex under intermittent fluoroscopic, and a bone marrow aspiration was performed. The specimen was given to the cytotech to prepare, and was found to be adequate. A core biopsy was then performed. The bone core was placed in formalin. The patient tolerated the procedure well and suffered no complications. Impression: Impression: Successful percutaneous diagnostic bone marrow aspiration and bone core biopsy. A full pathology report will follow. Workstation performed: [id number]     Echo complete w/ contrast if indicated    Result Date: 7/23/2023  Narrative: •  Left Ventricle: Left ventricular cavity size is normal. Wall thickness is normal. The left ventricular ejection fraction is 65%. Systolic function is normal. Although no diagnostic regional wall motion abnormality was identified, this possibility cannot be completely excluded on the basis of this study. Diastolic function is normal. Strain not reliable due to poor image quality. Technically difficult study likely due to body habitus. No prior studies for comparison. CT soft tissue neck with contrast    Result Date: 7/22/2023  Narrative: CT NECK WITH CONTRAST INDICATION:   Thyroid cancer, staging thyroid cancer, swelling. COMPARISON: 6/1/2023 TECHNIQUE:  Axial, sagittal, and coronal 2D reformatted images were created from the axial source data and submitted for interpretation. Radiation dose length product (DLP) for this visit:  721.66 mGy-cm . This examination, like all CT scans performed in the [facility], was performed utilizing techniques to minimize radiation dose exposure, including the use of iterative  reconstruction and automated exposure control. IV Contrast:  100 mL of iohexol (OMNIPAQUE) IMAGE QUALITY:  Diagnostic. FINDINGS: VISUALIZED BRAIN PARENCHYMA:  No acute intracranial pathology of the visualized brain parenchyma. VISUALIZED ORBITS: Normal visualized orbits. PARANASAL SINUSES: Normal visualized paranasal sinuses. NASAL CAVITY AND NASOPHARYNX:  Normal. SUPRAHYOID NECK:  Normal oral cavity, tongue base, tonsillar fossa and epiglottis. INFRAHYOID NECK:  Aryepiglottic folds and piriform sinuses are normal.  Normal glottis and subglottic airway/trachea. THYROID GLAND: Significantly increased size of thyroid tissue compared to the prior exam compressing the airway. PAROTID AND SUBMANDIBULAR GLANDS:  Normal. LYMPH NODES: Prominent bilateral cervical level 2 lymph nodes as before VASCULAR STRUCTURES:  Normal enhancement of the cervical vasculature. THORACIC INLET:  Lung apices and upper mediastinum are unremarkable. BONY STRUCTURES: No acute fracture or destructive osseous lesion.      Impression: Significantly increased size of thyroid tissue compared to the prior exam compressing the airway/trachea. This is consistent with patient's known history of thyroid cancer. Surgical consult recommended for further evaluation. Workstation performed: [id number]     CTA ED chest PE study    Result Date: 7/22/2023  Narrative: CTA - CHEST WITH IV CONTRAST - PULMONARY ANGIOGRAM INDICATION:   Pulmonary embolism (PE) suspected, positive D-dimer tachycardia, SOB, elevated d-dimer. Per my review of the medical record, right thyroid nodule biopsy 7/20/2023 showing lymphoma. Right neck pain, difficulty breathing, difficulty swallowing. COMPARISON: Neck CT from today, 6/1/2023 and 1/31/2018. TECHNIQUE: CT angiogram timed for optimal opacification of the pulmonary arteries. Axial, sagittal, and coronal 2D reformats created from source data. Coronal 3D MIP postprocessing on the acquisition scanner. Radiation dose length product (DLP):  455.29 mGy-cm . Radiation dose exposure minimized using iterative reconstruction and automated exposure control. IV Contrast:  100 mL of iohexol (OMNIPAQUE) FINDINGS: PULMONARY ARTERIES:  No pulmonary embolus. LUNGS:  Lungs clear. AIRWAYS: No significant filling defects. PLEURA:  Unremarkable. HEART/GREAT VESSELS:  Normal for age. MEDIASTINUM AND LYNNE:  Unremarkable. CHEST WALL AND LOWER NECK: Marked enlargement of the thyroid gland severe tracheal narrowing, slightly increased since 6/1/2023. No obvious postbiopsy hemorrhage but the thyroid is incompletely imaged. UPPER ABDOMEN: Hepatic steatosis. OSSEOUS STRUCTURES:  Normal for age. Impression: No pulmonary embolus. No acute pulmonary disease. Severe enlargement of the thyroid gland with recent biopsy showing lymphoma, with increased tracheal narrowing compared with the neck CT from 6/1/2023 with no obvious postprocedural hemorrhage. See neck CT from today. Hepatic steatosis.  Workstation performed: [id number]     I reviewed the above laboratory and imaging data. ASSESSMENT/PLAN:  1. Diffuse Large B Cell Lymphoma, Germinal Center Subtype, Non-double expressor phenotype. Elevated proliferation  index (%). Involvement of thyroid. Date of diagnosis 7/20/23. The patient is due for cycle 2 R-CHOP on 8/23/23. Will schedule port placement. Will obtain re-staging imaging after 1-2 cycles. Re-staging imaging to be ordered during her next visit in 2 weeks. The patient had a possible reaction to rituximab--headache, nausea and sensitivity to light. Infusion will be titrated. Repeat ECHO will be ordered during next visit. Growth factor support already ordered. Pregnancy testing prior to every treatment.

## 2023-08-21 NOTE — TELEPHONE ENCOUNTER
Patient Call    Who are you speaking with? Parent    If it is not the patient, are they listed on an active communication consent form? Yes   What is the reason for this call? Jan Bey calling in upset stating that the IR was unable to schedule her port placment until Thursday, and she was supposed to start chemo on Wednesday. Does this require a call back? Yes   If a call back is required, please list best call back number [phone]    If a call back is required, advise that a message will be forwarded to their care team and someone will return their call as soon as possible. Did you relay this information to the patient?  Yes

## 2023-08-21 NOTE — PRE-PROCEDURE INSTRUCTIONS
Pre-procedure Instructions for Interventional Radiology  [address] [place] [address], [address] [place] [address]  Interventional Radiology [phone]    You are scheduled for a/an [facility] Placement. On Tuesday 8/22/23. Your arrival time is 1300. Our Interventional Radiology nurse will notify you a few days before your procedure with the exact arrival time and location to arrive. To prepare for your procedure:  1. Please arrange for someone to drive you home after the procedure and stay with you until the next morning if you are instructed to do so. This is typically for patients receiving some type of sedative or anesthetic for the procedure. 2. DO NOT EAT OR DRINK ANYTHING after midnight on the evening before your procedure including candy & gum.  3. ONLY SIPS OF WATER with your medications are allowed on the morning of your procedure. 4. TAKE ALL OF YOUR REGULAR MEDICATIONS THE MORNING OF YOUR PROCEDURE with sips of water! We may call you to stop some of your blood sugar, blood pressure and blood thinning medications depending on the procedure. Please take all of these medications unless we instruct you to stop them. 5. If you have an allergy to x-ray dye, please contact Interventional Radiology for an x-ray dye preparation which usually consists of an oral steroid and Benadryl. The day of your procedure:  1. Bring a list of the medications you take at home. 2. Bring medications you take for breathing problems (such as inhalers), medications for chest pain, or both. 3. Bring a case for your glasses or contacts. 4. Bring you insurance card and a form of photo ID.  5. Please leave all valuables such as credit cards and jewelry at home. 6. Report to the registration desk in the main lobby at the [facility]. The  will direct you to the waiting room. 7. While your procedure is being performed, your family may wait in the waiting room.  If they need to leave, they may provide a number to be called following the procedure. 8. Be prepared to stay overnight just in case. Sometimes procedures will indicate the need for further observation or treatment. 9. If you are scheduled for a follow-up visit with the Interventional Radiologist after your procedure, you will be called with a date and time.     Special Instructions (Medications to alter or stop taking before your procedure etc.):

## 2023-08-21 NOTE — TELEPHONE ENCOUNTER
Spoke with patient regarding upcoming first appointment,pt made aware appt time changed from 0830 to 8am. Discussed infusion centers policies and procedures, patient labs completed 8/21/23. patient verbalizes understanding and has no further questions at this time.

## 2023-08-21 NOTE — TELEPHONE ENCOUNTER
Returned phone call to patient's mom, Williams Allen. Williams Allen is aware patient will still start treatment on Wednesday even though her port isn't going to be placed until Thursday. I let her know treatment is on Wednesday at 8:30am and I also let her know the time of the port placement as well for Thursday. She was inquiring if patient is going to be getting neulasta injections day after treatment, I let her know I will discuss with provider and then return her phone call. She verbalized understanding and has no other questions or concerns at this moment.

## 2023-08-22 ENCOUNTER — APPOINTMENT (OUTPATIENT)
Dept: LAB | Facility: HOSPITAL | Age: 23
End: 2023-08-22
Attending: INTERNAL MEDICINE
Payer: COMMERCIAL

## 2023-08-22 ENCOUNTER — TELEPHONE (OUTPATIENT)
Dept: HEMATOLOGY ONCOLOGY | Facility: CLINIC | Age: 23
End: 2023-08-22

## 2023-08-22 DIAGNOSIS — C85.99 LYMPHOMA OF THYROID GLAND (HCC): Primary | ICD-10-CM

## 2023-08-22 DIAGNOSIS — C83.31 DIFFUSE LARGE B-CELL LYMPHOMA OF LYMPH NODES OF NECK (HCC): ICD-10-CM

## 2023-08-22 DIAGNOSIS — Z79.899 HIGH RISK MEDICATION USE: ICD-10-CM

## 2023-08-22 DIAGNOSIS — C85.99 LYMPHOMA OF THYROID GLAND (HCC): ICD-10-CM

## 2023-08-22 LAB
ALBUMIN SERPL BCP-MCNC: 4.6 G/DL (ref 3.5–5)
ALP SERPL-CCNC: 62 U/L (ref 34–104)
ALT SERPL W P-5'-P-CCNC: 23 U/L (ref 7–52)
ANION GAP SERPL CALCULATED.3IONS-SCNC: 8 MMOL/L
AST SERPL W P-5'-P-CCNC: 14 U/L (ref 13–39)
BILIRUB SERPL-MCNC: 0.32 MG/DL (ref 0.2–1)
BUN SERPL-MCNC: 16 MG/DL (ref 5–25)
CALCIUM SERPL-MCNC: 10 MG/DL (ref 8.4–10.2)
CHLORIDE SERPL-SCNC: 105 MMOL/L (ref 96–108)
CO2 SERPL-SCNC: 25 MMOL/L (ref 21–32)
CREAT SERPL-MCNC: 0.75 MG/DL (ref 0.6–1.3)
ERYTHROCYTE [DISTWIDTH] IN BLOOD BY AUTOMATED COUNT: 14 % (ref 11.6–15.1)
GFR SERPL CREATININE-BSD FRML MDRD: 113 ML/MIN/1.73SQ M
GLUCOSE SERPL-MCNC: 128 MG/DL (ref 65–140)
HCG SERPL QL: NEGATIVE
HCT VFR BLD AUTO: 39.7 % (ref 34.8–46.1)
HGB BLD-MCNC: 12.8 G/DL (ref 11.5–15.4)
LDH SERPL-CCNC: 128 U/L (ref 140–271)
MCH RBC QN AUTO: 27.5 PG (ref 26.8–34.3)
MCHC RBC AUTO-ENTMCNC: 32.2 G/DL (ref 31.4–37.4)
MCV RBC AUTO: 85 FL (ref 82–98)
NRBC BLD AUTO-RTO: 0 /100 WBCS
PLATELET # BLD AUTO: 426 THOUSANDS/UL (ref 149–390)
PMV BLD AUTO: 8.9 FL (ref 8.9–12.7)
POTASSIUM SERPL-SCNC: 4.2 MMOL/L (ref 3.5–5.3)
PROT SERPL-MCNC: 7.8 G/DL (ref 6.4–8.4)
RBC # BLD AUTO: 4.65 MILLION/UL (ref 3.81–5.12)
SODIUM SERPL-SCNC: 138 MMOL/L (ref 135–147)
URATE SERPL-MCNC: 3.8 MG/DL (ref 2–7.5)
WBC # BLD AUTO: 10.93 THOUSAND/UL (ref 4.31–10.16)

## 2023-08-22 PROCEDURE — 80053 COMPREHEN METABOLIC PANEL: CPT

## 2023-08-22 PROCEDURE — 85027 COMPLETE CBC AUTOMATED: CPT

## 2023-08-22 PROCEDURE — 84550 ASSAY OF BLOOD/URIC ACID: CPT

## 2023-08-22 PROCEDURE — 83615 LACTATE (LD) (LDH) ENZYME: CPT

## 2023-08-22 PROCEDURE — 84703 CHORIONIC GONADOTROPIN ASSAY: CPT

## 2023-08-22 PROCEDURE — 36415 COLL VENOUS BLD VENIPUNCTURE: CPT

## 2023-08-22 RX ORDER — PREDNISONE 20 MG/1
TABLET ORAL
Qty: 30 TABLET | Refills: 0 | OUTPATIENT
Start: 2023-08-22

## 2023-08-22 RX ORDER — ACETAMINOPHEN 325 MG/1
650 TABLET ORAL ONCE
Status: CANCELLED | OUTPATIENT
Start: 2023-08-23

## 2023-08-22 RX ORDER — PREDNISONE 20 MG/1
TABLET ORAL
Qty: 5 TABLET | Refills: 5 | Status: SHIPPED | OUTPATIENT
Start: 2023-08-22

## 2023-08-22 RX ORDER — SODIUM CHLORIDE 9 MG/ML
20 INJECTION, SOLUTION INTRAVENOUS ONCE
Status: CANCELLED | OUTPATIENT
Start: 2023-08-23

## 2023-08-22 RX ORDER — DOXORUBICIN HYDROCHLORIDE 2 MG/ML
50 INJECTION, SOLUTION INTRAVENOUS ONCE
Status: CANCELLED | OUTPATIENT
Start: 2023-08-23

## 2023-08-22 NOTE — TELEPHONE ENCOUNTER
Returned phone call to patient and let her know I put new lab order in. She has no other questions or concerns at this moment.

## 2023-08-22 NOTE — TELEPHONE ENCOUNTER
Called patient's mom to make her aware that prednisone will need to be ordered for treatment to help lessen symptoms. Mother reports that when she was on the steroids in the hospital, they made her anxious to the point she was screaming, so they ended up cutting her dose down. I made Dr. Nate Rudolph aware. She will prescribe patient 20mg prednisone daily for five days with treatment cycles. Dr. Nate Rudolph also does not want patient to receive nyvepria injection secondary to not needing it at this moment, I made mother, Zoe Wiggins aware and she was in agreement to it. Dr. Nate Rudolph also wants patient to have pregnancy test prior to her treatments, I made mother aware. She reports they will stop over to lab today to have that completed. I also instructed her to have patient please come in today after the lab to sign consent for treatment. She verbalized understanding and has no other questions or concerns at this moment.

## 2023-08-22 NOTE — TELEPHONE ENCOUNTER
Patient Call    Who are you speaking with? Patient    If it is not the patient, are they listed on an active communication consent form? Yes   What is the reason for this call? Patient stated she went to the lab to get a pregnancy test but they wont do a urine one only blood. Does this require a call back? Yes   If a call back is required, please list best call back number [phone]   If a call back is required, advise that a message will be forwarded to their care team and someone will return their call as soon as possible. Did you relay this information to the patient?  Yes

## 2023-08-22 NOTE — TELEPHONE ENCOUNTER
Lvm on answering machine that additional infusions have been scheduled and updated on mychart. I called patient mother Chey Richmond and made her aware of first injection scheduled for 8/24 at [facility] has my teams number if reschedule is needed, she is aware injection needs to be 24hr after treatment. Chey Richmond was also offered Friday morning if 8/24 is no good due to port being placed same day.        Patient has treatment this Wednesday and will receive AVS.

## 2023-08-23 ENCOUNTER — PATIENT OUTREACH (OUTPATIENT)
Dept: CASE MANAGEMENT | Facility: HOSPITAL | Age: 23
End: 2023-08-23

## 2023-08-23 ENCOUNTER — HOSPITAL ENCOUNTER (OUTPATIENT)
Dept: INFUSION CENTER | Facility: CLINIC | Age: 23
Discharge: HOME | End: 2023-08-23
Payer: COMMERCIAL

## 2023-08-23 VITALS
SYSTOLIC BLOOD PRESSURE: 144 MMHG | WEIGHT: 272.2 LBS | BODY MASS INDEX: 45.35 KG/M2 | RESPIRATION RATE: 18 BRPM | DIASTOLIC BLOOD PRESSURE: 92 MMHG | HEART RATE: 98 BPM | HEIGHT: 65 IN | TEMPERATURE: 97.5 F

## 2023-08-23 DIAGNOSIS — C85.99 LYMPHOMA OF THYROID GLAND (HCC): Primary | ICD-10-CM

## 2023-08-23 PROCEDURE — 96415 CHEMO IV INFUSION ADDL HR: CPT

## 2023-08-23 PROCEDURE — 96413 CHEMO IV INFUSION 1 HR: CPT

## 2023-08-23 PROCEDURE — 96367 TX/PROPH/DG ADDL SEQ IV INF: CPT

## 2023-08-23 PROCEDURE — 96411 CHEMO IV PUSH ADDL DRUG: CPT

## 2023-08-23 PROCEDURE — 96417 CHEMO IV INFUS EACH ADDL SEQ: CPT

## 2023-08-23 RX ORDER — DOXORUBICIN HYDROCHLORIDE 2 MG/ML
50 INJECTION, SOLUTION INTRAVENOUS ONCE
Status: COMPLETED | OUTPATIENT
Start: 2023-08-23 | End: 2023-08-23

## 2023-08-23 RX ORDER — ACETAMINOPHEN 325 MG/1
650 TABLET ORAL ONCE
Status: COMPLETED | OUTPATIENT
Start: 2023-08-23 | End: 2023-08-23

## 2023-08-23 RX ORDER — SODIUM CHLORIDE 9 MG/ML
20 INJECTION, SOLUTION INTRAVENOUS ONCE
Status: COMPLETED | OUTPATIENT
Start: 2023-08-23 | End: 2023-08-23

## 2023-08-23 RX ADMIN — DEXAMETHASONE SODIUM PHOSPHATE: 10 INJECTION, SOLUTION INTRAMUSCULAR; INTRAVENOUS at 14:00

## 2023-08-23 RX ADMIN — SODIUM CHLORIDE 20 ML/HR: 0.9 INJECTION, SOLUTION INTRAVENOUS at 09:03

## 2023-08-23 RX ADMIN — APREPITANT 130 MG: 130 INJECTION, EMULSION INTRAVENOUS at 14:23

## 2023-08-23 RX ADMIN — SODIUM CHLORIDE 800 MG: 0.9 INJECTION, SOLUTION INTRAVENOUS at 10:15

## 2023-08-23 RX ADMIN — DIPHENHYDRAMINE HYDROCHLORIDE 25 MG: 50 INJECTION, SOLUTION INTRAMUSCULAR; INTRAVENOUS at 09:03

## 2023-08-23 RX ADMIN — VINCRISTINE SULFATE 2 MG: 1 INJECTION, SOLUTION INTRAVENOUS at 16:01

## 2023-08-23 RX ADMIN — ACETAMINOPHEN 650 MG: 325 TABLET, FILM COATED ORAL at 09:03

## 2023-08-23 RX ADMIN — CYCLOPHOSPHAMIDE 1650 MG: 1 INJECTION, POWDER, FOR SOLUTION INTRAVENOUS; ORAL at 15:06

## 2023-08-23 RX ADMIN — DOXORUBICIN HYDROCHLORIDE 110 MG: 2 INJECTION, SOLUTION INTRAVENOUS at 15:40

## 2023-08-23 NOTE — PROGRESS NOTES
Biopsychosocial and Barriers Assessment    Cancer Diagnosis: lymphoma of thyroid gland  Home/Cell Phone: [phone]  Emergency Contact: mother, Vila Hodgkins  Marital Status: single  Interpretation concerns, speaks another language, preferred language: speaks [place]  Cultural concerns: none noted  Ability to read or write: independent    Caregiver/Support: family, friends, boyfriend  Children: none  Child/Elder care: NA    Housing: lives local to [facility]:   Lives With: parents, family  Daily Living Activities: independent  Durable Medical Equipment: none  Ambulation: independent    Preferred Pharmacy:   High co-pays with insurance: no concerns at this time  High co-pays with medication coverage: no concerns  No medication coverage: NA    Primary Care Provider: Dr. Brenda Loredo  Hx of [facility]: none  Hx of Short term rehab: none  Mental Health Hx: none  Substance Abuse Hx: none  Employment: was working full time as  at the outlets, see note  [place] Status/Location: not a   Ability to pay bills: see note  POA/LW/AD:  Transportation Plan/Concerns: pt or family will drive      What do you know about your Cancer Diagnosis    What has your doctor told you about your cancer diagnosis:    What has your doctor told you about your cancer treatment:    What specific concerns do you have about your diagnosis and treatment:    Have you been made aware of any hair loss associated with treatment:    Additional Comments:      MSW met with pt in the infusion center, I had previously s/w her mother while pt was admitted. Pt had her first cycle of tx while inpt but is here today for her second, she admits to being nervous based on side effects from her last treatment, most notably a severe headache that left her screaming in pain. She says that she hardly slept last night with anxiety about coming in today.   She shared her story of diagnosis with me and how it's hard to believe that all of this happened the way that it did. Pt lives just a few minutes away with her family. She notes that she has very strong support in her life from family, friends, and her boyfriend. She has no transportation needs. She is on her parents health insurance and has no outstanding balance at this time. She was previously working as a  for [employer], however she is on a OJSAFAT at this time and has applied for disability with them. Her family is otherwise helping with her bills. Pt is open to me checking in with her for ongoing support. She is unsure that she would want to participate in a support group, however I gave her information today on the [facility] programs in case she changes her mind. I did point out to her that they have a young adult group that meets which she seemed interested in. She notes that she feels well supported by friends and does not want to make everything in her life about cancer. She denies any needs or concerns at this point but was appreciative of the time spent talking. I have checked in with her periodically throughout the day today and will check in with her at her next appt as well. I did provide her my direct contact information today as well. No other needs or concerns noted at this time.

## 2023-08-23 NOTE — PROGRESS NOTES
Pt presents for day 2 R-CHOP offering no compliants. PIV placed with positive blood return throughout. Pt tolerated treatment without incident. PIV removed. AVS printed. Next appointment reviewed.

## 2023-08-24 ENCOUNTER — TELEPHONE (OUTPATIENT)
Dept: HEMATOLOGY ONCOLOGY | Facility: CLINIC | Age: 23
End: 2023-08-24

## 2023-08-24 ENCOUNTER — HOSPITAL ENCOUNTER (OUTPATIENT)
Dept: NON INVASIVE DIAGNOSTICS | Facility: HOSPITAL | Age: 23
Discharge: HOME | End: 2023-08-24
Attending: INTERNAL MEDICINE | Admitting: RADIOLOGY
Payer: COMMERCIAL

## 2023-08-24 ENCOUNTER — HOSPITAL ENCOUNTER (OUTPATIENT)
Dept: INFUSION CENTER | Facility: CLINIC | Age: 23
End: 2023-08-24

## 2023-08-24 VITALS
HEIGHT: 65 IN | OXYGEN SATURATION: 97 % | TEMPERATURE: 97.6 F | HEART RATE: 95 BPM | SYSTOLIC BLOOD PRESSURE: 124 MMHG | DIASTOLIC BLOOD PRESSURE: 74 MMHG | WEIGHT: 272.27 LBS | RESPIRATION RATE: 20 BRPM | BODY MASS INDEX: 45.36 KG/M2

## 2023-08-24 DIAGNOSIS — C85.99 LYMPHOMA OF THYROID GLAND (HCC): ICD-10-CM

## 2023-08-24 PROCEDURE — 76937 US GUIDE VASCULAR ACCESS: CPT

## 2023-08-24 PROCEDURE — C1769 GUIDE WIRE: HCPCS

## 2023-08-24 PROCEDURE — C1788 PORT, INDWELLING, IMP: HCPCS

## 2023-08-24 PROCEDURE — 77001 FLUOROGUIDE FOR VEIN DEVICE: CPT

## 2023-08-24 PROCEDURE — 99152 MOD SED SAME PHYS/QHP 5/>YRS: CPT | Performed by: RADIOLOGY

## 2023-08-24 PROCEDURE — 76937 US GUIDE VASCULAR ACCESS: CPT | Performed by: RADIOLOGY

## 2023-08-24 PROCEDURE — 36561 INSERT TUNNELED CV CATH: CPT

## 2023-08-24 PROCEDURE — 36561 INSERT TUNNELED CV CATH: CPT | Performed by: RADIOLOGY

## 2023-08-24 PROCEDURE — 77001 FLUOROGUIDE FOR VEIN DEVICE: CPT | Performed by: RADIOLOGY

## 2023-08-24 RX ORDER — FENTANYL CITRATE 50 UG/ML
INJECTION, SOLUTION INTRAMUSCULAR; INTRAVENOUS AS NEEDED
Status: COMPLETED | OUTPATIENT
Start: 2023-08-24 | End: 2023-08-24

## 2023-08-24 RX ORDER — SODIUM CHLORIDE 9 MG/ML
75 INJECTION, SOLUTION INTRAVENOUS CONTINUOUS
Status: DISCONTINUED | OUTPATIENT
Start: 2023-08-24 | End: 2023-08-25 | Stop reason: HOSPADM

## 2023-08-24 RX ORDER — MIDAZOLAM HYDROCHLORIDE 2 MG/2ML
INJECTION, SOLUTION INTRAMUSCULAR; INTRAVENOUS AS NEEDED
Status: COMPLETED | OUTPATIENT
Start: 2023-08-24 | End: 2023-08-24

## 2023-08-24 RX ORDER — ACETAMINOPHEN 325 MG/1
650 TABLET ORAL EVERY 4 HOURS PRN
Status: DISCONTINUED | OUTPATIENT
Start: 2023-08-24 | End: 2023-08-25 | Stop reason: HOSPADM

## 2023-08-24 RX ADMIN — FENTANYL CITRATE 50 MCG: 50 INJECTION, SOLUTION INTRAMUSCULAR; INTRAVENOUS at 14:29

## 2023-08-24 RX ADMIN — MIDAZOLAM HYDROCHLORIDE 0.5 MG: 1 INJECTION, SOLUTION INTRAMUSCULAR; INTRAVENOUS at 14:40

## 2023-08-24 RX ADMIN — Medication 10 ML: at 14:28

## 2023-08-24 RX ADMIN — FENTANYL CITRATE 25 MCG: 50 INJECTION, SOLUTION INTRAMUSCULAR; INTRAVENOUS at 14:40

## 2023-08-24 RX ADMIN — FENTANYL CITRATE 50 MCG: 50 INJECTION, SOLUTION INTRAMUSCULAR; INTRAVENOUS at 14:24

## 2023-08-24 RX ADMIN — MIDAZOLAM HYDROCHLORIDE 1 MG: 1 INJECTION, SOLUTION INTRAMUSCULAR; INTRAVENOUS at 14:24

## 2023-08-24 RX ADMIN — SODIUM CHLORIDE 75 ML/HR: 0.9 INJECTION, SOLUTION INTRAVENOUS at 13:32

## 2023-08-24 RX ADMIN — Medication 10 ML: at 14:43

## 2023-08-24 RX ADMIN — MIDAZOLAM HYDROCHLORIDE 1 MG: 1 INJECTION, SOLUTION INTRAMUSCULAR; INTRAVENOUS at 14:28

## 2023-08-24 RX ADMIN — CEFAZOLIN 3000 MG: 1 INJECTION, POWDER, FOR SOLUTION INTRAMUSCULAR; INTRAVENOUS at 13:33

## 2023-08-24 NOTE — BRIEF OP NOTE (RAD/CATH)
INTERVENTIONAL RADIOLOGY PROCEDURE NOTE    Date: 8/24/2023    Procedure:   Procedure Summary     Date: 08/24/23 Room / Location: [address] [place] [address] Cardiac Cath Lab    Anesthesia Start:  Anesthesia Stop:     Procedure: IR PORT PLACEMENT Diagnosis:       Lymphoma of thyroid gland ([address])      (chemotherapy administration)    Scheduled Providers:  Responsible Provider:     Anesthesia Type: Not recorded ASA Status: Not recorded          Preoperative diagnosis:   1. Lymphoma of thyroid gland ([address])         Postoperative diagnosis: Same. Surgeon: Christiano Hunter MD     Assistant: None. No qualified resident was available. Blood loss: 4 mL    Specimens: None     Findings: Successful right chest port placement. Complications: None immediate.     Anesthesia: conscious sedation and local

## 2023-08-24 NOTE — H&P
Interventional Radiology  History and Physical 8/24/2023     Markos Albert   2000   [id number]    Assessment/Plan:  26year old female with diffuse large B cell lymphoma will be undergoing chemotherapy and presents for port placement. Problem List Items Addressed This Visit        Endocrine    Lymphoma of thyroid gland ([address])    Relevant Orders    IR port placement          Subjective:     Patient ID: Markos Albert is a 26 y.o. female. History of Present Illness  Patient with diffuse large B cell lymphoma will be undergoing chemotherapy and presents for port placement.     Review of Systems      Past Medical History:   Diagnosis Date   • Asthma    • Disease of thyroid gland    • Ear infection         Past Surgical History:   Procedure Laterality Date   • IR BIOPSY BONE MARROW  7/26/2023   • IR BIOPSY NECK  7/20/2023        Social History     Tobacco Use   Smoking Status Never   Smokeless Tobacco Never        Social History     Substance and Sexual Activity   Alcohol Use Yes   • Alcohol/week: 1.0 standard drink of alcohol   • Types: 1 Standard drinks or equivalent per week    Comment: socially        Social History     Substance and Sexual Activity   Drug Use Never        Allergies   Allergen Reactions   • Pollen Extract    • Shrimp Extract Allergy Skin Test - Food Allergy Hives       Current Outpatient Medications   Medication Sig Dispense Refill   • albuterol (Proventil HFA) 90 mcg/act inhaler Inhale 2 puffs every 6 (six) hours as needed for wheezing 6.7 g 5   • allopurinol (ZYLOPRIM) 300 mg tablet Take 1 tablet (300 mg total) by mouth daily 90 tablet 0   • amLODIPine (NORVASC) 5 mg tablet Take 1 tablet (5 mg total) by mouth daily Do not start before August 8, 2023. 30 tablet 0   • dicyclomine (BENTYL) 20 mg tablet Take 1 tablet (20 mg total) by mouth 2 (two) times a day 20 tablet 0   • diphenhydrAMINE (BENADRYL) 25 mg capsule Take 1 capsule (25 mg total) by mouth every 6 (six) hours as needed for itching 20 capsule 0   • fluticasone (FLONASE) 50 mcg/act nasal spray SPRAY 1 SPRAY INTO EACH NOSTRIL EVERY DAY 16 mL 2   • levothyroxine 150 mcg tablet Take 1 tablet (150 mcg total) by mouth daily 90 tablet 0   • montelukast (SINGULAIR) 10 mg tablet Take 10 mg by mouth daily at bedtime     • ondansetron (ZOFRAN) 4 mg tablet Take 1 tablet (4 mg total) by mouth every 6 (six) hours 12 tablet 0   • pantoprazole (PROTONIX) 40 mg tablet Take 1 tablet (40 mg total) by mouth 2 (two) times a day before meals 60 tablet 0   • polyethylene glycol (MIRALAX) 17 g packet Take 17 g by mouth daily Do not start before August 8, 2023.  0   • predniSONE 20 mg tablet TAKE 1 TABLET BY MOUTH EVERY DAY (Patient taking differently: Take 10 mg by mouth daily) 30 tablet 0   • predniSONE 20 mg tablet Take one tablet (20mg total) on treatment day THEN take one tablet (20mg total) for four more days after treatment; total of 5 days. Repeat every treatment cycle. 5 tablet 5   • senna-docusate sodium (SENOKOT S) 8.6-50 mg per tablet Take 1 tablet by mouth daily at bedtime  0     Current Facility-Administered Medications   Medication Dose Route Frequency Provider Last Rate Last Admin   • ceFAZolin (ANCEF) 3,000 mg in dextrose 5% 100 ml IVPB  3,000 mg Intravenous Once Bijal Ruby MD       • sodium chloride 0.9 % infusion  75 mL/hr Intravenous Continuous Bijal Ruby MD         Facility-Administered Medications Ordered in Other Encounters   Medication Dose Route Frequency Provider Last Rate Last Admin   • alteplase (CATHFLO) injection 2 mg  2 mg Intracatheter Q1MIN PRN Marli Sparks MD              Objective:    Vitals:    08/24/23 1321   BP: 127/80   Pulse: 85   Resp: 18   Temp: 98.8 °F (37.1 °C)   TempSrc: Temporal   SpO2: 96%   Weight: 124 kg (272 lb 4.3 oz)   Height: 5' 5" (1.651 m)        Physical Exam  Constitutional:       Appearance: Normal appearance. Cardiovascular:      Rate and Rhythm: Normal rate. Pulmonary:      Effort: Pulmonary effort is normal.   Skin:     General: Skin is dry. No results found for: "BNP"   Lab Results   Component Value Date    WBC 10.93 (H) 08/22/2023    HGB 12.8 08/22/2023    HCT 39.7 08/22/2023    MCV 85 08/22/2023     (H) 08/22/2023     Lab Results   Component Value Date    INR 0.92 07/25/2023    INR 0.99 07/20/2023    PROTIME 12.6 07/25/2023    PROTIME 12.9 07/20/2023     No results found for: "PTT"      I have personally reviewed pertinent imaging and laboratory results. Code Status: Prior  Advance Directive and Living Will:      Power of :    POLST:      This text is generated with voice recognition software. There may be translation, syntax,  or grammatical errors. If you have any questions, please contact the dictating provider.

## 2023-08-24 NOTE — TELEPHONE ENCOUNTER
Returned phone call to mother, Evan Valentino. She was inquiring if it was ok for patient to take her pain medication even though she has port placement today. I confirmed with her she is ok to proceed to take her pain medication. She verbalized understanding and has no other questions or concerns a this moment.

## 2023-09-05 ENCOUNTER — TELEPHONE (OUTPATIENT)
Dept: HEMATOLOGY ONCOLOGY | Facility: CLINIC | Age: 23
End: 2023-09-05

## 2023-09-05 NOTE — TELEPHONE ENCOUNTER
Called patient to reschedule appt .  Patient stated that she will not like to reschedule at this time

## 2023-09-08 PROBLEM — Z95.828 PORT-A-CATH IN PLACE: Status: ACTIVE | Noted: 2023-09-08

## 2023-09-11 DIAGNOSIS — C83.31 DIFFUSE LARGE B-CELL LYMPHOMA OF LYMPH NODES OF NECK (HCC): Primary | ICD-10-CM

## 2023-09-11 DIAGNOSIS — Z79.899 HIGH RISK MEDICATION USE: ICD-10-CM

## 2023-09-12 ENCOUNTER — HOSPITAL ENCOUNTER (OUTPATIENT)
Dept: INFUSION CENTER | Facility: CLINIC | Age: 23
Discharge: HOME | End: 2023-09-12
Payer: COMMERCIAL

## 2023-09-12 ENCOUNTER — TELEPHONE (OUTPATIENT)
Dept: HEMATOLOGY ONCOLOGY | Facility: CLINIC | Age: 23
End: 2023-09-12

## 2023-09-12 DIAGNOSIS — C83.31 DIFFUSE LARGE B-CELL LYMPHOMA OF LYMPH NODES OF NECK (HCC): ICD-10-CM

## 2023-09-12 DIAGNOSIS — Z79.899 HIGH RISK MEDICATION USE: ICD-10-CM

## 2023-09-12 DIAGNOSIS — Z95.828 PORT-A-CATH IN PLACE: ICD-10-CM

## 2023-09-12 DIAGNOSIS — C85.99 LYMPHOMA OF THYROID GLAND (HCC): Primary | ICD-10-CM

## 2023-09-12 LAB
ALBUMIN SERPL BCP-MCNC: 4 G/DL (ref 3.5–5)
ALP SERPL-CCNC: 50 U/L (ref 34–104)
ALT SERPL W P-5'-P-CCNC: 20 U/L (ref 7–52)
ANION GAP SERPL CALCULATED.3IONS-SCNC: 8 MMOL/L
AST SERPL W P-5'-P-CCNC: 14 U/L (ref 13–39)
BASOPHILS # BLD MANUAL: 0 THOUSAND/UL (ref 0–0.1)
BASOPHILS NFR MAR MANUAL: 0 % (ref 0–1)
BILIRUB SERPL-MCNC: 0.38 MG/DL (ref 0.2–1)
BUN SERPL-MCNC: 15 MG/DL (ref 5–25)
CALCIUM SERPL-MCNC: 9.2 MG/DL (ref 8.4–10.2)
CHLORIDE SERPL-SCNC: 101 MMOL/L (ref 96–108)
CO2 SERPL-SCNC: 27 MMOL/L (ref 21–32)
CREAT SERPL-MCNC: 0.65 MG/DL (ref 0.6–1.3)
EOSINOPHIL # BLD MANUAL: 0 THOUSAND/UL (ref 0–0.4)
EOSINOPHIL NFR BLD MANUAL: 0 % (ref 0–6)
ERYTHROCYTE [DISTWIDTH] IN BLOOD BY AUTOMATED COUNT: 14.6 % (ref 11.6–15.1)
GFR SERPL CREATININE-BSD FRML MDRD: 126 ML/MIN/1.73SQ M
GLUCOSE SERPL-MCNC: 89 MG/DL (ref 65–140)
HCG SERPL QL: NEGATIVE
HCT VFR BLD AUTO: 37.8 % (ref 34.8–46.1)
HGB BLD-MCNC: 12.3 G/DL (ref 11.5–15.4)
LYMPHOCYTES # BLD AUTO: 1.95 THOUSAND/UL (ref 0.6–4.47)
LYMPHOCYTES # BLD AUTO: 27 % (ref 14–44)
MCH RBC QN AUTO: 28 PG (ref 26.8–34.3)
MCHC RBC AUTO-ENTMCNC: 32.5 G/DL (ref 31.4–37.4)
MCV RBC AUTO: 86 FL (ref 82–98)
METAMYELOCYTES NFR BLD MANUAL: 5 % (ref 0–1)
MONOCYTES # BLD AUTO: 0.63 THOUSAND/UL (ref 0–1.22)
MONOCYTES NFR BLD: 9 % (ref 4–12)
NEUTROPHILS # BLD MANUAL: 4.04 THOUSAND/UL (ref 1.85–7.62)
NEUTS BAND NFR BLD MANUAL: 4 % (ref 0–8)
NEUTS SEG NFR BLD AUTO: 54 % (ref 43–75)
PLATELET # BLD AUTO: 373 THOUSANDS/UL (ref 149–390)
PLATELET BLD QL SMEAR: ADEQUATE
PMV BLD AUTO: 9.1 FL (ref 8.9–12.7)
POLYCHROMASIA BLD QL SMEAR: PRESENT
POTASSIUM SERPL-SCNC: 3.8 MMOL/L (ref 3.5–5.3)
PROT SERPL-MCNC: 7.3 G/DL (ref 6.4–8.4)
RBC # BLD AUTO: 4.4 MILLION/UL (ref 3.81–5.12)
SODIUM SERPL-SCNC: 136 MMOL/L (ref 135–147)
VARIANT LYMPHS # BLD AUTO: 1 %
WBC # BLD AUTO: 6.97 THOUSAND/UL (ref 4.31–10.16)

## 2023-09-12 PROCEDURE — 80053 COMPREHEN METABOLIC PANEL: CPT | Performed by: INTERNAL MEDICINE

## 2023-09-12 PROCEDURE — 85027 COMPLETE CBC AUTOMATED: CPT | Performed by: INTERNAL MEDICINE

## 2023-09-12 PROCEDURE — 84703 CHORIONIC GONADOTROPIN ASSAY: CPT | Performed by: INTERNAL MEDICINE

## 2023-09-12 PROCEDURE — 85007 BL SMEAR W/DIFF WBC COUNT: CPT | Performed by: INTERNAL MEDICINE

## 2023-09-12 NOTE — PROGRESS NOTES
Pt presents for port a cath flush. Pt offers no complaints. Port accessed, flushed, labs drawn, saline locked and de-accessed without difficulty. AVS declined, Next appointment reviewed.

## 2023-09-13 ENCOUNTER — PATIENT OUTREACH (OUTPATIENT)
Dept: CASE MANAGEMENT | Facility: HOSPITAL | Age: 23
End: 2023-09-13

## 2023-09-13 ENCOUNTER — HOSPITAL ENCOUNTER (OUTPATIENT)
Dept: INFUSION CENTER | Facility: CLINIC | Age: 23
Discharge: HOME | End: 2023-09-13
Payer: COMMERCIAL

## 2023-09-13 VITALS
WEIGHT: 275 LBS | TEMPERATURE: 98.9 F | DIASTOLIC BLOOD PRESSURE: 96 MMHG | BODY MASS INDEX: 45.82 KG/M2 | RESPIRATION RATE: 18 BRPM | HEART RATE: 99 BPM | OXYGEN SATURATION: 96 % | HEIGHT: 65 IN | SYSTOLIC BLOOD PRESSURE: 143 MMHG

## 2023-09-13 DIAGNOSIS — I10 HYPERTENSION: ICD-10-CM

## 2023-09-13 DIAGNOSIS — R13.10 DYSPHAGIA: ICD-10-CM

## 2023-09-13 DIAGNOSIS — C85.99 LYMPHOMA OF THYROID GLAND (HCC): Primary | ICD-10-CM

## 2023-09-13 PROCEDURE — 96367 TX/PROPH/DG ADDL SEQ IV INF: CPT

## 2023-09-13 PROCEDURE — 96415 CHEMO IV INFUSION ADDL HR: CPT

## 2023-09-13 PROCEDURE — 96411 CHEMO IV PUSH ADDL DRUG: CPT

## 2023-09-13 PROCEDURE — 96417 CHEMO IV INFUS EACH ADDL SEQ: CPT

## 2023-09-13 PROCEDURE — 96413 CHEMO IV INFUSION 1 HR: CPT

## 2023-09-13 RX ORDER — LORAZEPAM 0.5 MG/1
0.5 TABLET ORAL EVERY 8 HOURS PRN
Status: CANCELLED
Start: 2023-09-13

## 2023-09-13 RX ORDER — AMLODIPINE BESYLATE 5 MG/1
5 TABLET ORAL DAILY
Qty: 30 TABLET | Refills: 0 | Status: SHIPPED | OUTPATIENT
Start: 2023-09-13

## 2023-09-13 RX ORDER — ACETAMINOPHEN 325 MG/1
650 TABLET ORAL ONCE
Status: COMPLETED | OUTPATIENT
Start: 2023-09-13 | End: 2023-09-13

## 2023-09-13 RX ORDER — ACETAMINOPHEN 325 MG/1
650 TABLET ORAL ONCE
Status: CANCELLED | OUTPATIENT
Start: 2023-09-13

## 2023-09-13 RX ORDER — PANTOPRAZOLE SODIUM 40 MG/1
40 TABLET, DELAYED RELEASE ORAL
Qty: 60 TABLET | Refills: 0 | Status: SHIPPED | OUTPATIENT
Start: 2023-09-13

## 2023-09-13 RX ORDER — DOXORUBICIN HYDROCHLORIDE 2 MG/ML
50 INJECTION, SOLUTION INTRAVENOUS ONCE
Status: CANCELLED | OUTPATIENT
Start: 2023-09-13

## 2023-09-13 RX ORDER — SODIUM CHLORIDE 9 MG/ML
20 INJECTION, SOLUTION INTRAVENOUS ONCE
Status: COMPLETED | OUTPATIENT
Start: 2023-09-13 | End: 2023-09-13

## 2023-09-13 RX ORDER — SODIUM CHLORIDE 9 MG/ML
20 INJECTION, SOLUTION INTRAVENOUS ONCE
Status: CANCELLED | OUTPATIENT
Start: 2023-09-13

## 2023-09-13 RX ORDER — DOXORUBICIN HYDROCHLORIDE 2 MG/ML
50 INJECTION, SOLUTION INTRAVENOUS ONCE
Status: COMPLETED | OUTPATIENT
Start: 2023-09-13 | End: 2023-09-13

## 2023-09-13 RX ORDER — LORAZEPAM 0.5 MG/1
0.5 TABLET ORAL EVERY 8 HOURS PRN
Status: DISCONTINUED | OUTPATIENT
Start: 2023-09-13 | End: 2023-09-16 | Stop reason: HOSPADM

## 2023-09-13 RX ADMIN — DEXAMETHASONE SODIUM PHOSPHATE: 10 INJECTION, SOLUTION INTRAMUSCULAR; INTRAVENOUS at 13:48

## 2023-09-13 RX ADMIN — DOXORUBICIN HYDROCHLORIDE 110 MG: 2 INJECTION, SOLUTION INTRAVENOUS at 15:28

## 2023-09-13 RX ADMIN — CYCLOPHOSPHAMIDE 1650 MG: 1 INJECTION, POWDER, FOR SOLUTION INTRAVENOUS; ORAL at 14:52

## 2023-09-13 RX ADMIN — DIPHENHYDRAMINE HYDROCHLORIDE 25 MG: 50 INJECTION, SOLUTION INTRAMUSCULAR; INTRAVENOUS at 08:51

## 2023-09-13 RX ADMIN — SODIUM CHLORIDE 20 ML/HR: 0.9 INJECTION, SOLUTION INTRAVENOUS at 09:00

## 2023-09-13 RX ADMIN — SODIUM CHLORIDE 800 MG: 0.9 INJECTION, SOLUTION INTRAVENOUS at 09:56

## 2023-09-13 RX ADMIN — ACETAMINOPHEN 650 MG: 325 TABLET, FILM COATED ORAL at 08:50

## 2023-09-13 RX ADMIN — VINCRISTINE SULFATE 2 MG: 1 INJECTION, SOLUTION INTRAVENOUS at 15:48

## 2023-09-13 RX ADMIN — APREPITANT 130 MG: 130 INJECTION, EMULSION INTRAVENOUS at 14:11

## 2023-09-13 RX ADMIN — LORAZEPAM 0.5 MG: 0.5 TABLET ORAL at 08:50

## 2023-09-13 NOTE — PROGRESS NOTES
Pt reports to unit offering no complaints, states that after chemo she feels fatigued for a few days and has dry skin. . Patient receiving RCHOP, tolerating infusion well without any adverse events, during cytoxan patient experienced nasal stuffiness, message to be sent to office to ask for a longer infusion time for that medication next cycle, report given to SAJAN RN.

## 2023-09-13 NOTE — PROGRESS NOTES
Pt tolerated remaining infusion without incident. Port flushed and de-accessed. AVS declined. Reached out to Melyssa Duran RN to request cytoxan be run over 45 minutes due to facial stuffiness in her sinuses. Office will change it for her next appts.

## 2023-09-13 NOTE — PROGRESS NOTES
MSW checked in with pt in the infusion center this morning, she says that her second treatment went much better overall compared to her first.  She says that she did not develop the horrible headache that she had the first time, which was her biggest worry. She says that outside of a few days of feeling run down, fatigued, just generally not feeling well, she had no major side effects. Her job did start paying her disability last week, and she believes she will be getting back pay back to July in the near future. She thinks that this is approved through October, but says that she is on a JOSAFAT until February of 2024. At this point she denies any needs or concerns but thanked me for checking in. I will remain available to her as needed moving forward.

## 2023-09-18 ENCOUNTER — TELEPHONE (OUTPATIENT)
Dept: HEMATOLOGY ONCOLOGY | Facility: CLINIC | Age: 23
End: 2023-09-18

## 2023-09-18 DIAGNOSIS — C83.31 DIFFUSE LARGE B-CELL LYMPHOMA OF LYMPH NODES OF NECK (HCC): ICD-10-CM

## 2023-09-18 DIAGNOSIS — C85.99 LYMPHOMA OF THYROID GLAND (HCC): ICD-10-CM

## 2023-09-18 DIAGNOSIS — R11.2 NAUSEA AND VOMITING, UNSPECIFIED VOMITING TYPE: Primary | ICD-10-CM

## 2023-09-18 DIAGNOSIS — Z79.899 HIGH RISK MEDICATION USE: ICD-10-CM

## 2023-09-18 RX ORDER — ONDANSETRON HYDROCHLORIDE 8 MG/1
8 TABLET, FILM COATED ORAL EVERY 8 HOURS PRN
Qty: 20 TABLET | Refills: 0 | Status: SHIPPED | OUTPATIENT
Start: 2023-09-18

## 2023-09-18 NOTE — TELEPHONE ENCOUNTER
Called patient and instructed her to have her mother call pharmacy for prednisone refill, there should have been 5 all together. I also let her know I pended a zofran script to provider to sign to help with her nausea. She verbalized understanding and has no other questions or concerns at this moment. I instructed her to have her mother return call to office if the pharmacy reports no refills.

## 2023-09-18 NOTE — TELEPHONE ENCOUNTER
Medication Refill Request   Who are you speaking with? Parent   If it is not the patient, are they listed on an active communication consent form? Yes   Which medication is being requested for refill? Please list medication name and dosage Prednisone 20 mg    How many pills does the patient have left?  a few left   Preferred Pharmacy / Address  [facility] [address], [address] [address]   Phone:  [phone]  Fax:  [phone]    Who is the prescribing provider?  Dr. Katelyn Davis   Call back number [phone]   Relevant Information Patient requesting if something can be prescribed for nausea as well

## 2023-09-20 ENCOUNTER — TELEPHONE (OUTPATIENT)
Dept: HEMATOLOGY ONCOLOGY | Facility: CLINIC | Age: 23
End: 2023-09-20

## 2023-09-20 DIAGNOSIS — C83.31 DIFFUSE LARGE B-CELL LYMPHOMA OF LYMPH NODES OF NECK (HCC): Primary | ICD-10-CM

## 2023-09-20 NOTE — TELEPHONE ENCOUNTER
Called patient with new appt scheduled for October 24 .  Left message with all info and also with hope line tel number

## 2023-09-21 ENCOUNTER — TELEPHONE (OUTPATIENT)
Dept: HEMATOLOGY ONCOLOGY | Facility: CLINIC | Age: 23
End: 2023-09-21

## 2023-09-21 NOTE — TELEPHONE ENCOUNTER
Patient Call    Who are you speaking with? Patient    If it is not the patient, are they listed on an active communication consent form? N/A   What is the reason for this call? [facility] to know if there is any way the Prednisone can be a month supply, as the way the prescription is now she would have to refill it every few days. Does this require a call back? Yes   If a call back is required, please list best call back number [phone]   If a call back is required, advise that a message will be forwarded to their care team and someone will return their call as soon as possible. Did you relay this information to the patient?  Yes

## 2023-09-22 ENCOUNTER — TELEPHONE (OUTPATIENT)
Dept: HEMATOLOGY ONCOLOGY | Facility: CLINIC | Age: 23
End: 2023-09-22

## 2023-09-22 NOTE — TELEPHONE ENCOUNTER
Received voicemail:    "Good morning, Tae Childs. This is Scarlet Tirado, Homero Costello Chandra's mom. I'm sorry. Today is not a good day for me. I was asleep when you called. I think Doctor is there. I might have gotten confused, but I think doctor there is the one that wants her on the 20 milligrams every day. So if you can call me back at [phone]. I'm not quite sure what happened. I might have gotten doctors confused, but I just figured whether it's doctors there, Doctor Abigail Dye, that they should both be on, I guess the same boat when it comes to her morning medications. Again, if you can just call me back and if I have to, I'll have [place] [facility] called. Better there. Thank you so much. Hope to hear from you soon. cynthia Rodas."      Returned phone call to mother, Niles Antoine. She apologized for the mix up with providers and reports it is Dr. Lucia Pickering who prescribes patient's daily dose of prednisone. I instructed her to call their office for refill of that since Dr. Abigail Dye is out of the office today. She verbalized understanding and has no other questions or concerns at this moment.

## 2023-09-22 NOTE — TELEPHONE ENCOUNTER
Attempted to return phone call to mother, no answer. I left a detailed voicemail letting her know patient should only be taking prednisone on days 1-5 during her treatments and not everyday otherwise. I instructed her to return my phone call at her earliest convenience to discuss further.

## 2023-10-03 ENCOUNTER — HOSPITAL ENCOUNTER (OUTPATIENT)
Dept: INFUSION CENTER | Facility: CLINIC | Age: 23
Discharge: HOME | End: 2023-10-03
Payer: COMMERCIAL

## 2023-10-03 DIAGNOSIS — E03.9 ACQUIRED HYPOTHYROIDISM: ICD-10-CM

## 2023-10-03 DIAGNOSIS — C83.31 DIFFUSE LARGE B-CELL LYMPHOMA OF LYMPH NODES OF NECK (HCC): ICD-10-CM

## 2023-10-03 DIAGNOSIS — R13.10 DYSPHAGIA: ICD-10-CM

## 2023-10-03 DIAGNOSIS — I10 HYPERTENSION: ICD-10-CM

## 2023-10-03 DIAGNOSIS — C85.99 LYMPHOMA OF THYROID GLAND (HCC): ICD-10-CM

## 2023-10-03 LAB
ALBUMIN SERPL BCP-MCNC: 3.9 G/DL (ref 3.5–5)
ALP SERPL-CCNC: 57 U/L (ref 34–104)
ALT SERPL W P-5'-P-CCNC: 28 U/L (ref 7–52)
ANION GAP SERPL CALCULATED.3IONS-SCNC: 8 MMOL/L
ANISOCYTOSIS BLD QL SMEAR: PRESENT
AST SERPL W P-5'-P-CCNC: 21 U/L (ref 13–39)
BASOPHILS # BLD MANUAL: 0.12 THOUSAND/UL (ref 0–0.1)
BASOPHILS NFR MAR MANUAL: 2 % (ref 0–1)
BILIRUB SERPL-MCNC: 0.33 MG/DL (ref 0.2–1)
BUN SERPL-MCNC: 11 MG/DL (ref 5–25)
CALCIUM SERPL-MCNC: 9.2 MG/DL (ref 8.4–10.2)
CHLORIDE SERPL-SCNC: 105 MMOL/L (ref 96–108)
CO2 SERPL-SCNC: 25 MMOL/L (ref 21–32)
CREAT SERPL-MCNC: 0.61 MG/DL (ref 0.6–1.3)
EOSINOPHIL # BLD MANUAL: 0.19 THOUSAND/UL (ref 0–0.4)
EOSINOPHIL NFR BLD MANUAL: 3 % (ref 0–6)
ERYTHROCYTE [DISTWIDTH] IN BLOOD BY AUTOMATED COUNT: 15.1 % (ref 11.6–15.1)
GFR SERPL CREATININE-BSD FRML MDRD: 129 ML/MIN/1.73SQ M
GLUCOSE SERPL-MCNC: 94 MG/DL (ref 65–140)
HBV SURFACE AG SER QL: NORMAL
HCG SERPL QL: NEGATIVE
HCT VFR BLD AUTO: 36.2 % (ref 34.8–46.1)
HGB BLD-MCNC: 11.9 G/DL (ref 11.5–15.4)
LYMPHOCYTES # BLD AUTO: 1.49 THOUSAND/UL (ref 0.6–4.47)
LYMPHOCYTES # BLD AUTO: 24 % (ref 14–44)
MCH RBC QN AUTO: 29 PG (ref 26.8–34.3)
MCHC RBC AUTO-ENTMCNC: 32.9 G/DL (ref 31.4–37.4)
MCV RBC AUTO: 88 FL (ref 82–98)
METAMYELOCYTES NFR BLD MANUAL: 3 % (ref 0–1)
MONOCYTES # BLD AUTO: 0.19 THOUSAND/UL (ref 0–1.22)
MONOCYTES NFR BLD: 3 % (ref 4–12)
MYELOCYTES NFR BLD MANUAL: 1 % (ref 0–1)
NEUTROPHILS # BLD MANUAL: 3.97 THOUSAND/UL (ref 1.85–7.62)
NEUTS BAND NFR BLD MANUAL: 2 % (ref 0–8)
NEUTS SEG NFR BLD AUTO: 62 % (ref 43–75)
PLATELET # BLD AUTO: 373 THOUSANDS/UL (ref 149–390)
PLATELET BLD QL SMEAR: ADEQUATE
PMV BLD AUTO: 9.5 FL (ref 8.9–12.7)
POLYCHROMASIA BLD QL SMEAR: PRESENT
POTASSIUM SERPL-SCNC: 3.6 MMOL/L (ref 3.5–5.3)
PROT SERPL-MCNC: 6.9 G/DL (ref 6.4–8.4)
RBC # BLD AUTO: 4.11 MILLION/UL (ref 3.81–5.12)
RBC MORPH BLD: PRESENT
SODIUM SERPL-SCNC: 138 MMOL/L (ref 135–147)
WBC # BLD AUTO: 6.21 THOUSAND/UL (ref 4.31–10.16)

## 2023-10-03 PROCEDURE — 85007 BL SMEAR W/DIFF WBC COUNT: CPT | Performed by: INTERNAL MEDICINE

## 2023-10-03 PROCEDURE — 84703 CHORIONIC GONADOTROPIN ASSAY: CPT | Performed by: INTERNAL MEDICINE

## 2023-10-03 PROCEDURE — 80053 COMPREHEN METABOLIC PANEL: CPT | Performed by: INTERNAL MEDICINE

## 2023-10-03 PROCEDURE — 86704 HEP B CORE ANTIBODY TOTAL: CPT

## 2023-10-03 PROCEDURE — 87340 HEPATITIS B SURFACE AG IA: CPT

## 2023-10-03 PROCEDURE — 85027 COMPLETE CBC AUTOMATED: CPT | Performed by: INTERNAL MEDICINE

## 2023-10-03 RX ORDER — PANTOPRAZOLE SODIUM 40 MG/1
40 TABLET, DELAYED RELEASE ORAL
Qty: 60 TABLET | Refills: 0 | Status: SHIPPED | OUTPATIENT
Start: 2023-10-03

## 2023-10-03 RX ORDER — AMLODIPINE BESYLATE 5 MG/1
5 TABLET ORAL DAILY
Qty: 30 TABLET | Refills: 0 | Status: SHIPPED | OUTPATIENT
Start: 2023-10-03

## 2023-10-03 RX ORDER — LEVOTHYROXINE SODIUM 0.15 MG/1
150 TABLET ORAL DAILY
Qty: 90 TABLET | Refills: 0 | Status: SHIPPED | OUTPATIENT
Start: 2023-10-03 | End: 2024-01-01

## 2023-10-03 NOTE — PROGRESS NOTES
Pt to clinic for labs draw via port. Per Fernanda Bell, ok to draw hep B labs today. Pt offers no complaints today. Pt aware of next appointment. Pt port accessed, flushed, and de-accessed with positive blood returned. AVS was offered, pt declined. Pt ambulated out of clinic safely.

## 2023-10-04 ENCOUNTER — HOSPITAL ENCOUNTER (OUTPATIENT)
Dept: INFUSION CENTER | Facility: CLINIC | Age: 23
Discharge: HOME | End: 2023-10-04
Payer: COMMERCIAL

## 2023-10-04 ENCOUNTER — TELEPHONE (OUTPATIENT)
Dept: HEMATOLOGY ONCOLOGY | Facility: CLINIC | Age: 23
End: 2023-10-04

## 2023-10-04 ENCOUNTER — HOSPITAL ENCOUNTER (EMERGENCY)
Facility: HOSPITAL | Age: 23
Discharge: HOME | End: 2023-10-04
Attending: EMERGENCY MEDICINE
Payer: COMMERCIAL

## 2023-10-04 VITALS
DIASTOLIC BLOOD PRESSURE: 72 MMHG | OXYGEN SATURATION: 98 % | TEMPERATURE: 98.3 F | SYSTOLIC BLOOD PRESSURE: 133 MMHG | RESPIRATION RATE: 18 BRPM | HEART RATE: 109 BPM

## 2023-10-04 VITALS
HEART RATE: 101 BPM | DIASTOLIC BLOOD PRESSURE: 98 MMHG | HEIGHT: 65 IN | TEMPERATURE: 97.7 F | RESPIRATION RATE: 18 BRPM | WEIGHT: 273.2 LBS | SYSTOLIC BLOOD PRESSURE: 137 MMHG | BODY MASS INDEX: 45.52 KG/M2

## 2023-10-04 DIAGNOSIS — C85.99 LYMPHOMA OF THYROID GLAND (HCC): Primary | ICD-10-CM

## 2023-10-04 DIAGNOSIS — G43.909 MIGRAINE WITHOUT STATUS MIGRAINOSUS, NOT INTRACTABLE, UNSPECIFIED MIGRAINE TYPE: Primary | ICD-10-CM

## 2023-10-04 PROCEDURE — 96375 TX/PRO/DX INJ NEW DRUG ADDON: CPT

## 2023-10-04 PROCEDURE — 96374 THER/PROPH/DIAG INJ IV PUSH: CPT

## 2023-10-04 PROCEDURE — 96367 TX/PROPH/DG ADDL SEQ IV INF: CPT

## 2023-10-04 PROCEDURE — 96415 CHEMO IV INFUSION ADDL HR: CPT

## 2023-10-04 PROCEDURE — 96411 CHEMO IV PUSH ADDL DRUG: CPT

## 2023-10-04 PROCEDURE — 99282 EMERGENCY DEPT VISIT SF MDM: CPT

## 2023-10-04 PROCEDURE — 96413 CHEMO IV INFUSION 1 HR: CPT

## 2023-10-04 PROCEDURE — 99284 EMERGENCY DEPT VISIT MOD MDM: CPT | Performed by: PHYSICIAN ASSISTANT

## 2023-10-04 PROCEDURE — 96417 CHEMO IV INFUS EACH ADDL SEQ: CPT

## 2023-10-04 RX ORDER — DIPHENHYDRAMINE HYDROCHLORIDE 50 MG/ML
50 INJECTION INTRAMUSCULAR; INTRAVENOUS ONCE
Status: DISCONTINUED | OUTPATIENT
Start: 2023-10-04 | End: 2023-10-04 | Stop reason: HOSPADM

## 2023-10-04 RX ORDER — ACETAMINOPHEN 325 MG/1
650 TABLET ORAL ONCE
Status: CANCELLED | OUTPATIENT
Start: 2023-10-04

## 2023-10-04 RX ORDER — DOXORUBICIN HYDROCHLORIDE 2 MG/ML
50 INJECTION, SOLUTION INTRAVENOUS ONCE
Status: COMPLETED | OUTPATIENT
Start: 2023-10-04 | End: 2023-10-04

## 2023-10-04 RX ORDER — SODIUM CHLORIDE 9 MG/ML
20 INJECTION, SOLUTION INTRAVENOUS ONCE
Status: CANCELLED | OUTPATIENT
Start: 2023-10-04

## 2023-10-04 RX ORDER — DOXORUBICIN HYDROCHLORIDE 2 MG/ML
50 INJECTION, SOLUTION INTRAVENOUS ONCE
Status: CANCELLED | OUTPATIENT
Start: 2023-10-04

## 2023-10-04 RX ORDER — LORAZEPAM 0.5 MG/1
0.5 TABLET ORAL EVERY 8 HOURS PRN
Status: DISCONTINUED | OUTPATIENT
Start: 2023-10-04 | End: 2023-10-07 | Stop reason: HOSPADM

## 2023-10-04 RX ORDER — METOCLOPRAMIDE HYDROCHLORIDE 5 MG/ML
10 INJECTION INTRAMUSCULAR; INTRAVENOUS ONCE
Status: DISCONTINUED | OUTPATIENT
Start: 2023-10-04 | End: 2023-10-04 | Stop reason: HOSPADM

## 2023-10-04 RX ORDER — DROPERIDOL 2.5 MG/ML
1.25 INJECTION, SOLUTION INTRAMUSCULAR; INTRAVENOUS ONCE
Status: COMPLETED | OUTPATIENT
Start: 2023-10-04 | End: 2023-10-04

## 2023-10-04 RX ORDER — LORAZEPAM 0.5 MG/1
0.5 TABLET ORAL EVERY 8 HOURS PRN
Status: CANCELLED
Start: 2023-10-04

## 2023-10-04 RX ORDER — SODIUM CHLORIDE 9 MG/ML
20 INJECTION, SOLUTION INTRAVENOUS ONCE
Status: COMPLETED | OUTPATIENT
Start: 2023-10-04 | End: 2023-10-04

## 2023-10-04 RX ORDER — KETOROLAC TROMETHAMINE 30 MG/ML
15 INJECTION, SOLUTION INTRAMUSCULAR; INTRAVENOUS ONCE
Status: COMPLETED | OUTPATIENT
Start: 2023-10-04 | End: 2023-10-04

## 2023-10-04 RX ORDER — ACETAMINOPHEN 325 MG/1
650 TABLET ORAL ONCE
Status: COMPLETED | OUTPATIENT
Start: 2023-10-04 | End: 2023-10-04

## 2023-10-04 RX ADMIN — SODIUM CHLORIDE 800 MG: 0.9 INJECTION, SOLUTION INTRAVENOUS at 10:49

## 2023-10-04 RX ADMIN — KETOROLAC TROMETHAMINE 15 MG: 30 INJECTION INTRAMUSCULAR; INTRAVENOUS at 17:32

## 2023-10-04 RX ADMIN — CYCLOPHOSPHAMIDE 1650 MG: 1 INJECTION, POWDER, FOR SOLUTION INTRAVENOUS; ORAL at 15:09

## 2023-10-04 RX ADMIN — DIPHENHYDRAMINE HYDROCHLORIDE 25 MG: 50 INJECTION, SOLUTION INTRAMUSCULAR; INTRAVENOUS at 09:36

## 2023-10-04 RX ADMIN — DROPERIDOL 1.25 MG: 2.5 INJECTION, SOLUTION INTRAMUSCULAR; INTRAVENOUS at 17:38

## 2023-10-04 RX ADMIN — VINCRISTINE SULFATE 2 MG: 1 INJECTION, SOLUTION INTRAVENOUS at 16:22

## 2023-10-04 RX ADMIN — APREPITANT 130 MG: 130 INJECTION, EMULSION INTRAVENOUS at 14:19

## 2023-10-04 RX ADMIN — ACETAMINOPHEN 650 MG: 325 TABLET, FILM COATED ORAL at 09:34

## 2023-10-04 RX ADMIN — SODIUM CHLORIDE 20 ML/HR: 0.9 INJECTION, SOLUTION INTRAVENOUS at 10:00

## 2023-10-04 RX ADMIN — DEXAMETHASONE SODIUM PHOSPHATE: 10 INJECTION, SOLUTION INTRAMUSCULAR; INTRAVENOUS at 13:54

## 2023-10-04 RX ADMIN — DOXORUBICIN HYDROCHLORIDE 110 MG: 2 INJECTION, SOLUTION INTRAVENOUS at 16:06

## 2023-10-04 NOTE — PROGRESS NOTES
Pt to clinic for Ruxience, Cytoxan, Adriamycin, and Vincristine. Pt offers no complaints today. Tolerating vincristine infusion without complications. Report given to Bernarda Berman RN. Aware of next appointment. AVS declined.

## 2023-10-04 NOTE — TELEPHONE ENCOUNTER
Patient Call    Who are you speaking with? Parent    If it is not the patient, are they listed on an active communication consent form? N/A   What is the reason for this call? Patient mother is calling advising her daughter is getting very bad headaches and if she can take nurtec/ Sent message ruma forbes   Does this require a call back? Yes   If a call back is required, please list best call back number [phone]   If a call back is required, advise that a message will be forwarded to their care team and someone will return their call as soon as possible. Did you relay this information to the patient?  Yes

## 2023-10-04 NOTE — PROGRESS NOTES
Patient called out while the vincristine was running that she was getting a headache. She finished up with chemo infusion but was sobbing that her head pain was intense. According to her this headache has happened 2 her the other cycles she received but 2-3 days after her treatment ended. S/w her mother on the phone and told her the best thing for us to do is call a medical emergency and have her get seen in the ED, which is what we did. Patient did take some tylenol and advil when the headache came on. Chemo was taken, port left accessed for the ED when they came for her. [employer] sent to Dr Nate Rudolph and Melyssa Duran RN regarding what transpired this evening.

## 2023-10-04 NOTE — ED PROVIDER NOTES
History  Chief Complaint   Patient presents with   • Headache     Pt arrives as medical emergency from infusion center. Pt receiving chemo infusion today, sudden onset of severe headache. Pt screaming and crying upon arrival. [place] accessed. Hx of same reaction in the past     Patient is a 21-year-old female with a past medical history significant for lymphoma of the thyroid gland presenting to the emergency department for evaluation of headache. Patient was receiving her chemotherapy infusion today when she developed a headache towards the end of her infusion. She states that she gets this headache every time she gets an infusion. Usually this headache occurs 2 days after the infusion, however this headache started during her infusion. Patient hysterically crying on my evaluation so some history was obtained from her partner. She took 3 Tylenol and 3 Advil without relief. She is not having any focal weakness, numbness, tingling, visual disturbances, abdominal pain, nausea, vomiting, diarrhea. Denying chest pain, difficulty breathing, fevers, chills. No other complaints at this time. Prior to Admission Medications   Prescriptions Last Dose Informant Patient Reported? Taking? albuterol (Proventil HFA) 90 mcg/act inhaler  Self No No   Sig: Inhale 2 puffs every 6 (six) hours as needed for wheezing   allopurinol (ZYLOPRIM) 300 mg tablet  Self No No   Sig: Take 1 tablet (300 mg total) by mouth daily   amLODIPine (NORVASC) 5 mg tablet   No No   Sig: TAKE 1 TABLET (5 MG TOTAL) BY MOUTH DAILY.    dicyclomine (BENTYL) 20 mg tablet  Self No No   Sig: Take 1 tablet (20 mg total) by mouth 2 (two) times a day   diphenhydrAMINE (BENADRYL) 25 mg capsule  Self No No   Sig: Take 1 capsule (25 mg total) by mouth every 6 (six) hours as needed for itching   fluticasone (FLONASE) 50 mcg/act nasal spray  Self No No   Sig: SPRAY 1 SPRAY INTO EACH NOSTRIL EVERY DAY   levothyroxine 150 mcg tablet   No No   Sig: Take 1 tablet (150 mcg total) by mouth daily   montelukast (SINGULAIR) 10 mg tablet  Self Yes No   Sig: Take 10 mg by mouth daily at bedtime   ondansetron (ZOFRAN) 4 mg tablet  Self No No   Sig: Take 1 tablet (4 mg total) by mouth every 6 (six) hours   ondansetron (ZOFRAN) 8 mg tablet   No No   Sig: Take 1 tablet (8 mg total) by mouth every 8 (eight) hours as needed for nausea or vomiting   pantoprazole (PROTONIX) 40 mg tablet   No No   Sig: TAKE 1 TABLET BY MOUTH 2 TIMES A DAY BEFORE MEALS.   polyethylene glycol (MIRALAX) 17 g packet  Self No No   Sig: Take 17 g by mouth daily Do not start before August 8, 2023. predniSONE 20 mg tablet  Self No No   Sig: TAKE 1 TABLET BY MOUTH EVERY DAY   Patient taking differently: Take 10 mg by mouth daily   predniSONE 20 mg tablet   No No   Sig: Take one tablet (20mg total) on treatment day THEN take one tablet (20mg total) for four more days after treatment; total of 5 days. Repeat every treatment cycle. senna-docusate sodium (SENOKOT S) 8.6-50 mg per tablet  Self No No   Sig: Take 1 tablet by mouth daily at bedtime      Facility-Administered Medications: None       Past Medical History:   Diagnosis Date   • Asthma    • Disease of thyroid gland    • Ear infection    • Lymphoma [facility])        Past Surgical History:   Procedure Laterality Date   • IR BIOPSY BONE MARROW  7/26/2023   • IR BIOPSY NECK  7/20/2023   • IR PORT PLACEMENT  8/24/2023       No family history on file. I have reviewed and agree with the history as documented. E-Cigarette/Vaping   • E-Cigarette Use Never User      E-Cigarette/Vaping Substances   • Nicotine No    • THC No    • CBD No    • Flavoring No    • Other No    • Unknown No      Social History     Tobacco Use   • Smoking status: Never   • Smokeless tobacco: Never   Vaping Use   • Vaping Use: Never used   Substance Use Topics   • Alcohol use:  Yes     Alcohol/week: 1.0 standard drink of alcohol     Types: 1 Standard drinks or equivalent per week Comment: socially   • Drug use: Never       Review of Systems   Constitutional: Negative for chills and fever. HENT: Negative for congestion and sore throat. Eyes: Negative for visual disturbance. Respiratory: Negative for cough, chest tightness and shortness of breath. Cardiovascular: Negative for chest pain. Gastrointestinal: Negative for abdominal pain, diarrhea, nausea and vomiting. Neurological: Positive for headaches. Negative for dizziness, syncope and light-headedness. Psychiatric/Behavioral: The patient is nervous/anxious. All other systems reviewed and are negative. Physical Exam  Physical Exam  Vitals reviewed. Constitutional:       General: She is not in acute distress. Appearance: Normal appearance. She is obese. She is not ill-appearing, toxic-appearing or diaphoretic. HENT:      Head: Normocephalic and atraumatic. Right Ear: External ear normal.      Left Ear: External ear normal.   Eyes:      General: No scleral icterus. Right eye: No discharge. Left eye: No discharge. Extraocular Movements: Extraocular movements intact. Conjunctiva/sclera: Conjunctivae normal.   Cardiovascular:      Rate and Rhythm: Regular rhythm. Tachycardia present. Heart sounds: Normal heart sounds. No murmur heard. No friction rub. No gallop. Pulmonary:      Effort: Pulmonary effort is normal. No respiratory distress. Breath sounds: Normal breath sounds. No stridor. No wheezing, rhonchi or rales. Musculoskeletal:      Cervical back: Normal range of motion and neck supple. Skin:     General: Skin is warm and dry. Neurological:      General: No focal deficit present. Mental Status: She is alert and oriented to person, place, and time. Cranial Nerves: No cranial nerve deficit. Sensory: No sensory deficit. Motor: No weakness.       Coordination: Coordination normal.   Psychiatric:         Mood and Affect: Mood normal. Behavior: Behavior normal.         Vital Signs  ED Triage Vitals [10/04/23 1727]   Temperature Pulse Respirations Blood Pressure SpO2   98.3 °F (36.8 °C) (!) 156 22 (!) 202/107 99 %      Temp Source Heart Rate Source Patient Position - Orthostatic VS BP Location FiO2 (%)   Oral Monitor Sitting Left arm --      Pain Score       10 - Worst Possible Pain           Vitals:    10/04/23 1727 10/04/23 1800   BP: (!) 202/107 133/72   Pulse: (!) 156 (!) 109   Patient Position - Orthostatic VS: Sitting Sitting         Visual Acuity  Visual Acuity    Flowsheet Row Most Recent Value   L Pupil Size (mm) 4   R Pupil Size (mm) 4          ED Medications  Medications   metoclopramide (REGLAN) injection 10 mg (10 mg Intravenous Not Given 10/4/23 1732)   diphenhydrAMINE (BENADRYL) injection 50 mg (50 mg Intravenous Not Given 10/4/23 1732)   ketorolac (TORADOL) injection 15 mg (15 mg Intravenous Given 10/4/23 1732)   droperidol (INAPSINE) injection 1.25 mg (1.25 mg Intravenous Given 10/4/23 1738)       Diagnostic Studies  Results Reviewed     None                 No orders to display              Procedures  Procedures         ED Course                               SBIRT 22yo+    Flowsheet Row Most Recent Value   Initial Alcohol Screen: US AUDIT-C     1. How often do you have a drink containing alcohol? 1 Filed at: 10/04/2023 1745   2. How many drinks containing alcohol do you have on a typical day you are drinking? 0 Filed at: 10/04/2023 1745   3b. FEMALE Any Age, or MALE 65+: How often do you have 4 or more drinks on one occassion? 0 Filed at: 10/04/2023 1745   Audit-C Score 1 Filed at: 10/04/2023 1745   DINAH: How many times in the past year have you. .. Used an illegal drug or used a prescription medication for non-medical reasons? Never Filed at: 10/04/2023 1745                    Medical Decision Making  Denies to the emergency department for evaluation of headache that occurred during her chemotherapy infusion.   Upon arrival patient appears uncomfortable secondary to pain. She is hysterically crying. She does not have any focal neurologic deficits on examination. She appears to be very anxious. She was given Toradol as well as droperidol for migraine and reports 100% resolution of her symptoms. She is requesting to go home. She was discharged with instructions to follow-up with neurology as well as her oncologist to inquire about potential change in therapy as she does not tolerate her current regimen and develops headaches every time she gets her infusion. She is in stable condition at time of discharge. Migraine without status migrainosus, not intractable, unspecified migraine type: acute illness or injury  Risk  Prescription drug management. Disposition  Final diagnoses:   Migraine without status migrainosus, not intractable, unspecified migraine type     Time reflects when diagnosis was documented in both MDM as applicable and the Disposition within this note     Time User Action Codes Description Comment    10/4/2023  6:46 PM Johnna Hylton, [address] [G43.904] Migraine without status migrainosus, not intractable, unspecified migraine type       ED Disposition     ED Disposition   Discharge    Condition   Stable    Date/Time   Wed Oct 4, 2023  6:46 PM    [address] discharge to home/self care.                Follow-up Information     Follow up With Specialties Details Why Contact Info Additional [facility] Emergency Department Emergency Medicine Go to  If symptoms worsen [address] [facility] Emergency Department, [place], [place], [facility]  [facility] Neurology Schedule an appointment as soon as possible for a visit  for follow up [address] [place] [id number]  [address] [place] [address] Neurology [address] [facility]  [address], [place], [place], [address]          Patient's Medications   Discharge Prescriptions    No medications on file           PDMP Review     None          ED Provider  Electronically Signed by           Claudia Salcido PA-C  10/04/23 6081

## 2023-10-05 ENCOUNTER — TELEPHONE (OUTPATIENT)
Dept: HEMATOLOGY ONCOLOGY | Facility: CLINIC | Age: 23
End: 2023-10-05

## 2023-10-05 NOTE — TELEPHONE ENCOUNTER
Returned phone call to mother, Teja Barros. She reports that she is worried about patient's current treatment since she has been getting uncontrolled migraines with them to the point that the patient is screaming out loud. She is requesting that provider look at the patient's tx plan and see if it can be adjusted or changed. She is also inquiring if the provider can prescribe something to the patient for the headaches so she has something to take to assist with alleviating it. I let her know Dr. Reggie Allen is out of the office right now for the rest of the week, but I will consult with her and call her with any changes and updated. She verbalized understanding and has no other questions or concerns at this moment.

## 2023-10-05 NOTE — TELEPHONE ENCOUNTER
Attempted to return phone call, no answer. I left a voicemail letting her know I received a message that patient went to the ER with a headache from tx. I also mentioned that I will bring to providers attention to see what recommendations she might have or changes she might need to make to the plan. I reported I would call back next week with any updated. I instructed her to return phone call if she has any other questions or concerns at this moment.

## 2023-10-05 NOTE — TELEPHONE ENCOUNTER
Patient Call    Who are you speaking with? Parent    If it is not the patient, are they listed on an active communication consent form? Yes   What is the reason for this call? Patients mom called in wanting to speak with Halima Vu, call can not be transferred, advised Halima Vu and she will be calling mom back   Does this require a call back? Yes   If a call back is required, please list best call back number [phone]   If a call back is required, advise that a message will be forwarded to their care team and someone will return their call as soon as possible. Did you relay this information to the patient?  Yes

## 2023-10-06 DIAGNOSIS — Z79.899 HIGH RISK MEDICATION USE: ICD-10-CM

## 2023-10-06 DIAGNOSIS — C85.99 LYMPHOMA OF THYROID GLAND (HCC): ICD-10-CM

## 2023-10-06 DIAGNOSIS — E06.1 SUBACUTE THYROIDITIS: ICD-10-CM

## 2023-10-06 DIAGNOSIS — C83.31 DIFFUSE LARGE B-CELL LYMPHOMA OF LYMPH NODES OF NECK (HCC): ICD-10-CM

## 2023-10-06 RX ORDER — PREDNISONE 20 MG/1
10 TABLET ORAL DAILY
OUTPATIENT
Start: 2023-10-06

## 2023-10-06 NOTE — TELEPHONE ENCOUNTER
Name of medication/s patient is requesting to be refilled Predisone  Medication dosage 20 mg    How often is medication being taken one everyday    Is patient requesting 30 or 90 day supply, 30 day supply    Name of Pharmacy [facility] in [place]    Last Visit 10/3/2023  Next Visit 11/15/2023    Patient also only has 3 day supply left

## 2023-10-09 LAB — MISCELLANEOUS LAB TEST RESULT: NORMAL

## 2023-10-10 DIAGNOSIS — C85.99 LYMPHOMA OF THYROID GLAND (HCC): ICD-10-CM

## 2023-10-10 DIAGNOSIS — Z79.899 HIGH RISK MEDICATION USE: ICD-10-CM

## 2023-10-10 DIAGNOSIS — C83.31 DIFFUSE LARGE B-CELL LYMPHOMA OF LYMPH NODES OF NECK (HCC): ICD-10-CM

## 2023-10-10 RX ORDER — PREDNISONE 20 MG/1
20 TABLET ORAL DAILY
Qty: 30 TABLET | Refills: 0 | OUTPATIENT
Start: 2023-10-10

## 2023-10-10 NOTE — TELEPHONE ENCOUNTER
T/c from pt's mother, Ana Cao - she has been calling Dr Abraham Coulter office (gilberto) for over 3 weeks to get a refill on Coral's prednisone - they told her they will send it and it never gets done - and now no one calls her back after leaving several messages and she has only 2 pills left - pt in chemotherapy and has to have this after each treatment - is requesting if you would send in the refill for her? She is aware you are not in the office today. Ana Cao asks that we call her. Please advise.

## 2023-10-10 NOTE — TELEPHONE ENCOUNTER
Notified patient that she needs to reach out to her oncologist to have more then a 5 day supply sent in. Patient understood and will reach out to them.

## 2023-10-10 NOTE — TELEPHONE ENCOUNTER
Patients mother called and patient only has 1 pill left. Her oncologist keeps refilling this for only 5 days but patient was instructed to take it everyday.  IF we could send over a script for 30 day supply sent to [facility] in [place]

## 2023-10-11 RX ORDER — PREDNISONE 20 MG/1
TABLET ORAL
Qty: 5 TABLET | Refills: 5 | Status: SHIPPED | OUTPATIENT
Start: 2023-10-11

## 2023-10-19 ENCOUNTER — TELEPHONE (OUTPATIENT)
Dept: HEMATOLOGY ONCOLOGY | Facility: CLINIC | Age: 23
End: 2023-10-19

## 2023-10-19 DIAGNOSIS — R51.9 ACUTE INTRACTABLE HEADACHE, UNSPECIFIED HEADACHE TYPE: Primary | ICD-10-CM

## 2023-10-19 DIAGNOSIS — C85.90 NON-HODGKIN'S LYMPHOMA, UNSPECIFIED BODY REGION, UNSPECIFIED NON-HODGKIN LYMPHOMA TYPE (HCC): ICD-10-CM

## 2023-10-19 RX ORDER — SUMATRIPTAN 50 MG/1
50 TABLET, FILM COATED ORAL ONCE AS NEEDED
Qty: 10 TABLET | Refills: 0 | Status: SHIPPED | OUTPATIENT
Start: 2023-10-19 | End: 2023-11-21

## 2023-10-19 NOTE — TELEPHONE ENCOUNTER
Patient Call    Who are you speaking with? Parent    If it is not the patient, are they listed on an active communication consent form? Yes   What is the reason for this call? Patients mother calling to speak with Johnna Ruiz regarding patient   Does this require a call back? yes   If a call back is required, please list best call back number [phone]   If a call back is required, advise that a message will be forwarded to their care team and someone will return their call as soon as possible. Did you relay this information to the patient?  yes

## 2023-10-19 NOTE — TELEPHONE ENCOUNTER
Dr. Chris Salcido calling to discuss tx with mother, Jan Bey. Per Dr. Chris Salcido: Run the Rituximab at 150mg/hr, not to exceed 200mg/hr. Imitrex prescribed for headaches.

## 2023-10-23 ENCOUNTER — HOSPITAL ENCOUNTER (OUTPATIENT)
Dept: CT IMAGING | Facility: HOSPITAL | Age: 23
Discharge: HOME | End: 2023-10-23
Attending: INTERNAL MEDICINE
Payer: COMMERCIAL

## 2023-10-23 DIAGNOSIS — C85.90 NON-HODGKIN'S LYMPHOMA, UNSPECIFIED BODY REGION, UNSPECIFIED NON-HODGKIN LYMPHOMA TYPE (HCC): ICD-10-CM

## 2023-10-23 PROCEDURE — G1004 CDSM NDSC: HCPCS

## 2023-10-23 PROCEDURE — 71260 CT THORAX DX C+: CPT

## 2023-10-23 PROCEDURE — 74177 CT ABD & PELVIS W/CONTRAST: CPT

## 2023-10-23 RX ADMIN — IOHEXOL 100 ML: 350 INJECTION, SOLUTION INTRAVENOUS at 13:56

## 2023-10-24 ENCOUNTER — OFFICE VISIT (OUTPATIENT)
Dept: HEMATOLOGY ONCOLOGY | Facility: CLINIC | Age: 23
End: 2023-10-24
Payer: COMMERCIAL

## 2023-10-24 ENCOUNTER — TELEPHONE (OUTPATIENT)
Dept: HEMATOLOGY ONCOLOGY | Facility: CLINIC | Age: 23
End: 2023-10-24

## 2023-10-24 ENCOUNTER — HOSPITAL ENCOUNTER (OUTPATIENT)
Dept: INFUSION CENTER | Facility: CLINIC | Age: 23
Discharge: HOME | End: 2023-10-24
Payer: COMMERCIAL

## 2023-10-24 VITALS
DIASTOLIC BLOOD PRESSURE: 86 MMHG | OXYGEN SATURATION: 98 % | SYSTOLIC BLOOD PRESSURE: 130 MMHG | WEIGHT: 280 LBS | HEART RATE: 86 BPM | HEIGHT: 65 IN | BODY MASS INDEX: 46.65 KG/M2 | TEMPERATURE: 97.6 F | RESPIRATION RATE: 16 BRPM

## 2023-10-24 DIAGNOSIS — C83.31 DIFFUSE LARGE B-CELL LYMPHOMA OF LYMPH NODES OF NECK (HCC): Primary | ICD-10-CM

## 2023-10-24 DIAGNOSIS — C85.99 LYMPHOMA OF THYROID GLAND (HCC): ICD-10-CM

## 2023-10-24 DIAGNOSIS — Z95.828 PORT-A-CATH IN PLACE: Primary | ICD-10-CM

## 2023-10-24 LAB
ALBUMIN SERPL BCP-MCNC: 4.2 G/DL (ref 3.5–5)
ALP SERPL-CCNC: 60 U/L (ref 34–104)
ALT SERPL W P-5'-P-CCNC: 54 U/L (ref 7–52)
ANION GAP SERPL CALCULATED.3IONS-SCNC: 6 MMOL/L
AST SERPL W P-5'-P-CCNC: 29 U/L (ref 13–39)
BASOPHILS # BLD MANUAL: 0.05 THOUSAND/UL (ref 0–0.1)
BASOPHILS NFR MAR MANUAL: 1 % (ref 0–1)
BILIRUB SERPL-MCNC: 0.28 MG/DL (ref 0.2–1)
BUN SERPL-MCNC: 9 MG/DL (ref 5–25)
CALCIUM SERPL-MCNC: 9.3 MG/DL (ref 8.4–10.2)
CHLORIDE SERPL-SCNC: 105 MMOL/L (ref 96–108)
CO2 SERPL-SCNC: 27 MMOL/L (ref 21–32)
CREAT SERPL-MCNC: 0.63 MG/DL (ref 0.6–1.3)
EOSINOPHIL # BLD MANUAL: 0.16 THOUSAND/UL (ref 0–0.4)
EOSINOPHIL NFR BLD MANUAL: 3 % (ref 0–6)
ERYTHROCYTE [DISTWIDTH] IN BLOOD BY AUTOMATED COUNT: 14.9 % (ref 11.6–15.1)
GFR SERPL CREATININE-BSD FRML MDRD: 127 ML/MIN/1.73SQ M
GLUCOSE SERPL-MCNC: 101 MG/DL (ref 65–140)
HCG SERPL QL: NEGATIVE
HCT VFR BLD AUTO: 34.9 % (ref 34.8–46.1)
HGB BLD-MCNC: 11.5 G/DL (ref 11.5–15.4)
LYMPHOCYTES # BLD AUTO: 1.11 THOUSAND/UL (ref 0.6–4.47)
LYMPHOCYTES # BLD AUTO: 20 % (ref 14–44)
MCH RBC QN AUTO: 28.9 PG (ref 26.8–34.3)
MCHC RBC AUTO-ENTMCNC: 33 G/DL (ref 31.4–37.4)
MCV RBC AUTO: 88 FL (ref 82–98)
METAMYELOCYTES NFR BLD MANUAL: 3 % (ref 0–1)
MONOCYTES # BLD AUTO: 0.9 THOUSAND/UL (ref 0–1.22)
MONOCYTES NFR BLD: 17 % (ref 4–12)
MYELOCYTES NFR BLD MANUAL: 1 % (ref 0–1)
NEUTROPHILS # BLD MANUAL: 2.85 THOUSAND/UL (ref 1.85–7.62)
NEUTS BAND NFR BLD MANUAL: 5 % (ref 0–8)
NEUTS SEG NFR BLD AUTO: 49 % (ref 43–75)
PLATELET # BLD AUTO: 417 THOUSANDS/UL (ref 149–390)
PLATELET BLD QL SMEAR: ABNORMAL
PMV BLD AUTO: 9.5 FL (ref 8.9–12.7)
POTASSIUM SERPL-SCNC: 3.7 MMOL/L (ref 3.5–5.3)
PROT SERPL-MCNC: 7.3 G/DL (ref 6.4–8.4)
RBC # BLD AUTO: 3.98 MILLION/UL (ref 3.81–5.12)
SODIUM SERPL-SCNC: 138 MMOL/L (ref 135–147)
VARIANT LYMPHS # BLD AUTO: 1 %
WBC # BLD AUTO: 5.27 THOUSAND/UL (ref 4.31–10.16)

## 2023-10-24 PROCEDURE — 80053 COMPREHEN METABOLIC PANEL: CPT | Performed by: INTERNAL MEDICINE

## 2023-10-24 PROCEDURE — 84703 CHORIONIC GONADOTROPIN ASSAY: CPT | Performed by: INTERNAL MEDICINE

## 2023-10-24 PROCEDURE — 85027 COMPLETE CBC AUTOMATED: CPT | Performed by: INTERNAL MEDICINE

## 2023-10-24 PROCEDURE — 99215 OFFICE O/P EST HI 40 MIN: CPT | Performed by: INTERNAL MEDICINE

## 2023-10-24 PROCEDURE — 85007 BL SMEAR W/DIFF WBC COUNT: CPT | Performed by: INTERNAL MEDICINE

## 2023-10-24 RX ORDER — SODIUM CHLORIDE 9 MG/ML
20 INJECTION, SOLUTION INTRAVENOUS ONCE
Status: CANCELLED | OUTPATIENT
Start: 2023-10-25

## 2023-10-24 RX ORDER — DOXORUBICIN HYDROCHLORIDE 2 MG/ML
50 INJECTION, SOLUTION INTRAVENOUS ONCE
Status: CANCELLED | OUTPATIENT
Start: 2023-10-25

## 2023-10-24 RX ORDER — ACETAMINOPHEN 325 MG/1
650 TABLET ORAL ONCE
Status: CANCELLED | OUTPATIENT
Start: 2023-10-25

## 2023-10-24 RX ORDER — LORAZEPAM 0.5 MG/1
0.5 TABLET ORAL EVERY 8 HOURS PRN
Status: CANCELLED
Start: 2023-10-25

## 2023-10-24 NOTE — PROGRESS NOTES
Pt presents for lab specimen collection via port a cath. Port accessed, labs drawn, saline locked and de accessed without difficulty. Declined AVS, next appointment reviewed.

## 2023-10-24 NOTE — PROGRESS NOTES
[address] H[facility]ATOLOGY [facility] [address]  2000      PRIMARY HEMATOLOGIC/ONCOLOGIC DIAGNOSIS:  1. Diffuse Large B Cell Lymphoma, Germinal Center Subtype, Non-double expressor phenotype. Elevated proliferation index (%). Involvement of thyroid. Date of diagnosis 7/20/23. PATHOLOGY:   Stan Hanks MD O: [phone] F: [phone]  [address], [place] [address]  Surgical Pathology Report (Preliminary result) [id number]  Authorizing Provider: Stan Hanks MD Ordering Provider: Stan Hanks MD  Ordering Location: [facility] Cardiac  Cath Lab  Collected: 07/20/2023 [address] [place] [address]  Pathologist: Marya Mcburney, MD Received: 07/20/2023 1347  . Specimens  A Lymph Node, neck  . Mohit Doran Final Diagnosis  THYROID, BIOPSY: DIFFUSE LARGE B-CELL LYMPHOMA, GERMINAL CENTER SUBTYPE, NON-DOUBLE EXPRESSOR  PHENOTYPE; ADDITIONAL ANCILLARY TESTING PENDING; SEE IMPRESSION AND COMMENT  IMPRESSION:  The preliminary findings are of thyroid biopsies involved by a diffuse large B-cell lymphoma with an elevated proliferation index (%). Cytogenetic FISH studies for MYC, BCL2, BCL6 and IRF4 rearrangements as well as 11q aberrations are reportedly negative. The BCL2 positivity by immunohistochemistry and the absence of MYC rearrangements rules out Burkitt lymphoma and highgrade B-cell lymphoma with MYC and BCL2 and / or BCL6 rearrangements. The absence of 11q aberrations rules out high-grade / large B-cell lymphoma with 11q aberrations and the absence of IRF4 rearrangements rules out large B-cell lymphoma with IRF4 rearrangements. The overall findings are most compatible with a diffuse large B-cell lymphoma (DLBCL), NOS, germinal center subtype, per the [facility] criteria, and non-double-expressor phenotype by immunohistochemistry.  Correlation with systemic (PET/CT) imaging, SPEP/UPEP with serum immunoglobulin heavy chains (IgG, IgA, IgM, IgE, IgD) and serum and urine free light chains (kappa/lambda) to evaluate for systemic involvement, is recommended, if clinically indicated. Additional studies (molecular and FISH) are pending and will be added to the final report when available. Subtype: Germinal Center Subtype: (CD10+/BCL6+/MUM1+)  Phenotype: Non-Double-Expressor Phenotype (BCL2+/MYC-)  Ki-67 Proliferation index: % within neoplastic cells  Cytogenetics: MYC, BCL2, BCL6, IRF4 rearrangements and 11q aberrations are NOT DETECTED; ([employer] #[id number],  [id number], [id number]; see separate reports). Surgical Pathology Report                         Case: [id number]                                    Authorizing Provider:  Rikki Laughlin MD        Collected:           07/26/2023 0915               Ordering Location:     [facility]      Received:            07/26/2023 [address] [place] [address]                                                               Pathologist:           Dorothy Alford MD                                                           Specimens:   A) - Iliac Crest, Right, core                                                                        B) - Iliac Crest, Right, clot                                                                        C) - Iliac Crest, Right, slide                                                             Addendum   Karyotype analysis is within normal limits; the original diagnosis remains unchanged. Cytogenetics Oncology Chromosome Analysis ([employer] # [id number]; please see separate report for further details): Addendum electronically signed by Dorothy Alford MD on 8/14/2023 at  8:10 AM   Final Diagnosis   A -C.   Bone marrow,  right iliac crest,  biopsy, clot, and aspirate:  -  No morphologic or immunophenotypic evidence of B-cell lymphoma.  -  Normocellular (~70%) bone marrow with trilineage maturing hematopoiesis with no increase in blasts. -  Reduced iron stores, correlation with serum iron studies is recommended. -  No increase in reticulin fibrosis. Comment: The patient's recently diagnosed B-cell lymphoma pending ancillary work-up is noted ([id number]). The current bone marrow biopsy shows no morphologic or immunophenotypic evidence of involvement by B-cell lymphoma. Flow cytometry is negative for myeloid, lymphoid, and plasma cell abnormalities. Karyotype analysis is pending and will be reported in a separate addendum. If clinically indicated material is available for ancillary molecular studies including B-cell gene rearrangement and lymphoid NGS; if compared to the diagnostic sample these testing modalities may be more sensitive in excluding low level molecular involvement of the bone marrow. Clinical correlation is advised. Electronically signed by Paulo Coles MD on 7/28/2023 at  3:41 PM   Microscopic Description  [facility]   Bone marrow aspirate smears: The aspirate smears are spicular, cellular, and  adequate for evaluation. Cellularity is composed of maturing trilineage hematopoiesis with a myeloid to erythroid ratio of 2.4:1.  Myelopoiesis:  Myeloid elements are present in appropriate quantities and show normal maturation without overt evidence of dysplasia (myeloblasts= <5%). Erythropoiesis:  Eyrthroid elements are present in appropriate quantities and show normal maturation without overt evidence of dysplasia. Megakaryocytes:  Megakaryocytes are present in appropriate quantities and exhibit a spectrum of maturation. Lymphocytes:  Mildly increased with small morphology; no evidence of large cells. Plasma cells:  Rare plasma cells.      200 cell differential:  Blasts: 0%  Pros: 0%  Sanders/Myelo: 17.5%  Segs: 39.0%  Eryth: 26.0%  Lymphs: 9.5%  Mono: 2.5%  Eos: 4.0%  Plasma cells: 1.5%  Baso: 0%  Atypical cells: 0%     M:E ratio= 2.4     Bone marrow core biopsy and aspirate clot:  Histologic sections of the aspirate clot and decalcified core biopsy are adequate for evaluation. Marrow space cellularity is normocellular for patient age (~70%). Myelopoiesis:  Exhibit progressive maturation (myeloblasts= <5%). Erythropoiesis:  Exhibit progressive maturation. Megakaryocytes:  Present with a spectrum of normal morphology. Lymphocytes:  Scattered. Plasma cells:  Rare. Special studies:   * Iron stain performed on the aspirate smear, clot, and core biopsy highlights reduced iron stores (1/4) with no evidence of ring sideroblasts; correlation with serum iron studies is recommended. * Reticulin stain performed on the core biopsy shows no significant increase in reticulin fibers. 0 of 3 by the  Consensus grading scheme. * PAS highlights myeloid and megakaryocytic elements confirming a normal M:E ratio. * Immunohistochemical stains were performed with appropriate controls and show the following results:  -  CD34 shows no increase in blasts (<5% of total cellularity) and  shows no increase in immature cells (<5% of total cellularity). Additionally,  highlights scattered mast cells. CD61 highlights scattered megakaryocytes in normal distribution and quantity. CD3 and CD20 highlight interstitially scattered T and B-cells (<10% of total cellularity). [place]-5 highlights B-cells and is negative for large B-cells. BCL-6 is negative.  highlights scattered plasma cells (<5% of total cellularity) with a polytypic kappa and lambda light chain expression by kappa and lambda in situ hybridization studies.       Note  [facility]   Component Ref Range & Units 7/25/23 0601 7/24/23 0454 7/22/23 1336 7/20/23 1047 6/23/23 2212 6/1/23 1033 2/12/23 1548   WBC 4.31 - 10.16 Thousand/uL 12.02 High   9.49  10.12  8.64  11.60 High   7.62  5.24    RBC 3.81 - 5.12 Million/uL 4.46  4.51  4.38  4.27  4.62  4.46  4.67    Hemoglobin 11.5 - 15.4 g/dL 12.0  12.1  12.0  11.9  12.9  12.5  13.1    Hematocrit 34.8 - 46.1 % 38.4  40.5  37.5  36.2  39.3  38.4  40.4    MCV 82 - 98 fL 86  90  86  85  85  86  87    MCH 26.8 - 34.3 pg 26.9  26.8  27.4  27.9  27.9  28.0  28.1    MCHC 31.4 - 37.4 g/dL 31.3 Low   29.9 Low   32.0  32.9  32.8  32.6  32.4    RDW 11.6 - 15.1 % 12.8  12.8  12.9  12.9  12.6  12.6  12.7    MPV 8.9 - 12.7 fL 9.5  9.6  9.3  9.1  9.2  9.5  10.0    Platelets 399 - 179 Thousands/uL 375  372  344  323  440 High   327  320       Flow Cytometry Analysis ([employer] # [id number]; please see separate report for further details): Additional Information  [facility]   All reported additional testing was performed with appropriately reactive controls. These tests were developed and their performance characteristics determined by [facility] or appropriate performing facility, though some tests may be performed on tissues which have not been validated for performance characteristics (such as staining performed on alcohol exposed cell blocks and decalcified tissues). Results should be interpreted with caution and in the context of the patients’ clinical condition. These tests may not be cleared or approved by the U.S. Food and Drug Administration, though the FDA has determined that such clearance or approval is not necessary. These tests are used for clinical purposes and they should not be regarded as investigational or for research. This laboratory has been approved by CLIA 88, designated as a high-complexity laboratory and is qualified to perform these tests. .Interpretation performed at [facility] [address] [facility], [address] [address] [address]      Gross Description  [facility]   A. The specimen is received in formalin, labeled with the patient's name and hospital number, and is designated " right iliac crest core".   The specimen consists of a tan to brown osseous tissue core measuring 2.9 cm in length and 0.3 cm in diameter. Entirely submitted. One cassette following decalcification in Immunocal. In an embedding bag.  B. The specimen is received in formalin, labeled with the patient's name and hospital number, and is designated " right iliac crest clot". The specimen consists of a brown to red soft tissue fragment measuring 2.8 x 1.7 x 0.2 cm. Entirely submitted. One cassette. In an embedding bag.  C. The specimen consists of microscopic aspirate slides of the right iliac crest, labeled with the patient's name and hospital number. Slides are submitted for staining and interpretation. Note: The estimated total formalin fixation time based upon information provided by the submitting clinician and the standard processing schedule is under 72 hours. STAGING: Cancer Staging   No matching staging information was found for the patient.        Oncology History   Lymphoma of thyroid gland ([address])   7/22/2023 Initial Diagnosis    Lymphoma of thyroid gland ([address])     8/2/2023 -  Chemotherapy    DOXOrubicin (ADRIAMYCIN), 50 mg/m2 = 110 mg, Intravenous, Once, 4 of 6 cycles  Administration: 110 mg (8/3/2023), 110 mg (8/23/2023), 110 mg (9/13/2023), 110 mg (10/4/2023)  alteplase (CATHFLO), 2 mg, Intracatheter, Every 1 Minute as needed, 4 of 6 cycles  vincristine (ONCOVIN) chemo infusion, 2 mg (original dose 1.4 mg/m2), Intravenous, Once, 4 of 6 cycles  Dose modification: 2 mg (original dose 1.4 mg/m2, Cycle 1, Reason: Max Dose Reached)  Administration: 2 mg (8/3/2023), 2 mg (8/23/2023), 2 mg (9/13/2023), 2 mg (10/4/2023)  cyclophosphamide (CYTOXAN) IVPB, 750 mg/m2 = 1,650 mg, Intravenous, Once, 4 of 6 cycles  Administration: 1,650 mg (8/2/2023), 1,650 mg (8/23/2023), 1,650 mg (9/13/2023), 1,650 mg (10/4/2023)  fosaprepitant (EMEND) IVPB, 150 mg, Intravenous, Once, 1 of 1 cycle  Administration: 150 mg (8/2/2023)  riTUXimab (RITUXAN) first titrated chemo infusion, 825 mg, Intravenous, Once, 2 of 2 cycles  Dose modification: 375 mg/m2 (original dose 375 mg/m2, Cycle 2)  Administration: 800 mg (8/2/2023)  aprepitant (CINVANTI) in  mL IVPB, 130 mg, Intravenous, Once, 3 of 5 cycles  Administration: 130 mg (8/23/2023), 130 mg (9/13/2023), 130 mg (10/4/2023)  riTUXimab-pvvr (Alejandra Stefano) first titrated chemo infusion, 825 mg (100 % of original dose 375 mg/m2), Intravenous, Once, 3 of 5 cycles  Dose modification: 375 mg/m2 (original dose 375 mg/m2, Cycle 2)  Administration: 800 mg (8/23/2023), 800 mg (9/13/2023), 800 mg (10/4/2023)       CURRENT TREATMENT:  1. R-CHOP. C1 D1 administered 8/2/23      HISTORY OF PRESENT ILLNESS:  Lurdes Mac is a 27 yo female who presents to the office for evaluation and management of newly diagnosed DLBCL. The patient presented to the hospital on 7/22/23 after being referred by a provider. She had undergone thyroid biopsy on 7/20/23. Pathology was c/w diffuse Large B Cell Lymphoma, Germinal Center Subtype, Non-double expressor phenotype. In the ED the patient reported SOB. The patient received C1 R-CHOP as inpatient. She developed headache, nausea and sensitivity to light w/ Rituximab administration. The patient reported prior hx of migraines. Today she reports feeling better after treatment initiation. She is able to eat without issues and is breathing normally. The mass has significantly decreased in size clinically. INTERIM HISTORY:  The patient presents for a follow-up after undergoing re-staging imaging. Tolerating treatment well w/ the exception of headaches.      PAST MEDICAL,PAST SURGICAL, FAMILY AND SOCIAL HISTORY:    Patient Active Problem List   Diagnosis    Mild intermittent asthma without complication    Seasonal allergic rhinitis due to pollen    Shrimp allergy    Enlarged thyroid    Lymphoma of thyroid gland (HCC)    Morbid obesity (HCC)    Hypothyroidism    Elevated blood pressure reading    Headache    Anxiety    Port-A-Cath in place     Past Medical History:   Diagnosis Date    Asthma Disease of thyroid gland     Ear infection     Lymphoma [facility])      Past Surgical History:   Procedure Laterality Date    IR BIOPSY BONE MARROW  7/26/2023    IR BIOPSY NECK  7/20/2023    IR PORT PLACEMENT  8/24/2023     No family history on file. Social History     Socioeconomic History    Marital status: Single     Spouse name: Not on file    Number of children: Not on file    Years of education: Not on file    Highest education level: Not on file   Occupational History    Not on file   Tobacco Use    Smoking status: Never    Smokeless tobacco: Never   Vaping Use    Vaping Use: Never used   Substance and Sexual Activity    Alcohol use: Yes     Alcohol/week: 1.0 standard drink of alcohol     Types: 1 Standard drinks or equivalent per week     Comment: socially    Drug use: Never    Sexual activity: Yes     Partners: Male     Birth control/protection: Condom Male   Other Topics Concern    Not on file   Social History Narrative    Not on file     Social Determinants of Health     Financial Resource Strain: Not on file   Food Insecurity: No Food Insecurity (7/24/2023)    Hunger Vital Sign     Worried About Running Out of Food in the Last Year: Never true     Ran Out of Food in the Last Year: Never true   Transportation Needs: No Transportation Needs (7/24/2023)    PRAPARE - Transportation     Lack of Transportation (Medical): No     Lack of Transportation (Non-Medical):  No   Physical Activity: Not on file   Stress: Not on file   Social Connections: Not on file   Intimate Partner Violence: Not on file   Housing Stability: Unknown (7/24/2023)    Housing Stability Vital Sign     Unable to Pay for Housing in the Last Year: No     Number of Places Lived in the Last Year: Not on file     Unstable Housing in the Last Year: No       Current Outpatient Medications:     albuterol (Proventil HFA) 90 mcg/act inhaler, Inhale 2 puffs every 6 (six) hours as needed for wheezing, Disp: 6.7 g, Rfl: 5    allopurinol (ZYLOPRIM) 300 mg tablet, Take 1 tablet (300 mg total) by mouth daily, Disp: 90 tablet, Rfl: 0    amLODIPine (NORVASC) 5 mg tablet, TAKE 1 TABLET (5 MG TOTAL) BY MOUTH DAILY. , Disp: 30 tablet, Rfl: 0    dicyclomine (BENTYL) 20 mg tablet, Take 1 tablet (20 mg total) by mouth 2 (two) times a day, Disp: 20 tablet, Rfl: 0    diphenhydrAMINE (BENADRYL) 25 mg capsule, Take 1 capsule (25 mg total) by mouth every 6 (six) hours as needed for itching, Disp: 20 capsule, Rfl: 0    fluticasone (FLONASE) 50 mcg/act nasal spray, SPRAY 1 SPRAY INTO EACH NOSTRIL EVERY DAY, Disp: 16 mL, Rfl: 2    levothyroxine 150 mcg tablet, Take 1 tablet (150 mcg total) by mouth daily, Disp: 90 tablet, Rfl: 0    montelukast (SINGULAIR) 10 mg tablet, Take 10 mg by mouth daily at bedtime, Disp: , Rfl:     ondansetron (ZOFRAN) 4 mg tablet, Take 1 tablet (4 mg total) by mouth every 6 (six) hours, Disp: 12 tablet, Rfl: 0    ondansetron (ZOFRAN) 8 mg tablet, Take 1 tablet (8 mg total) by mouth every 8 (eight) hours as needed for nausea or vomiting, Disp: 20 tablet, Rfl: 0    pantoprazole (PROTONIX) 40 mg tablet, TAKE 1 TABLET BY MOUTH 2 TIMES A DAY BEFORE MEALS., Disp: 60 tablet, Rfl: 0    polyethylene glycol (MIRALAX) 17 g packet, Take 17 g by mouth daily Do not start before August 8, 2023., Disp: , Rfl: 0    predniSONE 20 mg tablet, TAKE 1 TABLET BY MOUTH EVERY DAY (Patient taking differently: Take 10 mg by mouth daily Only 5 days after chemo), Disp: 30 tablet, Rfl: 0    predniSONE 20 mg tablet, Take one tablet (20mg total) on treatment day THEN take one tablet (20mg total) for four more days after treatment; total of 5 days.  Repeat every treatment cycle., Disp: 5 tablet, Rfl: 5    senna-docusate sodium (SENOKOT S) 8.6-50 mg per tablet, Take 1 tablet by mouth daily at bedtime, Disp: , Rfl: 0    SUMAtriptan (Imitrex) 50 mg tablet, Take 1 tablet (50 mg total) by mouth once as needed for migraine for up to 10 doses, Disp: 10 tablet, Rfl: 0  No current facility-administered medications for this visit. Allergies   Allergen Reactions    Pollen Extract     Shrimp Extract Allergy Skin Test - Food Allergy Hives     Vitals:    10/24/23 0842   BP: 130/86   Pulse: 86   Resp: 16   Temp: 97.6 °F (36.4 °C)   SpO2: 98%       ROS:  CONSTITUTIONAL:  No fever. No chills. No dizziness. No weakness. EYES:  No pain, erythema, or discharge. No blurring of vision. ENT:  No sore throat, URI symptoms. No epistaxis. No tinnitus. CARDIOVASCULAR:  No chest pain. No palpitations. No lower extremity edema. RESPIRATORY:  No shortness of breath, cough, pain with respiration, pleuritic chest pain. No hemoptysis. No dyspnea. No paroxysmal nocturnal dyspnea. GASTROINTESTINAL:  Normal appetite. No nausea, vomiting, diarrhea. No pain. No bloating. No melena. GENITOURINARY:  No frequency, urgency, nocturia. No hematuria or dysuria. MUSCULOSKELETAL:  No arthralgias or myalgias. INTEGUMENTARY:  No swelling. No bruising. No contusions. No abrasions. No lymphangitis. NEUROLOGIC:  No headache. No neck pain. No numbness or tingling of the extremities. No weakness. PSYCHIATRIC:  No confusion. ENDOCRINE:  No fatigue. No weakness. No history of thyroid, diabetes or adrenal problems. HEMATOLOGICAL:  No bleeding. No petechiae. No bruising.     PHYSICAL EXAM:  ECOG 0  GENERAL: AAO x 3  HEENT: AT,NC  CVS: S1S2 RRR  LUNGS: CTA b/l  ABD: NT,ND, +BS  EXTR: no edema  NEURO: CN II-XII grossly intact    LABS:  I have reviewed pertinent labs:  CBC:   Lab Results   Component Value Date    WBC 6.21 10/03/2023    RBC 4.11 10/03/2023    HGB 11.9 10/03/2023    HCT 36.2 10/03/2023    MCV 88 10/03/2023     10/03/2023    MCH 29.0 10/03/2023    MCHC 32.9 10/03/2023    RDW 15.1 10/03/2023    MPV 9.5 10/03/2023    NEUTROABS 22.81 (H) 08/05/2023     CMP:   Lab Results   Component Value Date    SODIUM 138 10/03/2023    K 3.6 10/03/2023     10/03/2023    CO2 25 10/03/2023    AGAP 8 10/03/2023    BUN 11 10/03/2023    CREATININE 0.61 10/03/2023    GLUC 94 10/03/2023    GLUF 91 08/21/2023    CALCIUM 9.2 10/03/2023    AST 21 10/03/2023    ALT 28 10/03/2023    ALKPHOS 57 10/03/2023    TP 6.9 10/03/2023    ALB 3.9 10/03/2023    TBILI 0.33 10/03/2023    EGFR 129 10/03/2023     Liver Enzymes:   Lab Results   Component Value Date    AST 21 10/03/2023    ALT 28 10/03/2023    ALKPHOS 57 10/03/2023    TP 6.9 10/03/2023    ALB 3.9 10/03/2023    TBILI 0.33 10/03/2023     Vitamin B12 No results found for: "[id number]"  Iron Study   Lab Results   Component Value Date    FERRITIN 82 08/02/2023    CONCFE 13 (L) 08/02/2023    TIBC 319 08/02/2023    IRON 42 (L) 08/02/2023     Folate No results found for: "FOLATE"  Magnesium   Lab Results   Component Value Date    MG 2.2 08/21/2023     Phosphorus   Lab Results   Component Value Date    PHOS 4.7 (H) 08/21/2023     Coagulation Panel   Lab Results   Component Value Date    DDIMER 1.06 (H) 07/22/2023    PROTIME 12.6 07/25/2023    INR 0.92 07/25/2023       IMAGING:  MRI brain wo contrast    Result Date: 8/6/2023  Narrative: MRI BRAIN WITHOUT CONTRAST INDICATION: Lymphoma of thyroid gland (HCC. COMPARISON:   None. TECHNIQUE:  Multiplanar, multisequence imaging of the brain was performed. IMAGE QUALITY:  Diagnostic. FINDINGS: BRAIN PARENCHYMA:  There is no discrete mass, mass effect or midline shift. There is no intracranial hemorrhage. There is no evidence of acute infarction and diffusion imaging is unremarkable. There are no white matter changes in the cerebral hemispheres. VENTRICLES:  Normal for the patient's age. SELLA AND PITUITARY GLAND:  Normal. ORBITS:  Normal. PARANASAL SINUSES:  Normal. VASCULATURE:  Evaluation of the major intracranial vasculature demonstrates appropriate flow voids.  CALVARIUM AND SKULL BASE:  Normal. EXTRACRANIAL SOFT TISSUES:  Normal.     Impression: Normal. Workstation performed: [id number]     IR biopsy bone marrow    Result Date: 7/26/2023  Narrative: Imaged guided diagnostic bone marrow aspiration and core biopsy History: Lymphoma. Conscious sedation time: 30 minutes Fluoroscopy time: 0.6 minutes. Radiation dose length product (DLP) for this visit:  67 mGy . The patient was brought to the angiography suite and placed prone on the table. Fluoroscopy was used to localize the right iliac bone. The skin was then marked, prepped, and draped in usual sterile fashion. Lidocaine was administered to the skin, and subcutaneous tissues down to the periosteum of the right iliac bone, and a small skin incision was made. An 11-gauge [employer] biopsy needle was advanced percutaneously through the cortex under intermittent fluoroscopic, and a bone marrow aspiration was performed. The specimen was given to the cytotech to prepare, and was found to be adequate. A core biopsy was then performed. The bone core was placed in formalin. The patient tolerated the procedure well and suffered no complications. Impression: Impression: Successful percutaneous diagnostic bone marrow aspiration and bone core biopsy. A full pathology report will follow. Workstation performed: [id number]     Echo complete w/ contrast if indicated    Result Date: 7/23/2023  Narrative:   Left Ventricle: Left ventricular cavity size is normal. Wall thickness is normal. The left ventricular ejection fraction is 65%. Systolic function is normal. Although no diagnostic regional wall motion abnormality was identified, this possibility cannot be completely excluded on the basis of this study. Diastolic function is normal. Strain not reliable due to poor image quality. Technically difficult study likely due to body habitus. No prior studies for comparison. CT soft tissue neck with contrast    Result Date: 7/22/2023  Narrative: CT NECK WITH CONTRAST INDICATION:   Thyroid cancer, staging thyroid cancer, swelling.  COMPARISON: 6/1/2023 TECHNIQUE:  Axial, sagittal, and coronal 2D reformatted images were created from the axial source data and submitted for interpretation. Radiation dose length product (DLP) for this visit:  721.66 mGy-cm . This examination, like all CT scans performed in the [facility], was performed utilizing techniques to minimize radiation dose exposure, including the use of iterative  reconstruction and automated exposure control. IV Contrast:  100 mL of iohexol (OMNIPAQUE) IMAGE QUALITY:  Diagnostic. FINDINGS: VISUALIZED BRAIN PARENCHYMA:  No acute intracranial pathology of the visualized brain parenchyma. VISUALIZED ORBITS: Normal visualized orbits. PARANASAL SINUSES: Normal visualized paranasal sinuses. NASAL CAVITY AND NASOPHARYNX:  Normal. SUPRAHYOID NECK:  Normal oral cavity, tongue base, tonsillar fossa and epiglottis. INFRAHYOID NECK:  Aryepiglottic folds and piriform sinuses are normal.  Normal glottis and subglottic airway/trachea. THYROID GLAND: Significantly increased size of thyroid tissue compared to the prior exam compressing the airway. PAROTID AND SUBMANDIBULAR GLANDS:  Normal. LYMPH NODES: Prominent bilateral cervical level 2 lymph nodes as before VASCULAR STRUCTURES:  Normal enhancement of the cervical vasculature. THORACIC INLET:  Lung apices and upper mediastinum are unremarkable. BONY STRUCTURES: No acute fracture or destructive osseous lesion. Impression: Significantly increased size of thyroid tissue compared to the prior exam compressing the airway/trachea. This is consistent with patient's known history of thyroid cancer. Surgical consult recommended for further evaluation. Workstation performed: [id number]     CTA ED chest PE study    Result Date: 7/22/2023  Narrative: CTA - CHEST WITH IV CONTRAST - PULMONARY ANGIOGRAM INDICATION:   Pulmonary embolism (PE) suspected, positive D-dimer tachycardia, SOB, elevated d-dimer. Per my review of the medical record, right thyroid nodule biopsy 7/20/2023 showing lymphoma. Right neck pain, difficulty breathing, difficulty swallowing.  COMPARISON: Neck CT from today, 6/1/2023 and 1/31/2018. TECHNIQUE: CT angiogram timed for optimal opacification of the pulmonary arteries. Axial, sagittal, and coronal 2D reformats created from source data. Coronal 3D MIP postprocessing on the acquisition scanner. Radiation dose length product (DLP):  455.29 mGy-cm . Radiation dose exposure minimized using iterative reconstruction and automated exposure control. IV Contrast:  100 mL of iohexol (OMNIPAQUE) FINDINGS: PULMONARY ARTERIES:  No pulmonary embolus. LUNGS:  Lungs clear. AIRWAYS: No significant filling defects. PLEURA:  Unremarkable. HEART/GREAT VESSELS:  Normal for age. MEDIASTINUM AND LYNNE:  Unremarkable. CHEST WALL AND LOWER NECK: Marked enlargement of the thyroid gland severe tracheal narrowing, slightly increased since 6/1/2023. No obvious postbiopsy hemorrhage but the thyroid is incompletely imaged. UPPER ABDOMEN: Hepatic steatosis. OSSEOUS STRUCTURES:  Normal for age. Impression: No pulmonary embolus. No acute pulmonary disease. Severe enlargement of the thyroid gland with recent biopsy showing lymphoma, with increased tracheal narrowing compared with the neck CT from 6/1/2023 with no obvious postprocedural hemorrhage. See neck CT from today. Hepatic steatosis. Workstation performed: [id number]     I reviewed the above laboratory and imaging data. ASSESSMENT/PLAN:  1. Diffuse Large B Cell Lymphoma, Germinal Center Subtype, Non-double expressor phenotype. Elevated proliferation index (%). Involvement of thyroid. Date of diagnosis 7/20/23. The patient is due for cycle 5 R-CHOP on 10/25/23. Re-staging imaging CT TAP 10/23/23--MAXINE  Will obtain CT Neck. The patient had a possible reaction to rituximab--headache, nausea and sensitivity to light. Titration rate will be capped. Imitrex prescribed. Repeat ECHO will be done before Cycle 6. Pregnancy testing prior to every treatment. Labs will be drawn today for treatment tomorrow.

## 2023-10-25 ENCOUNTER — HOSPITAL ENCOUNTER (OUTPATIENT)
Dept: INFUSION CENTER | Facility: CLINIC | Age: 23
Discharge: HOME | End: 2023-10-25
Payer: COMMERCIAL

## 2023-10-25 VITALS
RESPIRATION RATE: 18 BRPM | HEIGHT: 65 IN | SYSTOLIC BLOOD PRESSURE: 136 MMHG | TEMPERATURE: 96.9 F | WEIGHT: 274.8 LBS | BODY MASS INDEX: 45.79 KG/M2 | DIASTOLIC BLOOD PRESSURE: 99 MMHG | HEART RATE: 103 BPM

## 2023-10-25 DIAGNOSIS — C85.99 LYMPHOMA OF THYROID GLAND (HCC): Primary | ICD-10-CM

## 2023-10-25 PROCEDURE — 96415 CHEMO IV INFUSION ADDL HR: CPT

## 2023-10-25 PROCEDURE — 96413 CHEMO IV INFUSION 1 HR: CPT

## 2023-10-25 PROCEDURE — 96411 CHEMO IV PUSH ADDL DRUG: CPT

## 2023-10-25 PROCEDURE — 96417 CHEMO IV INFUS EACH ADDL SEQ: CPT

## 2023-10-25 PROCEDURE — 96367 TX/PROPH/DG ADDL SEQ IV INF: CPT

## 2023-10-25 RX ORDER — SODIUM CHLORIDE 9 MG/ML
20 INJECTION, SOLUTION INTRAVENOUS ONCE
Status: COMPLETED | OUTPATIENT
Start: 2023-10-25 | End: 2023-10-25

## 2023-10-25 RX ORDER — LORAZEPAM 0.5 MG/1
0.5 TABLET ORAL EVERY 8 HOURS PRN
Status: DISCONTINUED | OUTPATIENT
Start: 2023-10-25 | End: 2023-10-28 | Stop reason: HOSPADM

## 2023-10-25 RX ORDER — ACETAMINOPHEN 325 MG/1
650 TABLET ORAL ONCE
Status: COMPLETED | OUTPATIENT
Start: 2023-10-25 | End: 2023-10-25

## 2023-10-25 RX ORDER — DOXORUBICIN HYDROCHLORIDE 2 MG/ML
50 INJECTION, SOLUTION INTRAVENOUS ONCE
Status: COMPLETED | OUTPATIENT
Start: 2023-10-25 | End: 2023-10-25

## 2023-10-25 RX ADMIN — SODIUM CHLORIDE 800 MG: 0.9 INJECTION, SOLUTION INTRAVENOUS at 10:35

## 2023-10-25 RX ADMIN — DIPHENHYDRAMINE HYDROCHLORIDE 25 MG: 50 INJECTION, SOLUTION INTRAMUSCULAR; INTRAVENOUS at 09:28

## 2023-10-25 RX ADMIN — APREPITANT 130 MG: 130 INJECTION, EMULSION INTRAVENOUS at 15:56

## 2023-10-25 RX ADMIN — VINCRISTINE SULFATE 2 MG: 1 INJECTION, SOLUTION INTRAVENOUS at 17:53

## 2023-10-25 RX ADMIN — ACETAMINOPHEN 650 MG: 325 TABLET, FILM COATED ORAL at 09:28

## 2023-10-25 RX ADMIN — SODIUM CHLORIDE 20 ML/HR: 0.9 INJECTION, SOLUTION INTRAVENOUS at 09:28

## 2023-10-25 RX ADMIN — DOXORUBICIN HYDROCHLORIDE 110 MG: 2 INJECTION, SOLUTION INTRAVENOUS at 17:37

## 2023-10-25 RX ADMIN — DEXAMETHASONE SODIUM PHOSPHATE: 10 INJECTION, SOLUTION INTRAMUSCULAR; INTRAVENOUS at 15:34

## 2023-10-25 RX ADMIN — LORAZEPAM 0.5 MG: 0.5 TABLET ORAL at 09:28

## 2023-10-25 RX ADMIN — CYCLOPHOSPHAMIDE 1650 MG: 1 INJECTION, POWDER, FOR SOLUTION INTRAVENOUS; ORAL at 16:43

## 2023-10-25 NOTE — PROGRESS NOTES
Patient tolerated remainder of infusions without any adverse events, positive blood return throughout. Patient aware of next appointment, port de accessed, AVS declined.

## 2023-10-26 NOTE — PROGRESS NOTES
Pt here for chemotherapy, offering no complaints. Right port accessed with positive blood return noted throughout treatment thus far. Had to reach out to Dr Gwen Spicer to clarify the Rituxan orders. Dr wants patient med to run at the first titration up to 200 mg/hr (100 ml/hr). Tolerated rituxan fine. Report given to Hunter Blevins, and Neil Lao, RN's.

## 2023-11-07 ENCOUNTER — HOSPITAL ENCOUNTER (OUTPATIENT)
Dept: NON INVASIVE DIAGNOSTICS | Facility: CLINIC | Age: 23
Discharge: HOME | End: 2023-11-07
Payer: COMMERCIAL

## 2023-11-07 VITALS
SYSTOLIC BLOOD PRESSURE: 136 MMHG | WEIGHT: 274 LBS | BODY MASS INDEX: 46.78 KG/M2 | HEIGHT: 64 IN | DIASTOLIC BLOOD PRESSURE: 99 MMHG | HEART RATE: 100 BPM

## 2023-11-07 DIAGNOSIS — C83.31 DIFFUSE LARGE B-CELL LYMPHOMA OF LYMPH NODES OF NECK (HCC): ICD-10-CM

## 2023-11-07 LAB
AORTIC ROOT: 3.2 CM
APICAL FOUR CHAMBER EJECTION FRACTION: 61 %
ASCENDING AORTA: 2.9 CM
E WAVE DECELERATION TIME: 61 MS
E/A RATIO: 1
FRACTIONAL SHORTENING: 29 (ref 28–44)
INTERVENTRICULAR SEPTUM IN DIASTOLE (PARASTERNAL SHORT AXIS VIEW): 1 CM
INTERVENTRICULAR SEPTUM: 1 CM (ref 0.6–1.1)
LAAS-AP2: 13.7 CM2
LAAS-AP4: 9.4 CM2
LEFT ATRIUM SIZE: 3.1 CM
LEFT ATRIUM VOLUME (MOD BIPLANE): 28 ML
LEFT ATRIUM VOLUME INDEX (MOD BIPLANE): 12.3 ML/M2
LEFT INTERNAL DIMENSION IN SYSTOLE: 3 CM (ref 2.1–4)
LEFT VENTRICULAR INTERNAL DIMENSION IN DIASTOLE: 4.2 CM (ref 3.5–6)
LEFT VENTRICULAR POSTERIOR WALL IN END DIASTOLE: 1 CM
LEFT VENTRICULAR STROKE VOLUME: 43 ML
LVSV (TEICH): 43 ML
MV E'TISSUE VEL-SEP: 12 CM/S
MV PEAK A VEL: 0.79 M/S
MV PEAK E VEL: 79 CM/S
MV STENOSIS PRESSURE HALF TIME: 18 MS
MV VALVE AREA P 1/2 METHOD: 12.22
RIGHT ATRIUM AREA SYSTOLE A4C: 8 CM2
RIGHT VENTRICLE ID DIMENSION: 2.8 CM
SL CV LEFT ATRIUM LENGTH A2C: 4.3 CM
SL CV LV EF: 65
SL CV PED ECHO LEFT VENTRICLE DIASTOLIC VOLUME (MOD BIPLANE) 2D: 77 ML
SL CV PED ECHO LEFT VENTRICLE SYSTOLIC VOLUME (MOD BIPLANE) 2D: 34 ML
TRICUSPID ANNULAR PLANE SYSTOLIC EXCURSION: 2.4 CM

## 2023-11-07 PROCEDURE — 93306 TTE W/DOPPLER COMPLETE: CPT

## 2023-11-07 PROCEDURE — 93306 TTE W/DOPPLER COMPLETE: CPT | Performed by: INTERNAL MEDICINE

## 2023-11-13 DIAGNOSIS — I10 HYPERTENSION: ICD-10-CM

## 2023-11-13 DIAGNOSIS — R13.10 DYSPHAGIA: ICD-10-CM

## 2023-11-13 DIAGNOSIS — E03.9 ACQUIRED HYPOTHYROIDISM: ICD-10-CM

## 2023-11-13 RX ORDER — PANTOPRAZOLE SODIUM 40 MG/1
40 TABLET, DELAYED RELEASE ORAL
Qty: 60 TABLET | Refills: 0 | Status: SHIPPED | OUTPATIENT
Start: 2023-11-13

## 2023-11-13 RX ORDER — LEVOTHYROXINE SODIUM 0.15 MG/1
150 TABLET ORAL DAILY
Qty: 90 TABLET | Refills: 0 | Status: SHIPPED | OUTPATIENT
Start: 2023-11-13 | End: 2024-02-11

## 2023-11-13 RX ORDER — AMLODIPINE BESYLATE 5 MG/1
5 TABLET ORAL DAILY
Qty: 30 TABLET | Refills: 0 | Status: SHIPPED | OUTPATIENT
Start: 2023-11-13

## 2023-11-14 ENCOUNTER — HOSPITAL ENCOUNTER (OUTPATIENT)
Dept: INFUSION CENTER | Facility: CLINIC | Age: 23
Discharge: HOME | End: 2023-11-14
Payer: COMMERCIAL

## 2023-11-14 ENCOUNTER — TELEPHONE (OUTPATIENT)
Dept: HEMATOLOGY ONCOLOGY | Facility: CLINIC | Age: 23
End: 2023-11-14

## 2023-11-14 DIAGNOSIS — Z95.828 PORT-A-CATH IN PLACE: ICD-10-CM

## 2023-11-14 DIAGNOSIS — C85.99 LYMPHOMA OF THYROID GLAND (HCC): Primary | ICD-10-CM

## 2023-11-14 LAB
ALBUMIN SERPL BCP-MCNC: 4.3 G/DL (ref 3.5–5)
ALP SERPL-CCNC: 62 U/L (ref 34–104)
ALT SERPL W P-5'-P-CCNC: 50 U/L (ref 7–52)
ANION GAP SERPL CALCULATED.3IONS-SCNC: 7 MMOL/L
AST SERPL W P-5'-P-CCNC: 36 U/L (ref 13–39)
BASOPHILS # BLD AUTO: 0.04 THOUSANDS/ÂΜL (ref 0–0.1)
BASOPHILS NFR BLD AUTO: 1 % (ref 0–1)
BILIRUB SERPL-MCNC: 0.37 MG/DL (ref 0.2–1)
BUN SERPL-MCNC: 11 MG/DL (ref 5–25)
CALCIUM SERPL-MCNC: 9.2 MG/DL (ref 8.4–10.2)
CHLORIDE SERPL-SCNC: 106 MMOL/L (ref 96–108)
CO2 SERPL-SCNC: 26 MMOL/L (ref 21–32)
CREAT SERPL-MCNC: 0.65 MG/DL (ref 0.6–1.3)
EOSINOPHIL # BLD AUTO: 0.12 THOUSAND/ÂΜL (ref 0–0.61)
EOSINOPHIL NFR BLD AUTO: 3 % (ref 0–6)
ERYTHROCYTE [DISTWIDTH] IN BLOOD BY AUTOMATED COUNT: 14.2 % (ref 11.6–15.1)
GFR SERPL CREATININE-BSD FRML MDRD: 126 ML/MIN/1.73SQ M
GLUCOSE SERPL-MCNC: 107 MG/DL (ref 65–140)
HCG SERPL QL: NEGATIVE
HCT VFR BLD AUTO: 34.5 % (ref 34.8–46.1)
HGB BLD-MCNC: 11.5 G/DL (ref 11.5–15.4)
IMM GRANULOCYTES # BLD AUTO: 0.1 THOUSAND/UL (ref 0–0.2)
IMM GRANULOCYTES NFR BLD AUTO: 2 % (ref 0–2)
LYMPHOCYTES # BLD AUTO: 1.03 THOUSANDS/ÂΜL (ref 0.6–4.47)
LYMPHOCYTES NFR BLD AUTO: 24 % (ref 14–44)
MCH RBC QN AUTO: 29.3 PG (ref 26.8–34.3)
MCHC RBC AUTO-ENTMCNC: 33.3 G/DL (ref 31.4–37.4)
MCV RBC AUTO: 88 FL (ref 82–98)
MONOCYTES # BLD AUTO: 1.01 THOUSAND/ÂΜL (ref 0.17–1.22)
MONOCYTES NFR BLD AUTO: 23 % (ref 4–12)
NEUTROPHILS # BLD AUTO: 2.03 THOUSANDS/ÂΜL (ref 1.85–7.62)
NEUTS SEG NFR BLD AUTO: 47 % (ref 43–75)
NRBC BLD AUTO-RTO: 0 /100 WBCS
PLATELET # BLD AUTO: 388 THOUSANDS/UL (ref 149–390)
PMV BLD AUTO: 9.3 FL (ref 8.9–12.7)
POTASSIUM SERPL-SCNC: 3.8 MMOL/L (ref 3.5–5.3)
PROT SERPL-MCNC: 7.5 G/DL (ref 6.4–8.4)
RBC # BLD AUTO: 3.92 MILLION/UL (ref 3.81–5.12)
SODIUM SERPL-SCNC: 139 MMOL/L (ref 135–147)
WBC # BLD AUTO: 4.33 THOUSAND/UL (ref 4.31–10.16)

## 2023-11-14 PROCEDURE — 85025 COMPLETE CBC W/AUTO DIFF WBC: CPT | Performed by: INTERNAL MEDICINE

## 2023-11-14 PROCEDURE — 84703 CHORIONIC GONADOTROPIN ASSAY: CPT | Performed by: INTERNAL MEDICINE

## 2023-11-14 PROCEDURE — 80053 COMPREHEN METABOLIC PANEL: CPT | Performed by: INTERNAL MEDICINE

## 2023-11-14 RX ORDER — LORAZEPAM 0.5 MG/1
0.5 TABLET ORAL EVERY 8 HOURS PRN
Start: 2023-11-27

## 2023-11-14 RX ORDER — DOXORUBICIN HYDROCHLORIDE 2 MG/ML
50 INJECTION, SOLUTION INTRAVENOUS ONCE
OUTPATIENT
Start: 2023-11-27

## 2023-11-14 RX ORDER — SODIUM CHLORIDE 9 MG/ML
20 INJECTION, SOLUTION INTRAVENOUS ONCE
OUTPATIENT
Start: 2023-11-27

## 2023-11-14 RX ORDER — ACETAMINOPHEN 325 MG/1
650 TABLET ORAL ONCE
OUTPATIENT
Start: 2023-11-27

## 2023-11-14 NOTE — TELEPHONE ENCOUNTER
Bret and informed that ct of neck is not needed, as pet/ct scan will be ordered after the last treatment, patient expressed understanding and was pleased

## 2023-11-15 ENCOUNTER — HOSPITAL ENCOUNTER (OUTPATIENT)
Dept: INFUSION CENTER | Facility: CLINIC | Age: 23
Discharge: HOME | End: 2023-11-15

## 2023-11-15 ENCOUNTER — TELEMEDICINE (OUTPATIENT)
Dept: ENDOCRINOLOGY | Facility: CLINIC | Age: 23
End: 2023-11-15
Payer: COMMERCIAL

## 2023-11-15 ENCOUNTER — TELEPHONE (OUTPATIENT)
Dept: HEMATOLOGY ONCOLOGY | Facility: CLINIC | Age: 23
End: 2023-11-15

## 2023-11-15 VITALS
SYSTOLIC BLOOD PRESSURE: 138 MMHG | HEART RATE: 101 BPM | DIASTOLIC BLOOD PRESSURE: 100 MMHG | RESPIRATION RATE: 17 BRPM | TEMPERATURE: 97.8 F

## 2023-11-15 DIAGNOSIS — E06.3 HYPOTHYROIDISM DUE TO HASHIMOTO'S THYROIDITIS: Primary | ICD-10-CM

## 2023-11-15 DIAGNOSIS — C85.99 LYMPHOMA OF THYROID GLAND (HCC): ICD-10-CM

## 2023-11-15 DIAGNOSIS — E03.8 HYPOTHYROIDISM DUE TO HASHIMOTO'S THYROIDITIS: Primary | ICD-10-CM

## 2023-11-15 DIAGNOSIS — C83.31 DIFFUSE LARGE B-CELL LYMPHOMA OF LYMPH NODES OF NECK (HCC): Primary | ICD-10-CM

## 2023-11-15 PROCEDURE — 99214 OFFICE O/P EST MOD 30 MIN: CPT | Performed by: STUDENT IN AN ORGANIZED HEALTH CARE EDUCATION/TRAINING PROGRAM

## 2023-11-15 NOTE — PROGRESS NOTES
Virtual Regular Visit    Verification of patient location:    Patient is located at Home in the following state in which I hold an active license [place]      Assessment/Plan:    Problem List Items Addressed This Visit          Endocrine    Lymphoma of thyroid gland ([address])    Hypothyroidism - Primary    Relevant Orders    T4, free Lab Collect    TSH, 3rd generation Lab Jenifer Jordan is in today for hypothyroidism due to Hashimoto thyroiditis, and thyroid lymphoma. For hypothyroidism she is taking levothyroxine 150 MCG once daily, she is taking it regularly and properly. She is clinically and biochemically euthyroid. We will continue levothyroxine at current dose. For thyroid lymphoma, she is following with oncology team, on R-CHOP chemotherapy. Return back in 6 months. TFT in 6 weeks. Reason for visit is   Chief Complaint   Patient presents with    Virtual Regular Visit          Encounter provider Skyler Jimenez MD    Provider located at [address]  [facility] [address] [phone]      Recent Visits  No visits were found meeting these conditions. Showing recent visits within past 7 days and meeting all other requirements  Today's Visits  Date Type Provider Dept   11/15/23 Telemedicine Skyler Jimenez MD [facility] [place]   Showing today's visits and meeting all other requirements  Future Appointments  No visits were found meeting these conditions. Showing future appointments within next 150 days and meeting all other requirements       The patient was identified by name and date of birth. Cr Meneses was informed that this is a telemedicine visit and that the visit is being conducted through the [employer] Wave. She agrees to proceed. .  My office door was closed. No one else was in the room.   She acknowledged consent and understanding of privacy and security of the video platform. The patient has agreed to participate and understands they can discontinue the visit at any time. Patient is aware this is a billable service. Alex Solorzano is a 26 y.o. female with hypothyroidism due to Hashimoto's thyroiditis and thyroid lymphoma. LUCIANA Hernandez is a 27-year-old female who is seen virtually for hypothyroidism due to Hashimoto thyroiditis and thyroid lymphoma. Wanda Reynolds was diagnosed with diffuse large B-cell lymphoma of the thyroid which initially presented with progressively enlarged thyroid, she is following with oncology team and currently is on chemotherapy R-CHOP. For hypothyroidism she is on levothyroxine 150 mcg once daily, she is taking it regularly and properly. Most recent TFT showed TSH of 2.829 and free T4 of 0.94. She denies palpitations, tremors, heat intolerance, excessive sweating, constipation, diarrhea,          Past Medical History:   Diagnosis Date    Asthma     Disease of thyroid gland     Ear infection     Lymphoma [facility])        Past Surgical History:   Procedure Laterality Date    IR BIOPSY BONE MARROW  7/26/2023    IR BIOPSY NECK  7/20/2023    IR PORT PLACEMENT  8/24/2023       Current Outpatient Medications   Medication Sig Dispense Refill    albuterol (Proventil HFA) 90 mcg/act inhaler Inhale 2 puffs every 6 (six) hours as needed for wheezing 6.7 g 5    allopurinol (ZYLOPRIM) 300 mg tablet Take 1 tablet (300 mg total) by mouth daily 90 tablet 0    amLODIPine (NORVASC) 5 mg tablet TAKE 1 TABLET (5 MG TOTAL) BY MOUTH DAILY.  30 tablet 0    dicyclomine (BENTYL) 20 mg tablet Take 1 tablet (20 mg total) by mouth 2 (two) times a day 20 tablet 0    diphenhydrAMINE (BENADRYL) 25 mg capsule Take 1 capsule (25 mg total) by mouth every 6 (six) hours as needed for itching 20 capsule 0    fluticasone (FLONASE) 50 mcg/act nasal spray SPRAY 1 SPRAY INTO EACH NOSTRIL EVERY DAY 16 mL 2    levothyroxine 150 mcg tablet Take 1 tablet (150 mcg total) by mouth daily 90 tablet 0    montelukast (SINGULAIR) 10 mg tablet Take 10 mg by mouth daily at bedtime      ondansetron (ZOFRAN) 4 mg tablet Take 1 tablet (4 mg total) by mouth every 6 (six) hours 12 tablet 0    ondansetron (ZOFRAN) 8 mg tablet Take 1 tablet (8 mg total) by mouth every 8 (eight) hours as needed for nausea or vomiting 20 tablet 0    pantoprazole (PROTONIX) 40 mg tablet TAKE 1 TABLET BY MOUTH 2 TIMES A DAY BEFORE MEALS. 60 tablet 0    polyethylene glycol (MIRALAX) 17 g packet Take 17 g by mouth daily Do not start before August 8, 2023. 0    predniSONE 20 mg tablet TAKE 1 TABLET BY MOUTH EVERY DAY (Patient taking differently: Take 10 mg by mouth daily Only 5 days after chemo) 30 tablet 0    predniSONE 20 mg tablet Take one tablet (20mg total) on treatment day THEN take one tablet (20mg total) for four more days after treatment; total of 5 days. Repeat every treatment cycle. 5 tablet 5    senna-docusate sodium (SENOKOT S) 8.6-50 mg per tablet Take 1 tablet by mouth daily at bedtime  0    SUMAtriptan (Imitrex) 50 mg tablet Take 1 tablet (50 mg total) by mouth once as needed for migraine for up to 10 doses 10 tablet 0     No current facility-administered medications for this visit. Facility-Administered Medications Ordered in Other Visits   Medication Dose Route Frequency Provider Last Rate Last Admin    alteplase (CATHFLO) injection 2 mg  2 mg Intracatheter Q1MIN PRN Ryan Mittal MD            Allergies   Allergen Reactions    Pollen Extract     Shrimp Extract Allergy Skin Test - Food Allergy Hives       Review of Systems   Constitutional:  Positive for appetite change and fatigue. Negative for unexpected weight change. HENT:  Negative for trouble swallowing and voice change. Eyes:  Negative for visual disturbance. Respiratory:  Negative for cough, shortness of breath and wheezing. Cardiovascular:  Negative for palpitations and leg swelling.    Gastrointestinal: Negative for abdominal pain, constipation, diarrhea, nausea and vomiting. Endocrine: Negative for cold intolerance, heat intolerance, polyphagia and polyuria. Musculoskeletal:  Negative for arthralgias. Skin:  Negative for color change, rash and wound. Neurological:  Negative for dizziness, tremors, weakness, light-headedness, numbness and headaches. Psychiatric/Behavioral:  Negative for agitation and sleep disturbance. The patient is not nervous/anxious. Video Exam    Vitals:       Physical Exam  Constitutional:       General: She is not in acute distress. Appearance: She is obese. She is not ill-appearing. Pulmonary:      Effort: Pulmonary effort is normal. No respiratory distress. Neurological:      Mental Status: She is alert and oriented to person, place, and time.           Visit Time  Total Visit Duration: 20

## 2023-11-15 NOTE — TELEPHONE ENCOUNTER
[facility] Treating RN reached out to office because patient reports having a cold for about a week- stuffy nose, crusty eyes, scratchy throat, pink face rash on her cheeks, and temps around 99.2. initial set of vitals were 97.8, pulse 101, /100 resps 19. Reviewed symptoms with Dr. Cecilio Bonilla and she wants pt to be evaluated by her PCP and today's treatment will be held x 1 week. Infusion RN relayed information to patient.

## 2023-11-15 NOTE — PROGRESS NOTES
Pt presents for chemotherapy complaining of cold symptoms x 1 week, she reports stuffy nose, crusty eyes, scratchy throat, facial rash on her cheeks, and temps of 99.2. Per Dr. Reggie Allen, pt should see PCP and be rescheduled in one week for chemo. Pt will be rescheduled to 11/22/22 Wednesday, rescheduled for labs appt prior to chemo, infusion tech will call pt to make her aware.

## 2023-11-16 ENCOUNTER — OFFICE VISIT (OUTPATIENT)
Dept: FAMILY MEDICINE CLINIC | Facility: CLINIC | Age: 23
End: 2023-11-16
Payer: COMMERCIAL

## 2023-11-16 VITALS
TEMPERATURE: 98.2 F | OXYGEN SATURATION: 100 % | SYSTOLIC BLOOD PRESSURE: 122 MMHG | BODY MASS INDEX: 46.26 KG/M2 | HEIGHT: 64 IN | HEART RATE: 110 BPM | DIASTOLIC BLOOD PRESSURE: 88 MMHG | WEIGHT: 271 LBS

## 2023-11-16 DIAGNOSIS — Z23 ENCOUNTER FOR IMMUNIZATION: Primary | ICD-10-CM

## 2023-11-16 DIAGNOSIS — C85.99 LYMPHOMA OF THYROID GLAND (HCC): ICD-10-CM

## 2023-11-16 DIAGNOSIS — K64.9 HEMORRHOIDS, UNSPECIFIED HEMORRHOID TYPE: ICD-10-CM

## 2023-11-16 PROCEDURE — 90471 IMMUNIZATION ADMIN: CPT

## 2023-11-16 PROCEDURE — 99214 OFFICE O/P EST MOD 30 MIN: CPT | Performed by: FAMILY MEDICINE

## 2023-11-16 PROCEDURE — 90686 IIV4 VACC NO PRSV 0.5 ML IM: CPT

## 2023-11-16 RX ORDER — HYDROCORTISONE ACETATE 25 MG/1
25 SUPPOSITORY RECTAL 2 TIMES DAILY
Qty: 12 SUPPOSITORY | Refills: 0 | Status: SHIPPED | OUTPATIENT
Start: 2023-11-16

## 2023-11-16 NOTE — PROGRESS NOTES
"Assessment/Plan:    No problem-specific Assessment & Plan notes found for this encounter.     Diagnoses and all orders for this visit:    Encounter for immunization  -     influenza vaccine, quadrivalent, 0.5 mL, preservative-free    Hemorrhoids, unspecified hemorrhoid type  -     hydrocortisone (ANUSOL-HC) 25 mg suppository; Insert 1 suppository (25 mg total) into the rectum 2 (two) times a day    Lymphoma of thyroid gland (HCC)  Continue following with Heme/onc.         Subjective:      Patient ID: Coral Artis is a 23 y.o. female.    HPI    Patient presents to the office for follow up. Notes that chemo has been going well. States that she is taking everything one step at a time. She is working ot maintain as much normalcy in her life as she can.     Patient reports that she has been having some issues with constipation and straining with BMs. States that she is having blood in her stool. Having hard BMs once per day, reports that they large. Notes that her BMs are painful. Notes some itching in the anus throughout the day. Notes that the blood in on the TP when she wipes.     Reports that this has been going on for about 1 month.     The following portions of the patient's history were reviewed and updated as appropriate: allergies, current medications, past family history, past medical history, past social history, past surgical history, and problem list.    Review of Systems      Objective:  /88   Pulse (!) 110   Temp 98.2 °F (36.8 °C)   Ht 5' 4\" (1.626 m)   Wt 123 kg (271 lb)   SpO2 100%   BMI 46.52 kg/m²     HR of 90 on repeat.     Physical Exam  Vitals reviewed.   Constitutional:       General: She is not in acute distress.     Appearance: Normal appearance.   HENT:      Head: Normocephalic and atraumatic.      Right Ear: External ear normal.      Left Ear: External ear normal.      Nose: Nose normal.      Mouth/Throat:      Mouth: Mucous membranes are moist.   Eyes:      Extraocular " Movements: Extraocular movements intact.      Conjunctiva/sclera: Conjunctivae normal.   Cardiovascular:      Rate and Rhythm: Normal rate and regular rhythm.      Heart sounds: Normal heart sounds.   Pulmonary:      Effort: Pulmonary effort is normal.      Breath sounds: Normal breath sounds. No wheezing, rhonchi or rales.   Abdominal:      General: Bowel sounds are normal. There is no distension.      Palpations: Abdomen is soft.      Tenderness: There is no abdominal tenderness.   Musculoskeletal:      Right lower leg: No edema.      Left lower leg: No edema.   Skin:     General: Skin is warm.      Capillary Refill: Capillary refill takes less than 2 seconds.      Findings: No rash.   Neurological:      Mental Status: She is alert. Mental status is at baseline.         DO Kristopher Leon [facility]

## 2023-11-20 DIAGNOSIS — R51.9 ACUTE INTRACTABLE HEADACHE, UNSPECIFIED HEADACHE TYPE: ICD-10-CM

## 2023-11-21 RX ORDER — SUMATRIPTAN 50 MG/1
50 TABLET, FILM COATED ORAL ONCE AS NEEDED
Qty: 9 TABLET | Refills: 1 | Status: SHIPPED | OUTPATIENT
Start: 2023-11-21

## 2023-11-22 ENCOUNTER — HOSPITAL ENCOUNTER (OUTPATIENT)
Dept: INFUSION CENTER | Facility: CLINIC | Age: 23
End: 2023-11-22

## 2023-11-24 ENCOUNTER — HOSPITAL ENCOUNTER (OUTPATIENT)
Dept: INFUSION CENTER | Facility: CLINIC | Age: 23
End: 2023-11-24
Payer: COMMERCIAL

## 2023-11-24 DIAGNOSIS — C83.31 DIFFUSE LARGE B-CELL LYMPHOMA OF LYMPH NODES OF NECK (HCC): ICD-10-CM

## 2023-11-24 DIAGNOSIS — Z95.828 PORT-A-CATH IN PLACE: Primary | ICD-10-CM

## 2023-11-24 DIAGNOSIS — C85.99 LYMPHOMA OF THYROID GLAND (HCC): ICD-10-CM

## 2023-11-24 LAB
ALBUMIN SERPL BCP-MCNC: 4 G/DL (ref 3.5–5)
ALP SERPL-CCNC: 60 U/L (ref 34–104)
ALT SERPL W P-5'-P-CCNC: 30 U/L (ref 7–52)
ANION GAP SERPL CALCULATED.3IONS-SCNC: 7 MMOL/L
AST SERPL W P-5'-P-CCNC: 20 U/L (ref 13–39)
BASOPHILS # BLD AUTO: 0.06 THOUSANDS/ÂΜL (ref 0–0.1)
BASOPHILS NFR BLD AUTO: 1 % (ref 0–1)
BILIRUB SERPL-MCNC: 0.33 MG/DL (ref 0.2–1)
BUN SERPL-MCNC: 12 MG/DL (ref 5–25)
CALCIUM SERPL-MCNC: 8.9 MG/DL (ref 8.4–10.2)
CHLORIDE SERPL-SCNC: 107 MMOL/L (ref 96–108)
CO2 SERPL-SCNC: 24 MMOL/L (ref 21–32)
CREAT SERPL-MCNC: 0.63 MG/DL (ref 0.6–1.3)
EOSINOPHIL # BLD AUTO: 0.17 THOUSAND/ÂΜL (ref 0–0.61)
EOSINOPHIL NFR BLD AUTO: 2 % (ref 0–6)
ERYTHROCYTE [DISTWIDTH] IN BLOOD BY AUTOMATED COUNT: 14.1 % (ref 11.6–15.1)
GFR SERPL CREATININE-BSD FRML MDRD: 127 ML/MIN/1.73SQ M
GLUCOSE SERPL-MCNC: 108 MG/DL (ref 65–140)
HCG SERPL QL: NEGATIVE
HCT VFR BLD AUTO: 34.9 % (ref 34.8–46.1)
HGB BLD-MCNC: 11.5 G/DL (ref 11.5–15.4)
IMM GRANULOCYTES # BLD AUTO: 0.05 THOUSAND/UL (ref 0–0.2)
IMM GRANULOCYTES NFR BLD AUTO: 1 % (ref 0–2)
LYMPHOCYTES # BLD AUTO: 1.12 THOUSANDS/ÂΜL (ref 0.6–4.47)
LYMPHOCYTES NFR BLD AUTO: 12 % (ref 14–44)
MCH RBC QN AUTO: 29.5 PG (ref 26.8–34.3)
MCHC RBC AUTO-ENTMCNC: 33 G/DL (ref 31.4–37.4)
MCV RBC AUTO: 90 FL (ref 82–98)
MONOCYTES # BLD AUTO: 0.91 THOUSAND/ÂΜL (ref 0.17–1.22)
MONOCYTES NFR BLD AUTO: 10 % (ref 4–12)
NEUTROPHILS # BLD AUTO: 7.06 THOUSANDS/ÂΜL (ref 1.85–7.62)
NEUTS SEG NFR BLD AUTO: 74 % (ref 43–75)
NRBC BLD AUTO-RTO: 0 /100 WBCS
PLATELET # BLD AUTO: 336 THOUSANDS/UL (ref 149–390)
PMV BLD AUTO: 10.1 FL (ref 8.9–12.7)
POTASSIUM SERPL-SCNC: 3.9 MMOL/L (ref 3.5–5.3)
PROT SERPL-MCNC: 7.2 G/DL (ref 6.4–8.4)
RBC # BLD AUTO: 3.9 MILLION/UL (ref 3.81–5.12)
SODIUM SERPL-SCNC: 138 MMOL/L (ref 135–147)
WBC # BLD AUTO: 9.37 THOUSAND/UL (ref 4.31–10.16)

## 2023-11-24 PROCEDURE — 80053 COMPREHEN METABOLIC PANEL: CPT | Performed by: INTERNAL MEDICINE

## 2023-11-24 PROCEDURE — 84703 CHORIONIC GONADOTROPIN ASSAY: CPT | Performed by: INTERNAL MEDICINE

## 2023-11-24 PROCEDURE — 85025 COMPLETE CBC W/AUTO DIFF WBC: CPT | Performed by: INTERNAL MEDICINE

## 2023-11-27 ENCOUNTER — HOSPITAL ENCOUNTER (OUTPATIENT)
Dept: INFUSION CENTER | Facility: CLINIC | Age: 23
Discharge: HOME | End: 2023-11-27
Payer: COMMERCIAL

## 2023-11-27 VITALS
TEMPERATURE: 96.9 F | BODY MASS INDEX: 45.82 KG/M2 | SYSTOLIC BLOOD PRESSURE: 144 MMHG | HEIGHT: 65 IN | DIASTOLIC BLOOD PRESSURE: 94 MMHG | RESPIRATION RATE: 18 BRPM | WEIGHT: 275 LBS | HEART RATE: 98 BPM

## 2023-11-27 DIAGNOSIS — C85.99 LYMPHOMA OF THYROID GLAND (HCC): Primary | ICD-10-CM

## 2023-11-27 PROCEDURE — 96367 TX/PROPH/DG ADDL SEQ IV INF: CPT

## 2023-11-27 PROCEDURE — 96417 CHEMO IV INFUS EACH ADDL SEQ: CPT

## 2023-11-27 PROCEDURE — 96415 CHEMO IV INFUSION ADDL HR: CPT

## 2023-11-27 PROCEDURE — 96413 CHEMO IV INFUSION 1 HR: CPT

## 2023-11-27 PROCEDURE — 96411 CHEMO IV PUSH ADDL DRUG: CPT

## 2023-11-27 RX ORDER — DOXORUBICIN HYDROCHLORIDE 2 MG/ML
50 INJECTION, SOLUTION INTRAVENOUS ONCE
Status: COMPLETED | OUTPATIENT
Start: 2023-11-27 | End: 2023-11-27

## 2023-11-27 RX ORDER — LORAZEPAM 0.5 MG/1
0.5 TABLET ORAL EVERY 8 HOURS PRN
Status: DISCONTINUED | OUTPATIENT
Start: 2023-11-27 | End: 2023-11-30 | Stop reason: HOSPADM

## 2023-11-27 RX ORDER — ACETAMINOPHEN 325 MG/1
650 TABLET ORAL ONCE
Status: COMPLETED | OUTPATIENT
Start: 2023-11-27 | End: 2023-11-27

## 2023-11-27 RX ORDER — SODIUM CHLORIDE 9 MG/ML
20 INJECTION, SOLUTION INTRAVENOUS ONCE
Status: COMPLETED | OUTPATIENT
Start: 2023-11-27 | End: 2023-11-27

## 2023-11-27 RX ADMIN — DOXORUBICIN HYDROCHLORIDE 110 MG: 2 INJECTION, SOLUTION INTRAVENOUS at 17:06

## 2023-11-27 RX ADMIN — DIPHENHYDRAMINE HYDROCHLORIDE 25 MG: 50 INJECTION, SOLUTION INTRAMUSCULAR; INTRAVENOUS at 08:47

## 2023-11-27 RX ADMIN — DEXAMETHASONE SODIUM PHOSPHATE: 10 INJECTION, SOLUTION INTRAMUSCULAR; INTRAVENOUS at 15:05

## 2023-11-27 RX ADMIN — SODIUM CHLORIDE 800 MG: 0.9 INJECTION, SOLUTION INTRAVENOUS at 09:49

## 2023-11-27 RX ADMIN — SODIUM CHLORIDE 20 ML/HR: 0.9 INJECTION, SOLUTION INTRAVENOUS at 08:46

## 2023-11-27 RX ADMIN — APREPITANT 130 MG: 130 INJECTION, EMULSION INTRAVENOUS at 15:29

## 2023-11-27 RX ADMIN — CYCLOPHOSPHAMIDE 1650 MG: 1 INJECTION, POWDER, FOR SOLUTION INTRAVENOUS; ORAL at 16:19

## 2023-11-27 RX ADMIN — ACETAMINOPHEN 650 MG: 325 TABLET, FILM COATED ORAL at 08:47

## 2023-11-27 RX ADMIN — VINCRISTINE SULFATE 2 MG: 1 INJECTION, SOLUTION INTRAVENOUS at 17:20

## 2023-11-27 RX ADMIN — LORAZEPAM 0.5 MG: 0.5 TABLET ORAL at 08:47

## 2023-11-27 NOTE — PROGRESS NOTES
Pt here for her last chemotherapy. Offers no complaints. Right port accessed with positive blood return noted thus far thru treatments. Report given to Qi Dominguez RN and Will Foster RN.

## 2023-11-30 ENCOUNTER — TELEPHONE (OUTPATIENT)
Dept: HEMATOLOGY ONCOLOGY | Facility: CLINIC | Age: 23
End: 2023-11-30

## 2023-11-30 NOTE — TELEPHONE ENCOUNTER
Patient Call    Who are you speaking with? Parent    If it is not the patient, are they listed on an active communication consent form? Yes   What is the reason for this call? Medication question 2. Patient very sick. Is it from chemo? Please advise. Does this require a call back? Yes   If a call back is required, please list best call back number [phone]    If a call back is required, advise that a message will be forwarded to their care team and someone will return their call as soon as possible. Did you relay this information to the patient?  Yes

## 2023-11-30 NOTE — TELEPHONE ENCOUNTER
Returned call to patients mother Otto Dobbs. Per mother, patient is sleeping more this time with decreased appetite and increased body aches. Denies fever. Mother is encouraging patient to increase oral intake and will give tylenol to assist with body aches. Mother aware to seek ED if patient not eating/drinking or fever or if symptoms worsen. Patient will continue to take allopurinol as prescribed, but will need refill if she is to continue past this weekend.

## 2023-12-04 ENCOUNTER — TELEPHONE (OUTPATIENT)
Dept: HEMATOLOGY ONCOLOGY | Facility: CLINIC | Age: 23
End: 2023-12-04

## 2023-12-04 DIAGNOSIS — C83.31 DIFFUSE LARGE B-CELL LYMPHOMA OF LYMPH NODES OF NECK (HCC): Primary | ICD-10-CM

## 2023-12-04 NOTE — TELEPHONE ENCOUNTER
Follow up call to patients mother. She states patient is feeling much better. She is able to eat and drink now. Advised allopurinol is no longer needed once completed. Pts mother asked when PET scan will be scheduled. Advised will check with Dr. Avtar Le and get back to them.

## 2023-12-04 NOTE — TELEPHONE ENCOUNTER
Called patient's mother to review after confirming PET will need to be scheduled after 1/25/24. Changed the date on the order. Please schedule the patient at [facility]. Patient will need lyft transportation set up too for this appt. Thanks!

## 2023-12-04 NOTE — TELEPHONE ENCOUNTER
Called patient with new appt scheduled for PET scan on 1/29/24 @ 10 am  At [facility] .  Patient confirmed appt

## 2023-12-11 DIAGNOSIS — I10 HYPERTENSION: ICD-10-CM

## 2023-12-11 RX ORDER — AMLODIPINE BESYLATE 5 MG/1
5 TABLET ORAL DAILY
Qty: 30 TABLET | Refills: 0 | Status: SHIPPED | OUTPATIENT
Start: 2023-12-11

## 2024-01-10 ENCOUNTER — PATIENT OUTREACH (OUTPATIENT)
Dept: CASE MANAGEMENT | Facility: HOSPITAL | Age: 24
End: 2024-01-10

## 2024-01-10 NOTE — PROGRESS NOTES
Closing OncSW episode as of today, no contact with pt since 9/2023.  MSW will remain available to her as needed moving forward.

## 2024-01-22 ENCOUNTER — HOSPITAL ENCOUNTER (OUTPATIENT)
Dept: INFUSION CENTER | Facility: CLINIC | Age: 24
Discharge: HOME | End: 2024-01-22
Payer: COMMERCIAL

## 2024-01-22 DIAGNOSIS — Z95.828 PORT-A-CATH IN PLACE: Primary | ICD-10-CM

## 2024-01-22 PROCEDURE — 96523 IRRIG DRUG DELIVERY DEVICE: CPT

## 2024-01-22 NOTE — PROGRESS NOTES
Patient presents for a port a cath flush. Patient offers no complaints. Port accessed, flushed, saline locked and de accessed without difficulty. AVS declined, next appointment on 3/4/24 at 1:30PM reviewed.

## 2024-01-29 ENCOUNTER — HOSPITAL ENCOUNTER (OUTPATIENT)
Dept: RADIOLOGY | Age: 24
Discharge: HOME | End: 2024-01-29
Payer: COMMERCIAL

## 2024-01-29 DIAGNOSIS — C83.31 DIFFUSE LARGE B-CELL LYMPHOMA OF LYMPH NODES OF NECK (HCC): ICD-10-CM

## 2024-01-29 LAB — GLUCOSE SERPL-MCNC: 88 MG/DL (ref 65–140)

## 2024-01-29 PROCEDURE — 82948 REAGENT STRIP/BLOOD GLUCOSE: CPT

## 2024-01-29 PROCEDURE — G1004 CDSM NDSC: HCPCS

## 2024-01-29 PROCEDURE — A9552 F18 FDG: HCPCS

## 2024-01-29 PROCEDURE — 78815 PET IMAGE W/CT SKULL-THIGH: CPT

## 2024-01-29 NOTE — NURSING NOTE
PET/CT pre and post instructions reviewed with patient. This includes instructions of NO cell phone usage during one-hour FDG uptake phase, patient verbalizes understanding.

## 2024-02-05 ENCOUNTER — TELEPHONE (OUTPATIENT)
Dept: HEMATOLOGY ONCOLOGY | Facility: CLINIC | Age: 24
End: 2024-02-05

## 2024-02-05 NOTE — TELEPHONE ENCOUNTER
Patient Call    Who are you speaking with? Patient    If it is not the patient, are they listed on an active communication consent form? Yes   What is the reason for this call? Need clearance to go back to work   Does this require a call back? Yes   If a call back is required, please list [facility] call back number [phone]    If a call back is required, advise that a message will be forwarded to their care team and someone will return their call as soon as possible.   Did you relay this information to the patient? Yes

## 2024-02-26 ENCOUNTER — OFFICE VISIT (OUTPATIENT)
Dept: HEMATOLOGY ONCOLOGY | Facility: CLINIC | Age: 24
End: 2024-02-26
Payer: COMMERCIAL

## 2024-02-26 VITALS
SYSTOLIC BLOOD PRESSURE: 132 MMHG | DIASTOLIC BLOOD PRESSURE: 86 MMHG | RESPIRATION RATE: 16 BRPM | OXYGEN SATURATION: 98 % | WEIGHT: 261.5 LBS | BODY MASS INDEX: 43.57 KG/M2 | TEMPERATURE: 97.9 F | HEIGHT: 65 IN | HEART RATE: 68 BPM

## 2024-02-26 DIAGNOSIS — C85.99 LYMPHOMA OF THYROID GLAND (HCC): Primary | ICD-10-CM

## 2024-02-26 DIAGNOSIS — E03.9 ACQUIRED HYPOTHYROIDISM: ICD-10-CM

## 2024-02-26 DIAGNOSIS — E66.01 MORBID OBESITY (HCC): ICD-10-CM

## 2024-02-26 PROCEDURE — 99214 OFFICE O/P EST MOD 30 MIN: CPT | Performed by: INTERNAL MEDICINE

## 2024-02-26 NOTE — PROGRESS NOTES
Hematology/Oncology Outpatient Follow- up Note  Coral Artis 23 y.o. 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  She will come back in 3 months with laboratory work.  She will need to scan again in 6 months.  She has had an excellent response to treatment appears to be doing quite well.      HPI: 23-year-old female status post 6 cycles of R-CHOP treatment for diffuse large B-cell lymphoma germinal center subtype none double expression phenotype.  This arose involve the thyroid.  Her last PET scan CT scan showed marked improvement no evidence of any definite right upper abnormality.  She is feeling fairly well tolerated treatment reasonably well.  She initially presented with a large thyroid mass which was positive for lymphoma had adenopathy in the neck.  She pressure under tracheal area.  She did not have mediastinal or retroperitoneal adenopathy apparently.  She had no major B symptoms.  She did not have involvement of her bone marrow.    Interval History: Note from 10/24/2023:  .Coral Artis is a 22 yo female who presents to the office for evaluation and management of newly diagnosed DLBCL. The patient presented to the hospital on 7/22/23 after being referred by a provider. She had undergone thyroid biopsy on 7/20/23. Pathology was c/w diffuse Large B Cell Lymphoma, Germinal Center Subtype, Non-double expressor phenotype. In the ED the patient reported SOB. The patient received C1 R-CHOP as inpatient. She developed headache, nausea and sensitivity to light w/ Rituximab administration. The patient reported prior hx of migraines.   Today she reports feeling better after treatment initiation. She is able to eat without issues and is breathing normally. The mass has significantly decreased in size  clinically.           Cancer Staging:  Cancer Staging   No matching staging information was found for the patient.      Molecular Testing:     THYROID, BIOPSY: DIFFUSE LARGE B-CELL LYMPHOMA, GERMINAL CENTER SUBTYPE, NON-DOUBLE EXPRESSOR  PHENOTYPE; ADDITIONAL ANCILLARY TESTING PENDING; SEE IMPRESSION AND COMMENT  IMPRESSION:  The preliminary findings are of thyroid biopsies involved by a diffuse large B-cell lymphoma with an elevated proliferation index (%). Cytogenetic FISH studies for MYC, BCL2, BCL6 and IRF4 rearrangements as well as 11q aberrations are reportedly negative. The BCL2 positivity by immunohistochemistry and the absence of MYC rearrangements rules out Burkitt lymphoma and highgrade B-cell lymphoma with MYC and BCL2 and / or BCL6 rearrangements. The absence of 11q aberrations rules out high-grade / large B-cell lymphoma with 11q aberrations and the absence of IRF4 rearrangements rules out large B-cell lymphoma with IRF4 rearrangements. The overall findings are most compatible with a diffuse large B-cell lymphoma (DLBCL), NOS, germinal center subtype, per the Orion criteria, and non-double-expressor phenotype by immunohistochemistry. Correlation with systemic (PET/CT) imaging, SPEP/UPEP with serum immunoglobulin heavy chains (IgG, IgA, IgM, IgE, IgD) and serum and urine free light chains (kappa/lambda) to evaluate for systemic involvement, is recommended, if clinically indicated. Additional studies (molecular and FISH) are pending and will be added to the final report when available.  Subtype: Germinal Center Subtype: (CD10+/BCL6+/MUM1+)  Phenotype: Non-Double-Expressor Phenotype (BCL2+/MYC-)  Ki-67 Proliferation index: % within neoplastic cells  Cytogenetics: MYC, BCL2, BCL6, IRF4 rearrangements and 11q aberrations are NOT DETECTED; ([employer] #[id number],  [id number], [id number]; see separate reports).              Surgical Pathology Report                         Case: [id number]                                      Authorizing Provider:  Reynaldo Mendez MD        Collected:           07/26/2023 0915                Ordering Location:     [facility]      Received:            07/26/2023 0925                                       [facility]                                                                Pathologist:           Vinayak Prado MD                                                            Specimens:   A) - Iliac Crest, Right, core                                                                         B) - Iliac Crest, Right, clot                                                                         C) - Iliac Crest, Right, slide                                                                Addendum    Karyotype analysis is within normal limits; the original diagnosis remains unchanged.     Cytogenetics Oncology Chromosome Analysis ([employer] # [id number]; please see separate report for further details):      Addendum electronically signed by Vinayak Prado MD on 8/14/2023 at  8:10 AM    Final Diagnosis    A -C.  Bone marrow,  right iliac crest,  biopsy, clot, and aspirate:  -  No morphologic or immunophenotypic evidence of B-cell lymphoma.  -  Normocellular (~70%) bone marrow with trilineage maturing hematopoiesis with no increase in blasts.  -  Reduced iron stores, correlation with serum iron studies is recommended.  -  No increase in reticulin fibrosis.     Comment: The patient's recently diagnosed B-cell lymphoma pending ancillary work-up is noted ([id number]).               The current bone marrow biopsy shows no morphologic or immunophenotypic evidence of involvement by B-cell lymphoma. Flow cytometry is negative for myeloid, lymphoid, and plasma cell abnormalities.  Karyotype analysis is pending and will be reported in a separate addendum.   If clinically indicated material is available for ancillary molecular studies including B-cell gene  rearrangement and lymphoid NGS; if compared to the diagnostic sample these testing modalities may be more sensitive in excluding low level molecular involvement of the bone marrow. Clinical correlation is advised.    Electronically signed by Vinayak Prado MD on 7/28/2023 at  3:41 PM    Microscopic Description   [facility]    Bone marrow aspirate smears:  The aspirate smears are spicular, cellular, and  adequate for evaluation. Cellularity is composed of maturing trilineage hematopoiesis with a myeloid to erythroid ratio of 2.4:1.  Myelopoiesis:  Myeloid elements are present in appropriate quantities and show normal maturation without overt evidence of dysplasia (myeloblasts= <5%).  Erythropoiesis:  Eyrthroid elements are present in appropriate quantities and show normal maturation without overt evidence of dysplasia.   Megakaryocytes:  Megakaryocytes are present in appropriate quantities and exhibit a spectrum of maturation.  Lymphocytes:  Mildly increased with small morphology; no evidence of large cells.  Plasma cells:  Rare plasma cells.     200 cell differential:  Blasts: 0%  Pros: 0%  [place]/Myelo: 17.5%  Segs: 39.0%  Eryth: 26.0%  Lymphs: 9.5%  Mono: 2.5%  Eos: 4.0%  Plasma cells: 1.5%  Baso: 0%  Atypical cells: 0%     M:E ratio= 2.4     Bone marrow core biopsy and aspirate clot:  Histologic sections of the aspirate clot and decalcified core biopsy are adequate for evaluation.  Marrow space cellularity is normocellular for patient age (~70%).  Myelopoiesis:  Exhibit progressive maturation (myeloblasts= <5%).  Erythropoiesis:  Exhibit progressive maturation.  Megakaryocytes:  Present with a spectrum of normal morphology.  Lymphocytes:  Scattered.  Plasma cells:  Rare.     Special studies:   * Iron stain performed on the aspirate smear, clot, and core biopsy highlights reduced iron stores (1/4) with no evidence of ring sideroblasts; correlation with serum iron studies is recommended.    * Reticulin stain  performed on the core biopsy shows no significant increase in reticulin fibers.  0 of 3 by the  Consensus grading scheme.  * PAS highlights myeloid and megakaryocytic elements confirming a normal M:E ratio.  * Immunohistochemical stains were performed with appropriate controls and show the following results:  -  CD34 shows no increase in blasts (<5% of total cellularity) and  shows no increase in immature cells (<5% of total cellularity). Additionally,  highlights scattered mast cells.     CD61 highlights scattered megakaryocytes in normal distribution and quantity.      CD3 and CD20 highlight interstitially scattered T and B-cells (<10% of total cellularity). [place]-5 highlights B-cells and is negative for large B-cells. BCL-6 is negative.      highlights scattered plasma cells (<5% of total cellularity) with a polytypic kappa and lambda light chain expression by kappa and lambda in situ hybridization studies.      Previous Hematologic/ Oncologic History:    Oncology History   Lymphoma of thyroid gland (HCC)   7/22/2023 Initial Diagnosis    Lymphoma of thyroid gland (HCC)     8/2/2023 - 11/27/2023 Chemotherapy    DOXOrubicin (ADRIAMYCIN), 50 mg/m2 = 110 mg, Intravenous, Once, 6 of 6 cycles  Administration: 110 mg (8/3/2023), 110 mg (8/23/2023), 110 mg (9/13/2023), 110 mg (10/4/2023), 110 mg (10/25/2023), 110 mg (11/27/2023)  alteplase (CATHFLO), 2 mg, Intracatheter, Every 1 Minute as needed, 6 of 6 cycles  vincristine (ONCOVIN) chemo infusion, 2 mg (original dose 1.4 mg/m2), Intravenous, Once, 6 of 6 cycles  Dose modification: 2 mg (original dose 1.4 mg/m2, Cycle 1, Reason: Max Dose Reached)  Administration: 2 mg (8/3/2023), 2 mg (8/23/2023), 2 mg (9/13/2023), 2 mg (10/4/2023), 2 mg (10/25/2023), 2 mg (11/27/2023)  cyclophosphamide (CYTOXAN) IVPB, 750 mg/m2 = 1,650 mg, Intravenous, Once, 6 of 6 cycles  Administration: 1,650 mg (8/2/2023), 1,650 mg (8/23/2023), 1,650 mg (9/13/2023), 1,650 mg  (10/4/2023), 1,650 mg (10/25/2023), 1,650 mg (11/27/2023)  fosaprepitant (EMEND) IVPB, 150 mg, Intravenous, Once, 1 of 1 cycle  Administration: 150 mg (8/2/2023)  riTUXimab (RITUXAN) first titrated chemo infusion, 825 mg, Intravenous, Once, 2 of 2 cycles  Dose modification: 375 mg/m2 (original dose 375 mg/m2, Cycle 2)  Administration: 800 mg (8/2/2023)  aprepitant (CINVANTI) in  mL IVPB, 130 mg, Intravenous, Once, 5 of 5 cycles  Administration: 130 mg (8/23/2023), 130 mg (9/13/2023), 130 mg (10/4/2023), 130 mg (10/25/2023), 130 mg (11/27/2023)  riTUXimab-pvvr (RUXIENCE) first titrated chemo infusion, 825 mg (100 % of original dose 375 mg/m2), Intravenous, Once, 5 of 5 cycles  Dose modification: 375 mg/m2 (original dose 375 mg/m2, Cycle 2)  Administration: 800 mg (8/23/2023), 800 mg (9/13/2023), 800 mg (10/4/2023), 800 mg (10/25/2023), 800 mg (11/27/2023)         Current Hematologic/ Oncologic Treatment:       Cycle 1         Test Results:    Imaging: NM PET CT skull base to mid thigh    Result Date: 1/29/2024  Narrative: PET/CT SCAN INDICATION: C83.31: Diffuse large b-cell lymphoma, lymph nodes of head, face, and neck MODIFIER: PI COMPARISON: Previous CT from October 23, 2023 CELL TYPE: Diffuse large B-cell lymphoma found on the thyroid biopsy TECHNIQUE:   12.6 mCi F-18-FDG administered IV. Multiplanar attenuation corrected and non attenuation corrected PET images are available for interpretation, and contiguous, low dose, axial CT sections were obtained from the skull base through the femurs.  Intravenous contrast material was not utilized. This examination, like all CT scans performed in the [facility]ending [facility] to [facility], was performed utilizing techniques to minimize radiation dose exposure, including the use of iterative reconstruction and automated exposure control. Fasting serum glucose: 88 mg/dl FINDINGS: VISUALIZED BRAIN: No acute abnormalities are seen. HEAD/NECK: There is no hypermetabolic cervical  "lymph node enlargement seen On the previous CT from July 22, 2023 patient was found to have bilateral thyroid masses. These have resolved Mild uptake seen in the bilateral thyroid lobes with SUV of 4.6 on the left side and 3.2 on the right side. The previously noted bilateral thyroid masses are not visible CHEST: There is no hypermetabolic mediastinal lymphadenopathy There is no focal lung nodule seen Trachea and central bronchi are patent CT images: Unremarkable. ABDOMEN: No FDG avid soft tissue lesions are seen. CT images: Unremarkable. PELVIS: No FDG avid soft tissue lesions are seen. CT images: Unremarkable. OSSEOUS STRUCTURES: No FDG avid lesions are seen. CT images: No significant findings.     Impression: There is no hypermetabolic lymphadenopathy in the cervical, thoracic and abdominopelvic region. The previously noted thyroid masses have resolved mild low-level uptake seen in the both thyroid lobes left greater than right in the region of the index lesion Continue surveillance Workstation performed: [id number]             Labs:   Lab Results   Component Value Date    WBC 9.37 11/24/2023    HGB 11.5 11/24/2023    HCT 34.9 11/24/2023    MCV 90 11/24/2023     11/24/2023     Lab Results   Component Value Date    K 3.9 11/24/2023     11/24/2023    CO2 24 11/24/2023    BUN 12 11/24/2023    CREATININE 0.63 11/24/2023    GLUF 91 08/21/2023    CALCIUM 8.9 11/24/2023    CORRECTEDCA 8.3 08/06/2023    AST 20 11/24/2023    ALT 30 11/24/2023    ALKPHOS 60 11/24/2023    EGFR 127 11/24/2023         No results found for: \"SPEP\", \"UPEP\"    No results found for: \"PSA\"    No results found for: \"CEA\"    No results found for: \"\"    No results found for: \"AFP\"    Lab Results   Component Value Date    IRON 42 (L) 08/02/2023    TIBC 319 08/02/2023    FERRITIN 82 08/02/2023       No results found for: \"QGHEUUNC83\"      ROS: Review of Systems   Constitutional: Negative.    HENT: Negative.     Eyes: Negative.  "   Respiratory: Negative.     Cardiovascular: Negative.    Gastrointestinal: Negative.    Endocrine: Negative.    Genitourinary: Negative.    Musculoskeletal: Negative.    Skin: Negative.    Allergic/Immunologic: Negative.    Neurological: Negative.    Hematological: Negative.          Current Medications: Reviewed  Allergies: Reviewed  PMH/FH/SH:  Reviewed      Physical Exam:    Body surface area is 2.23 meters squared.    Wt Readings from Last 3 Encounters:   02/26/24 119 kg (261 lb 8 oz)   11/27/23 125 kg (275 lb)   11/16/23 123 kg (271 lb)        Temp Readings from Last 3 Encounters:   02/26/24 97.9 °F (36.6 °C) (Temporal)   11/27/23 (!) 96.9 °F (36.1 °C) (Temporal)   11/16/23 98.2 °F (36.8 °C)        BP Readings from Last 3 Encounters:   02/26/24 132/86   11/27/23 144/94   11/16/23 122/88         Pulse Readings from Last 3 Encounters:   02/26/24 68   11/27/23 98   11/16/23 (!) 110     @LASTSAO2(3)@      Physical Exam  Vitals reviewed.   Constitutional:       Appearance: Normal appearance. She is obese.   HENT:      Head: Normocephalic and atraumatic.   Eyes:      Extraocular Movements: Extraocular movements intact.      Conjunctiva/sclera: Conjunctivae normal.      Pupils: Pupils are equal, round, and reactive to light.   Cardiovascular:      Rate and Rhythm: Normal rate and regular rhythm.      Heart sounds: Normal heart sounds.   Pulmonary:      Effort: Pulmonary effort is normal.      Breath sounds: Normal breath sounds.   Abdominal:      General: Abdomen is flat. Bowel sounds are normal.      Palpations: Abdomen is soft.   Musculoskeletal:         General: Normal range of motion.      Cervical back: Normal range of motion and neck supple.   Skin:     General: Skin is warm and dry.   Neurological:      General: No focal deficit present.      Mental Status: She is alert and oriented to person, place, and time. Mental status is at baseline.     No definite adenopathy is appreciated.      Goals and Barriers:   Current Goal: Prolong Survival from Cancer.   Barriers: None.      Patient's Capacity to Self Care:  Patient is able to self care.    Code Status: [unfilled]  Advance Directive and Living Will:      Power of :

## 2024-03-01 NOTE — ASSESSMENT & PLAN NOTE
Mohs Case Number:  · No exacerbation   · Continue Albuterol PRN  · Continue montelukast Date Of Previous Biopsy (Optional): 01/04/24 Previous Accession (Optional): [id number] Biopsy Photograph Reviewed: Yes Referring Physician (Optional): Dr. Renita MD Consent Type: Consent 1 (Standard) Eye Shield Used: No Surgeon Performing Repair (Optional): Dr. Sandy DO Initial Size Of Lesion: 1.4 X Size Of Lesion In Cm (Optional): 0.7 Number Of Stages: 4 Primary Defect Length In Cm (Final Defect Size - Required For Flaps/Grafts): 0 Repair Type: Referred to oculoplastics for closure Which Eyelid Repair Cpt Are You Using?: 66951 Which Oculoplastic Surgeon Are You Referring To?: A Oculoplastic Surgeon Procedure Text (A): After obtaining clear surgical margins the patient was sent to oculoplastics for surgical repair.  The patient understands they will receive post-surgical care and follow-up from the referring physician's office. Otolaryngologist Procedure Text (A): After obtaining clear surgical margins the patient was sent to otolaryngology for surgical repair.  The patient understands they will receive post-surgical care and follow-up from the referring physician's office. Plastic Surgeon Procedure Text (A): After obtaining clear surgical margins the patient was sent to plastics for surgical repair.  The patient understands they will receive post-surgical care and follow-up from the referring physician's office. Mid-Level Procedure Text (A): After obtaining clear surgical margins the patient was sent to a mid-level provider for surgical repair.  The patient understands they will receive post-surgical care and follow-up from the mid-level provider. Provider Procedure Text (A): After obtaining clear surgical margins the defect was repaired by another provider. Asc Procedure Text (A): After obtaining clear surgical margins the patient was sent to an ASC for surgical repair.  The patient understands they will receive post-surgical care and follow-up from the ASC physician. Suturegard Retention Suture: 2-0 Nylon Retention Suture Bite Size: 3 mm Length To Time In Minutes Device Was In Place: 10 Number Of Hemigard Strips Per Side: 1 Undermining Type: Entire Wound Debridement Text: The wound edges were debrided prior to proceeding with the closure to facilitate wound healing. Helical Rim Text: The closure involved the helical rim. Vermilion Border Text: The closure involved the vermilion border. Nostril Rim Text: The closure involved the nostril rim. Retention Suture Text: Retention sutures were placed to support the closure and prevent dehiscence. Location Indication Override (Is Already Calculated Based On Selected Body Location): Area H Area H Indication Text: Tumors in this location are included in Area H (eyelids, eyebrows, nose, lips, chin, ear, pre-auricular, post-auricular, temple, genitalia, hands, feet, ankles and areola).  Tissue conservation is critical in these anatomic locations. Area M Indication Text: Tumors in this location are included in Area M (cheek, forehead, scalp, neck, jawline and pretibial skin).  Mohs surgery is indicated for tumors in these anatomic locations. Area L Indication Text: Tumors in this location are included in Area L (trunk and extremities).  Mohs surgery is indicated for larger tumors, or tumors with aggressive histologic features, in these anatomic locations. 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 Information on tumor staging will be automatically added for SCCs on the head and neck. AJCC 8 includes tumor size, tumor depth, perineural involvement and bone invasion. Tumor Depth: Less than 6mm from granular layer and no invasion beyond the subcutaneous fat Medical Necessity Statement: Based on my medical judgement, Mohs surgery is the most appropriate treatment for this cancer compared to other treatments. Alternatives Discussed Intro (Do Not Add Period): I discussed alternative treatments to Mohs surgery and specifically discussed the risks and benefits of Consent 1/Introductory Paragraph: The rationale for Mohs was explained to the patient and consent was obtained. The risks, benefits and alternatives to therapy were discussed in detail. Specifically, the risks of infection, scarring, bleeding, prolonged wound healing, incomplete removal, allergy to anesthesia, nerve injury and recurrence were addressed. Prior to the procedure, the treatment site was clearly identified and confirmed by the patient. All components of Universal Protocol/PAUSE Rule completed. Consent 2/Introductory Paragraph: Mohs surgery was explained to the patient and consent was obtained. The risks, benefits and alternatives to therapy were discussed in detail. Specifically, the risks of infection, scarring, bleeding, prolonged wound healing, incomplete removal, allergy to anesthesia, nerve injury and recurrence were addressed. Prior to the procedure, the treatment site was clearly identified and confirmed by the patient. All components of Universal Protocol/PAUSE Rule completed. Consent 3/Introductory Paragraph: I gave the patient a chance to ask questions they had about the procedure.  Following this I explained the Mohs procedure and consent was obtained. The risks, benefits and alternatives to therapy were discussed in detail. Specifically, the risks of infection, scarring, bleeding, prolonged wound healing, incomplete removal, allergy to anesthesia, nerve injury and recurrence were addressed. Prior to the procedure, the treatment site was clearly identified and confirmed by the patient. All components of Universal Protocol/PAUSE Rule completed. Consent (Temporal Branch)/Introductory Paragraph: The rationale for Mohs was explained to the patient and consent was obtained. The risks, benefits and alternatives to therapy were discussed in detail. Specifically, the risks of damage to the temporal branch of the facial nerve, infection, scarring, bleeding, prolonged wound healing, incomplete removal, allergy to anesthesia, and recurrence were addressed. Prior to the procedure, the treatment site was clearly identified and confirmed by the patient. All components of Universal Protocol/PAUSE Rule completed. Consent (Marginal Mandibular)/Introductory Paragraph: The rationale for Mohs was explained to the patient and consent was obtained. The risks, benefits and alternatives to therapy were discussed in detail. Specifically, the risks of damage to the marginal mandibular branch of the facial nerve, infection, scarring, bleeding, prolonged wound healing, incomplete removal, allergy to anesthesia, and recurrence were addressed. Prior to the procedure, the treatment site was clearly identified and confirmed by the patient. All components of Universal Protocol/PAUSE Rule completed. Consent (Spinal Accessory)/Introductory Paragraph: The rationale for Mohs was explained to the patient and consent was obtained. The risks, benefits and alternatives to therapy were discussed in detail. Specifically, the risks of damage to the spinal accessory nerve, infection, scarring, bleeding, prolonged wound healing, incomplete removal, allergy to anesthesia, and recurrence were addressed. Prior to the procedure, the treatment site was clearly identified and confirmed by the patient. All components of Universal Protocol/PAUSE Rule completed. Consent (Near Eyelid Margin)/Introductory Paragraph: The rationale for Mohs was explained to the patient and consent was obtained. The risks, benefits and alternatives to therapy were discussed in detail. Specifically, the risks of ectropion or eyelid deformity, infection, scarring, bleeding, prolonged wound healing, incomplete removal, allergy to anesthesia, nerve injury and recurrence were addressed. Prior to the procedure, the treatment site was clearly identified and confirmed by the patient. All components of Universal Protocol/PAUSE Rule completed. Consent (Ear)/Introductory Paragraph: The rationale for Mohs was explained to the patient and consent was obtained. The risks, benefits and alternatives to therapy were discussed in detail. Specifically, the risks of ear deformity, infection, scarring, bleeding, prolonged wound healing, incomplete removal, allergy to anesthesia, nerve injury and recurrence were addressed. Prior to the procedure, the treatment site was clearly identified and confirmed by the patient. All components of Universal Protocol/PAUSE Rule completed. Consent (Nose)/Introductory Paragraph: The rationale for Mohs was explained to the patient and consent was obtained. The risks, benefits and alternatives to therapy were discussed in detail. Specifically, the risks of nasal deformity, changes in the flow of air through the nose, infection, scarring, bleeding, prolonged wound healing, incomplete removal, allergy to anesthesia, nerve injury and recurrence were addressed. Prior to the procedure, the treatment site was clearly identified and confirmed by the patient. All components of Universal Protocol/PAUSE Rule completed. Consent (Lip)/Introductory Paragraph: The rationale for Mohs was explained to the patient and consent was obtained. The risks, benefits and alternatives to therapy were discussed in detail. Specifically, the risks of lip deformity, changes in the oral aperture, infection, scarring, bleeding, prolonged wound healing, incomplete removal, allergy to anesthesia, nerve injury and recurrence were addressed. Prior to the procedure, the treatment site was clearly identified and confirmed by the patient. All components of Universal Protocol/PAUSE Rule completed. Consent (Scalp)/Introductory Paragraph: The rationale for Mohs was explained to the patient and consent was obtained. The risks, benefits and alternatives to therapy were discussed in detail. Specifically, the risks of changes in hair growth pattern secondary to repair, infection, scarring, bleeding, prolonged wound healing, incomplete removal, allergy to anesthesia, nerve injury and recurrence were addressed. Prior to the procedure, the treatment site was clearly identified and confirmed by the patient. All components of Universal Protocol/PAUSE Rule completed. Detail Level: Detailed Postop Diagnosis: same Anesthesia Type: 2% lidocaine with epinephrine and a 1:10 solution of 8.4% sodium bicarbonate Anesthesia Volume In Cc: 3 Hemostasis: Electrocautery Estimated Blood Loss (Cc): minimal Repair Anesthesia Method: local infiltration Anesthesia Volume In Cc: 2.5 Brow Lift Text: A midfrontal incision was made medially to the defect to allow access to the tissues just superior to the left eyebrow. Following careful dissection inferiorly in a supraperiosteal plane to the level of the left eyebrow, several 3-0 monocryl sutures were used to resuspend the eyebrow orbicularis oculi muscular unit to the superior frontal bone periosteum. This resulted in an appropriate reapproximation of static eyebrow symmetry and correction of the left brow ptosis. Deep Sutures: 5-0 Monocryl Epidermal Sutures: 5-0 Ethilon Epidermal Closure: simple interrupted Suturegard Intro: Intraoperative tissue expansion was performed, utilizing the SUTUREGARD device, in order to reduce wound tension. Suturegard Body: The suture ends were repeatedly re-tightened and re-clamped to achieve the desired tissue expansion. Hemigard Intro: Due to skin fragility and wound tension, it was decided to use HEMIGARD adhesive retention suture devices to permit a linear closure. The skin was cleaned and dried for a 6cm distance away from the wound. Excessive hair, if present, was removed to allow for adhesion. Hemigard Postcare Instructions: The HEMIGARD strips are to remain completely dry for at least 5-7 days. Donor Site Anesthesia Type: same as repair anesthesia Graft Donor Site Bandage (Optional-Leave Blank If You Don't Want In Note): Steri-strips and a pressure bandage were applied to the donor site. Closure 2 Information: This tab is for additional flaps and grafts, including complex repair and grafts and complex repair and flaps. You can also specify a different location for the additional defect, if the location is the same you do not need to select a new one. We will insert the automated text for the repair you select below just as we do for solitary flaps and grafts. Please note that at this time if you select a location with a different insurance zone you will need to override the ICD10 and CPT if appropriate. Closure 3 Information: This tab is for additional flaps and grafts above and beyond our usual structured repairs.  Please note if you enter information here it will not currently bill and you will need to add the billing information manually. Wound Care: Polysporin ointment Dressing: pressure dressing Dressing (No Sutures): dry sterile dressing Unna Boot Text: An Unna boot was placed to help immobilize the limb and facilitate more rapid healing. Home Suture Removal Text: Patient was provided instructions on removing sutures and will remove their sutures at home.  If they have any questions or difficulties they will call the office. Post-Care Instructions: I reviewed with the patient in detail post-care instructions. Patient is not to engage in any heavy lifting, exercise, or swimming for the next 14 days. Should the patient develop any fevers, chills, bleeding, severe pain patient will contact the office immediately. Pain Refusal Text: I offered to prescribe pain medication but the patient refused to take this medication. Mauc Instructions: By selecting yes to the question below the MAUC number will be added into the note.  This will be calculated automatically based on the diagnosis chosen, the size entered, the body zone selected (H,M,L) and the specific indications you chose. You will also have the option to override the Mohs AUC if you disagree with the automatically calculated number and this option is found in the Case Summary tab. Where Do You Want The Question To Include Opioid Counseling Located?: Case Summary Tab Eye Protection Verbiage: Before proceeding with the stage, a plastic scleral shield was inserted. The globe was anesthetized with a few drops of 1% lidocaine with 1:100,000 epinephrine. Then, an appropriate sized scleral shield was chosen and coated with lacrilube ointment. The shield was gently inserted and left in place for the duration of each stage. After the stage was completed, the shield was gently removed. Mohs Method Verbiage: An incision at a 45 degree angle following the standard Mohs approach was done and the specimen was harvested as a microscopic controlled layer. Surgeon/Pathologist Verbiage (Will Incorporate Name Of Surgeon From Intro If Not Blank): operated in two distinct and integrated capacities as the surgeon and pathologist. Mohs Histo Method Verbiage: Each section was then chromacoded and processed in the Mohs lab using the Mohs protocol and submitted for frozen section. Subsequent Stages Histo Method Verbiage: Using a similar technique to that described above, a thin layer of tissue was removed from all areas where tumor was visible on the previous stage.  The tissue was again oriented, mapped, dyed, and processed as above. Mohs Rapid Report Verbiage: The area of clinically evident tumor was marked with skin marking ink and appropriately hatched.  The initial incision was made following the Mohs approach through the skin.  The specimen was taken to the lab, divided into the necessary number of pieces, chromacoded and processed according to the Mohs protocol.  This was repeated in successive stages until a tumor free defect was achieved. Complex Repair Preamble Text (Leave Blank If You Do Not Want): Extensive wide and deep undermining was performed. Intermediate Repair Preamble Text (Leave Blank If You Do Not Want): Minimal undermining was performed with blunt dissection. Crescentic Complex Repair Preamble Text (Leave Blank If You Do Not Want): Extensive wide undermining was performed. Crescentic Intermediate Repair Preamble Text (Leave Blank If You Do Not Want): Undermining was performed with blunt dissection. Non-Graft Cartilage Fenestration Text: The cartilage was fenestrated with a 2mm punch biopsy to help facilitate healing. Graft Cartilage Fenestration Text: The cartilage was fenestrated with a 3mm punch biopsy to help facilitate graft survival and healing. Secondary Intention Text (Leave Blank If You Do Not Want): The defect will heal with secondary intention. No Repair - Repaired With Adjacent Surgical Defect Text (Leave Blank If You Do Not Want): After obtaining clear surgical margins the defect was repaired concurrently with another surgical defect which was in close approximation. Adjacent Tissue Transfer Text: The defect edges were debeveled with a #15 scalpel blade.  Given the location of the defect and the proximity to free margins an adjacent tissue transfer was deemed most appropriate.  Using a sterile surgical marker, an appropriate flap was drawn incorporating the defect and placing the expected incisions within the relaxed skin tension lines where possible.    The area thus outlined was incised deep to adipose tissue with a #15 scalpel blade.  The skin margins were undermined to an appropriate distance in all directions utilizing iris scissors. Advancement Flap (Single) Text: The defect edges were debeveled with a #15 scalpel blade.  Given the location of the defect and the proximity to free margins a single advancement flap was deemed most appropriate.  Using a sterile surgical marker, an appropriate advancement flap was drawn incorporating the defect and placing the expected incisions within the relaxed skin tension lines where possible.    The area thus outlined was incised deep to adipose tissue with a #15 scalpel blade.  The skin margins were undermined to an appropriate distance in all directions utilizing iris scissors. Advancement Flap (Double) Text: The defect edges were debeveled with a #15 scalpel blade.  Given the location of the defect and the proximity to free margins a double advancement flap was deemed most appropriate.  Using a sterile surgical marker, the appropriate advancement flaps were drawn incorporating the defect and placing the expected incisions within the relaxed skin tension lines where possible.    The area thus outlined was incised deep to adipose tissue with a #15 scalpel blade.  The skin margins were undermined to an appropriate distance in all directions utilizing iris scissors. Burow's Advancement Flap Text: The defect edges were debeveled with a #15 scalpel blade.  Given the location of the defect and the proximity to free margins a Burow's advancement flap was deemed most appropriate.  Using a sterile surgical marker, the appropriate advancement flap was drawn incorporating the defect and placing the expected incisions within the relaxed skin tension lines where possible.    The area thus outlined was incised deep to adipose tissue with a #15 scalpel blade.  The skin margins were undermined to an appropriate distance in all directions utilizing iris scissors. Chonodrocutaneous Helical Advancement Flap Text: The defect edges were debeveled with a #15 scalpel blade.  Given the location of the defect and the proximity to free margins a chondrocutaneous helical advancement flap was deemed most appropriate.  Using a sterile surgical marker, the appropriate advancement flap was drawn incorporating the defect and placing the expected incisions within the relaxed skin tension lines where possible.    The area thus outlined was incised deep to adipose tissue with a #15 scalpel blade.  The skin margins were undermined to an appropriate distance in all directions utilizing iris scissors. Crescentic Advancement Flap Text: The defect edges were debeveled with a #15 scalpel blade.  Given the location of the defect and the proximity to free margins a crescentic advancement flap was deemed most appropriate.  Using a sterile surgical marker, the appropriate advancement flap was drawn incorporating the defect and placing the expected incisions within the relaxed skin tension lines where possible.    The area thus outlined was incised deep to adipose tissue with a #15 scalpel blade.  The skin margins were undermined to an appropriate distance in all directions utilizing iris scissors. A-T Advancement Flap Text: The defect edges were debeveled with a #15 scalpel blade.  Given the location of the defect, shape of the defect and the proximity to free margins an A-T advancement flap was deemed most appropriate.  Using a sterile surgical marker, an appropriate advancement flap was drawn incorporating the defect and placing the expected incisions within the relaxed skin tension lines where possible.    The area thus outlined was incised deep to adipose tissue with a #15 scalpel blade.  The skin margins were undermined to an appropriate distance in all directions utilizing iris scissors. O-T Advancement Flap Text: The defect edges were debeveled with a #15 scalpel blade.  Given the location of the defect, shape of the defect and the proximity to free margins an O-T advancement flap was deemed most appropriate.  Using a sterile surgical marker, an appropriate advancement flap was drawn incorporating the defect and placing the expected incisions within the relaxed skin tension lines where possible.    The area thus outlined was incised deep to adipose tissue with a #15 scalpel blade.  The skin margins were undermined to an appropriate distance in all directions utilizing iris scissors. O-L Flap Text: The defect edges were debeveled with a #15 scalpel blade.  Given the location of the defect, shape of the defect and the proximity to free margins an O-L flap was deemed most appropriate.  Using a sterile surgical marker, an appropriate advancement flap was drawn incorporating the defect and placing the expected incisions within the relaxed skin tension lines where possible.    The area thus outlined was incised deep to adipose tissue with a #15 scalpel blade.  The skin margins were undermined to an appropriate distance in all directions utilizing iris scissors. O-Z Flap Text: The defect edges were debeveled with a #15 scalpel blade.  Given the location of the defect, shape of the defect and the proximity to free margins an O-Z flap was deemed most appropriate.  Using a sterile surgical marker, an appropriate transposition flap was drawn incorporating the defect and placing the expected incisions within the relaxed skin tension lines where possible. The area thus outlined was incised deep to adipose tissue with a #15 scalpel blade.  The skin margins were undermined to an appropriate distance in all directions utilizing iris scissors. Double O-Z Flap Text: The defect edges were debeveled with a #15 scalpel blade.  Given the location of the defect, shape of the defect and the proximity to free margins a Double O-Z flap was deemed most appropriate.  Using a sterile surgical marker, an appropriate transposition flap was drawn incorporating the defect and placing the expected incisions within the relaxed skin tension lines where possible. The area thus outlined was incised deep to adipose tissue with a #15 scalpel blade.  The skin margins were undermined to an appropriate distance in all directions utilizing iris scissors. V-Y Flap Text: The defect edges were debeveled with a #15 scalpel blade.  Given the location of the defect, shape of the defect and the proximity to free margins a V-Y flap was deemed most appropriate.  Using a sterile surgical marker, an appropriate advancement flap was drawn incorporating the defect and placing the expected incisions within the relaxed skin tension lines where possible.    The area thus outlined was incised deep to adipose tissue with a #15 scalpel blade.  The skin margins were undermined to an appropriate distance in all directions utilizing iris scissors. Advancement-Rotation Flap Text: The defect edges were debeveled with a #15 scalpel blade.  Given the location of the defect, shape of the defect and the proximity to free margins an advancement-rotation flap was deemed most appropriate.  Using a sterile surgical marker, an appropriate flap was drawn incorporating the defect and placing the expected incisions within the relaxed skin tension lines where possible. The area thus outlined was incised deep to adipose tissue with a #15 scalpel blade.  The skin margins were undermined to an appropriate distance in all directions utilizing iris scissors. Mercedes Flap Text: The defect edges were debeveled with a #15 scalpel blade.  Given the location of the defect, shape of the defect and the proximity to free margins a Mercedes flap was deemed most appropriate.  Using a sterile surgical marker, an appropriate advancement flap was drawn incorporating the defect and placing the expected incisions within the relaxed skin tension lines where possible. The area thus outlined was incised deep to adipose tissue with a #15 scalpel blade.  The skin margins were undermined to an appropriate distance in all directions utilizing iris scissors. Modified Advancement Flap Text: The defect edges were debeveled with a #15 scalpel blade.  Given the location of the defect, shape of the defect and the proximity to free margins a modified advancement flap was deemed most appropriate.  Using a sterile surgical marker, an appropriate advancement flap was drawn incorporating the defect and placing the expected incisions within the relaxed skin tension lines where possible.    The area thus outlined was incised deep to adipose tissue with a #15 scalpel blade.  The skin margins were undermined to an appropriate distance in all directions utilizing iris scissors. Mucosal Advancement Flap Text: Given the location of the defect, shape of the defect and the proximity to free margins a mucosal advancement flap was deemed most appropriate. Incisions were made with a 15 blade scalpel in the appropriate fashion along the cutaneous vermilion border and the mucosal lip. The remaining actinically damaged mucosal tissue was excised.  The mucosal advancement flap was then elevated to the gingival sulcus with care taken to preserve the neurovascular structures and advanced into the primary defect. Care was taken to ensure that precise realignment of the vermilion border was achieved. Peng Advancement Flap Text: The defect edges were debeveled with a #15 scalpel blade.  Given the location of the defect, shape of the defect and the proximity to free margins a Peng advancement flap was deemed most appropriate.  Using a sterile surgical marker, an appropriate advancement flap was drawn incorporating the defect and placing the expected incisions within the relaxed skin tension lines where possible. The area thus outlined was incised deep to adipose tissue with a #15 scalpel blade.  The skin margins were undermined to an appropriate distance in all directions utilizing iris scissors. Hatchet Flap Text: The defect edges were debeveled with a #15 scalpel blade.  Given the location of the defect, shape of the defect and the proximity to free margins a hatchet flap was deemed most appropriate.  Using a sterile surgical marker, an appropriate hatchet flap was drawn incorporating the defect and placing the expected incisions within the relaxed skin tension lines where possible.    The area thus outlined was incised deep to adipose tissue with a #15 scalpel blade.  The skin margins were undermined to an appropriate distance in all directions utilizing iris scissors. Rotation Flap Text: The defect edges were debeveled with a #15 scalpel blade.  Given the location of the defect, shape of the defect and the proximity to free margins a rotation flap was deemed most appropriate.  Using a sterile surgical marker, an appropriate rotation flap was drawn incorporating the defect and placing the expected incisions within the relaxed skin tension lines where possible.    The area thus outlined was incised deep to adipose tissue with a #15 scalpel blade.  The skin margins were undermined to an appropriate distance in all directions utilizing iris scissors. Bilateral Rotation Flap Text: The defect edges were debeveled with a #15 scalpel blade. Given the location of the defect, shape of the defect and the proximity to free margins a bilateral rotation flap was deemed most appropriate. Using a sterile surgical marker, an appropriate rotation flap was drawn incorporating the defect and placing the expected incisions within the relaxed skin tension lines where possible. The area thus outlined was incised deep to adipose tissue with a #15 scalpel blade. The skin margins were undermined to an appropriate distance in all directions utilizing iris scissors. Following this, the designed flap was carried over into the primary defect and sutured into place. Spiral Flap Text: The defect edges were debeveled with a #15 scalpel blade.  Given the location of the defect, shape of the defect and the proximity to free margins a spiral flap was deemed most appropriate.  Using a sterile surgical marker, an appropriate rotation flap was drawn incorporating the defect and placing the expected incisions within the relaxed skin tension lines where possible. The area thus outlined was incised deep to adipose tissue with a #15 scalpel blade.  The skin margins were undermined to an appropriate distance in all directions utilizing iris scissors. Staged Advancement Flap Text: The defect edges were debeveled with a #15 scalpel blade.  Given the location of the defect, shape of the defect and the proximity to free margins a staged advancement flap was deemed most appropriate.  Using a sterile surgical marker, an appropriate advancement flap was drawn incorporating the defect and placing the expected incisions within the relaxed skin tension lines where possible. The area thus outlined was incised deep to adipose tissue with a #15 scalpel blade.  The skin margins were undermined to an appropriate distance in all directions utilizing iris scissors. Star Wedge Flap Text: The defect edges were debeveled with a #15 scalpel blade.  Given the location of the defect, shape of the defect and the proximity to free margins a star wedge flap was deemed most appropriate.  Using a sterile surgical marker, an appropriate rotation flap was drawn incorporating the defect and placing the expected incisions within the relaxed skin tension lines where possible. The area thus outlined was incised deep to adipose tissue with a #15 scalpel blade.  The skin margins were undermined to an appropriate distance in all directions utilizing iris scissors. Transposition Flap Text: The defect edges were debeveled with a #15 scalpel blade.  Given the location of the defect and the proximity to free margins a transposition flap was deemed most appropriate.  Using a sterile surgical marker, an appropriate transposition flap was drawn incorporating the defect.    The area thus outlined was incised deep to adipose tissue with a #15 scalpel blade.  The skin margins were undermined to an appropriate distance in all directions utilizing iris scissors. Muscle Hinge Flap Text: The defect edges were debeveled with a #15 scalpel blade.  Given the size, depth and location of the defect and the proximity to free margins a muscle hinge flap was deemed most appropriate.  Using a sterile surgical marker, an appropriate hinge flap was drawn incorporating the defect. The area thus outlined was incised with a #15 scalpel blade.  The skin margins were undermined to an appropriate distance in all directions utilizing iris scissors. Mustarde Flap Text: The defect edges were debeveled with a #15 scalpel blade.  Given the size, depth and location of the defect and the proximity to free margins a Mustarde flap was deemed most appropriate.  Using a sterile surgical marker, an appropriate flap was drawn incorporating the defect. The area thus outlined was incised with a #15 scalpel blade.  The skin margins were undermined to an appropriate distance in all directions utilizing iris scissors. Nasal Turnover Hinge Flap Text: The defect edges were debeveled with a #15 scalpel blade.  Given the size, depth, location of the defect and the defect being full thickness a nasal turnover hinge flap was deemed most appropriate.  Using a sterile surgical marker, an appropriate hinge flap was drawn incorporating the defect. The area thus outlined was incised with a #15 scalpel blade. The flap was designed to recreate the nasal mucosal lining and the alar rim. The skin margins were undermined to an appropriate distance in all directions utilizing iris scissors. Nasalis-Muscle-Based Myocutaneous Island Pedicle Flap Text: Using a #15 blade, an incision was made around the donor flap to the level of the nasalis muscle. Wide lateral undermining was then performed in both the subcutaneous plane above the nasalis muscle, and in a submuscular plane just above periosteum. This allowed the formation of a free nasalis muscle axial pedicle (based on the angular artery) which was still attached to the actual cutaneous flap, increasing its mobility and vascular viability. Hemostasis was obtained with pinpoint electrocoagulation. The flap was mobilized into position and the pivotal anchor points positioned and stabilized with buried interrupted sutures. Subcutaneous and dermal tissues were closed in a multilayered fashion with sutures. Tissue redundancies were excised, and the epidermal edges were apposed without significant tension and sutured with sutures. Nasalis Myocutaneous Flap Text: Using a #15 blade, an incision was made around the donor flap to the level of the nasalis muscle. Wide lateral undermining was then performed in both the subcutaneous plane above the nasalis muscle, and in a submuscular plane just above periosteum. This allowed the formation of a free nasalis muscle axial pedicle which was still attached to the actual cutaneous flap, increasing its mobility and vascular viability. Hemostasis was obtained with pinpoint electrocoagulation. The flap was mobilized into position and the pivotal anchor points positioned and stabilized with buried interrupted sutures. Subcutaneous and dermal tissues were closed in a multilayered fashion with sutures. Tissue redundancies were excised, and the epidermal edges were apposed without significant tension and sutured with sutures. Orbicularis Oris Muscle Flap Text: The defect edges were debeveled with a #15 scalpel blade.  Given that the defect affected the competency of the oral sphincter an obicularis oris muscle flap was deemed most appropriate to restore this competency and normal muscle function.  Using a sterile surgical marker, an appropriate flap was drawn incorporating the defect. The area thus outlined was incised with a #15 scalpel blade. Melolabial Transposition Flap Text: The defect edges were debeveled with a #15 scalpel blade.  Given the location of the defect and the proximity to free margins a melolabial flap was deemed most appropriate.  Using a sterile surgical marker, an appropriate melolabial transposition flap was drawn incorporating the defect.    The area thus outlined was incised deep to adipose tissue with a #15 scalpel blade.  The skin margins were undermined to an appropriate distance in all directions utilizing iris scissors. Rectangular Flap Text: The defect edges were debeveled with a #15 scalpel blade. Given the location of the defect and the proximity to free margins a rectangular flap was deemed most appropriate. Using a sterile surgical marker, an appropriate rectangular flap was drawn incorporating the defect. The area thus outlined was incised deep to adipose tissue with a #15 scalpel blade. The skin margins were undermined to an appropriate distance in all directions utilizing iris scissors. Following this, the designed flap was carried over into the primary defect and sutured into place. Rhombic Flap Text: The defect edges were debeveled with a #15 scalpel blade.  Given the location of the defect and the proximity to free margins a rhombic flap was deemed most appropriate.  Using a sterile surgical marker, an appropriate rhombic flap was drawn incorporating the defect.    The area thus outlined was incised deep to adipose tissue with a #15 scalpel blade.  The skin margins were undermined to an appropriate distance in all directions utilizing iris scissors. Rhomboid Transposition Flap Text: The defect edges were debeveled with a #15 scalpel blade.  Given the location of the defect and the proximity to free margins a rhomboid transposition flap was deemed most appropriate.  Using a sterile surgical marker, an appropriate rhomboid flap was drawn incorporating the defect.    The area thus outlined was incised deep to adipose tissue with a #15 scalpel blade.  The skin margins were undermined to an appropriate distance in all directions utilizing iris scissors. Bi-Rhombic Flap Text: The defect edges were debeveled with a #15 scalpel blade.  Given the location of the defect and the proximity to free margins a bi-rhombic flap was deemed most appropriate.  Using a sterile surgical marker, an appropriate rhombic flap was drawn incorporating the defect. The area thus outlined was incised deep to adipose tissue with a #15 scalpel blade.  The skin margins were undermined to an appropriate distance in all directions utilizing iris scissors. Helical Rim Advancement Flap Text: The defect edges were debeveled with a #15 blade scalpel.  Given the location of the defect and the proximity to free margins (helical rim) a double helical rim advancement flap was deemed most appropriate.  Using a sterile surgical marker, the appropriate advancement flaps were drawn incorporating the defect and placing the expected incisions between the helical rim and antihelix where possible.  The area thus outlined was incised through and through with a #15 scalpel blade.  With a skin hook and iris scissors, the flaps were gently and sharply undermined and freed up. Bilateral Helical Rim Advancement Flap Text: The defect edges were debeveled with a #15 blade scalpel.  Given the location of the defect and the proximity to free margins (helical rim) a bilateral helical rim advancement flap was deemed most appropriate.  Using a sterile surgical marker, the appropriate advancement flaps were drawn incorporating the defect and placing the expected incisions between the helical rim and antihelix where possible.  The area thus outlined was incised through and through with a #15 scalpel blade.  With a skin hook and iris scissors, the flaps were gently and sharply undermined and freed up. Ear Star Wedge Flap Text: The defect edges were debeveled with a #15 blade scalpel.  Given the location of the defect and the proximity to free margins (helical rim) an ear star wedge flap was deemed most appropriate.  Using a sterile surgical marker, the appropriate flap was drawn incorporating the defect and placing the expected incisions between the helical rim and antihelix where possible.  The area thus outlined was incised through and through with a #15 scalpel blade. Banner Transposition Flap Text: The defect edges were debeveled with a #15 scalpel blade.  Given the location of the defect and the proximity to free margins a Banner transposition flap was deemed most appropriate.  Using a sterile surgical marker, an appropriate flap drawn around the defect. The area thus outlined was incised deep to adipose tissue with a #15 scalpel blade.  The skin margins were undermined to an appropriate distance in all directions utilizing iris scissors. Bilobed Flap Text: The defect edges were debeveled with a #15 scalpel blade.  Given the location of the defect and the proximity to free margins a bilobe flap was deemed most appropriate.  Using a sterile surgical marker, an appropriate bilobe flap drawn around the defect.    The area thus outlined was incised deep to adipose tissue with a #15 scalpel blade.  The skin margins were undermined to an appropriate distance in all directions utilizing iris scissors. Bilobed Transposition Flap Text: The defect edges were debeveled with a #15 scalpel blade.  Given the location of the defect and the proximity to free margins a bilobed transposition flap was deemed most appropriate.  Using a sterile surgical marker, an appropriate bilobe flap drawn around the defect.    The area thus outlined was incised deep to adipose tissue with a #15 scalpel blade.  The skin margins were undermined to an appropriate distance in all directions utilizing iris scissors. Trilobed Flap Text: The defect edges were debeveled with a #15 scalpel blade.  Given the location of the defect and the proximity to free margins a trilobed flap was deemed most appropriate.  Using a sterile surgical marker, an appropriate trilobed flap drawn around the defect.    The area thus outlined was incised deep to adipose tissue with a #15 scalpel blade.  The skin margins were undermined to an appropriate distance in all directions utilizing iris scissors. Dorsal Nasal Flap Text: The defect edges were debeveled with a #15 scalpel blade.  Given the location of the defect and the proximity to free margins a dorsal nasal flap was deemed most appropriate.  Using a sterile surgical marker, an appropriate dorsal nasal flap was drawn around the defect.    The area thus outlined was incised deep to adipose tissue with a #15 scalpel blade.  The skin margins were undermined to an appropriate distance in all directions utilizing iris scissors. Island Pedicle Flap Text: The defect edges were debeveled with a #15 scalpel blade.  Given the location of the defect, shape of the defect and the proximity to free margins an island pedicle advancement flap was deemed most appropriate.  Using a sterile surgical marker, an appropriate advancement flap was drawn incorporating the defect, outlining the appropriate donor tissue and placing the expected incisions within the relaxed skin tension lines where possible.    The area thus outlined was incised deep to adipose tissue with a #15 scalpel blade.  The skin margins were undermined to an appropriate distance in all directions around the primary defect and laterally outward around the island pedicle utilizing iris scissors.  There was minimal undermining beneath the pedicle flap. Island Pedicle Flap With Canthal Suspension Text: The defect edges were debeveled with a #15 scalpel blade.  Given the location of the defect, shape of the defect and the proximity to free margins an island pedicle advancement flap was deemed most appropriate.  Using a sterile surgical marker, an appropriate advancement flap was drawn incorporating the defect, outlining the appropriate donor tissue and placing the expected incisions within the relaxed skin tension lines where possible. The area thus outlined was incised deep to adipose tissue with a #15 scalpel blade.  The skin margins were undermined to an appropriate distance in all directions around the primary defect and laterally outward around the island pedicle utilizing iris scissors.  There was minimal undermining beneath the pedicle flap. A suspension suture was placed in the canthal tendon to prevent tension and prevent ectropion. Alar Island Pedicle Flap Text: The defect edges were debeveled with a #15 scalpel blade.  Given the location of the defect, shape of the defect and the proximity to the alar rim an island pedicle advancement flap was deemed most appropriate.  Using a sterile surgical marker, an appropriate advancement flap was drawn incorporating the defect, outlining the appropriate donor tissue and placing the expected incisions within the nasal ala running parallel to the alar rim. The area thus outlined was incised with a #15 scalpel blade.  The skin margins were undermined minimally to an appropriate distance in all directions around the primary defect and laterally outward around the island pedicle utilizing iris scissors.  There was minimal undermining beneath the pedicle flap. Double Island Pedicle Flap Text: The defect edges were debeveled with a #15 scalpel blade.  Given the location of the defect, shape of the defect and the proximity to free margins a double island pedicle advancement flap was deemed most appropriate.  Using a sterile surgical marker, an appropriate advancement flap was drawn incorporating the defect, outlining the appropriate donor tissue and placing the expected incisions within the relaxed skin tension lines where possible.    The area thus outlined was incised deep to adipose tissue with a #15 scalpel blade.  The skin margins were undermined to an appropriate distance in all directions around the primary defect and laterally outward around the island pedicle utilizing iris scissors.  There was minimal undermining beneath the pedicle flap. Island Pedicle Flap-Requiring Vessel Identification Text: The defect edges were debeveled with a #15 scalpel blade.  Given the location of the defect, shape of the defect and the proximity to free margins an island pedicle advancement flap was deemed most appropriate.  Using a sterile surgical marker, an appropriate advancement flap was drawn, based on the axial vessel mentioned above, incorporating the defect, outlining the appropriate donor tissue and placing the expected incisions within the relaxed skin tension lines where possible.    The area thus outlined was incised deep to adipose tissue with a #15 scalpel blade.  The skin margins were undermined to an appropriate distance in all directions around the primary defect and laterally outward around the island pedicle utilizing iris scissors.  There was minimal undermining beneath the pedicle flap. Keystone Flap Text: The defect edges were debeveled with a #15 scalpel blade.  Given the location of the defect, shape of the defect a keystone flap was deemed most appropriate.  Using a sterile surgical marker, an appropriate keystone flap was drawn incorporating the defect, outlining the appropriate donor tissue and placing the expected incisions within the relaxed skin tension lines where possible. The area thus outlined was incised deep to adipose tissue with a #15 scalpel blade.  The skin margins were undermined to an appropriate distance in all directions around the primary defect and laterally outward around the flap utilizing iris scissors. O-T Plasty Text: The defect edges were debeveled with a #15 scalpel blade.  Given the location of the defect, shape of the defect and the proximity to free margins an O-T plasty was deemed most appropriate.  Using a sterile surgical marker, an appropriate O-T plasty was drawn incorporating the defect and placing the expected incisions within the relaxed skin tension lines where possible.    The area thus outlined was incised deep to adipose tissue with a #15 scalpel blade.  The skin margins were undermined to an appropriate distance in all directions utilizing iris scissors. O-Z Plasty Text: The defect edges were debeveled with a #15 scalpel blade.  Given the location of the defect, shape of the defect and the proximity to free margins an O-Z plasty (double transposition flap) was deemed most appropriate.  Using a sterile surgical marker, the appropriate transposition flaps were drawn incorporating the defect and placing the expected incisions within the relaxed skin tension lines where possible.    The area thus outlined was incised deep to adipose tissue with a #15 scalpel blade.  The skin margins were undermined to an appropriate distance in all directions utilizing iris scissors.  Hemostasis was achieved with electrocautery.  The flaps were then transposed into place, one clockwise and the other counterclockwise, and anchored with interrupted buried subcutaneous sutures. Double O-Z Plasty Text: The defect edges were debeveled with a #15 scalpel blade.  Given the location of the defect, shape of the defect and the proximity to free margins a Double O-Z plasty (double transposition flap) was deemed most appropriate.  Using a sterile surgical marker, the appropriate transposition flaps were drawn incorporating the defect and placing the expected incisions within the relaxed skin tension lines where possible. The area thus outlined was incised deep to adipose tissue with a #15 scalpel blade.  The skin margins were undermined to an appropriate distance in all directions utilizing iris scissors.  Hemostasis was achieved with electrocautery.  The flaps were then transposed into place, one clockwise and the other counterclockwise, and anchored with interrupted buried subcutaneous sutures. V-Y Plasty Text: The defect edges were debeveled with a #15 scalpel blade.  Given the location of the defect, shape of the defect and the proximity to free margins an V-Y advancement flap was deemed most appropriate.  Using a sterile surgical marker, an appropriate advancement flap was drawn incorporating the defect and placing the expected incisions within the relaxed skin tension lines where possible.    The area thus outlined was incised deep to adipose tissue with a #15 scalpel blade.  The skin margins were undermined to an appropriate distance in all directions utilizing iris scissors. H Plasty Text: Given the location of the defect, shape of the defect and the proximity to free margins a H-plasty was deemed most appropriate for repair.  Using a sterile surgical marker, the appropriate advancement arms of the H-plasty were drawn incorporating the defect and placing the expected incisions within the relaxed skin tension lines where possible. The area thus outlined was incised deep to adipose tissue with a #15 scalpel blade. The skin margins were undermined to an appropriate distance in all directions utilizing iris scissors.  The opposing advancement arms were then advanced into place in opposite direction and anchored with interrupted buried subcutaneous sutures. W Plasty Text: The lesion was extirpated to the level of the fat with a #15 scalpel blade.  Given the location of the defect, shape of the defect and the proximity to free margins a W-plasty was deemed most appropriate for repair.  Using a sterile surgical marker, the appropriate transposition arms of the W-plasty were drawn incorporating the defect and placing the expected incisions within the relaxed skin tension lines where possible.    The area thus outlined was incised deep to adipose tissue with a #15 scalpel blade.  The skin margins were undermined to an appropriate distance in all directions utilizing iris scissors.  The opposing transposition arms were then transposed into place in opposite direction and anchored with interrupted buried subcutaneous sutures. Z Plasty Text: The lesion was extirpated to the level of the fat with a #15 scalpel blade.  Given the location of the defect, shape of the defect and the proximity to free margins a Z-plasty was deemed most appropriate for repair.  Using a sterile surgical marker, the appropriate transposition arms of the Z-plasty were drawn incorporating the defect and placing the expected incisions within the relaxed skin tension lines where possible.    The area thus outlined was incised deep to adipose tissue with a #15 scalpel blade.  The skin margins were undermined to an appropriate distance in all directions utilizing iris scissors.  The opposing transposition arms were then transposed into place in opposite direction and anchored with interrupted buried subcutaneous sutures. Double Z Plasty Text: The lesion was extirpated to the level of the fat with a #15 scalpel blade. Given the location of the defect, shape of the defect and the proximity to free margins a double Z-plasty was deemed most appropriate for repair. Using a sterile surgical marker, the appropriate transposition arms of the double Z-plasty were drawn incorporating the defect and placing the expected incisions within the relaxed skin tension lines where possible. The area thus outlined was incised deep to adipose tissue with a #15 scalpel blade. The skin margins were undermined to an appropriate distance in all directions utilizing iris scissors. The opposing transposition arms were then transposed and carried over into place in opposite direction and anchored with interrupted buried subcutaneous sutures. Zygomaticofacial Flap Text: Given the location of the defect, shape of the defect and the proximity to free margins a zygomaticofacial flap was deemed most appropriate for repair.  Using a sterile surgical marker, the appropriate flap was drawn incorporating the defect and placing the expected incisions within the relaxed skin tension lines where possible. The area thus outlined was incised deep to adipose tissue with a #15 scalpel blade with preservation of a vascular pedicle.  The skin margins were undermined to an appropriate distance in all directions utilizing iris scissors.  The flap was then placed into the defect and anchored with interrupted buried subcutaneous sutures. Cheek Interpolation Flap Text: A decision was made to reconstruct the defect utilizing an interpolation axial flap and a staged reconstruction.  A telfa template was made of the defect.  This telfa template was then used to outline the Cheek Interpolation flap.  The donor area for the pedicle flap was then injected with anesthesia.  The flap was excised through the skin and subcutaneous tissue down to the layer of the underlying musculature.  The interpolation flap was carefully excised within this deep plane to maintain its blood supply.  The edges of the donor site were undermined.   The donor site was closed in a primary fashion.  The pedicle was then rotated into position and sutured.  Once the tube was sutured into place, adequate blood supply was confirmed with blanching and refill.  The pedicle was then wrapped with xeroform gauze and dressed appropriately with a telfa and gauze bandage to ensure continued blood supply and protect the attached pedicle. Cheek-To-Nose Interpolation Flap Text: A decision was made to reconstruct the defect utilizing an interpolation axial flap and a staged reconstruction.  A telfa template was made of the defect.  This telfa template was then used to outline the Cheek-To-Nose Interpolation flap.  The donor area for the pedicle flap was then injected with anesthesia.  The flap was excised through the skin and subcutaneous tissue down to the layer of the underlying musculature.  The interpolation flap was carefully excised within this deep plane to maintain its blood supply.  The edges of the donor site were undermined.   The donor site was closed in a primary fashion.  The pedicle was then rotated into position and sutured.  Once the tube was sutured into place, adequate blood supply was confirmed with blanching and refill.  The pedicle was then wrapped with xeroform gauze and dressed appropriately with a telfa and gauze bandage to ensure continued blood supply and protect the attached pedicle. Interpolation Flap Text: A decision was made to reconstruct the defect utilizing an interpolation axial flap and a staged reconstruction.  A telfa template was made of the defect.  This telfa template was then used to outline the interpolation flap.  The donor area for the pedicle flap was then injected with anesthesia.  The flap was excised through the skin and subcutaneous tissue down to the layer of the underlying musculature.  The interpolation flap was carefully excised within this deep plane to maintain its blood supply.  The edges of the donor site were undermined.   The donor site was closed in a primary fashion.  The pedicle was then rotated into position and sutured.  Once the tube was sutured into place, adequate blood supply was confirmed with blanching and refill.  The pedicle was then wrapped with xeroform gauze and dressed appropriately with a telfa and gauze bandage to ensure continued blood supply and protect the attached pedicle. Melolabial Interpolation Flap Text: A decision was made to reconstruct the defect utilizing an interpolation axial flap and a staged reconstruction.  A telfa template was made of the defect.  This telfa template was then used to outline the melolabial interpolation flap.  The donor area for the pedicle flap was then injected with anesthesia.  The flap was excised through the skin and subcutaneous tissue down to the layer of the underlying musculature.  The pedicle flap was carefully excised within this deep plane to maintain its blood supply.  The edges of the donor site were undermined.   The donor site was closed in a primary fashion.  The pedicle was then rotated into position and sutured.  Once the tube was sutured into place, adequate blood supply was confirmed with blanching and refill.  The pedicle was then wrapped with xeroform gauze and dressed appropriately with a telfa and gauze bandage to ensure continued blood supply and protect the attached pedicle. Mastoid Interpolation Flap Text: A decision was made to reconstruct the defect utilizing an interpolation axial flap and a staged reconstruction.  A telfa template was made of the defect.  This telfa template was then used to outline the mastoid interpolation flap.  The donor area for the pedicle flap was then injected with anesthesia.  The flap was excised through the skin and subcutaneous tissue down to the layer of the underlying musculature.  The pedicle flap was carefully excised within this deep plane to maintain its blood supply.  The edges of the donor site were undermined.   The donor site was closed in a primary fashion.  The pedicle was then rotated into position and sutured.  Once the tube was sutured into place, adequate blood supply was confirmed with blanching and refill.  The pedicle was then wrapped with xeroform gauze and dressed appropriately with a telfa and gauze bandage to ensure continued blood supply and protect the attached pedicle. Posterior Auricular Interpolation Flap Text: A decision was made to reconstruct the defect utilizing an interpolation axial flap and a staged reconstruction.  A telfa template was made of the defect.  This telfa template was then used to outline the posterior auricular interpolation flap.  The donor area for the pedicle flap was then injected with anesthesia.  The flap was excised through the skin and subcutaneous tissue down to the layer of the underlying musculature.  The pedicle flap was carefully excised within this deep plane to maintain its blood supply.  The edges of the donor site were undermined.   The donor site was closed in a primary fashion.  The pedicle was then rotated into position and sutured.  Once the tube was sutured into place, adequate blood supply was confirmed with blanching and refill.  The pedicle was then wrapped with xeroform gauze and dressed appropriately with a telfa and gauze bandage to ensure continued blood supply and protect the attached pedicle. Paramedian Forehead Flap Text: A decision was made to reconstruct the defect utilizing an interpolation axial flap and a staged reconstruction.  A telfa template was made of the defect.  This telfa template was then used to outline the paramedian forehead pedicle flap.  The donor area for the pedicle flap was then injected with anesthesia.  The flap was excised through the skin and subcutaneous tissue down to the layer of the underlying musculature.  The pedicle flap was carefully excised within this deep plane to maintain its blood supply.  The edges of the donor site were undermined.   The donor site was closed in a primary fashion.  The pedicle was then rotated into position and sutured.  Once the tube was sutured into place, adequate blood supply was confirmed with blanching and refill.  The pedicle was then wrapped with xeroform gauze and dressed appropriately with a telfa and gauze bandage to ensure continued blood supply and protect the attached pedicle. Abbe Flap (Upper To Lower Lip) Text: The defect of the lower lip was assessed and measured.  Given the location and size of the defect, an Abbe flap was deemed most appropriate. Using a sterile surgical marker, an appropriate Abbe flap was measured and drawn on the upper lip. Local anesthesia was then infiltrated.  A scalpel was then used to incise the upper lip through and through the skin, vermilion, muscle and mucosa, leaving the flap pedicled on the opposite side.  The flap was then rotated and transferred to the lower lip defect.  The flap was then sutured into place with a three layer technique, closing the orbicularis oris muscle layer with subcutaneous buried sutures, followed by a mucosal layer and an epidermal layer. Abbe Flap (Lower To Upper Lip) Text: The defect of the upper lip was assessed and measured.  Given the location and size of the defect, an Abbe flap was deemed most appropriate. Using a sterile surgical marker, an appropriate Abbe flap was measured and drawn on the lower lip. Local anesthesia was then infiltrated. A scalpel was then used to incise the upper lip through and through the skin, vermilion, muscle and mucosa, leaving the flap pedicled on the opposite side.  The flap was then rotated and transferred to the lower lip defect.  The flap was then sutured into place with a three layer technique, closing the orbicularis oris muscle layer with subcutaneous buried sutures, followed by a mucosal layer and an epidermal layer. Estlander Flap (Upper To Lower Lip) Text: The defect of the lower lip was assessed and measured.  Given the location and size of the defect, an Estlander flap was deemed most appropriate. Using a sterile surgical marker, an appropriate Estlander flap was measured and drawn on the upper lip. Local anesthesia was then infiltrated. A scalpel was then used to incise the lateral aspect of the flap, through skin, muscle and mucosa, leaving the flap pedicled medially.  The flap was then rotated and positioned to fill the lower lip defect.  The flap was then sutured into place with a three layer technique, closing the orbicularis oris muscle layer with subcutaneous buried sutures, followed by a mucosal layer and an epidermal layer. Cheiloplasty (Less Than 50%) Text: A decision was made to reconstruct the defect with a  cheiloplasty.  The defect was undermined extensively.  Additional obicularis oris muscle was excised with a 15 blade scalpel.  The defect was converted into a full thickness wedge, of less than 50% of the vertical height of the lip, to facilite a better cosmetic result.  Small vessels were then tied off with 5-0 monocyrl. The obicularis oris, superficial fascia, adipose and dermis were then reapproximated.  After the deeper layers were approximated the epidermis was reapproximated with particular care given to realign the vermilion border. Cheiloplasty (Complex) Text: A decision was made to reconstruct the defect with a  cheiloplasty.  The defect was undermined extensively.  Additional obicularis oris muscle was excised with a 15 blade scalpel.  The defect was converted into a full thickness wedge to facilite a better cosmetic result.  Small vessels were then tied off with 5-0 monocyrl. The obicularis oris, superficial fascia, adipose and dermis were then reapproximated.  After the deeper layers were approximated the epidermis was reapproximated with particular care given to realign the vermilion border. Ear Wedge Repair Text: A wedge excision was completed by carrying down an excision through the full thickness of the ear and cartilage with an inward facing Burow's triangle. The wound was then closed in a layered fashion. Full Thickness Lip Wedge Repair (Flap) Text: Given the location of the defect and the proximity to free margins a full thickness wedge repair was deemed most appropriate.  Using a sterile surgical marker, the appropriate repair was drawn incorporating the defect and placing the expected incisions perpendicular to the vermilion border.  The vermilion border was also meticulously outlined to ensure appropriate reapproximation during the repair.  The area thus outlined was incised through and through with a #15 scalpel blade.  The muscularis and dermis were reaproximated with deep sutures following hemostasis. Care was taken to realign the vermilion border before proceeding with the superficial closure.  Once the vermilion was realigned the superfical and mucosal closure was finished. Ftsg Text: The defect edges were debeveled with a #15 scalpel blade.  Given the location of the defect, shape of the defect and the proximity to free margins a full thickness skin graft was deemed most appropriate.  Using a sterile surgical marker, the primary defect shape was transferred to the donor site. The area thus outlined was incised deep to adipose tissue with a #15 scalpel blade.  The harvested graft was then trimmed of adipose tissue until only dermis and epidermis was left.  The skin margins of the secondary defect were undermined to an appropriate distance in all directions utilizing iris scissors.  The secondary defect was closed with interrupted buried subcutaneous sutures.  The skin edges were then re-apposed with running  sutures.  The skin graft was then placed in the primary defect and oriented appropriately. Split-Thickness Skin Graft Text: The defect edges were debeveled with a #15 scalpel blade.  Given the location of the defect, shape of the defect and the proximity to free margins a split thickness skin graft was deemed most appropriate.  Using a sterile surgical marker, the primary defect shape was transferred to the donor site. The split thickness graft was then harvested.  The skin graft was then placed in the primary defect and oriented appropriately. Pinch Graft Text: The defect edges were debeveled with a #15 scalpel blade. Given the location of the defect, shape of the defect and the proximity to free margins a pinch graft was deemed most appropriate. Using a sterile surgical marker, the primary defect shape was transferred to the donor site. The area thus outlined was incised deep to adipose tissue with a #15 scalpel blade.  The harvested graft was then trimmed of adipose tissue until only dermis and epidermis was left. The skin margins of the secondary defect were undermined to an appropriate distance in all directions utilizing iris scissors.  The secondary defect was closed with interrupted buried subcutaneous sutures.  The skin edges were then re-apposed with running  sutures.  The skin graft was then placed in the primary defect and oriented appropriately. Burow's Graft Text: The defect edges were debeveled with a #15 scalpel blade.  Given the location of the defect, shape of the defect, the proximity to free margins and the presence of a standing cone deformity a Burow's skin graft was deemed most appropriate. The standing cone was removed and this tissue was then trimmed to the shape of the primary defect. The adipose tissue was also removed until only dermis and epidermis were left.  The skin margins of the secondary defect were undermined to an appropriate distance in all directions utilizing iris scissors.  The secondary defect was closed with interrupted buried subcutaneous sutures.  The skin edges were then re-apposed with running  sutures.  The skin graft was then placed in the primary defect and oriented appropriately. Cartilage Graft Text: The defect edges were debeveled with a #15 scalpel blade.  Given the location of the defect, shape of the defect, the fact the defect involved a full thickness cartilage defect a cartilage graft was deemed most appropriate.  An appropriate donor site was identified, cleansed, and anesthetized. The cartilage graft was then harvested and transferred to the recipient site, oriented appropriately and then sutured into place.  The secondary defect was then repaired using a primary closure. Composite Graft Text: The defect edges were debeveled with a #15 scalpel blade.  Given the location of the defect, shape of the defect, the proximity to free margins and the fact the defect was full thickness a composite graft was deemed most appropriate.  The defect was outline and then transferred to the donor site.  A full thickness graft was then excised from the donor site. The graft was then placed in the primary defect, oriented appropriately and then sutured into place.  The secondary defect was then repaired using a primary closure. Epidermal Autograft Text: The defect edges were debeveled with a #15 scalpel blade.  Given the location of the defect, shape of the defect and the proximity to free margins an epidermal autograft was deemed most appropriate.  Using a sterile surgical marker, the primary defect shape was transferred to the donor site. The epidermal graft was then harvested.  The skin graft was then placed in the primary defect and oriented appropriately. Dermal Autograft Text: The defect edges were debeveled with a #15 scalpel blade.  Given the location of the defect, shape of the defect and the proximity to free margins a dermal autograft was deemed most appropriate.  Using a sterile surgical marker, the primary defect shape was transferred to the donor site. The area thus outlined was incised deep to adipose tissue with a #15 scalpel blade.  The harvested graft was then trimmed of adipose and epidermal tissue until only dermis was left.  The skin graft was then placed in the primary defect and oriented appropriately. Skin Substitute Text: The defect edges were debeveled with a #15 scalpel blade.  Given the location of the defect, shape of the defect and the proximity to free margins a skin substitute graft was deemed most appropriate.  The graft material was trimmed to fit the size of the defect. The graft was then placed in the primary defect and oriented appropriately. Tissue Cultured Epidermal Autograft Text: The defect edges were debeveled with a #15 scalpel blade.  Given the location of the defect, shape of the defect and the proximity to free margins a tissue cultured epidermal autograft was deemed most appropriate.  The graft was then trimmed to fit the size of the defect.  The graft was then placed in the primary defect and oriented appropriately. Xenograft Text: The defect edges were debeveled with a #15 scalpel blade.  Given the location of the defect, shape of the defect and the proximity to free margins a xenograft was deemed most appropriate.  The graft was then trimmed to fit the size of the defect.  The graft was then placed in the primary defect and oriented appropriately. Purse String (Simple) Text: Given the location of the defect and the characteristics of the surrounding skin a purse string closure was deemed most appropriate.  Undermining was performed circumfirentially around the surgical defect.  A purse string suture was then placed and tightened. Purse String (Intermediate) Text: Given the location of the defect and the characteristics of the surrounding skin a purse string intermediate closure was deemed most appropriate.  Undermining was performed circumfirentially around the surgical defect.  A purse string suture was then placed and tightened. Partial Purse String (Simple) Text: Given the location of the defect and the characteristics of the surrounding skin a simple purse string closure was deemed most appropriate.  Undermining was performed circumfirentially around the surgical defect.  A purse string suture was then placed and tightened. Wound tension only allowed a partial closure of the circular defect. Partial Purse String (Intermediate) Text: Given the location of the defect and the characteristics of the surrounding skin an intermediate purse string closure was deemed most appropriate.  Undermining was performed circumfirentially around the surgical defect.  A purse string suture was then placed and tightened. Wound tension only allowed a partial closure of the circular defect. Localized Dermabrasion With Wire Brush Text: The patient was draped in routine manner.  Localized dermabrasion using 3 x 17 mm wire brush was performed in routine manner to papillary dermis. This spot dermabrasion is being performed to complete skin cancer reconstruction. It also will eliminate the other sun damaged precancerous cells that are known to be part of the regional effect of a lifetime's worth of sun exposure. This localized dermabrasion is therapeutic and should not be considered cosmetic in any regard. Tarsorrhaphy Text: A tarsorrhaphy was performed using Frost sutures. Intermediate Repair And Flap Additional Text (Will Appearing After The Standard Complex Repair Text): The intermediate repair was not sufficient to completely close the primary defect. The remaining additional defect was repaired with the flap mentioned below. Intermediate Repair And Graft Additional Text (Will Appearing After The Standard Complex Repair Text): The intermediate repair was not sufficient to completely close the primary defect. The remaining additional defect was repaired with the graft mentioned below. Complex Repair And Flap Additional Text (Will Appearing After The Standard Complex Repair Text): The complex repair was not sufficient to completely close the primary defect. The remaining additional defect was repaired with the flap mentioned below. Complex Repair And Graft Additional Text (Will Appearing After The Standard Complex Repair Text): The complex repair was not sufficient to completely close the primary defect. The remaining additional defect was repaired with the graft mentioned below. Eyelid Full Thickness Repair - 98977: The eyelid defect was full thickness which required a wedge repair of the eyelid. Special care was taken to ensure that the eyelid margin was realligned when placing sutures. Eyelid Partial Thickness Repair - 97574: The eyelid defect was partial thickness which required a wedge repair of the eyelid. Special care was taken to ensure that the eyelid margin was realligned when placing sutures. Manual Repair Warning Statement: We plan on removing the manually selected variable below in favor of our much easier automatic structured text blocks found in the previous tab. We decided to do this to help make the flow better and give you the full power of structured data. Manual selection is never going to be ideal in our platform and I would encourage you to avoid using manual selection from this point on, especially since I will be sunsetting this feature. It is important that you do one of two things with the customized text below. First, you can save all of the text in a word file so you can have it for future reference. Second, transfer the text to the appropriate area in the Library tab. Lastly, if there is a flap or graft type which we do not have you need to let us know right away so I can add it in before the variable is hidden. No need to panic, we plan to give you roughly 6 months to make the change. Same Histology In Subsequent Stages Text: The pattern and morphology of the tumor is as described in the first stage. No Residual Tumor Seen Histology Text: There were no malignant cells seen in the sections examined. Inflammation Suggestive Of Cancer Camouflage Histology Text: There was a dense lymphocytic infiltrate which prevented adequate histologic evaluation of adjacent structures. Bcc Histology Text: There were numerous aggregates of basaloid cells. Bcc Infiltrative Histology Text: There were numerous aggregates of basaloid cells demonstrating an infiltrative pattern. Mart-1 - Positive Histology Text: MART-1 staining demonstrates areas of higher density and clustering of melanocytes with Pagetoid spread upwards within the epidermis. The surgical margins are positive for tumor cells. Mart-1 - Negative Histology Text: MART-1 staining demonstrates a normal density and pattern of melanocytes along the dermal-epidermal junction. The surgical margins are negative for tumor cells. Information: Selecting Yes will display possible errors in your note based on the variables you have selected. This validation is only offered as a suggestion for you. PLEASE NOTE THAT THE VALIDATION TEXT WILL BE REMOVED WHEN YOU FINALIZE YOUR NOTE. IF YOU WANT TO FAX A PRELIMINARY NOTE YOU WILL NEED TO TOGGLE THIS TO 'NO' IF YOU DO NOT WANT IT IN YOUR FAXED NOTE.

## 2024-03-14 ENCOUNTER — OFFICE VISIT (OUTPATIENT)
Dept: FAMILY MEDICINE CLINIC | Facility: CLINIC | Age: 24
End: 2024-03-14
Payer: COMMERCIAL

## 2024-03-14 VITALS
OXYGEN SATURATION: 98 % | DIASTOLIC BLOOD PRESSURE: 78 MMHG | HEIGHT: 65 IN | BODY MASS INDEX: 43.32 KG/M2 | TEMPERATURE: 98.3 F | HEART RATE: 95 BPM | SYSTOLIC BLOOD PRESSURE: 120 MMHG | WEIGHT: 260 LBS

## 2024-03-14 DIAGNOSIS — J30.1 SEASONAL ALLERGIC RHINITIS DUE TO POLLEN: ICD-10-CM

## 2024-03-14 DIAGNOSIS — N92.1 MENOMETRORRHAGIA: ICD-10-CM

## 2024-03-14 DIAGNOSIS — I10 HYPERTENSION: ICD-10-CM

## 2024-03-14 DIAGNOSIS — E03.9 ACQUIRED HYPOTHYROIDISM: ICD-10-CM

## 2024-03-14 DIAGNOSIS — Z00.00 ANNUAL PHYSICAL EXAM: Primary | ICD-10-CM

## 2024-03-14 DIAGNOSIS — J45.20 MILD INTERMITTENT ASTHMA WITHOUT COMPLICATION: ICD-10-CM

## 2024-03-14 DIAGNOSIS — E66.01 MORBID OBESITY WITH BMI OF 40.0-44.9, ADULT (HCC): ICD-10-CM

## 2024-03-14 DIAGNOSIS — Z12.4 CERVICAL CANCER SCREENING: ICD-10-CM

## 2024-03-14 DIAGNOSIS — F32.1 MODERATE MAJOR DEPRESSION, SINGLE EPISODE (HCC): ICD-10-CM

## 2024-03-14 PROCEDURE — 99395 PREV VISIT EST AGE 18-39: CPT | Performed by: FAMILY MEDICINE

## 2024-03-14 PROCEDURE — 99214 OFFICE O/P EST MOD 30 MIN: CPT | Performed by: FAMILY MEDICINE

## 2024-03-14 RX ORDER — LEVOTHYROXINE SODIUM 0.15 MG/1
150 TABLET ORAL DAILY
Qty: 90 TABLET | Refills: 0 | Status: SHIPPED | OUTPATIENT
Start: 2024-03-14 | End: 2024-06-12

## 2024-03-14 RX ORDER — AMLODIPINE BESYLATE 5 MG/1
5 TABLET ORAL DAILY
Qty: 90 TABLET | Refills: 0 | Status: SHIPPED | OUTPATIENT
Start: 2024-03-14

## 2024-03-14 RX ORDER — MONTELUKAST SODIUM 10 MG/1
10 TABLET ORAL
Qty: 90 TABLET | Refills: 0 | Status: SHIPPED | OUTPATIENT
Start: 2024-03-14

## 2024-03-14 RX ORDER — BUPROPION HYDROCHLORIDE 150 MG/1
150 TABLET ORAL EVERY MORNING
Qty: 30 TABLET | Refills: 5 | Status: SHIPPED | OUTPATIENT
Start: 2024-03-14 | End: 2024-09-10

## 2024-03-14 NOTE — PROGRESS NOTES
ADULT ANNUAL PHYSICAL  [facility] PRACTICE [address] [facility]    NAME: Coral Artis  AGE: 23 y.o. SEX: female  : 2000     DATE: 3/14/2024     Assessment and Plan:     Problem List Items Addressed This Visit     Mild intermittent asthma without complication    Relevant Medications    montelukast (SINGULAIR) 10 mg tablet    Seasonal allergic rhinitis due to pollen    Relevant Medications    montelukast (SINGULAIR) 10 mg tablet    Hypothyroidism    Relevant Medications    levothyroxine 150 mcg tablet   Other Visit Diagnoses     Annual physical exam    -  Primary    Hypertension        Relevant Medications    amLODIPine (NORVASC) 5 mg tablet    Morbid obesity with BMI of 40.0-44.9, adult (HCC)        Cervical cancer screening        Relevant Orders    Ambulatory Referral to Obstetrics / Gynecology    Menometrorrhagia        Relevant Orders    Ambulatory Referral to Obstetrics / Gynecology    Moderate major depression, single episode (HCC)        Relevant Medications    buPROPion (WELLBUTRIN XL) 150 mg 24 hr tablet    Other Relevant Orders    Ambulatory referral to Psych Services        Immunizations and preventive care screenings were discussed with patient today. Appropriate education was printed on patient's after visit summary.    Counseling:  Alcohol/drug use: discussed moderation in alcohol intake, the recommendations for healthy alcohol use, and avoidance of illicit drug use.  Dental Health: discussed importance of regular tooth brushing, flossing, and dental visits.  Sexual health: discussed sexually transmitted diseases, partner selection, use of condoms, avoidance of unintended pregnancy, and contraceptive alternatives.  Exercise: the importance of regular exercise/physical activity was discussed. Recommend exercise 3-5 times per week for at least 30 minutes.      No follow-ups on file.     Chief Complaint:     Chief Complaint   Patient  presents with   • Physical Exam      History of Present Illness:     Adult Annual Physical   Patient here for a comprehensive physical exam. The patient reports problems - as documented below    Reports that her period has been light for the first 3 days and then heavy and painful. Reports that she is having clots and heavy cramping for about 2 days. States that this then improves and trickles off. States that her menses has been worse since her chemo. States that she has had painful periods for most of her life. Reports that she is very irritable. Has not been on birth control previously.     Has had HPV vaccine x 3    Notes that her mental health is up and down. Notes that she is working through her diagnosis now that things have slowed down a little.     Diet and Physical Activity  Diet/Nutrition: not eating 3 meals per day, working on healthier snacks. Reports that she is eating healthy when she does eat.   Exercise: walking, moderate cardiovascular exercise, 3-4 times a week on average, and 30-60 minutes on average.      Depression Screening  PHQ-2/9 Depression Screening    Little interest or pleasure in doing things: 1 - several days  Feeling down, depressed, or hopeless: 2 - more than half the days  Trouble falling or staying asleep, or sleeping too much: 2 - more than half the days  Feeling tired or having little energy: 0 - not at all  Poor appetite or overeatin - several days  Feeling bad about yourself - or that you are a failure or have let yourself or your family down: 2 - more than half the days  Trouble concentrating on things, such as reading the newspaper or watching television: 3 - nearly every day  Moving or speaking so slowly that other people could have noticed. Or the opposite - being so fidgety or restless that you have been moving around a lot more than usual: 2 - more than half the days  Thoughts that you would be better off dead, or of hurting yourself in some way: 0 - not at all  PHQ-2  Score: 3  PHQ-2 Interpretation: POSITIVE depression screen  PHQ-9 Score: 13  PHQ-9 Interpretation: Moderate depression       DIGNA-7 Flowsheet Screening    Flowsheet Row Most Recent Value   Over the last 2 weeks, how often have you been bothered by any of the following problems?    Feeling nervous, anxious, or on edge 2   Not being able to stop or control worrying 1   Worrying too much about different things 1   Trouble relaxing 1   Being so restless that it is hard to sit still 0   Becoming easily annoyed or irritable 2   Feeling afraid as if something awful might happen 3   DIGNA-7 Total Score 10        General Health  Sleep: sleeps well. Sometimes with difficulty falling asleep. Using Melatonin PRN and this is helpful.   Hearing: normal - bilateral.  Vision: no vision problems and most recent eye exam >1 year ago.   Dental: no dental visits for >1 year, brushes teeth once daily, and flosses teeth occasionally.       /GYN Health  Follows with gynecology? no   Last menstrual period: 3/9/24  Contraceptive method: barrier methods.  History of STDs?: no.     Advanced Care Planning  Do you have an advanced directive? no  Do you have a durable medical power of ? no  ACP document given to the patient? no      Review of Systems:     Review of Systems     Past Medical History:     Past Medical History:   Diagnosis Date   • Asthma    • Disease of thyroid gland    • Ear infection    • Lymphoma (HCC)       Past Surgical History:     Past Surgical History:   Procedure Laterality Date   • IR BIOPSY BONE MARROW  7/26/2023   • IR BIOPSY NECK  7/20/2023   • IR PORT PLACEMENT  8/24/2023      Social History:     Social History     Socioeconomic History   • Marital status: Single     Spouse name: None   • Number of children: None   • Years of education: None   • Highest education level: None   Occupational History   • None   Tobacco Use   • Smoking status: Never     Passive exposure: Never   • Smokeless tobacco: Never   Vaping  Use   • Vaping status: Never Used   Substance and Sexual Activity   • Alcohol use: Yes     Alcohol/week: 1.0 standard drink of alcohol     Types: 1 Standard drinks or equivalent per week     Comment: socially   • Drug use: Never   • Sexual activity: Yes     Partners: Male     Birth control/protection: Condom Male   Other Topics Concern   • None   Social History Narrative   • None     Social Determinants of Health     Financial Resource Strain: Not on file   Food Insecurity: No Food Insecurity (7/24/2023)    Hunger Vital Sign    • Worried About Running Out of Food in the Last Year: Never true    • Ran Out of Food in the Last Year: Never true   Transportation Needs: No Transportation Needs (7/24/2023)    PRAPARE - Transportation    • Lack of Transportation (Medical): No    • Lack of Transportation (Non-Medical): No   Physical Activity: Not on file   Stress: Not on file   Social Connections: Not on file   Intimate Partner Violence: Not on file   Housing Stability: Unknown (7/24/2023)    Housing Stability Vital Sign    • Unable to Pay for Housing in the Last Year: No    • Number of Places Lived in the Last Year: Not on file    • Unstable Housing in the Last Year: No      Family History:     History reviewed. No pertinent family history.   Current Medications:     Current Outpatient Medications   Medication Sig Dispense Refill   • albuterol (Proventil HFA) 90 mcg/act inhaler Inhale 2 puffs every 6 (six) hours as needed for wheezing 6.7 g 5   • allopurinol (ZYLOPRIM) 300 mg tablet Take 1 tablet (300 mg total) by mouth daily 90 tablet 0   • amLODIPine (NORVASC) 5 mg tablet Take 1 tablet (5 mg total) by mouth daily 90 tablet 0   • buPROPion (WELLBUTRIN XL) 150 mg 24 hr tablet Take 1 tablet (150 mg total) by mouth every morning 30 tablet 5   • fluticasone (FLONASE) 50 mcg/act nasal spray SPRAY 1 SPRAY INTO EACH NOSTRIL EVERY DAY 16 mL 2   • hydrocortisone (ANUSOL-HC) 25 mg suppository Insert 1 suppository (25 mg total) into  "the rectum 2 (two) times a day 12 suppository 0   • levothyroxine 150 mcg tablet Take 1 tablet (150 mcg total) by mouth daily 90 tablet 0   • montelukast (SINGULAIR) 10 mg tablet Take 1 tablet (10 mg total) by mouth daily at bedtime 90 tablet 0   • ondansetron (ZOFRAN) 4 mg tablet Take 1 tablet (4 mg total) by mouth every 6 (six) hours 12 tablet 0   • ondansetron (ZOFRAN) 8 mg tablet Take 1 tablet (8 mg total) by mouth every 8 (eight) hours as needed for nausea or vomiting 20 tablet 0   • pantoprazole (PROTONIX) 40 mg tablet TAKE 1 TABLET BY MOUTH 2 TIMES A DAY BEFORE MEALS. 60 tablet 0   • polyethylene glycol (MIRALAX) 17 g packet Take 17 g by mouth daily Do not start before August 8, 2023.  0   • predniSONE 20 mg tablet TAKE 1 TABLET BY MOUTH EVERY DAY (Patient taking differently: Take 10 mg by mouth daily Only 5 days after chemo) 30 tablet 0   • predniSONE 20 mg tablet Take one tablet (20mg total) on treatment day THEN take one tablet (20mg total) for four more days after treatment; total of 5 days. Repeat every treatment cycle. (Patient not taking: Reported on 3/14/2024) 5 tablet 5   • senna-docusate sodium (SENOKOT S) 8.6-50 mg per tablet Take 1 tablet by mouth daily at bedtime (Patient not taking: Reported on 11/16/2023)  0   • SUMAtriptan (IMITREX) 50 mg tablet TAKE 1 TABLET (50 MG TOTAL) BY MOUTH ONCE AS NEEDED FOR MIGRAINE FOR UP TO 10 DOSES (Patient not taking: Reported on 3/14/2024) 9 tablet 1     No current facility-administered medications for this visit.      Allergies:     Allergies   Allergen Reactions   • Pollen Extract    • Shrimp Extract Allergy Skin Test - Food Allergy Hives      Physical Exam:     /78 (BP Location: Left arm, Patient Position: Sitting, Cuff Size: Large)   Pulse 95   Temp 98.3 °F (36.8 °C) (Tympanic)   Ht 5' 5.35\" (1.66 m)   Wt 118 kg (260 lb)   SpO2 98%   BMI 42.80 kg/m²     Physical Exam  Vitals reviewed.   Constitutional:       General: She is not in acute " distress.     Appearance: Normal appearance. She is obese.   HENT:      Head: Normocephalic and atraumatic.      Right Ear: External ear normal.      Left Ear: External ear normal.      Nose: Nose normal.      Mouth/Throat:      Mouth: Mucous membranes are moist.   Eyes:      Extraocular Movements: Extraocular movements intact.      Conjunctiva/sclera: Conjunctivae normal.      Pupils: Pupils are equal, round, and reactive to light.   Cardiovascular:      Rate and Rhythm: Normal rate and regular rhythm.      Heart sounds: Normal heart sounds.   Pulmonary:      Effort: Pulmonary effort is normal.      Breath sounds: Normal breath sounds.   Abdominal:      General: Bowel sounds are normal. There is no distension.      Palpations: Abdomen is soft.      Tenderness: There is no abdominal tenderness.   Musculoskeletal:      Cervical back: Neck supple.      Right lower leg: No edema.      Left lower leg: No edema.   Lymphadenopathy:      Cervical: No cervical adenopathy.   Skin:     General: Skin is warm.      Capillary Refill: Capillary refill takes less than 2 seconds.      Findings: No rash.   Neurological:      Mental Status: She is alert. Mental status is at baseline.        Elma Woodruff DO   [facility] [address] [facility]    Depression Screening Follow-up Plan: Patient's depression screening was positive with a PHQ-2 score of 3. Their PHQ-9 score was 13. Patient assessed for underlying major depression. They have no active suicidal ideations. Brief counseling provided and recommend additional follow-up/re-evaluation next office visit.

## 2024-03-14 NOTE — PATIENT INSTRUCTIONS
Wellness Visit for Adults   AMBULATORY CARE:   A wellness visit  is when you see your healthcare provider to get screened for health problems. Your healthcare provider will also give you advice on how to stay healthy. Write down your questions so you remember to ask them. Ask your healthcare provider how often you should have a wellness visit.  What happens at a wellness visit:  Your healthcare provider will ask about your health, and your family history of health problems. This includes high blood pressure, heart disease, and cancer. He or she will ask if you have symptoms that concern you, if you smoke, and about your mood. You may also be asked about your intake of medicines, supplements, food, and alcohol. Any of the following may be done:  Your weight  will be checked. Your height may also be checked so your body mass index (BMI) can be calculated. Your BMI shows if you are at a healthy weight.    Your blood pressure  and heart rate will be checked. Your temperature may also be checked.    Blood and urine tests  may be done. Blood tests may be done to check your cholesterol levels. Abnormal cholesterol levels increase your risk for heart disease and stroke. You may also need a blood or urine test to check for diabetes if you are at increased risk. Urine tests may be done to look for signs of an infection or kidney disease.    A physical exam  includes checking your heartbeat and lungs with a stethoscope. Your healthcare provider may also check your skin to look for sun damage.    Screening tests  may be recommended. A screening test is done to check for diseases that may not cause symptoms. The screening tests you may need depend on your age, gender, family history, and lifestyle habits. For example, colorectal screening may be recommended if you are 50 years old or older.    Screening tests you need if you are a woman:   A Pap smear  is used to screen for cervical cancer. Pap smears are usually done every 3 to  5 years depending on your age. You may need them more often if you have had abnormal Pap smear test results in the past. Ask your healthcare provider how often you should have a Pap smear.    A mammogram  is an x-ray of your breasts to screen for breast cancer. Experts recommend mammograms every 2 years starting at age 50 years. You may need a mammogram at age 49 years or younger if you have an increased risk for breast cancer. Talk to your healthcare provider about when you should start having mammograms and how often you need them.    Vaccines you may need:   Get an influenza vaccine  every year. The influenza vaccine protects you from the flu. Several types of viruses cause the flu. The viruses change over time, so new vaccines are made each year.    Get a tetanus-diphtheria (Td) booster vaccine  every 10 years. This vaccine protects you against tetanus and diphtheria. Tetanus is a severe infection that may cause painful muscle spasms and lockjaw. Diphtheria is a severe bacterial infection that causes a thick covering in the back of your mouth and throat.    Get a human papillomavirus (HPV) vaccine  if you are female and aged 19 to 26 or male 19 to 21 and never received it. This vaccine protects you from HPV infection. HPV is the most common infection spread by sexual contact. HPV may also cause vaginal, penile, and anal cancers.    Get a pneumococcal vaccine  if you are aged 65 years or older. The pneumococcal vaccine is an injection given to protect you from pneumococcal disease. Pneumococcal disease is an infection caused by pneumococcal bacteria. The infection may cause pneumonia, meningitis, or an ear infection.    Get a shingles vaccine  if you are 60 or older, even if you have had shingles before. The shingles vaccine is an injection to protect you from the varicella-zoster virus. This is the same virus that causes chickenpox. Shingles is a painful rash that develops in people who had chickenpox or have  been exposed to the virus.    How to eat healthy:  My Plate is a model for planning healthy meals. It shows the types and amounts of foods that should go on your plate. Fruits and vegetables make up about half of your plate, and grains and protein make up the other half. A serving of dairy is included on the side of your plate. The amount of calories and serving sizes you need depends on your age, gender, weight, and height. Examples of healthy foods are listed below:  Eat a variety of vegetables  such as dark green, red, and orange vegetables. You can also include canned vegetables low in sodium (salt) and frozen vegetables without added butter or sauces.    Eat a variety of fresh fruits , canned fruit in 100% juice, frozen fruit, and dried fruit.    Include whole grains.  At least half of the grains you eat should be whole grains. Examples include whole-wheat bread, wheat pasta, brown rice, and whole-grain cereals such as oatmeal.    Eat a variety of protein foods such as seafood (fish and shellfish), lean meat, and poultry without skin (turkey and chicken). Examples of lean meats include pork leg, shoulder, or tenderloin, and beef round, sirloin, tenderloin, and extra lean ground beef. Other protein foods include eggs and egg substitutes, beans, peas, soy products, nuts, and seeds.    Choose low-fat dairy products such as skim or 1% milk or low-fat yogurt, cheese, and cottage cheese.    Limit unhealthy fats  such as butter, hard margarine, and shortening.       Exercise:  Exercise at least 30 minutes per day on most days of the week. Some examples of exercise include walking, biking, dancing, and swimming. You can also fit in more physical activity by taking the stairs instead of the elevator or parking farther away from stores. Include muscle strengthening activities 2 days each week. Regular exercise provides many health benefits. It helps you manage your weight, and decreases your risk for type 2 diabetes,  heart disease, stroke, and high blood pressure. Exercise can also help improve your mood. Ask your healthcare provider about the best exercise plan for you.       General health and safety guidelines:   Do not smoke.  Nicotine and other chemicals in cigarettes and cigars can cause lung damage. Ask your healthcare provider for information if you currently smoke and need help to quit. E-cigarettes or smokeless tobacco still contain nicotine. Talk to your healthcare provider before you use these products.    Limit alcohol.  A drink of alcohol is 12 ounces of beer, 5 ounces of wine, or 1½ ounces of liquor.    Lose weight, if needed.  Being overweight increases your risk of certain health conditions. These include heart disease, high blood pressure, type 2 diabetes, and certain types of cancer.    Protect your skin.  Do not sunbathe or use tanning beds. Use sunscreen with a SPF 15 or higher. Apply sunscreen at least 15 minutes before you go outside. Reapply sunscreen every 2 hours. Wear protective clothing, hats, and sunglasses when you are outside.    Drive safely.  Always wear your seatbelt. Make sure everyone in your car wears a seatbelt. A seatbelt can save your life if you are in an accident. Do not use your cell phone when you are driving. This could distract you and cause an accident. Pull over if you need to make a call or send a text message.    Practice safe sex.  Use latex condoms if are sexually active and have more than one partner. Your healthcare provider may recommend screening tests for sexually transmitted infections (STIs).    Wear helmets, lifejackets, and protective gear.  Always wear a helmet when you ride a bike or motorcycle, go skiing, or play sports that could cause a head injury. Wear protective equipment when you play sports. Wear a lifejacket when you are on a boat or doing water sports.    © Copyright [employer] 2023 Information is for End User's use only and may not be sold, redistributed or  otherwise used for commercial purposes.  The above information is an  only. It is not intended as medical advice for individual conditions or treatments. Talk to your doctor, nurse or pharmacist before following any medical regimen to see if it is safe and effective for you.    Depression   AMBULATORY CARE:   Depression  is a mood disorder that causes feelings of sadness or hopelessness that do not go away. Depression may cause you to lose interest in things you used to enjoy. These feelings may interfere with your daily life.  Common signs and symptoms:   Appetite changes, or weight gain or loss    Trouble falling or staying asleep, or sleeping too much    Fatigue (being mentally and physically tired) or lack of energy    Feeling restless, irritable, or withdrawn    Feeling worthless, hopeless, discouraged, or guilty    Trouble concentrating, remembering things, doing daily tasks, or making decisions    Thoughts about hurting or killing yourself    Call your local emergency number (911 in the ) if:   You think about hurting yourself or someone else.    You have done something on purpose to hurt yourself.    Call your therapist or doctor if:   Your symptoms get worse or do not get better with treatment.    Your depression keeps you from doing your regular daily activities.    You have new symptoms since your last visit.    You have questions or concerns about your condition or care.    The following resources are available at any time to help you, if needed:   Contact a suicide prevention organization:        For the [employer] Suicide and Crisis Lifeline:     Call or text [employer]     Send a chat on https://[employer].org/chat     Call 8-183-915-3591 (1-800-273-TALK)    For the Suicide Hotline, call 1-357.505.6056 (1-347-WFDYUGB)    For a list of international numbers: https://save.org/find-help/international-resources/  Treatment for depression  depends on how severe your symptoms are. You may need any of  the following:  Cognitive behavioral therapy (CBT)  teaches you how to identify and change negative thought patterns.    Antidepressant medicine  may be given to decrease or manage symptoms. You may need to take this medicine for several weeks before they start working.    Self-care:   Talk to someone about your depression.  Your healthcare provider may suggest counseling. You might feel more comfortable talking with a friend or family member about your depression. Choose someone you know will be supportive and encouraging.    Get regular physical activity.  Physical activity can lower your stress, improve your mood, and help you sleep better. Work with your healthcare provider to develop a plan that you enjoy.         Create a regular sleep schedule.  A routine can help you relax before bed. Listen to music, read, or do yoga. Try to go to bed and wake up at the same time every day. Sleep is important for emotional health.    Eat a variety of healthy foods.  Healthy foods include fruits, vegetables, whole-grain breads, low-fat dairy products, lean meats, fish, and cooked beans. A healthy meal plan is low in fat, salt, and added sugar.         Do not use alcohol, drugs, or nicotine products.  These can worsen depression or make it hard to manage. Talk to your therapist or healthcare provider if you use any of these products and need help to quit.    Follow up with your therapist or doctor as directed:  Your healthcare provider will monitor your progress at follow-up visits. Your provider will also monitor your medicine if you take antidepressants and ask if the medicine is helping. Tell your provider about any side effects or problems you have with your medicine. The type or amount of medicine may need to be changed. Write down your questions so you remember to ask them during your visits.  For more information or support:   [facility] [place] [facility]  [address]  [place]  [address]  Phone: 1- 470 - 494-8479  Phone: 6- 139 - 869-8672  Web Address: http://www.debo.[employer]public  [facility] Suicide and Crisis [facility]  [address]  [place] [address]  Phone: 4- 466 - 858  Web Address: http://www.suicidepreventionlifeline.org OR https://Webcollage/chat/    © Copyright [employer] 2023 Information is for End User's use only and may not be sold, redistributed or otherwise used for commercial purposes.  The above information is an  only. It is not intended as medical advice for individual conditions or treatments. Talk to your doctor, nurse or pharmacist before following any medical regimen to see if it is safe and effective for you.

## 2024-03-26 ENCOUNTER — APPOINTMENT (OUTPATIENT)
Dept: LAB | Facility: HOSPITAL | Age: 24
End: 2024-03-26
Attending: INTERNAL MEDICINE
Payer: COMMERCIAL

## 2024-03-26 DIAGNOSIS — E66.01 MORBID OBESITY (HCC): ICD-10-CM

## 2024-03-26 DIAGNOSIS — E06.3 HYPOTHYROIDISM DUE TO HASHIMOTO'S THYROIDITIS: ICD-10-CM

## 2024-03-26 DIAGNOSIS — E03.9 ACQUIRED HYPOTHYROIDISM: ICD-10-CM

## 2024-03-26 DIAGNOSIS — C85.99 LYMPHOMA OF THYROID GLAND (HCC): ICD-10-CM

## 2024-03-26 DIAGNOSIS — E03.8 HYPOTHYROIDISM DUE TO HASHIMOTO'S THYROIDITIS: ICD-10-CM

## 2024-03-26 LAB
ALBUMIN SERPL BCP-MCNC: 4.4 G/DL (ref 3.5–5)
ALP SERPL-CCNC: 80 U/L (ref 34–104)
ALT SERPL W P-5'-P-CCNC: 11 U/L (ref 7–52)
ANION GAP SERPL CALCULATED.3IONS-SCNC: 8 MMOL/L (ref 4–13)
AST SERPL W P-5'-P-CCNC: 13 U/L (ref 13–39)
BASOPHILS # BLD AUTO: 0.03 THOUSANDS/ÂΜL (ref 0–0.1)
BASOPHILS NFR BLD AUTO: 0 % (ref 0–1)
BILIRUB SERPL-MCNC: 0.55 MG/DL (ref 0.2–1)
BUN SERPL-MCNC: 13 MG/DL (ref 5–25)
CALCIUM SERPL-MCNC: 9.4 MG/DL (ref 8.4–10.2)
CHLORIDE SERPL-SCNC: 106 MMOL/L (ref 96–108)
CO2 SERPL-SCNC: 23 MMOL/L (ref 21–32)
CREAT SERPL-MCNC: 0.62 MG/DL (ref 0.6–1.3)
EOSINOPHIL # BLD AUTO: 0.17 THOUSAND/ÂΜL (ref 0–0.61)
EOSINOPHIL NFR BLD AUTO: 2 % (ref 0–6)
ERYTHROCYTE [DISTWIDTH] IN BLOOD BY AUTOMATED COUNT: 12.7 % (ref 11.6–15.1)
ERYTHROCYTE [SEDIMENTATION RATE] IN BLOOD: 37 MM/HOUR (ref 0–19)
GFR SERPL CREATININE-BSD FRML MDRD: 128 ML/MIN/1.73SQ M
GLUCOSE P FAST SERPL-MCNC: 94 MG/DL (ref 65–99)
HCT VFR BLD AUTO: 39.4 % (ref 34.8–46.1)
HGB BLD-MCNC: 13.1 G/DL (ref 11.5–15.4)
IMM GRANULOCYTES # BLD AUTO: 0.03 THOUSAND/UL (ref 0–0.2)
IMM GRANULOCYTES NFR BLD AUTO: 0 % (ref 0–2)
LDH SERPL-CCNC: 114 U/L (ref 140–271)
LYMPHOCYTES # BLD AUTO: 1.1 THOUSANDS/ÂΜL (ref 0.6–4.47)
LYMPHOCYTES NFR BLD AUTO: 15 % (ref 14–44)
MCH RBC QN AUTO: 28.8 PG (ref 26.8–34.3)
MCHC RBC AUTO-ENTMCNC: 33.2 G/DL (ref 31.4–37.4)
MCV RBC AUTO: 87 FL (ref 82–98)
MONOCYTES # BLD AUTO: 0.49 THOUSAND/ÂΜL (ref 0.17–1.22)
MONOCYTES NFR BLD AUTO: 7 % (ref 4–12)
NEUTROPHILS # BLD AUTO: 5.49 THOUSANDS/ÂΜL (ref 1.85–7.62)
NEUTS SEG NFR BLD AUTO: 76 % (ref 43–75)
NRBC BLD AUTO-RTO: 0 /100 WBCS
PLATELET # BLD AUTO: 314 THOUSANDS/UL (ref 149–390)
PMV BLD AUTO: 9.8 FL (ref 8.9–12.7)
POTASSIUM SERPL-SCNC: 4.2 MMOL/L (ref 3.5–5.3)
PROT SERPL-MCNC: 7.6 G/DL (ref 6.4–8.4)
RBC # BLD AUTO: 4.55 MILLION/UL (ref 3.81–5.12)
SODIUM SERPL-SCNC: 137 MMOL/L (ref 135–147)
T4 FREE SERPL-MCNC: 0.53 NG/DL (ref 0.61–1.12)
TSH SERPL DL<=0.05 MIU/L-ACNC: 6.83 UIU/ML (ref 0.45–4.5)
WBC # BLD AUTO: 7.31 THOUSAND/UL (ref 4.31–10.16)

## 2024-03-26 PROCEDURE — 85025 COMPLETE CBC W/AUTO DIFF WBC: CPT

## 2024-03-26 PROCEDURE — 85652 RBC SED RATE AUTOMATED: CPT

## 2024-03-26 PROCEDURE — 36415 COLL VENOUS BLD VENIPUNCTURE: CPT

## 2024-03-26 PROCEDURE — 84443 ASSAY THYROID STIM HORMONE: CPT

## 2024-03-26 PROCEDURE — 83615 LACTATE (LD) (LDH) ENZYME: CPT

## 2024-03-26 PROCEDURE — 80053 COMPREHEN METABOLIC PANEL: CPT

## 2024-03-26 PROCEDURE — 84439 ASSAY OF FREE THYROXINE: CPT

## 2024-04-01 ENCOUNTER — TELEPHONE (OUTPATIENT)
Dept: PSYCHIATRY | Facility: CLINIC | Age: 24
End: 2024-04-01

## 2024-05-28 ENCOUNTER — TELEPHONE (OUTPATIENT)
Dept: HEMATOLOGY ONCOLOGY | Facility: CLINIC | Age: 24
End: 2024-05-28

## 2024-05-28 NOTE — TELEPHONE ENCOUNTER
Spoke w/ pt due to her missed appt w/ Dr. Stover on 5/28 at 11:20 am. Pt said she needs a day where she does not work so she can definitely make her appt. Pt is scheduled for 6/4 at 10:00 as she does not work that day.

## 2024-06-04 ENCOUNTER — OFFICE VISIT (OUTPATIENT)
Dept: HEMATOLOGY ONCOLOGY | Facility: CLINIC | Age: 24
End: 2024-06-04
Payer: COMMERCIAL

## 2024-06-04 VITALS
OXYGEN SATURATION: 98 % | HEART RATE: 87 BPM | WEIGHT: 266 LBS | TEMPERATURE: 97.1 F | RESPIRATION RATE: 18 BRPM | BODY MASS INDEX: 44.32 KG/M2 | DIASTOLIC BLOOD PRESSURE: 80 MMHG | SYSTOLIC BLOOD PRESSURE: 128 MMHG | HEIGHT: 65 IN

## 2024-06-04 DIAGNOSIS — C85.99 LYMPHOMA OF THYROID GLAND (HCC): Primary | ICD-10-CM

## 2024-06-04 DIAGNOSIS — E03.9 ACQUIRED HYPOTHYROIDISM: ICD-10-CM

## 2024-06-04 PROCEDURE — 99214 OFFICE O/P EST MOD 30 MIN: CPT | Performed by: INTERNAL MEDICINE

## 2024-06-04 NOTE — PROGRESS NOTES
Hematology/Oncology Outpatient Follow- up Note  Coral Artis 23 y.o. 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  We will see her at that point.  She is doing well.        HPI: 23-year-old female here for follow-up with a history of diffuse large B-cell lymphoma stage IIb originating in thyroid.  She received 6 cycles of chemotherapy with R-CHOP completing in November 23, 2023.  Subsequently had PET CT scan which was clear in January 2024.  Subsequently doing well feeling well.  No major complaints no new masses or lumps.  No fever or chills.  Feels well doing well.      Interval History: Note from 2/26/2024:  Assessment / Plan:    1 thyroid diffuse large B-cell lymphoma stage IIb status post 6 cycles of chemotherapy R-CHOP in remission  Plan: Patient just had a PET/CT that was negative.  She will come back in 3 months with laboratory work.  She will need to scan again in 6 months.  She has had an excellent response to treatment appears to be doing quite well.  HPI: 23-year-old female status post 6 cycles of R-CHOP treatment for diffuse large B-cell lymphoma germinal center subtype none double expression phenotype.  This arose involve the thyroid.  Her last PET scan CT scan showed marked improvement no evidence of any definite right upper abnormality.  She is feeling fairly well tolerated treatment reasonably well.  She initially presented with a large thyroid mass which was positive for lymphoma had adenopathy in the neck.  She pressure under tracheal area.  She did not have mediastinal or retroperitoneal adenopathy apparently.  She had no major B symptoms.  She did not have involvement of her bone marrow.  Interval History: Note from  10/24/2023:  .Coral Artis is a 24 yo female who presents to the office for evaluation and management of newly diagnosed DLBCL. The patient presented to the hospital on 7/22/23 after being referred by a provider. She had undergone thyroid biopsy on 7/20/23. Pathology was c/w diffuse Large B Cell Lymphoma, Germinal Center Subtype, Non-double expressor phenotype. In the ED the patient reported SOB. The patient received C1 R-CHOP as inpatient. She developed headache, nausea and sensitivity to light w/ Rituximab administration. The patient reported prior hx of migraines.   Today she reports feeling better after treatment initiation. She is able to eat without issues and is breathing normally. The mass has significantly decreased in size clinically.       Cancer Staging:  Cancer Staging   No matching staging information was found for the patient.      Molecular Testing:     Previous Hematologic/ Oncologic History:    Oncology History   Lymphoma of thyroid gland (HCC)   7/22/2023 Initial Diagnosis    Lymphoma of thyroid gland (HCC)     8/2/2023 - 11/27/2023 Chemotherapy    DOXOrubicin (ADRIAMYCIN), 50 mg/m2 = 110 mg, Intravenous, Once, 6 of 6 cycles  Administration: 110 mg (8/3/2023), 110 mg (8/23/2023), 110 mg (9/13/2023), 110 mg (10/4/2023), 110 mg (10/25/2023), 110 mg (11/27/2023)  alteplase (CATHFLO), 2 mg, Intracatheter, Every 1 Minute as needed, 6 of 6 cycles  vincristine (ONCOVIN) chemo infusion, 2 mg (original dose 1.4 mg/m2), Intravenous, Once, 6 of 6 cycles  Dose modification: 2 mg (original dose 1.4 mg/m2, Cycle 1, Reason: Max Dose Reached)  Administration: 2 mg (8/3/2023), 2 mg (8/23/2023), 2 mg (9/13/2023), 2 mg (10/4/2023), 2 mg (10/25/2023), 2 mg (11/27/2023)  cyclophosphamide (CYTOXAN) IVPB, 750 mg/m2 = 1,650 mg, Intravenous, Once, 6 of 6 cycles  Administration: 1,650 mg (8/2/2023), 1,650 mg (8/23/2023), 1,650 mg (9/13/2023), 1,650 mg (10/4/2023), 1,650 mg (10/25/2023), 1,650 mg  "(11/27/2023)  fosaprepitant (EMEND) IVPB, 150 mg, Intravenous, Once, 1 of 1 cycle  Administration: 150 mg (8/2/2023)  riTUXimab (RITUXAN) first titrated chemo infusion, 825 mg, Intravenous, Once, 2 of 2 cycles  Dose modification: 375 mg/m2 (original dose 375 mg/m2, Cycle 2)  Administration: 800 mg (8/2/2023)  aprepitant (CINVANTI) in  mL IVPB, 130 mg, Intravenous, Once, 5 of 5 cycles  Administration: 130 mg (8/23/2023), 130 mg (9/13/2023), 130 mg (10/4/2023), 130 mg (10/25/2023), 130 mg (11/27/2023)  riTUXimab-pvvr (RUXIENCE) first titrated chemo infusion, 825 mg (100 % of original dose 375 mg/m2), Intravenous, Once, 5 of 5 cycles  Dose modification: 375 mg/m2 (original dose 375 mg/m2, Cycle 2)  Administration: 800 mg (8/23/2023), 800 mg (9/13/2023), 800 mg (10/4/2023), 800 mg (10/25/2023), 800 mg (11/27/2023)         Current Hematologic/ Oncologic Treatment:       Cycle 1         Test Results:    Imaging: No results found.          Labs:   Lab Results   Component Value Date    WBC 7.31 03/26/2024    HGB 13.1 03/26/2024    HCT 39.4 03/26/2024    MCV 87 03/26/2024     03/26/2024     Lab Results   Component Value Date    K 4.2 03/26/2024     03/26/2024    CO2 23 03/26/2024    BUN 13 03/26/2024    CREATININE 0.62 03/26/2024    GLUF 94 03/26/2024    CALCIUM 9.4 03/26/2024    CORRECTEDCA 8.3 08/06/2023    AST 13 03/26/2024    ALT 11 03/26/2024    ALKPHOS 80 03/26/2024    EGFR 128 03/26/2024         No results found for: \"SPEP\", \"UPEP\"    No results found for: \"PSA\"    No results found for: \"CEA\"    No results found for: \"\"    No results found for: \"AFP\"    Lab Results   Component Value Date    IRON 42 (L) 08/02/2023    TIBC 319 08/02/2023    FERRITIN 82 08/02/2023       No results found for: \"WOQSQGTE80\"      ROS: Review of Systems   Constitutional: Negative.    HENT: Negative.     Eyes: Negative.    Respiratory: Negative.     Cardiovascular: Negative.    Gastrointestinal: Negative.    Endocrine: " Negative.    Genitourinary: Negative.    Musculoskeletal: Negative.    Skin: Negative.    Allergic/Immunologic: Negative.    Neurological: Negative.    Hematological: Negative.          Current Medications: Reviewed  Allergies: Reviewed  PMH/FH/SH:  Reviewed      Physical Exam:    Body surface area is 2.24 meters squared.    Wt Readings from Last 3 Encounters:   06/04/24 121 kg (266 lb)   03/14/24 118 kg (260 lb)   02/26/24 119 kg (261 lb 8 oz)        Temp Readings from Last 3 Encounters:   06/04/24 (!) 97.1 °F (36.2 °C) (Temporal)   03/14/24 98.3 °F (36.8 °C) (Tympanic)   02/26/24 97.9 °F (36.6 °C) (Temporal)        BP Readings from Last 3 Encounters:   06/04/24 128/80   03/14/24 120/78   02/26/24 132/86         Pulse Readings from Last 3 Encounters:   06/04/24 87   03/14/24 95   02/26/24 68     @LASTSAO2(3)@      Physical Exam  Constitutional:       Appearance: Normal appearance. She is obese.   HENT:      Head: Normocephalic and atraumatic.   Eyes:      Extraocular Movements: Extraocular movements intact.      Conjunctiva/sclera: Conjunctivae normal.      Pupils: Pupils are equal, round, and reactive to light.   Cardiovascular:      Rate and Rhythm: Normal rate and regular rhythm.      Heart sounds: Normal heart sounds.   Pulmonary:      Effort: Pulmonary effort is normal.      Breath sounds: Normal breath sounds.   Abdominal:      General: Abdomen is flat. Bowel sounds are normal.      Palpations: Abdomen is soft.   Musculoskeletal:         General: Normal range of motion.      Cervical back: Normal range of motion and neck supple.   Skin:     General: Skin is warm and dry.   Neurological:      General: No focal deficit present.      Mental Status: She is alert and oriented to person, place, and time. Mental status is at baseline.     No significant adenopathy neck cervical supraclavicular axillary or inguinal area.  No masses palpable in the thyroid.  She is moderately overweight.      Goals and Barriers:   Current Goal: Prolong Survival from Cancer.   Barriers: None.      Patient's Capacity to Self Care:  Patient is able to self care.    Code Status: [unfilled]  Advance Directive and Living Will:      Power of :

## 2024-06-14 DIAGNOSIS — E03.9 ACQUIRED HYPOTHYROIDISM: ICD-10-CM

## 2024-06-14 RX ORDER — LEVOTHYROXINE SODIUM 0.15 MG/1
150 TABLET ORAL DAILY
Qty: 90 TABLET | Refills: 1 | Status: SHIPPED | OUTPATIENT
Start: 2024-06-14

## 2024-06-15 NOTE — PRE-PROCEDURE INSTRUCTIONS
Patient: Viry Trammell    Procedure Information       Date/Time: 06/15/24 0800    Procedure: Elbow Supracondylar Percutaneous Pinning (Right: Elbow)    Location: [facility] / [facility]    Surgeons: German Collins MD            Relevant Problems   Anesthesia (within normal limits)      Cardio (within normal limits)      Development (within normal limits)      Endo (within normal limits)      Genetic (within normal limits)      GI/Hepatic (within normal limits)      /Renal (within normal limits)      Hematology (within normal limits)      Neuro/Psych (within normal limits)      Pulmonary (within normal limits)      Musculoskeletal   (+) Right supracondylar humerus fracture, closed, initial encounter       Clinical information reviewed:    Allergies  Meds   Med Hx  Surg Hx   Fam Hx           PHYSICAL EXAM    Anesthesia Plan  History of general anesthesia?: no  History of complications of general anesthesia?: no  ASA 1     general     inhalational induction     Plan discussed with resident and attending.         Pre-procedure Instructions for Interventional Radiology  [address] [place] [address], [address] [place] [address]  Interventional Radiology [phone]    You are scheduled for a/an Thyroid Mass Biopsy. On Thursday 7/20/23. Your arrival time is 0930. Our Interventional Radiology nurse will notify you a few days before your procedure with the exact arrival time and location to arrive. To prepare for your procedure:  1. Please arrange for someone to drive you home after the procedure and stay with you until the next morning if you are instructed to do so. This is typically for patients receiving some type of sedative or anesthetic for the procedure. 2. DO NOT EAT OR DRINK ANYTHING after midnight on the evening before your procedure including candy & gum.  3. ONLY SIPS OF WATER with your medications are allowed on the morning of your procedure. 4. TAKE ALL OF YOUR REGULAR MEDICATIONS THE MORNING OF YOUR PROCEDURE with sips of water! We may call you to stop some of your blood sugar, blood pressure and blood thinning medications depending on the procedure. Please take all of these medications unless we instruct you to stop them. 5. If you have an allergy to x-ray dye, please contact Interventional Radiology for an x-ray dye preparation which usually consists of an oral steroid and Benadryl. The day of your procedure:  1. Bring a list of the medications you take at home. 2. Bring medications you take for breathing problems (such as inhalers), medications for chest pain, or both. 3. Bring a case for your glasses or contacts. 4. Bring you insurance card and a form of photo ID.  5. Please leave all valuables such as credit cards and jewelry at home. 6. Report to the registration desk in the main lobby at the [facility]. The  will direct you to the waiting room. 7. While your procedure is being performed, your family may wait in the waiting room.  If they need to leave, they may provide a number to be called following the procedure. 8. Be prepared to stay overnight just in case. Sometimes procedures will indicate the need for further observation or treatment. 9. If you are scheduled for a follow-up visit with the Interventional Radiologist after your procedure, you will be called with a date and time. Special Instructions (Medications to alter or stop taking before your procedure etc.):  Above reviewed with her mother Franchseca Cerna and then with the patient.

## 2024-06-24 ENCOUNTER — HOSPITAL ENCOUNTER (OUTPATIENT)
Dept: INFUSION CENTER | Facility: CLINIC | Age: 24
Discharge: HOME | End: 2024-06-24

## 2024-06-24 DIAGNOSIS — J30.1 SEASONAL ALLERGIC RHINITIS DUE TO POLLEN: ICD-10-CM

## 2024-06-24 DIAGNOSIS — Z95.828 PORT-A-CATH IN PLACE: Primary | ICD-10-CM

## 2024-06-24 DIAGNOSIS — J45.20 MILD INTERMITTENT ASTHMA WITHOUT COMPLICATION: ICD-10-CM

## 2024-06-24 RX ORDER — MONTELUKAST SODIUM 10 MG/1
10 TABLET ORAL
Qty: 90 TABLET | Refills: 1 | Status: SHIPPED | OUTPATIENT
Start: 2024-06-24

## 2024-06-24 NOTE — PROGRESS NOTES
Pt into clinic for port flush. Pt offers no complaints. Tolerated procedure. Port de-accessed. Pt aware of next appointment on 8/19/24 at 3pm. AVS declined.

## 2024-08-08 ENCOUNTER — TELEPHONE (OUTPATIENT)
Age: 24
End: 2024-08-08

## 2024-08-08 ENCOUNTER — OFFICE VISIT (OUTPATIENT)
Dept: FAMILY MEDICINE CLINIC | Facility: CLINIC | Age: 24
End: 2024-08-08
Payer: COMMERCIAL

## 2024-08-08 VITALS
OXYGEN SATURATION: 96 % | HEART RATE: 106 BPM | BODY MASS INDEX: 43.15 KG/M2 | WEIGHT: 259 LBS | SYSTOLIC BLOOD PRESSURE: 122 MMHG | DIASTOLIC BLOOD PRESSURE: 76 MMHG | TEMPERATURE: 98.7 F | HEIGHT: 65 IN

## 2024-08-08 DIAGNOSIS — J02.9 SORE THROAT: Primary | ICD-10-CM

## 2024-08-08 DIAGNOSIS — H66.002 NON-RECURRENT ACUTE SUPPURATIVE OTITIS MEDIA OF LEFT EAR WITHOUT SPONTANEOUS RUPTURE OF TYMPANIC MEMBRANE: ICD-10-CM

## 2024-08-08 DIAGNOSIS — J02.9 PHARYNGITIS, UNSPECIFIED ETIOLOGY: ICD-10-CM

## 2024-08-08 LAB
S PYO AG THROAT QL: NEGATIVE
SARS-COV-2 AG UPPER RESP QL IA: NEGATIVE
VALID CONTROL: NORMAL

## 2024-08-08 PROCEDURE — 99214 OFFICE O/P EST MOD 30 MIN: CPT | Performed by: FAMILY MEDICINE

## 2024-08-08 PROCEDURE — 87811 SARS-COV-2 COVID19 W/OPTIC: CPT | Performed by: FAMILY MEDICINE

## 2024-08-08 PROCEDURE — 87880 STREP A ASSAY W/OPTIC: CPT | Performed by: FAMILY MEDICINE

## 2024-08-08 RX ORDER — AMOXICILLIN AND CLAVULANATE POTASSIUM 875; 125 MG/1; MG/1
1 TABLET, FILM COATED ORAL EVERY 12 HOURS SCHEDULED
Qty: 20 TABLET | Refills: 0 | Status: SHIPPED | OUTPATIENT
Start: 2024-08-08 | End: 2024-08-18

## 2024-08-08 NOTE — PROGRESS NOTES
"Ambulatory Visit  Name: Coral Artis      : 2000      MRN: [id number]  Encounter Provider: Elma Woodruff DO  Encounter Date: 2024   Encounter department: [facility] [address]    Assessment & Plan   1. Sore throat  -     POCT Rapid Covid Ag  -     POCT rapid ANTIGEN strepA  2. Pharyngitis, unspecified etiology  -     amoxicillin-clavulanate (AUGMENTIN) 875-125 mg per tablet; Take 1 tablet by mouth every 12 (twelve) hours for 10 days  3. Non-recurrent acute suppurative otitis media of left ear without spontaneous rupture of tympanic membrane  -     amoxicillin-clavulanate (AUGMENTIN) 875-125 mg per tablet; Take 1 tablet by mouth every 12 (twelve) hours for 10 days     Rapid strep and COVID negative in office today.      History of Present Illness     HPI    Patient presents to the office for a sick visit.     Notes that she started with fever (101.?) and chills about 4 days ago, took Advil. Reports that she continued with fever for 2 more days. 102.2 temp max. No fever in over 24 hours. Notes that she has had sore throat, difficulty with swallowing solids. Notes that liquids are okay. States that speaking is painful. Has been coughing, with some sputum production. Notes body aches, this has continued after the fever. Was having headaches. States that she has a swelling on the right side of her neck, this is tender. Reports discomfort with turning her head to both sides. Both sides with ear pain, left hurting worse.    Notes that she has been taking lemon ronaldo tea with honey, tylenol and Ibuprofen, Dayquil and Nyquil. Medications seems to help.    Home COVID test on  night was negative.    No known sick contacts. Works with the public.     Review of Systems    Objective     /76   Pulse (!) 106   Temp 98.7 °F (37.1 °C)   Ht 5' 5\" (1.651 m)   Wt 117 kg (259 lb)   SpO2 96%   BMI 43.10 kg/m²     Physical Exam  Vitals reviewed.   Constitutional:  "      General: She is not in acute distress.     Appearance: Normal appearance.   HENT:      Head: Normocephalic and atraumatic.      Right Ear: External ear normal.      Left Ear: External ear normal. A middle ear effusion is present.      Nose: Nose normal.      Mouth/Throat:      Mouth: Mucous membranes are moist.      Pharynx: Pharyngeal swelling and posterior oropharyngeal erythema present.   Eyes:      Extraocular Movements: Extraocular movements intact.      Conjunctiva/sclera: Conjunctivae normal.      Pupils: Pupils are equal, round, and reactive to light.   Cardiovascular:      Rate and Rhythm: Normal rate and regular rhythm.      Heart sounds: Normal heart sounds.   Pulmonary:      Effort: Pulmonary effort is normal.      Breath sounds: Normal breath sounds.   Abdominal:      General: Bowel sounds are normal. There is no distension.      Palpations: Abdomen is soft.      Tenderness: There is no abdominal tenderness.   Musculoskeletal:      Cervical back: Neck supple.      Right lower leg: No edema.      Left lower leg: No edema.   Lymphadenopathy:      Cervical: No cervical adenopathy.   Skin:     General: Skin is warm.      Capillary Refill: Capillary refill takes less than 2 seconds.      Findings: No rash.   Neurological:      Mental Status: She is alert. Mental status is at baseline.       Administrative Statements       DO Krist[facility]  8/8/2024 10:36 AM

## 2024-08-08 NOTE — TELEPHONE ENCOUNTER
Patient was just seen and forgot to ask for a note for work.  Please complete a work note and make sure that it is in her mychart.  Thank you.

## 2024-08-23 ENCOUNTER — HOSPITAL ENCOUNTER (OUTPATIENT)
Dept: INFUSION CENTER | Facility: CLINIC | Age: 24
Discharge: HOME | End: 2024-08-23
Payer: COMMERCIAL

## 2024-08-23 DIAGNOSIS — Z95.828 PORT-A-CATH IN PLACE: Primary | ICD-10-CM

## 2024-08-23 PROCEDURE — 96523 IRRIG DRUG DELIVERY DEVICE: CPT

## 2024-08-23 NOTE — PROGRESS NOTES
Coral Artis  tolerated treatment well with no complications.      Coral Artis is aware of future appt on 10/15 at 1330.     AVS No (Declined by Coral Artis) d/c from unit stable

## 2024-10-01 ENCOUNTER — HOSPITAL ENCOUNTER (OUTPATIENT)
Dept: CT IMAGING | Facility: HOSPITAL | Age: 24
Discharge: HOME | End: 2024-10-01
Attending: INTERNAL MEDICINE
Payer: COMMERCIAL

## 2024-10-01 DIAGNOSIS — C83.31 DIFFUSE LARGE B-CELL LYMPHOMA OF LYMPH NODES OF NECK (HCC): ICD-10-CM

## 2024-10-01 DIAGNOSIS — C85.99 LYMPHOMA OF THYROID GLAND (HCC): ICD-10-CM

## 2024-10-01 DIAGNOSIS — E03.9 ACQUIRED HYPOTHYROIDISM: ICD-10-CM

## 2024-10-01 PROCEDURE — G1004 CDSM NDSC: HCPCS

## 2024-10-01 PROCEDURE — 74177 CT ABD & PELVIS W/CONTRAST: CPT

## 2024-10-01 PROCEDURE — 70470 CT HEAD/BRAIN W/O & W/DYE: CPT

## 2024-10-01 PROCEDURE — 71260 CT THORAX DX C+: CPT

## 2024-10-01 PROCEDURE — 70491 CT SOFT TISSUE NECK W/DYE: CPT

## 2024-10-01 RX ADMIN — IOHEXOL 100 ML: 350 INJECTION, SOLUTION INTRAVENOUS at 13:17

## 2024-10-02 ENCOUNTER — TELEPHONE (OUTPATIENT)
Age: 24
End: 2024-10-02

## 2024-10-02 ENCOUNTER — TELEPHONE (OUTPATIENT)
Dept: HEMATOLOGY ONCOLOGY | Facility: CLINIC | Age: 24
End: 2024-10-02

## 2024-10-02 NOTE — TELEPHONE ENCOUNTER
Call from patient's mother,Kiera, asking if there is a problem that Coral needs to have blood work done prior to her appointment next week with Dr. Stover. Explained that it is normal and routine to have labs checked prior to seeing the physician for follow up.    She is also questioning why Coral needs to have a CT of the soft tissue of the neck. She would like someone to reach out to her. A MyChart message is fine. She had no other questions or concerns.

## 2024-10-02 NOTE — TELEPHONE ENCOUNTER
Called and spoke with Coral regarding labs needed for upcoming appt with Dr Stover. Pt verbalized understanding and agreed to do labs

## 2024-10-15 ENCOUNTER — APPOINTMENT (OUTPATIENT)
Dept: LAB | Facility: HOSPITAL | Age: 24
End: 2024-10-15
Payer: COMMERCIAL

## 2024-10-15 ENCOUNTER — HOSPITAL ENCOUNTER (OUTPATIENT)
Dept: INFUSION CENTER | Facility: CLINIC | Age: 24
Discharge: HOME | End: 2024-10-15
Payer: COMMERCIAL

## 2024-10-15 DIAGNOSIS — Z95.828 PORT-A-CATH IN PLACE: Primary | ICD-10-CM

## 2024-10-15 DIAGNOSIS — C85.99 LYMPHOMA OF THYROID GLAND (HCC): ICD-10-CM

## 2024-10-15 DIAGNOSIS — E03.9 ACQUIRED HYPOTHYROIDISM: ICD-10-CM

## 2024-10-15 LAB
ALBUMIN SERPL BCG-MCNC: 4.2 G/DL (ref 3.5–5)
ALP SERPL-CCNC: 71 U/L (ref 34–104)
ALT SERPL W P-5'-P-CCNC: 14 U/L (ref 7–52)
ANION GAP SERPL CALCULATED.3IONS-SCNC: 6 MMOL/L (ref 4–13)
AST SERPL W P-5'-P-CCNC: 15 U/L (ref 13–39)
BASOPHILS # BLD AUTO: 0.04 THOUSANDS/ΜL (ref 0–0.1)
BASOPHILS NFR BLD AUTO: 1 % (ref 0–1)
BILIRUB SERPL-MCNC: 0.52 MG/DL (ref 0.2–1)
BUN SERPL-MCNC: 9 MG/DL (ref 5–25)
CALCIUM SERPL-MCNC: 9.1 MG/DL (ref 8.4–10.2)
CHLORIDE SERPL-SCNC: 105 MMOL/L (ref 96–108)
CO2 SERPL-SCNC: 27 MMOL/L (ref 21–32)
CREAT SERPL-MCNC: 0.72 MG/DL (ref 0.6–1.3)
EOSINOPHIL # BLD AUTO: 0.24 THOUSAND/ΜL (ref 0–0.61)
EOSINOPHIL NFR BLD AUTO: 4 % (ref 0–6)
ERYTHROCYTE [DISTWIDTH] IN BLOOD BY AUTOMATED COUNT: 12.3 % (ref 11.6–15.1)
GFR SERPL CREATININE-BSD FRML MDRD: 118 ML/MIN/1.73SQ M
GLUCOSE SERPL-MCNC: 103 MG/DL (ref 65–140)
HCT VFR BLD AUTO: 39.8 % (ref 34.8–46.1)
HGB BLD-MCNC: 12.9 G/DL (ref 11.5–15.4)
IMM GRANULOCYTES # BLD AUTO: 0.01 THOUSAND/UL (ref 0–0.2)
IMM GRANULOCYTES NFR BLD AUTO: 0 % (ref 0–2)
LYMPHOCYTES # BLD AUTO: 1.09 THOUSANDS/ΜL (ref 0.6–4.47)
LYMPHOCYTES NFR BLD AUTO: 17 % (ref 14–44)
MCH RBC QN AUTO: 28.5 PG (ref 26.8–34.3)
MCHC RBC AUTO-ENTMCNC: 32.4 G/DL (ref 31.4–37.4)
MCV RBC AUTO: 88 FL (ref 82–98)
MONOCYTES # BLD AUTO: 0.35 THOUSAND/ΜL (ref 0.17–1.22)
MONOCYTES NFR BLD AUTO: 5 % (ref 4–12)
NEUTROPHILS # BLD AUTO: 4.77 THOUSANDS/ΜL (ref 1.85–7.62)
NEUTS SEG NFR BLD AUTO: 73 % (ref 43–75)
NRBC BLD AUTO-RTO: 0 /100 WBCS
PLATELET # BLD AUTO: 340 THOUSANDS/UL (ref 149–390)
PMV BLD AUTO: 9.9 FL (ref 8.9–12.7)
POTASSIUM SERPL-SCNC: 3.8 MMOL/L (ref 3.5–5.3)
PROT SERPL-MCNC: 7.4 G/DL (ref 6.4–8.4)
RBC # BLD AUTO: 4.52 MILLION/UL (ref 3.81–5.12)
SODIUM SERPL-SCNC: 138 MMOL/L (ref 135–147)
T4 FREE SERPL-MCNC: 0.66 NG/DL (ref 0.61–1.12)
TSH SERPL DL<=0.05 MIU/L-ACNC: 4.63 UIU/ML (ref 0.45–4.5)
WBC # BLD AUTO: 6.5 THOUSAND/UL (ref 4.31–10.16)

## 2024-10-15 PROCEDURE — 96523 IRRIG DRUG DELIVERY DEVICE: CPT

## 2024-10-15 PROCEDURE — 85025 COMPLETE CBC W/AUTO DIFF WBC: CPT

## 2024-10-15 PROCEDURE — 84439 ASSAY OF FREE THYROXINE: CPT

## 2024-10-15 PROCEDURE — 84443 ASSAY THYROID STIM HORMONE: CPT

## 2024-10-15 PROCEDURE — 36415 COLL VENOUS BLD VENIPUNCTURE: CPT

## 2024-10-15 PROCEDURE — 80053 COMPREHEN METABOLIC PANEL: CPT

## 2024-10-15 NOTE — PROGRESS NOTES
Pt presents for port a cath flush offering no complaints. Port a cath accessed, flushed, and positive blood return noted. Port a cath de accessed without difficulty. Pt declined AVS. No future appointments scheduled at this time.

## 2024-10-23 ENCOUNTER — CLINICAL SUPPORT (OUTPATIENT)
Dept: FAMILY MEDICINE CLINIC | Facility: CLINIC | Age: 24
End: 2024-10-23

## 2024-10-23 DIAGNOSIS — Z01.00 ENCOUNTER FOR VISUAL TESTING: Primary | ICD-10-CM

## 2024-11-11 ENCOUNTER — TELEPHONE (OUTPATIENT)
Age: 24
End: 2024-11-11

## 2024-11-11 NOTE — TELEPHONE ENCOUNTER
Pt called, stated she dropped off forms to the office 2 weeks ago. They were for pcp to review and sign for her drivers permit. She is wondering the status of this. Please advise.

## 2024-11-11 NOTE — TELEPHONE ENCOUNTER
Forms were placed in  but never copied/scanned into chart - no call/message to patient noted.    Sent pt a portal message that form is ready for .    Placed in  folder on side 2.    Scanned into this encounter.

## 2024-11-12 ENCOUNTER — OFFICE VISIT (OUTPATIENT)
Dept: HEMATOLOGY ONCOLOGY | Facility: CLINIC | Age: 24
End: 2024-11-12
Payer: COMMERCIAL

## 2024-11-12 VITALS
BODY MASS INDEX: 42.82 KG/M2 | HEART RATE: 69 BPM | OXYGEN SATURATION: 98 % | DIASTOLIC BLOOD PRESSURE: 80 MMHG | RESPIRATION RATE: 18 BRPM | WEIGHT: 257 LBS | SYSTOLIC BLOOD PRESSURE: 126 MMHG | TEMPERATURE: 98.1 F | HEIGHT: 65 IN

## 2024-11-12 DIAGNOSIS — E03.9 ACQUIRED HYPOTHYROIDISM: ICD-10-CM

## 2024-11-12 DIAGNOSIS — C85.99 LYMPHOMA OF THYROID GLAND (HCC): Primary | ICD-10-CM

## 2024-11-12 PROCEDURE — 99215 OFFICE O/P EST HI 40 MIN: CPT | Performed by: INTERNAL MEDICINE

## 2024-11-12 NOTE — PROGRESS NOTES
Hematology/Oncology Outpatient Follow- up Note  Coral Artis 24 y.o. female MRN: @ Encounter: [id number]        Date:  11/12/2024        Assessment / Plan:      1. Lymphoma of thyroid gland (HCC)  -     CBC and differential; Future; Expected date: 01/15/2025  -     Comprehensive metabolic panel; Future; Expected date: 01/15/2025  -     LD,Blood; Future; Expected date: 01/15/2025  -     Ambulatory referral to Interventional Radiology; Future  2. Acquired hypothyroidism    -Patient with no concerning symptoms.  - Imaging done in October 2024 including CT chest abdomen pelvis with no evidence of recurrent lymphoma.  CT neck with no suspicious neck mass or cervical lymphadenopathy s/p treatment.  CT head with no acute intracranial abnormality.  No pathologic intracranial enhancement.  -Will get blood work including CBC, CMP and LDH prior to her next office visit which will be in 3 months.  -Will place IR referral to remove the port as per patient's request.    HPI:    Diffuse Large B Cell Lymphoma, Germinal Center Subtype, Non-double expressor phenotype. Elevated proliferation index (%). Involvement of thyroid. Date of diagnosis 7/20/23.   S/p 6 cycles of R-CHOP    Interval History:    Patient presents today with her father for follow-up and has transition of care.  She feels fine and offers no new complaints.  States that since she had chemotherapy she has been having occasional night sweats.  No fevers.  Denies noticing any enlarging lymph nodes.  No headaches, lightheadedness or dizziness.  No chest pains or shortness of breath.  No frequent infections.      Cancer Staging:  Cancer Staging   No matching staging information was found for the patient.      Molecular Testing:     Previous Hematologic/ Oncologic History:    Oncology History   Lymphoma of thyroid gland (HCC)   7/22/2023 Initial Diagnosis    Lymphoma of thyroid gland (HCC)     8/2/2023 - 11/27/2023 Chemotherapy    DOXOrubicin (ADRIAMYCIN), 50  mg/m2 = 110 mg, Intravenous, Once, 6 of 6 cycles  Administration: 110 mg (8/3/2023), 110 mg (8/23/2023), 110 mg (9/13/2023), 110 mg (10/4/2023), 110 mg (10/25/2023), 110 mg (11/27/2023)  alteplase (CATHFLO), 2 mg, Intracatheter, Every 1 Minute as needed, 6 of 6 cycles  vincristine (ONCOVIN) chemo infusion, 2 mg (original dose 1.4 mg/m2), Intravenous, Once, 6 of 6 cycles  Dose modification: 2 mg (original dose 1.4 mg/m2, Cycle 1, Reason: Max Dose Reached)  Administration: 2 mg (8/3/2023), 2 mg (8/23/2023), 2 mg (9/13/2023), 2 mg (10/4/2023), 2 mg (10/25/2023), 2 mg (11/27/2023)  cyclophosphamide (CYTOXAN) IVPB, 750 mg/m2 = 1,650 mg, Intravenous, Once, 6 of 6 cycles  Administration: 1,650 mg (8/2/2023), 1,650 mg (8/23/2023), 1,650 mg (9/13/2023), 1,650 mg (10/4/2023), 1,650 mg (10/25/2023), 1,650 mg (11/27/2023)  fosaprepitant (EMEND) IVPB, 150 mg, Intravenous, Once, 1 of 1 cycle  Administration: 150 mg (8/2/2023)  riTUXimab (RITUXAN) first titrated chemo infusion, 825 mg, Intravenous, Once, 2 of 2 cycles  Dose modification: 375 mg/m2 (original dose 375 mg/m2, Cycle 2)  Administration: 800 mg (8/2/2023)  aprepitant (CINVANTI) in  mL IVPB, 130 mg, Intravenous, Once, 5 of 5 cycles  Administration: 130 mg (8/23/2023), 130 mg (9/13/2023), 130 mg (10/4/2023), 130 mg (10/25/2023), 130 mg (11/27/2023)  riTUXimab-pvvr (RUXIENCE) first titrated chemo infusion, 825 mg (100 % of original dose 375 mg/m2), Intravenous, Once, 5 of 5 cycles  Dose modification: 375 mg/m2 (original dose 375 mg/m2, Cycle 2)  Administration: 800 mg (8/23/2023), 800 mg (9/13/2023), 800 mg (10/4/2023), 800 mg (10/25/2023), 800 mg (11/27/2023)         Current Hematologic/ Oncologic Treatment:      Surveillance        Test Results:    Imaging:     CT chest, abdomen and pelvis:  IMPRESSION:     No evidence of recurrent lymphoma in the chest, abdomen, or pelvis.    CT soft tissue neck:  CT NECK WITH CONTRAST     INDICATION:   C83.31: Diffuse large  b-cell lymphoma, lymph nodes of head, face, and neck.    Restaging of thyroid lymphoma status posttreatment clinically in remission please evaluate     COMPARISON: Same day CT head with and without contrast. NM PET/CT 1/29/2024. CT soft tissue neck with contrast 7/22/2023 6/1/2023, 1/31/2018. Ultrasound thyroid 6/1/2023.     TECHNIQUE:  Axial, sagittal, and coronal 2D reformatted images were created from the axial source data and submitted for interpretation.     Radiation dose length product (DLP) for this visit:  682 mGy-cm .  This examination, like all CT scans performed in the [facility], was performed utilizing techniques to minimize radiation dose exposure, including the use of iterative   reconstruction and automated exposure control.     IV Contrast:  100 mL of iohexol (OMNIPAQUE)     IMAGE QUALITY:  Diagnostic.     FINDINGS:     VISUALIZED BRAIN PARENCHYMA:  No acute intracranial pathology of the visualized brain parenchyma.     VISUALIZED ORBITS: Normal visualized orbits.     PARANASAL SINUSES: Small bilateral maxillary mucous retention cysts.     NASAL CAVITY AND NASOPHARYNX:  Normal.     SUPRAHYOID NECK:  Normal oral cavity, tongue base, tonsillar fossa and epiglottis.     INFRAHYOID NECK:  Aryepiglottic folds and piriform sinuses are normal.  Normal glottis and subglottic airway.     THYROID GLAND:  Unremarkable. No thyroid mass.     PAROTID AND SUBMANDIBULAR GLANDS:  Normal.     LYMPH NODES:  No pathologic or enlarged adenopathy.     VASCULAR STRUCTURES: Right chest port, right IJ approach, with catheter coursing out of field-of-view inferiorly into superior vena cava. Normal enhancement of the cervical vasculature.     THORACIC INLET:  Lung apices and upper mediastinum are unremarkable.     BONY STRUCTURES: No acute fracture or destructive osseous lesion.     IMPRESSION:     No suspicious neck mass or cervical lymphadenopathy status post treatment.    CT BRAIN - WITH AND WITHOUT  CONTRAST     INDICATION:   C85.99: Non-Hodgkin lymphoma, unspecified, extranodal and solid organ sites  E03.9: Hypothyroidism, unspecified.     COMPARISON: August 12, 2017     TECHNIQUE:  CT examination of the brain was performed both prior to and after the administration of intravenous contrast.  Multiplanar 2D reformatted images were created from the source data.     Radiation dose length product (DLP) for this visit:  889 mGy-cm .  This examination, like all CT scans performed in the [facility], was performed utilizing techniques to minimize radiation dose exposure, including the use of iterative   reconstruction and automated exposure control.     IV Contrast:  100 mL of iohexol (OMNIPAQUE)     IMAGE QUALITY: Mildly degraded by beam hardening/metallic streak artifact external to the patient's calvarium.     FINDINGS:     PARENCHYMA:  No intracranial mass, mass effect or midline shift. No CT signs of acute infarction.  No acute parenchymal hemorrhage.     Post contrast imaging of the brain is normal.     VENTRICLES AND EXTRA-AXIAL SPACES:  Normal for patient's age. Unchanged arachnoid cyst along the parasagittal left occipital lobe.     VISUALIZED ORBITS: Normal visualized orbits.     PARANASAL SINUSES: Mild mucosal thickening of the visualized paranasal sinuses.     CALVARIUM:  Normal.     IMPRESSION:     No acute intracranial abnormality. No pathologic intracranial enhancement.    ECHO: 11/07/2024      Left Ventricle: Left ventricular cavity size is normal. Wall thickness is normal. The left ventricular ejection fraction is 65%. Systolic function is normal. Wall motion cannot be accurately assessed.      Labs:   Lab Results   Component Value Date    WBC 6.50 10/15/2024    HGB 12.9 10/15/2024    HCT 39.8 10/15/2024    MCV 88 10/15/2024     10/15/2024     Lab Results   Component Value Date    K 3.8 10/15/2024     10/15/2024    CO2 27 10/15/2024    BUN 9 10/15/2024    CREATININE 0.72  "10/15/2024    GLUF 94 03/26/2024    CALCIUM 9.1 10/15/2024    CORRECTEDCA 8.3 08/06/2023    AST 15 10/15/2024    ALT 14 10/15/2024    ALKPHOS 71 10/15/2024    EGFR 118 10/15/2024         No results found for: \"SPEP\", \"UPEP\"    No results found for: \"PSA\"    No results found for: \"CEA\"    No results found for: \"\"    No results found for: \"AFP\"    Lab Results   Component Value Date    IRON 42 (L) 08/02/2023    TIBC 319 08/02/2023    FERRITIN 82 08/02/2023       No results found for: \"OEXPVKGP81\"      ROS: Review of Systems   Constitutional:  Positive for unexpected weight change (9lbs since June). Negative for activity change, appetite change, chills, diaphoresis, fatigue and fever.   HENT:  Negative for mouth sores, nosebleeds and sore throat.    Eyes:  Negative for redness and visual disturbance.   Respiratory:  Negative for cough, chest tightness, shortness of breath and wheezing.    Cardiovascular:  Negative for chest pain, palpitations and leg swelling.   Gastrointestinal:  Positive for abdominal pain (lower abdomen like muscle cramps with the periods). Negative for blood in stool, constipation, diarrhea, nausea and vomiting.   Genitourinary:  Negative for dysuria, flank pain and hematuria.   Musculoskeletal:  Negative for arthralgias, back pain, gait problem and myalgias.   Skin:  Negative for pallor, rash and wound.   Neurological:  Negative for dizziness, seizures, speech difficulty, weakness, light-headedness, numbness and headaches.   Hematological:  Negative for adenopathy. Does not bruise/bleed easily.   Psychiatric/Behavioral:  Negative for behavioral problems and confusion.          Current Medications: Reviewed  Allergies: Reviewed  PMH/FH/SH:  Reviewed      Physical Exam:    There is no height or weight on file to calculate BSA.    Wt Readings from Last 3 Encounters:   08/08/24 117 kg (259 lb)   06/04/24 121 kg (266 lb)   03/14/24 118 kg (260 lb)        Temp Readings from Last 3 Encounters: "   08/08/24 98.7 °F (37.1 °C)   06/04/24 (!) 97.1 °F (36.2 °C) (Temporal)   03/14/24 98.3 °F (36.8 °C) (Tympanic)        BP Readings from Last 3 Encounters:   08/08/24 122/76   06/04/24 128/80   03/14/24 120/78         Pulse Readings from Last 3 Encounters:   08/08/24 (!) 106   06/04/24 87   03/14/24 95         Physical Exam  Vitals and nursing note reviewed.   Constitutional:       General: She is not in acute distress.     Appearance: Normal appearance.   HENT:      Head: Normocephalic and atraumatic.      Mouth/Throat:      Mouth: Mucous membranes are moist.      Pharynx: Oropharynx is clear.   Eyes:      General: No scleral icterus.     Extraocular Movements: Extraocular movements intact.      Conjunctiva/sclera: Conjunctivae normal.   Cardiovascular:      Rate and Rhythm: Normal rate and regular rhythm.      Pulses: Normal pulses.      Heart sounds: No murmur heard.  Pulmonary:      Effort: Pulmonary effort is normal.      Breath sounds: Normal breath sounds. No wheezing, rhonchi or rales.   Abdominal:      General: Bowel sounds are normal.      Palpations: Abdomen is soft.      Tenderness: There is no abdominal tenderness.   Musculoskeletal:         General: No swelling or tenderness. Normal range of motion.      Cervical back: Neck supple.   Lymphadenopathy:      Cervical: No cervical adenopathy.   Skin:     General: Skin is warm and dry.      Coloration: Skin is not pale.      Findings: No bruising, erythema or rash.   Neurological:      General: No focal deficit present.      Mental Status: She is alert and oriented to person, place, and time.      Motor: No weakness.   Psychiatric:         Mood and Affect: Mood normal.         Behavior: Behavior normal.           Goals and Barriers:  Current Goal: Prolong Survival from Cancer.   Barriers: None.      Patient's Capacity to Self Care:  Patient is able to self care.    Disclaimer: This document was prepared using [employer]. If a word or  phrase is confusing, or does not make sense, this is likely due to recognition error which was not discovered during this clinician's review. If you believe an error has occurred, please contact me through [employer] service line for [email or web address].      Follow Up    All questions were answered to the patient's satisfaction during this encounter. The patient knows the contact information for our office and knows to reach out for any relevant concerns related to this encounter. They are to call for any temperature 100.4 or higher, new symptoms including but not restricted to shaking chills, decreased appetite, nausea, vomiting, diarrhea, increased fatigue, shortness of breath or chest pain, confusion, and not feeling the strength to come to the clinic. For all other listed problems and medical diagnosis in their chart - they are managed by PCP and/or other specialists, which the patient acknowledges.     I spent 59 minutes reviewing the records (labs, clinician notes, outside records, medical history, ordering medicine/tests/procedures, monitoring of anti-neoplastic toxicities, interpreting the imaging/labs previously done) and coordination of care as well as direct time with the patient today, of which greater than 50% of the time was spent in counseling and coordination of care with the patient/family.

## 2024-11-12 NOTE — H&P (VIEW-ONLY)
Hematology/Oncology Outpatient Follow- up Note  Coral Artis 24 y.o. female MRN: @ Encounter: [id number]        Date:  11/12/2024        Assessment / Plan:      1. Lymphoma of thyroid gland (HCC)  -     CBC and differential; Future; Expected date: 01/15/2025  -     Comprehensive metabolic panel; Future; Expected date: 01/15/2025  -     LD,Blood; Future; Expected date: 01/15/2025  -     Ambulatory referral to Interventional Radiology; Future  2. Acquired hypothyroidism    -Patient with no concerning symptoms.  - Imaging done in October 2024 including CT chest abdomen pelvis with no evidence of recurrent lymphoma.  CT neck with no suspicious neck mass or cervical lymphadenopathy s/p treatment.  CT head with no acute intracranial abnormality.  No pathologic intracranial enhancement.  -Will get blood work including CBC, CMP and LDH prior to her next office visit which will be in 3 months.  -Will place IR referral to remove the port as per patient's request.    HPI:    Diffuse Large B Cell Lymphoma, Germinal Center Subtype, Non-double expressor phenotype. Elevated proliferation index (%). Involvement of thyroid. Date of diagnosis 7/20/23.   S/p 6 cycles of R-CHOP    Interval History:    Patient presents today with her father for follow-up and has transition of care.  She feels fine and offers no new complaints.  States that since she had chemotherapy she has been having occasional night sweats.  No fevers.  Denies noticing any enlarging lymph nodes.  No headaches, lightheadedness or dizziness.  No chest pains or shortness of breath.  No frequent infections.      Cancer Staging:  Cancer Staging   No matching staging information was found for the patient.      Molecular Testing:     Previous Hematologic/ Oncologic History:    Oncology History   Lymphoma of thyroid gland (HCC)   7/22/2023 Initial Diagnosis    Lymphoma of thyroid gland (HCC)     8/2/2023 - 11/27/2023 Chemotherapy    DOXOrubicin (ADRIAMYCIN), 50  mg/m2 = 110 mg, Intravenous, Once, 6 of 6 cycles  Administration: 110 mg (8/3/2023), 110 mg (8/23/2023), 110 mg (9/13/2023), 110 mg (10/4/2023), 110 mg (10/25/2023), 110 mg (11/27/2023)  alteplase (CATHFLO), 2 mg, Intracatheter, Every 1 Minute as needed, 6 of 6 cycles  vincristine (ONCOVIN) chemo infusion, 2 mg (original dose 1.4 mg/m2), Intravenous, Once, 6 of 6 cycles  Dose modification: 2 mg (original dose 1.4 mg/m2, Cycle 1, Reason: Max Dose Reached)  Administration: 2 mg (8/3/2023), 2 mg (8/23/2023), 2 mg (9/13/2023), 2 mg (10/4/2023), 2 mg (10/25/2023), 2 mg (11/27/2023)  cyclophosphamide (CYTOXAN) IVPB, 750 mg/m2 = 1,650 mg, Intravenous, Once, 6 of 6 cycles  Administration: 1,650 mg (8/2/2023), 1,650 mg (8/23/2023), 1,650 mg (9/13/2023), 1,650 mg (10/4/2023), 1,650 mg (10/25/2023), 1,650 mg (11/27/2023)  fosaprepitant (EMEND) IVPB, 150 mg, Intravenous, Once, 1 of 1 cycle  Administration: 150 mg (8/2/2023)  riTUXimab (RITUXAN) first titrated chemo infusion, 825 mg, Intravenous, Once, 2 of 2 cycles  Dose modification: 375 mg/m2 (original dose 375 mg/m2, Cycle 2)  Administration: 800 mg (8/2/2023)  aprepitant (CINVANTI) in  mL IVPB, 130 mg, Intravenous, Once, 5 of 5 cycles  Administration: 130 mg (8/23/2023), 130 mg (9/13/2023), 130 mg (10/4/2023), 130 mg (10/25/2023), 130 mg (11/27/2023)  riTUXimab-pvvr (RUXIENCE) first titrated chemo infusion, 825 mg (100 % of original dose 375 mg/m2), Intravenous, Once, 5 of 5 cycles  Dose modification: 375 mg/m2 (original dose 375 mg/m2, Cycle 2)  Administration: 800 mg (8/23/2023), 800 mg (9/13/2023), 800 mg (10/4/2023), 800 mg (10/25/2023), 800 mg (11/27/2023)         Current Hematologic/ Oncologic Treatment:      Surveillance        Test Results:    Imaging:     CT chest, abdomen and pelvis:  IMPRESSION:     No evidence of recurrent lymphoma in the chest, abdomen, or pelvis.    CT soft tissue neck:  CT NECK WITH CONTRAST     INDICATION:   C83.31: Diffuse large  b-cell lymphoma, lymph nodes of head, face, and neck.    Restaging of thyroid lymphoma status posttreatment clinically in remission please evaluate     COMPARISON: Same day CT head with and without contrast. NM PET/CT 1/29/2024. CT soft tissue neck with contrast 7/22/2023 6/1/2023, 1/31/2018. Ultrasound thyroid 6/1/2023.     TECHNIQUE:  Axial, sagittal, and coronal 2D reformatted images were created from the axial source data and submitted for interpretation.     Radiation dose length product (DLP) for this visit:  682 mGy-cm .  This examination, like all CT scans performed in the [facility], was performed utilizing techniques to minimize radiation dose exposure, including the use of iterative   reconstruction and automated exposure control.     IV Contrast:  100 mL of iohexol (OMNIPAQUE)     IMAGE QUALITY:  Diagnostic.     FINDINGS:     VISUALIZED BRAIN PARENCHYMA:  No acute intracranial pathology of the visualized brain parenchyma.     VISUALIZED ORBITS: Normal visualized orbits.     PARANASAL SINUSES: Small bilateral maxillary mucous retention cysts.     NASAL CAVITY AND NASOPHARYNX:  Normal.     SUPRAHYOID NECK:  Normal oral cavity, tongue base, tonsillar fossa and epiglottis.     INFRAHYOID NECK:  Aryepiglottic folds and piriform sinuses are normal.  Normal glottis and subglottic airway.     THYROID GLAND:  Unremarkable. No thyroid mass.     PAROTID AND SUBMANDIBULAR GLANDS:  Normal.     LYMPH NODES:  No pathologic or enlarged adenopathy.     VASCULAR STRUCTURES: Right chest port, right IJ approach, with catheter coursing out of field-of-view inferiorly into superior vena cava. Normal enhancement of the cervical vasculature.     THORACIC INLET:  Lung apices and upper mediastinum are unremarkable.     BONY STRUCTURES: No acute fracture or destructive osseous lesion.     IMPRESSION:     No suspicious neck mass or cervical lymphadenopathy status post treatment.    CT BRAIN - WITH AND WITHOUT  CONTRAST     INDICATION:   C85.99: Non-Hodgkin lymphoma, unspecified, extranodal and solid organ sites  E03.9: Hypothyroidism, unspecified.     COMPARISON: August 12, 2017     TECHNIQUE:  CT examination of the brain was performed both prior to and after the administration of intravenous contrast.  Multiplanar 2D reformatted images were created from the source data.     Radiation dose length product (DLP) for this visit:  889 mGy-cm .  This examination, like all CT scans performed in the [facility], was performed utilizing techniques to minimize radiation dose exposure, including the use of iterative   reconstruction and automated exposure control.     IV Contrast:  100 mL of iohexol (OMNIPAQUE)     IMAGE QUALITY: Mildly degraded by beam hardening/metallic streak artifact external to the patient's calvarium.     FINDINGS:     PARENCHYMA:  No intracranial mass, mass effect or midline shift. No CT signs of acute infarction.  No acute parenchymal hemorrhage.     Post contrast imaging of the brain is normal.     VENTRICLES AND EXTRA-AXIAL SPACES:  Normal for patient's age. Unchanged arachnoid cyst along the parasagittal left occipital lobe.     VISUALIZED ORBITS: Normal visualized orbits.     PARANASAL SINUSES: Mild mucosal thickening of the visualized paranasal sinuses.     CALVARIUM:  Normal.     IMPRESSION:     No acute intracranial abnormality. No pathologic intracranial enhancement.    ECHO: 11/07/2024      Left Ventricle: Left ventricular cavity size is normal. Wall thickness is normal. The left ventricular ejection fraction is 65%. Systolic function is normal. Wall motion cannot be accurately assessed.      Labs:   Lab Results   Component Value Date    WBC 6.50 10/15/2024    HGB 12.9 10/15/2024    HCT 39.8 10/15/2024    MCV 88 10/15/2024     10/15/2024     Lab Results   Component Value Date    K 3.8 10/15/2024     10/15/2024    CO2 27 10/15/2024    BUN 9 10/15/2024    CREATININE 0.72  "10/15/2024    GLUF 94 03/26/2024    CALCIUM 9.1 10/15/2024    CORRECTEDCA 8.3 08/06/2023    AST 15 10/15/2024    ALT 14 10/15/2024    ALKPHOS 71 10/15/2024    EGFR 118 10/15/2024         No results found for: \"SPEP\", \"UPEP\"    No results found for: \"PSA\"    No results found for: \"CEA\"    No results found for: \"\"    No results found for: \"AFP\"    Lab Results   Component Value Date    IRON 42 (L) 08/02/2023    TIBC 319 08/02/2023    FERRITIN 82 08/02/2023       No results found for: \"[id number]\"      ROS: Review of Systems   Constitutional:  Positive for unexpected weight change (9lbs since June). Negative for activity change, appetite change, chills, diaphoresis, fatigue and fever.   HENT:  Negative for mouth sores, nosebleeds and sore throat.    Eyes:  Negative for redness and visual disturbance.   Respiratory:  Negative for cough, chest tightness, shortness of breath and wheezing.    Cardiovascular:  Negative for chest pain, palpitations and leg swelling.   Gastrointestinal:  Positive for abdominal pain (lower abdomen like muscle cramps with the periods). Negative for blood in stool, constipation, diarrhea, nausea and vomiting.   Genitourinary:  Negative for dysuria, flank pain and hematuria.   Musculoskeletal:  Negative for arthralgias, back pain, gait problem and myalgias.   Skin:  Negative for pallor, rash and wound.   Neurological:  Negative for dizziness, seizures, speech difficulty, weakness, light-headedness, numbness and headaches.   Hematological:  Negative for adenopathy. Does not bruise/bleed easily.   Psychiatric/Behavioral:  Negative for behavioral problems and confusion.          Current Medications: Reviewed  Allergies: Reviewed  PMH/FH/SH:  Reviewed      Physical Exam:    There is no height or weight on file to calculate BSA.    Wt Readings from Last 3 Encounters:   08/08/24 117 kg (259 lb)   06/04/24 121 kg (266 lb)   03/14/24 118 kg (260 lb)        Temp Readings from Last 3 Encounters: "   08/08/24 98.7 °F (37.1 °C)   06/04/24 (!) 97.1 °F (36.2 °C) (Temporal)   03/14/24 98.3 °F (36.8 °C) (Tympanic)        BP Readings from Last 3 Encounters:   08/08/24 122/76   06/04/24 128/80   03/14/24 120/78         Pulse Readings from Last 3 Encounters:   08/08/24 (!) 106   06/04/24 87   03/14/24 95         Physical Exam  Vitals and nursing note reviewed.   Constitutional:       General: She is not in acute distress.     Appearance: Normal appearance.   HENT:      Head: Normocephalic and atraumatic.      Mouth/Throat:      Mouth: Mucous membranes are moist.      Pharynx: Oropharynx is clear.   Eyes:      General: No scleral icterus.     Extraocular Movements: Extraocular movements intact.      Conjunctiva/sclera: Conjunctivae normal.   Cardiovascular:      Rate and Rhythm: Normal rate and regular rhythm.      Pulses: Normal pulses.      Heart sounds: No murmur heard.  Pulmonary:      Effort: Pulmonary effort is normal.      Breath sounds: Normal breath sounds. No wheezing, rhonchi or rales.   Abdominal:      General: Bowel sounds are normal.      Palpations: Abdomen is soft.      Tenderness: There is no abdominal tenderness.   Musculoskeletal:         General: No swelling or tenderness. Normal range of motion.      Cervical back: Neck supple.   Lymphadenopathy:      Cervical: No cervical adenopathy.   Skin:     General: Skin is warm and dry.      Coloration: Skin is not pale.      Findings: No bruising, erythema or rash.   Neurological:      General: No focal deficit present.      Mental Status: She is alert and oriented to person, place, and time.      Motor: No weakness.   Psychiatric:         Mood and Affect: Mood normal.         Behavior: Behavior normal.           Goals and Barriers:  Current Goal: Prolong Survival from Cancer.   Barriers: None.      Patient's Capacity to Self Care:  Patient is able to self care.    Disclaimer: This document was prepared using [employer]. If a word or  phrase is confusing, or does not make sense, this is likely due to recognition error which was not discovered during this clinician's review. If you believe an error has occurred, please contact me through [employer] service line for [email or web address].      Follow Up    All questions were answered to the patient's satisfaction during this encounter. The patient knows the contact information for our office and knows to reach out for any relevant concerns related to this encounter. They are to call for any temperature 100.4 or higher, new symptoms including but not restricted to shaking chills, decreased appetite, nausea, vomiting, diarrhea, increased fatigue, shortness of breath or chest pain, confusion, and not feeling the strength to come to the clinic. For all other listed problems and medical diagnosis in their chart - they are managed by PCP and/or other specialists, which the patient acknowledges.     I spent 59 minutes reviewing the records (labs, clinician notes, outside records, medical history, ordering medicine/tests/procedures, monitoring of anti-neoplastic toxicities, interpreting the imaging/labs previously done) and coordination of care as well as direct time with the patient today, of which greater than 50% of the time was spent in counseling and coordination of care with the patient/family.

## 2024-11-25 RX ORDER — SODIUM CHLORIDE 9 MG/ML
75 INJECTION, SOLUTION INTRAVENOUS CONTINUOUS
Status: CANCELLED | OUTPATIENT
Start: 2024-11-25

## 2024-12-02 ENCOUNTER — HOSPITAL ENCOUNTER (OUTPATIENT)
Dept: NON INVASIVE DIAGNOSTICS | Facility: HOSPITAL | Age: 24
Discharge: HOME | End: 2024-12-02
Attending: INTERNAL MEDICINE
Payer: COMMERCIAL

## 2024-12-02 VITALS
HEIGHT: 64 IN | HEART RATE: 80 BPM | RESPIRATION RATE: 16 BRPM | SYSTOLIC BLOOD PRESSURE: 140 MMHG | TEMPERATURE: 97.6 F | BODY MASS INDEX: 43.77 KG/M2 | WEIGHT: 256.39 LBS | DIASTOLIC BLOOD PRESSURE: 80 MMHG | OXYGEN SATURATION: 99 %

## 2024-12-02 DIAGNOSIS — C85.99 LYMPHOMA OF THYROID GLAND (HCC): ICD-10-CM

## 2024-12-02 LAB
EXT PREGNANCY TEST URINE: NEGATIVE
EXT. CONTROL: NORMAL

## 2024-12-02 PROCEDURE — 36590 REMOVAL TUNNELED CV CATH: CPT

## 2024-12-02 PROCEDURE — 99152 MOD SED SAME PHYS/QHP 5/>YRS: CPT

## 2024-12-02 PROCEDURE — 81025 URINE PREGNANCY TEST: CPT | Performed by: RADIOLOGY

## 2024-12-02 PROCEDURE — 36590 REMOVAL TUNNELED CV CATH: CPT | Performed by: RADIOLOGY

## 2024-12-02 PROCEDURE — 99152 MOD SED SAME PHYS/QHP 5/>YRS: CPT | Performed by: RADIOLOGY

## 2024-12-02 PROCEDURE — 99153 MOD SED SAME PHYS/QHP EA: CPT

## 2024-12-02 RX ORDER — MIDAZOLAM HYDROCHLORIDE 2 MG/2ML
INJECTION, SOLUTION INTRAMUSCULAR; INTRAVENOUS AS NEEDED
Status: COMPLETED | OUTPATIENT
Start: 2024-12-02 | End: 2024-12-02

## 2024-12-02 RX ORDER — LIDOCAINE HYDROCHLORIDE AND EPINEPHRINE 10; 10 MG/ML; UG/ML
INJECTION, SOLUTION INFILTRATION; PERINEURAL AS NEEDED
Status: COMPLETED | OUTPATIENT
Start: 2024-12-02 | End: 2024-12-02

## 2024-12-02 RX ORDER — FENTANYL CITRATE 50 UG/ML
INJECTION, SOLUTION INTRAMUSCULAR; INTRAVENOUS AS NEEDED
Status: COMPLETED | OUTPATIENT
Start: 2024-12-02 | End: 2024-12-02

## 2024-12-02 RX ORDER — DIPHENHYDRAMINE HYDROCHLORIDE 50 MG/ML
INJECTION INTRAMUSCULAR; INTRAVENOUS AS NEEDED
Status: COMPLETED | OUTPATIENT
Start: 2024-12-02 | End: 2024-12-02

## 2024-12-02 RX ORDER — SODIUM CHLORIDE 9 MG/ML
75 INJECTION, SOLUTION INTRAVENOUS CONTINUOUS
Status: DISCONTINUED | OUTPATIENT
Start: 2024-12-02 | End: 2024-12-03 | Stop reason: HOSPADM

## 2024-12-02 RX ADMIN — DIPHENHYDRAMINE HYDROCHLORIDE 12.5 MG: 50 INJECTION, SOLUTION INTRAMUSCULAR; INTRAVENOUS at 12:17

## 2024-12-02 RX ADMIN — FENTANYL CITRATE 25 MCG: 50 INJECTION, SOLUTION INTRAMUSCULAR; INTRAVENOUS at 12:14

## 2024-12-02 RX ADMIN — LIDOCAINE HYDROCHLORIDE,EPINEPHRINE BITARTRATE 5 ML: 10; .01 INJECTION, SOLUTION INFILTRATION; PERINEURAL at 12:13

## 2024-12-02 RX ADMIN — FENTANYL CITRATE 25 MCG: 50 INJECTION, SOLUTION INTRAMUSCULAR; INTRAVENOUS at 12:08

## 2024-12-02 RX ADMIN — MIDAZOLAM HYDROCHLORIDE 1 MG: 1 INJECTION, SOLUTION INTRAMUSCULAR; INTRAVENOUS at 12:14

## 2024-12-02 RX ADMIN — MIDAZOLAM HYDROCHLORIDE 1 MG: 1 INJECTION, SOLUTION INTRAMUSCULAR; INTRAVENOUS at 12:08

## 2024-12-02 NOTE — BRIEF OP NOTE (RAD/CATH)
INTERVENTIONAL RADIOLOGY PROCEDURE NOTE    Date: 12/2/2024    Procedure:   Procedure Summary       Date: 12/02/24 Room / Location: [facility] Cardiac Cath Lab    Anesthesia Start:  Anesthesia Stop:     Procedure: IR PORT REMOVAL Diagnosis:       Lymphoma of thyroid gland (HCC)      (patient with hx of B-Cell ymphoma)    Scheduled Providers:  Responsible Provider:     Anesthesia Type: Not recorded ASA Status: Not recorded            Preoperative diagnosis:   1. Lymphoma of thyroid gland (HCC)         Postoperative diagnosis: Same.    Surgeon: Mary Espinal MD     Assistant: None. No qualified resident was available.    Blood loss: 2 mL    Specimens: None    Findings: Successful port removal.  Previous stretched scar was resected.    Complications: None immediate.    Anesthesia: conscious sedation

## 2024-12-02 NOTE — INTERVAL H&P NOTE
"H&P reviewed. There have been no interval changes since the time the H&P was written.    /83   Pulse 85   Temp 97.5 °F (36.4 °C) (Temporal)   Resp 20   Ht 5' 4\" (1.626 m)   Wt 116 kg (256 lb 6.3 oz)   SpO2 98%   BMI 44.01 kg/m²     Prior imaging was reviewed.     24-year-old female with a history of diffuse large B-cell lymphoma status post chemotherapy through right chest port placed 8/24/2023.  She has completed chemotherapy and presents to interventional radiology this afternoon for removal of right chest port.    Informed written consent was obtained.    TATI Lockhart   "

## 2024-12-02 NOTE — LETTER
[facility] CARDIAC CATH LAB  [address]  [place] [address]  Dept: [phone]    December 2, 2024     Patient: Coral Artis   YOB: 2000   Date of Visit: 12/2/2024       To Whom it May Concern:    oCral Artis is under my professional care. She was seen in the hospital from 12/2/2024 to 12/02/24. She may return to work on 12/3/24 with the following limitations no heavy lifting >10 pounds for one week(12/9/24) .    If you have any questions or concerns, please don't hesitate to call.         Sincerely,          Mary Espinal MD

## 2024-12-02 NOTE — DISCHARGE INSTRUCTIONS
Venous Access Port Removal     WHAT YOU NEED TO KNOW:   An implanted venous access port is a device used to give treatments and take blood. It may also be called a central venous access device (CVAD). The port is a small container that is placed under your skin, usually in your upper chest. The port is attached to a catheter that enters a large vein.   DISCHARGE INSTRUCTIONS:   Resume your normal diet. Small sips of flat soda will help with mild nausea.  Prevent an infection:   Wash your hands often.  Use soap and water.   Check your skin for infection every day.  Look for redness, swelling, or fluid oozing from the port site.  Care for your port removal site:   1. You may shower beginning 48 hours after procedure.     2.  Leave glue in place.    3. It is normal for some bruising to occur.    4. Use Tylenol for pain.    5. Limit use of arm on the side that your port was placed. Lift nothing heavier than 5 pounds for 1 week, and then gradually increase activity as tolerated.    6. DO NOT apply ointment, lotion or cream to port site until incision is healed. Allow glue to fall off. DO NOT attempt to peel glue from skin even it it begins to flake.     7. After the port incision is healed you may swim, bathe.  Notify the Interventional Radiologist if you have any of the followin. Fever above 101 F    2. Increased redness or swelling after 1st day.     3. Increased pain after 1st day.    4. Any sign of infection (drainage from port site, skin separation, hot to touch).    5. Persistent nausea or vomiting.    Contact Interventional Radiology at  [phone]

## 2024-12-10 ENCOUNTER — HOSPITAL ENCOUNTER (OUTPATIENT)
Dept: INFUSION CENTER | Facility: CLINIC | Age: 24
Discharge: HOME | End: 2024-12-10

## 2025-02-06 ENCOUNTER — TELEPHONE (OUTPATIENT)
Dept: HEMATOLOGY ONCOLOGY | Facility: CLINIC | Age: 25
End: 2025-02-06

## 2025-02-11 ENCOUNTER — APPOINTMENT (OUTPATIENT)
Dept: LAB | Facility: HOSPITAL | Age: 25
End: 2025-02-11
Payer: COMMERCIAL

## 2025-02-11 DIAGNOSIS — C85.99 LYMPHOMA OF THYROID GLAND (HCC): ICD-10-CM

## 2025-02-11 LAB
ALBUMIN SERPL BCG-MCNC: 4.1 G/DL (ref 3.5–5)
ALP SERPL-CCNC: 75 U/L (ref 34–104)
ALT SERPL W P-5'-P-CCNC: 9 U/L (ref 7–52)
ANION GAP SERPL CALCULATED.3IONS-SCNC: 6 MMOL/L (ref 4–13)
AST SERPL W P-5'-P-CCNC: 13 U/L (ref 13–39)
BASOPHILS # BLD AUTO: 0.03 THOUSANDS/ΜL (ref 0–0.1)
BASOPHILS NFR BLD AUTO: 0 % (ref 0–1)
BILIRUB SERPL-MCNC: 0.36 MG/DL (ref 0.2–1)
BUN SERPL-MCNC: 12 MG/DL (ref 5–25)
CALCIUM SERPL-MCNC: 8.7 MG/DL (ref 8.4–10.2)
CHLORIDE SERPL-SCNC: 104 MMOL/L (ref 96–108)
CO2 SERPL-SCNC: 27 MMOL/L (ref 21–32)
CREAT SERPL-MCNC: 0.69 MG/DL (ref 0.6–1.3)
EOSINOPHIL # BLD AUTO: 0.39 THOUSAND/ΜL (ref 0–0.61)
EOSINOPHIL NFR BLD AUTO: 5 % (ref 0–6)
ERYTHROCYTE [DISTWIDTH] IN BLOOD BY AUTOMATED COUNT: 12.4 % (ref 11.6–15.1)
GFR SERPL CREATININE-BSD FRML MDRD: 123 ML/MIN/1.73SQ M
GLUCOSE SERPL-MCNC: 87 MG/DL (ref 65–140)
HCT VFR BLD AUTO: 39.4 % (ref 34.8–46.1)
HGB BLD-MCNC: 12.6 G/DL (ref 11.5–15.4)
IMM GRANULOCYTES # BLD AUTO: 0.02 THOUSAND/UL (ref 0–0.2)
IMM GRANULOCYTES NFR BLD AUTO: 0 % (ref 0–2)
LDH SERPL-CCNC: 127 U/L (ref 140–271)
LYMPHOCYTES # BLD AUTO: 1.49 THOUSANDS/ΜL (ref 0.6–4.47)
LYMPHOCYTES NFR BLD AUTO: 20 % (ref 14–44)
MCH RBC QN AUTO: 28.4 PG (ref 26.8–34.3)
MCHC RBC AUTO-ENTMCNC: 32 G/DL (ref 31.4–37.4)
MCV RBC AUTO: 89 FL (ref 82–98)
MONOCYTES # BLD AUTO: 0.51 THOUSAND/ΜL (ref 0.17–1.22)
MONOCYTES NFR BLD AUTO: 7 % (ref 4–12)
NEUTROPHILS # BLD AUTO: 5.16 THOUSANDS/ΜL (ref 1.85–7.62)
NEUTS SEG NFR BLD AUTO: 68 % (ref 43–75)
NRBC BLD AUTO-RTO: 0 /100 WBCS
PLATELET # BLD AUTO: 328 THOUSANDS/UL (ref 149–390)
PMV BLD AUTO: 9.5 FL (ref 8.9–12.7)
POTASSIUM SERPL-SCNC: 3.9 MMOL/L (ref 3.5–5.3)
PROT SERPL-MCNC: 7.3 G/DL (ref 6.4–8.4)
RBC # BLD AUTO: 4.44 MILLION/UL (ref 3.81–5.12)
SODIUM SERPL-SCNC: 137 MMOL/L (ref 135–147)
WBC # BLD AUTO: 7.6 THOUSAND/UL (ref 4.31–10.16)

## 2025-02-11 PROCEDURE — 85025 COMPLETE CBC W/AUTO DIFF WBC: CPT

## 2025-02-11 PROCEDURE — 83615 LACTATE (LD) (LDH) ENZYME: CPT

## 2025-02-11 PROCEDURE — 80053 COMPREHEN METABOLIC PANEL: CPT

## 2025-02-11 PROCEDURE — 36415 COLL VENOUS BLD VENIPUNCTURE: CPT

## 2025-02-12 ENCOUNTER — TELEPHONE (OUTPATIENT)
Age: 25
End: 2025-02-12

## 2025-02-12 ENCOUNTER — TELEPHONE (OUTPATIENT)
Dept: HEMATOLOGY ONCOLOGY | Facility: CLINIC | Age: 25
End: 2025-02-12

## 2025-03-24 ENCOUNTER — VBI (OUTPATIENT)
Dept: ADMINISTRATIVE | Facility: OTHER | Age: 25
End: 2025-03-24

## 2025-03-24 NOTE — TELEPHONE ENCOUNTER
03/24/25 8:49 AM     Chart reviewed for Pap Smear (HPV) aka Cervical Cancer Screening ; nothing is submitted to the patient's insurance at this time.     Katherin Hoff   [facility]

## 2025-04-01 ENCOUNTER — OFFICE VISIT (OUTPATIENT)
Dept: HEMATOLOGY ONCOLOGY | Facility: CLINIC | Age: 25
End: 2025-04-01
Payer: COMMERCIAL

## 2025-04-01 VITALS
HEART RATE: 80 BPM | DIASTOLIC BLOOD PRESSURE: 76 MMHG | SYSTOLIC BLOOD PRESSURE: 118 MMHG | BODY MASS INDEX: 46.35 KG/M2 | HEIGHT: 64 IN | WEIGHT: 271.5 LBS | OXYGEN SATURATION: 97 % | RESPIRATION RATE: 18 BRPM | TEMPERATURE: 98.5 F

## 2025-04-01 DIAGNOSIS — C85.99 LYMPHOMA OF THYROID GLAND (HCC): Primary | ICD-10-CM

## 2025-04-01 DIAGNOSIS — E03.9 ACQUIRED HYPOTHYROIDISM: ICD-10-CM

## 2025-04-01 DIAGNOSIS — Z12.4 PAP SMEAR FOR CERVICAL CANCER SCREENING: ICD-10-CM

## 2025-04-01 PROCEDURE — 99215 OFFICE O/P EST HI 40 MIN: CPT | Performed by: INTERNAL MEDICINE

## 2025-04-01 NOTE — PROGRESS NOTES
Name: Coral Artis      : 2000      MRN: [id number]  Encounter Provider: Damaris Damon MD  Encounter Date: 2025   Encounter department: [facility] [place]  :  Assessment & Plan  Lymphoma of thyroid gland (HCC)  Last scans in 2024.  No evidence of recurrent lymphoma in the chest abdomen or pelvis.  CT neck with no suspicious neck mass or cervical lymphadenopathy.  Last thyroid labs also done in 2024.  Patient has not been following with endocrinology.  Advised to schedule an appointment with endocrinology.  Repeat blood work and CT scans prior to the next office visit.  Orders:    CT chest abdomen pelvis w contrast; Future    CT soft tissue neck w contrast; Future    CBC and differential; Standing    Comprehensive metabolic panel; Standing    LD,Blood; Standing    TSH, 3rd generation with Free T4 reflex; Standing    Pap smear for cervical cancer screening  Patient is sexually active and not up-to-date with cervical cancer screening.  Discussed that we will place referral to GYN to establish care.  Patient agreeable.  Orders:    Ambulatory Referral to Obstetrics / Gynecology; Future    Acquired hypothyroidism  Continue levothyroxine.  Follow-up with endocrinology.             Return in about 3 months (around 2025).    History of Present Illness   Chief Complaint   Patient presents with    Follow-up   HPI:  Diffuse Large B Cell Lymphoma, Germinal Center Subtype, Non-double expressor phenotype. Elevated proliferation index (%). Involvement of thyroid. Date of diagnosis 23.   S/p 6 cycles of R-CHOP    Interval History:  Patient presents today for follow-up.  She feels fine and offers no complaints.  Denies any unintentional weight loss.  No night sweats or fevers.  Has not noticed any lumps in her neck, axilla or groin.  Has not had a follow-up with endocrinology in over a year.  Last TSH checked was in 2024.  Has not had a Pap  smear done.  Is not established with GYN.  Oncology History   Cancer Staging   No matching staging information was found for the patient.  Oncology History   Lymphoma of thyroid gland (HCC)   7/20/2023 Biopsy    Specimens  A Lymph Node, neck  .  .  Final Diagnosis  THYROID, BIOPSY: DIFFUSE LARGE B-CELL LYMPHOMA, GERMINAL CENTER SUBTYPE, NON-DOUBLE EXPRESSOR  PHENOTYPE; ADDITIONAL ANCILLARY TESTING PENDING; SEE IMPRESSION AND COMMENT  IMPRESSION:  The preliminary findings are of thyroid biopsies involved by a diffuse large B-cell lymphoma with an elevated proliferation index (%). Cytogenetic FISH studies for MYC, BCL2, BCL6 and IRF4 rearrangements as well as 11q aberrations are reportedly negative. The BCL2 positivity by immunohistochemistry and the absence of MYC rearrangements rules out Burkitt lymphoma and highgrade B-cell lymphoma with MYC and BCL2 and / or BCL6 rearrangements. The absence of 11q aberrations rules out high-grade / large B-cell lymphoma with 11q aberrations and the absence of IRF4 rearrangements rules out large B-cell lymphoma with IRF4 rearrangements. The overall findings are most compatible with a diffuse large B-cell lymphoma (DLBCL), NOS, germinal center subtype, per the Orion criteria, and non-double-expressor phenotype by immunohistochemistry. Correlation with systemic (PET/CT) imaging, SPEP/UPEP with serum immunoglobulin heavy chains (IgG, IgA, IgM, IgE, IgD) and serum and urine free light chains (kappa/lambda) to evaluate for systemic involvement, is recommended, if clinically indicated. Additional studies (molecular and FISH) are pending and will be added to the final report when available.  Subtype: Germinal Center Subtype: (CD10+/BCL6+/MUM1+)  Phenotype: Non-Double-Expressor Phenotype (BCL2+/MYC-)  Ki-67 Proliferation index: % within neoplastic cells  Cytogenetics: MYC, BCL2, BCL6, IRF4 rearrangements and 11q aberrations are NOT DETECTED     7/22/2023 Initial Diagnosis     Lymphoma of thyroid gland (HCC)     8/2/2023 - 11/27/2023 Chemotherapy    DOXOrubicin (ADRIAMYCIN), 50 mg/m2 = 110 mg, Intravenous, Once, 6 of 6 cycles  Administration: 110 mg (8/3/2023), 110 mg (8/23/2023), 110 mg (9/13/2023), 110 mg (10/4/2023), 110 mg (10/25/2023), 110 mg (11/27/2023)  alteplase (CATHFLO), 2 mg, Intracatheter, Every 1 Minute as needed, 6 of 6 cycles  vincristine (ONCOVIN) chemo infusion, 2 mg (original dose 1.4 mg/m2), Intravenous, Once, 6 of 6 cycles  Dose modification: 2 mg (original dose 1.4 mg/m2, Cycle 1, Reason: Max Dose Reached)  Administration: 2 mg (8/3/2023), 2 mg (8/23/2023), 2 mg (9/13/2023), 2 mg (10/4/2023), 2 mg (10/25/2023), 2 mg (11/27/2023)  cyclophosphamide (CYTOXAN) IVPB, 750 mg/m2 = 1,650 mg, Intravenous, Once, 6 of 6 cycles  Administration: 1,650 mg (8/2/2023), 1,650 mg (8/23/2023), 1,650 mg (9/13/2023), 1,650 mg (10/4/2023), 1,650 mg (10/25/2023), 1,650 mg (11/27/2023)  fosaprepitant (EMEND) IVPB, 150 mg, Intravenous, Once, 1 of 1 cycle  Administration: 150 mg (8/2/2023)  riTUXimab (RITUXAN) first titrated chemo infusion, 825 mg, Intravenous, Once, 2 of 2 cycles  Dose modification: 375 mg/m2 (original dose 375 mg/m2, Cycle 2)  Administration: 800 mg (8/2/2023)  aprepitant (CINVANTI) in  mL IVPB, 130 mg, Intravenous, Once, 5 of 5 cycles  Administration: 130 mg (8/23/2023), 130 mg (9/13/2023), 130 mg (10/4/2023), 130 mg (10/25/2023), 130 mg (11/27/2023)  riTUXimab-pvvr (RUXIENCE) first titrated chemo infusion, 825 mg (100 % of original dose 375 mg/m2), Intravenous, Once, 5 of 5 cycles  Dose modification: 375 mg/m2 (original dose 375 mg/m2, Cycle 2)  Administration: 800 mg (8/23/2023), 800 mg (9/13/2023), 800 mg (10/4/2023), 800 mg (10/25/2023), 800 mg (11/27/2023)     7/26/2024 Biopsy    Pathologist:           Vinayak Prado MD                                                           Specimens:   A) - Iliac Crest, Right, core                                                                         B) - Iliac Crest, Right, clot                                                                        C) - Iliac Crest, Right, slide                                                              Addendum   Karyotype analysis is within normal limits; the original diagnosis remains unchanged.     Cytogenetics Oncology Chromosome Analysis ([employer] # [id number]; please see separate report for further details):     Addendum electronically signed by Vinayak Prado MD on 8/14/2023 at  8:10 AM   Final Diagnosis   A -C.  Bone marrow,  right iliac crest,  biopsy, clot, and aspirate:  -  No morphologic or immunophenotypic evidence of B-cell lymphoma.  -  Normocellular (~70%) bone marrow with trilineage maturing hematopoiesis with no increase in blasts.  -  Reduced iron stores, correlation with serum iron studies is recommended.  -  No increase in reticulin fibrosis.     Comment: The patient's recently diagnosed B-cell lymphoma pending ancillary work-up is noted ([id number]).               The current bone marrow biopsy shows no morphologic or immunophenotypic evidence of involvement by B-cell lymphoma. Flow cytometry is negative for myeloid, lymphoid, and plasma cell abnormalities.  Karyotype analysis is pending and will be reported in a separate addendum.   If clinically indicated material is available for ancillary molecular studies including B-cell gene rearrangement and lymphoid NGS; if compared to the diagnostic sample these testing modalities may be more sensitive in excluding low level molecular involvement of the bone marrow. Clinical correlation is advised.   Electronically signed by Vinayak Prado MD on 7/28/2023 at  3:41 PM   Microscopic Description   [facility]   Bone marrow aspirate smears:  The aspirate smears are spicular, cellular, and  adequate for evaluation. Cellularity is composed of maturing trilineage hematopoiesis with a myeloid to erythroid ratio of  2.4:1.  Myelopoiesis:  Myeloid elements are present in appropriate quantities and show normal maturation without overt evidence of dysplasia (myeloblasts= <5%).  Erythropoiesis:  Eyrthroid elements are present in appropriate quantities and show normal maturation without overt evidence of dysplasia.   Megakaryocytes:  Megakaryocytes are present in appropriate quantities and exhibit a spectrum of maturation.  Lymphocytes:  Mildly increased with small morphology; no evidence of large cells.  Plasma cells:  Rare plasma cells.     200 cell differential:  Blasts: 0%  Pros: 0%  [place]/Myelo: 17.5%  Segs: 39.0%  Eryth: 26.0%  Lymphs: 9.5%  Mono: 2.5%  Eos: 4.0%  Plasma cells: 1.5%  Baso: 0%  Atypical cells: 0%     M:E ratio= 2.4     Bone marrow core biopsy and aspirate clot:  Histologic sections of the aspirate clot and decalcified core biopsy are adequate for evaluation.  Marrow space cellularity is normocellular for patient age (~70%).  Myelopoiesis:  Exhibit progressive maturation (myeloblasts= <5%).  Erythropoiesis:  Exhibit progressive maturation.  Megakaryocytes:  Present with a spectrum of normal morphology.  Lymphocytes:  Scattered.  Plasma cells:  Rare.     Special studies:   * Iron stain performed on the aspirate smear, clot, and core biopsy highlights reduced iron stores (1/4) with no evidence of ring sideroblasts; correlation with serum iron studies is recommended.    * Reticulin stain performed on the core biopsy shows no significant increase in reticulin fibers.  0 of 3 by the  Consensus grading scheme.  * PAS highlights myeloid and megakaryocytic elements confirming a normal M:E ratio.  * Immunohistochemical stains were performed with appropriate controls and show the following results:  -  CD34 shows no increase in blasts (<5% of total cellularity) and  shows no increase in immature cells (<5% of total cellularity). Additionally,  highlights scattered mast cells.     CD61 highlights scattered  "megakaryocytes in normal distribution and quantity.      CD3 and CD20 highlight interstitially scattered T and B-cells (<10% of total cellularity). Kinnear-5 highlights B-cells and is negative for large B-cells. BCL-6 is negative.      highlights scattered plasma cells (<5% of total cellularity) with a polytypic kappa and lambda light chain expression by kappa and lambda in situ hybridization studies.      Flow Cytometry Analysis ([employer] # [id number]; please see separate report for further details):                           Pertinent Medical History   04/01/25: Reviewed.      Review of Systems  14 point review of systems was negative except that mentioned in HPI.        Objective   /76 (BP Location: Left arm, Patient Position: Sitting, Cuff Size: Adult)   Pulse 80   Temp 98.5 °F (36.9 °C) (Temporal)   Resp 18   Ht 5' 4\" (1.626 m)   Wt 123 kg (271 lb 8 oz)   SpO2 97%   BMI 46.60 kg/m²     Pain Screening:  Pain Score: 0-No pain  ECOG   0  Physical Exam  Vitals and nursing note reviewed.   Constitutional:       General: She is not in acute distress.     Appearance: Normal appearance.   HENT:      Head: Normocephalic and atraumatic.      Mouth/Throat:      Mouth: Mucous membranes are moist.      Pharynx: Oropharynx is clear.   Eyes:      General: No scleral icterus.     Extraocular Movements: Extraocular movements intact.      Conjunctiva/sclera: Conjunctivae normal.   Cardiovascular:      Rate and Rhythm: Normal rate and regular rhythm.      Pulses: Normal pulses.      Heart sounds: No murmur heard.  Pulmonary:      Effort: Pulmonary effort is normal.      Breath sounds: Normal breath sounds. No wheezing, rhonchi or rales.   Abdominal:      General: Bowel sounds are normal.      Palpations: Abdomen is soft.      Tenderness: There is no abdominal tenderness.   Musculoskeletal:         General: No swelling or tenderness. Normal range of motion.      Cervical back: Neck supple.   Lymphadenopathy:     "  Cervical: No cervical adenopathy.   Skin:     General: Skin is warm and dry.      Coloration: Skin is not pale.      Findings: No bruising, erythema or rash.   Neurological:      General: No focal deficit present.      Mental Status: She is alert and oriented to person, place, and time.      Motor: No weakness.   Psychiatric:         Mood and Affect: Mood normal.         Behavior: Behavior normal.         Labs: I have reviewed the following labs:  Lab Results   Component Value Date/Time    WBC 7.60 02/11/2025 03:04 PM    RBC 4.44 02/11/2025 03:04 PM    Hemoglobin 12.6 02/11/2025 03:04 PM    Hematocrit 39.4 02/11/2025 03:04 PM    MCV 89 02/11/2025 03:04 PM    MCH 28.4 02/11/2025 03:04 PM    RDW 12.4 02/11/2025 03:04 PM    Platelets 328 02/11/2025 03:04 PM    Segmented % 68 02/11/2025 03:04 PM    Lymphocytes % 20 02/11/2025 03:04 PM    Monocytes % 7 02/11/2025 03:04 PM    Eosinophils Relative 5 02/11/2025 03:04 PM    Basophils Relative 0 02/11/2025 03:04 PM    Immature Grans % 0 02/11/2025 03:04 PM    Absolute Neutrophils 5.16 02/11/2025 03:04 PM     Lab Results   Component Value Date/Time    Potassium 3.9 02/11/2025 03:04 PM    Chloride 104 02/11/2025 03:04 PM    CO2 27 02/11/2025 03:04 PM    BUN 12 02/11/2025 03:04 PM    Creatinine 0.69 02/11/2025 03:04 PM    Calcium 8.7 02/11/2025 03:04 PM    AST 13 02/11/2025 03:04 PM    ALT 9 02/11/2025 03:04 PM    Alkaline Phosphatase 75 02/11/2025 03:04 PM    Total Protein 7.3 02/11/2025 03:04 PM    Albumin 4.1 02/11/2025 03:04 PM    Total Bilirubin 0.36 02/11/2025 03:04 PM    eGFR 123 02/11/2025 03:04 PM             Disclaimer: This document was prepared using [employer] technology. If a word or phrase is confusing, or does not make sense, this is likely due to recognition error which was not discovered during this clinician's review. If you believe an error has occurred, please contact me through [facility] service line for [email or web address].      Follow Up    All questions  were answered to the patient's satisfaction during this encounter. The patient knows the contact information for our office and knows to reach out for any relevant concerns related to this encounter. They are to call for any temperature 100.4 or higher, new symptoms including but not restricted to shaking chills, decreased appetite, nausea, vomiting, diarrhea, increased fatigue, shortness of breath or chest pain, confusion, and not feeling the strength to come to the clinic. For all other listed problems and medical diagnosis in their chart - they are managed by PCP and/or other specialists, which the patient acknowledges.     I spent 42 minutes reviewing the records (labs, clinician notes, outside records, medical history, ordering medicine/tests/procedures, monitoring of anti-neoplastic toxicities, interpreting the imaging/labs previously done) and coordination of care as well as direct time with the patient today, of which greater than 50% of the time was spent in counseling and coordination of care with the patient/family.'

## 2025-04-01 NOTE — ASSESSMENT & PLAN NOTE
Last scans in October 2024.  No evidence of recurrent lymphoma in the chest abdomen or pelvis.  CT neck with no suspicious neck mass or cervical lymphadenopathy.  Last thyroid labs also done in October 2024.  Patient has not been following with endocrinology.  Advised to schedule an appointment with endocrinology.  Repeat blood work and CT scans prior to the next office visit.  Orders:    CT chest abdomen pelvis w contrast; Future    CT soft tissue neck w contrast; Future    CBC and differential; Standing    Comprehensive metabolic panel; Standing    LD,Blood; Standing    TSH, 3rd generation with Free T4 reflex; Standing

## 2025-04-29 ENCOUNTER — HOSPITAL ENCOUNTER (OUTPATIENT)
Dept: CT IMAGING | Facility: HOSPITAL | Age: 25
Discharge: HOME | End: 2025-04-29
Attending: INTERNAL MEDICINE
Payer: COMMERCIAL

## 2025-04-29 DIAGNOSIS — C85.99 LYMPHOMA OF THYROID GLAND (HCC): ICD-10-CM

## 2025-04-29 PROCEDURE — 70491 CT SOFT TISSUE NECK W/DYE: CPT

## 2025-04-29 RX ADMIN — IOHEXOL 85 ML: 350 INJECTION, SOLUTION INTRAVENOUS at 13:13

## 2025-05-13 ENCOUNTER — HOSPITAL ENCOUNTER (OUTPATIENT)
Dept: CT IMAGING | Facility: HOSPITAL | Age: 25
Discharge: HOME | End: 2025-05-13
Attending: INTERNAL MEDICINE
Payer: COMMERCIAL

## 2025-05-13 DIAGNOSIS — C85.99 LYMPHOMA OF THYROID GLAND (HCC): ICD-10-CM

## 2025-05-13 PROCEDURE — 71260 CT THORAX DX C+: CPT

## 2025-05-13 PROCEDURE — 74177 CT ABD & PELVIS W/CONTRAST: CPT

## 2025-05-13 RX ADMIN — IOHEXOL 100 ML: 350 INJECTION, SOLUTION INTRAVENOUS at 11:25

## 2025-05-19 ENCOUNTER — RESULTS FOLLOW-UP (OUTPATIENT)
Dept: HEMATOLOGY ONCOLOGY | Facility: CLINIC | Age: 25
End: 2025-05-19

## 2025-05-19 NOTE — TELEPHONE ENCOUNTER
Call placed to patient to let her know below results. Patient verbalized understanding.    ----- Message from Damaris Damon MD sent at 5/19/2025 12:20 PM EDT -----  Ct scan with no evidence of Lymphoma  ----- Message -----  From: Interface, Radiology Results In  Sent: 5/19/2025   1:42 AM EDT  To: Damaris Damon MD

## 2025-07-01 ENCOUNTER — TELEPHONE (OUTPATIENT)
Dept: HEMATOLOGY ONCOLOGY | Facility: CLINIC | Age: 25
End: 2025-07-01

## 2025-07-02 ENCOUNTER — OFFICE VISIT (OUTPATIENT)
Dept: HEMATOLOGY ONCOLOGY | Facility: CLINIC | Age: 25
End: 2025-07-02
Payer: COMMERCIAL

## 2025-07-02 ENCOUNTER — APPOINTMENT (OUTPATIENT)
Dept: LAB | Facility: HOSPITAL | Age: 25
End: 2025-07-02
Payer: COMMERCIAL

## 2025-07-02 VITALS
SYSTOLIC BLOOD PRESSURE: 124 MMHG | TEMPERATURE: 97.7 F | RESPIRATION RATE: 18 BRPM | BODY MASS INDEX: 47.46 KG/M2 | HEIGHT: 64 IN | WEIGHT: 278 LBS | DIASTOLIC BLOOD PRESSURE: 82 MMHG | HEART RATE: 101 BPM | OXYGEN SATURATION: 96 %

## 2025-07-02 DIAGNOSIS — C85.99 LYMPHOMA OF THYROID GLAND (HCC): ICD-10-CM

## 2025-07-02 DIAGNOSIS — C85.99 LYMPHOMA OF THYROID GLAND (HCC): Primary | ICD-10-CM

## 2025-07-02 LAB
ALBUMIN SERPL BCG-MCNC: 4.4 G/DL (ref 3.5–5)
ALP SERPL-CCNC: 65 U/L (ref 34–104)
ALT SERPL W P-5'-P-CCNC: 12 U/L (ref 7–52)
ANION GAP SERPL CALCULATED.3IONS-SCNC: 8 MMOL/L (ref 4–13)
AST SERPL W P-5'-P-CCNC: 14 U/L (ref 13–39)
BASOPHILS # BLD AUTO: 0.04 THOUSANDS/ÂΜL (ref 0–0.1)
BASOPHILS NFR BLD AUTO: 1 % (ref 0–1)
BILIRUB SERPL-MCNC: 0.54 MG/DL (ref 0.2–1)
BUN SERPL-MCNC: 13 MG/DL (ref 5–25)
CALCIUM SERPL-MCNC: 8.9 MG/DL (ref 8.4–10.2)
CHLORIDE SERPL-SCNC: 104 MMOL/L (ref 96–108)
CO2 SERPL-SCNC: 26 MMOL/L (ref 21–32)
CREAT SERPL-MCNC: 0.65 MG/DL (ref 0.6–1.3)
EOSINOPHIL # BLD AUTO: 0.16 THOUSAND/ÂΜL (ref 0–0.61)
EOSINOPHIL NFR BLD AUTO: 3 % (ref 0–6)
ERYTHROCYTE [DISTWIDTH] IN BLOOD BY AUTOMATED COUNT: 12.3 % (ref 11.6–15.1)
GFR SERPL CREATININE-BSD FRML MDRD: 124 ML/MIN/1.73SQ M
GLUCOSE P FAST SERPL-MCNC: 96 MG/DL (ref 65–99)
HCT VFR BLD AUTO: 40.3 % (ref 34.8–46.1)
HGB BLD-MCNC: 13.3 G/DL (ref 11.5–15.4)
IMM GRANULOCYTES # BLD AUTO: 0.01 THOUSAND/UL (ref 0–0.2)
IMM GRANULOCYTES NFR BLD AUTO: 0 % (ref 0–2)
LDH SERPL-CCNC: 132 U/L (ref 140–271)
LYMPHOCYTES # BLD AUTO: 1.23 THOUSANDS/ÂΜL (ref 0.6–4.47)
LYMPHOCYTES NFR BLD AUTO: 19 % (ref 14–44)
MCH RBC QN AUTO: 28.6 PG (ref 26.8–34.3)
MCHC RBC AUTO-ENTMCNC: 33 G/DL (ref 31.4–37.4)
MCV RBC AUTO: 87 FL (ref 82–98)
MONOCYTES # BLD AUTO: 0.44 THOUSAND/ÂΜL (ref 0.17–1.22)
MONOCYTES NFR BLD AUTO: 7 % (ref 4–12)
NEUTROPHILS # BLD AUTO: 4.53 THOUSANDS/ÂΜL (ref 1.85–7.62)
NEUTS SEG NFR BLD AUTO: 70 % (ref 43–75)
NRBC BLD AUTO-RTO: 0 /100 WBCS
PLATELET # BLD AUTO: 307 THOUSANDS/UL (ref 149–390)
PMV BLD AUTO: 9.4 FL (ref 8.9–12.7)
POTASSIUM SERPL-SCNC: 4 MMOL/L (ref 3.5–5.3)
PROT SERPL-MCNC: 7.5 G/DL (ref 6.4–8.4)
RBC # BLD AUTO: 4.65 MILLION/UL (ref 3.81–5.12)
SODIUM SERPL-SCNC: 138 MMOL/L (ref 135–147)
T4 FREE SERPL-MCNC: 0.61 NG/DL (ref 0.61–1.12)
TSH SERPL DL<=0.05 MIU/L-ACNC: 8.13 UIU/ML (ref 0.45–4.5)
WBC # BLD AUTO: 6.41 THOUSAND/UL (ref 4.31–10.16)

## 2025-07-02 PROCEDURE — 84443 ASSAY THYROID STIM HORMONE: CPT

## 2025-07-02 PROCEDURE — 36415 COLL VENOUS BLD VENIPUNCTURE: CPT

## 2025-07-02 PROCEDURE — 99215 OFFICE O/P EST HI 40 MIN: CPT | Performed by: INTERNAL MEDICINE

## 2025-07-02 PROCEDURE — 84439 ASSAY OF FREE THYROXINE: CPT

## 2025-07-02 PROCEDURE — 83615 LACTATE (LD) (LDH) ENZYME: CPT

## 2025-07-02 PROCEDURE — 85025 COMPLETE CBC W/AUTO DIFF WBC: CPT

## 2025-07-02 PROCEDURE — 80053 COMPREHEN METABOLIC PANEL: CPT

## 2025-07-02 NOTE — PROGRESS NOTES
Name: Coral Artis      : 2000      MRN: [id number]  Encounter Provider: Damaris Damon MD  Encounter Date: 2025   Encounter department: [facility] [place]  :  Assessment & Plan  Lymphoma of thyroid gland (HCC)  Diagnosed 2023.  S/p 6 cycles of R-CHOP.  Currently on surveillance.  No B symptoms.  Most recent CT soft tissue neck with contrast on 2025 with stable small bilateral submandibular, jugular chain and posterior triangle lymph nodes which are below the size criteria for pathologic adenopathy.  CT chest abdomen pelvis on 2025 with no acute intrathoracic or intra-abdominal/pelvic abnormalities or evidence of recurrent lymphoma.  Last scans in 2024.  No evidence of recurrent lymphoma in the chest abdomen or pelvis.    Patient did not get her blood work done.  Advised to get blood work when she leaves the office today.  States she has an appointment scheduled with endocrinology and will be seeing them in October.      Orders:    CBC and differential; Standing    Comprehensive metabolic panel; Standing    LD,Blood; Standing    TSH, 3rd generation with Free T4 reflex; Standing         Return in about 3 months (around 10/2/2025) for Office Visit.  Assessment & Plan  1. Lymphoma.  The patient is two years post-diagnosis of lymphoma. Recent CT scans of the soft tissue of the neck, chest, abdomen, and pelvis showed stable small bilateral submandibular jugular and posterior triangle lymph nodes, which are below the size criteria for any pathologic abnormality, and no acute intrathoracic, intra-abdominal, or pelvic adenopathy abnormalities or evidence of recurrent lymphoma. Blood work has been ordered to monitor her condition, and she will be contacted with the results. The patient reports no new symptoms such as unintentional weight loss, night sweats, fevers, headaches, lightheadedness, dizziness, tingling, numbness, or lumps in the neck,  axilla, or groin.    2. Back pain.  New onset of back pain started yesterday without any specific trigger. The pain is localized to the lower back and does not radiate down the leg. The patient suspects it might be due to sleeping wrong or a pinched nerve. No sciatica symptoms are present. If the pain persists, further evaluation and potential imaging will be considered.    3. Thyroid management.  The patient is following up with endocrinology for her thyroid condition and has an appointment scheduled in 10/2025.    Follow-up  The patient will follow up in 3 months.      History of Present Illness   Chief Complaint   Patient presents with    Follow-up   HPI:  Diffuse Large B Cell Lymphoma, Germinal Center Subtype, Non-double expressor phenotype. Elevated proliferation index (%). Involvement of thyroid. Date of diagnosis 7/20/23.   S/p 6 cycles of R-CHOP  History of Present Illness  The patient is a 25-year-old female with a history of lymphoma who presents today for follow-up.    She reports feeling well overall, except for new onset back pain that began early yesterday. The cause of the back pain is unknown to her, but she suspects it may be due to heavy lifting or an awkward sleeping position. The pain was initially mild upon waking but intensified throughout the day, leading her to leave work early. She notes some improvement in her condition today. She does not believe her symptoms are related to sciatica and suspects they may be due to a pinched nerve or an awkward sleeping position.    She reports no unintentional weight loss, night sweats, fevers, headaches, lightheadedness, or dizziness. She also reports no tingling or numbness in her feet and no radiating pain down her leg. She has not noticed any lumps in her neck, axilla, or groin. She reports no chest pain or shortness of breath.    She has been informed that her recent scans did not reveal any abnormalities. She has not yet completed her blood  work but plans to do so today. She is currently under the care of an endocrinologist for her thyroid condition and has a follow-up appointment scheduled for 10/2025.    Oncology History   Cancer Staging   No matching staging information was found for the patient.  Oncology History   Lymphoma of thyroid gland (HCC)   7/20/2023 Biopsy    Specimens  A Lymph Node, neck  .  .  Final Diagnosis  THYROID, BIOPSY: DIFFUSE LARGE B-CELL LYMPHOMA, GERMINAL CENTER SUBTYPE, NON-DOUBLE EXPRESSOR  PHENOTYPE; ADDITIONAL ANCILLARY TESTING PENDING; SEE IMPRESSION AND COMMENT  IMPRESSION:  The preliminary findings are of thyroid biopsies involved by a diffuse large B-cell lymphoma with an elevated proliferation index (%). Cytogenetic FISH studies for MYC, BCL2, BCL6 and IRF4 rearrangements as well as 11q aberrations are reportedly negative. The BCL2 positivity by immunohistochemistry and the absence of MYC rearrangements rules out Burkitt lymphoma and highgrade B-cell lymphoma with MYC and BCL2 and / or BCL6 rearrangements. The absence of 11q aberrations rules out high-grade / large B-cell lymphoma with 11q aberrations and the absence of IRF4 rearrangements rules out large B-cell lymphoma with IRF4 rearrangements. The overall findings are most compatible with a diffuse large B-cell lymphoma (DLBCL), NOS, germinal center subtype, per the Orion criteria, and non-double-expressor phenotype by immunohistochemistry. Correlation with systemic (PET/CT) imaging, SPEP/UPEP with serum immunoglobulin heavy chains (IgG, IgA, IgM, IgE, IgD) and serum and urine free light chains (kappa/lambda) to evaluate for systemic involvement, is recommended, if clinically indicated. Additional studies (molecular and FISH) are pending and will be added to the final report when available.  Subtype: Germinal Center Subtype: (CD10+/BCL6+/MUM1+)  Phenotype: Non-Double-Expressor Phenotype (BCL2+/MYC-)  Ki-67 Proliferation index: % within neoplastic  cells  Cytogenetics: MYC, BCL2, BCL6, IRF4 rearrangements and 11q aberrations are NOT DETECTED     7/22/2023 Initial Diagnosis    Lymphoma of thyroid gland (HCC)     8/2/2023 - 11/27/2023 Chemotherapy    DOXOrubicin (ADRIAMYCIN), 50 mg/m2 = 110 mg, Intravenous, Once, 6 of 6 cycles  Administration: 110 mg (8/3/2023), 110 mg (8/23/2023), 110 mg (9/13/2023), 110 mg (10/4/2023), 110 mg (10/25/2023), 110 mg (11/27/2023)  alteplase (CATHFLO), 2 mg, Intracatheter, Every 1 Minute as needed, 6 of 6 cycles  vincristine (ONCOVIN) chemo infusion, 2 mg (original dose 1.4 mg/m2), Intravenous, Once, 6 of 6 cycles  Dose modification: 2 mg (original dose 1.4 mg/m2, Cycle 1, Reason: Max Dose Reached)  Administration: 2 mg (8/3/2023), 2 mg (8/23/2023), 2 mg (9/13/2023), 2 mg (10/4/2023), 2 mg (10/25/2023), 2 mg (11/27/2023)  cyclophosphamide (CYTOXAN) IVPB, 750 mg/m2 = 1,650 mg, Intravenous, Once, 6 of 6 cycles  Administration: 1,650 mg (8/2/2023), 1,650 mg (8/23/2023), 1,650 mg (9/13/2023), 1,650 mg (10/4/2023), 1,650 mg (10/25/2023), 1,650 mg (11/27/2023)  fosaprepitant (EMEND) IVPB, 150 mg, Intravenous, Once, 1 of 1 cycle  Administration: 150 mg (8/2/2023)  riTUXimab (RITUXAN) first titrated chemo infusion, 825 mg, Intravenous, Once, 2 of 2 cycles  Dose modification: 375 mg/m2 (original dose 375 mg/m2, Cycle 2)  Administration: 800 mg (8/2/2023)  aprepitant (CINVANTI) in  mL IVPB, 130 mg, Intravenous, Once, 5 of 5 cycles  Administration: 130 mg (8/23/2023), 130 mg (9/13/2023), 130 mg (10/4/2023), 130 mg (10/25/2023), 130 mg (11/27/2023)  riTUXimab-pvvr (RUXIENCE) first titrated chemo infusion, 825 mg (100 % of original dose 375 mg/m2), Intravenous, Once, 5 of 5 cycles  Dose modification: 375 mg/m2 (original dose 375 mg/m2, Cycle 2)  Administration: 800 mg (8/23/2023), 800 mg (9/13/2023), 800 mg (10/4/2023), 800 mg (10/25/2023), 800 mg (11/27/2023)     7/26/2024 Biopsy    Pathologist:           Vinayak Prado MD                                                            Specimens:   A) - Iliac Crest, Right, core                                                                        B) - Iliac Crest, Right, clot                                                                        C) - Iliac Crest, Right, slide                                                              Addendum   Karyotype analysis is within normal limits; the original diagnosis remains unchanged.     Cytogenetics Oncology Chromosome Analysis ([employer] # [id number]; please see separate report for further details):     Addendum electronically signed by Vinayak Prado MD on 8/14/2023 at  8:10 AM   Final Diagnosis   A -C.  Bone marrow,  right iliac crest,  biopsy, clot, and aspirate:  -  No morphologic or immunophenotypic evidence of B-cell lymphoma.  -  Normocellular (~70%) bone marrow with trilineage maturing hematopoiesis with no increase in blasts.  -  Reduced iron stores, correlation with serum iron studies is recommended.  -  No increase in reticulin fibrosis.     Comment: The patient's recently diagnosed B-cell lymphoma pending ancillary work-up is noted ([id number]).               The current bone marrow biopsy shows no morphologic or immunophenotypic evidence of involvement by B-cell lymphoma. Flow cytometry is negative for myeloid, lymphoid, and plasma cell abnormalities.  Karyotype analysis is pending and will be reported in a separate addendum.   If clinically indicated material is available for ancillary molecular studies including B-cell gene rearrangement and lymphoid NGS; if compared to the diagnostic sample these testing modalities may be more sensitive in excluding low level molecular involvement of the bone marrow. Clinical correlation is advised.   Electronically signed by Vinayak Prado MD on 7/28/2023 at  3:41 PM   Microscopic Description   [facility]   Bone marrow aspirate smears:  The aspirate smears are spicular, cellular, and  adequate for  evaluation. Cellularity is composed of maturing trilineage hematopoiesis with a myeloid to erythroid ratio of 2.4:1.  Myelopoiesis:  Myeloid elements are present in appropriate quantities and show normal maturation without overt evidence of dysplasia (myeloblasts= <5%).  Erythropoiesis:  Eyrthroid elements are present in appropriate quantities and show normal maturation without overt evidence of dysplasia.   Megakaryocytes:  Megakaryocytes are present in appropriate quantities and exhibit a spectrum of maturation.  Lymphocytes:  Mildly increased with small morphology; no evidence of large cells.  Plasma cells:  Rare plasma cells.     200 cell differential:  Blasts: 0%  Pros: 0%  Rose/Myelo: 17.5%  Segs: 39.0%  Eryth: 26.0%  Lymphs: 9.5%  Mono: 2.5%  Eos: 4.0%  Plasma cells: 1.5%  Baso: 0%  Atypical cells: 0%     M:E ratio= 2.4     Bone marrow core biopsy and aspirate clot:  Histologic sections of the aspirate clot and decalcified core biopsy are adequate for evaluation.  Marrow space cellularity is normocellular for patient age (~70%).  Myelopoiesis:  Exhibit progressive maturation (myeloblasts= <5%).  Erythropoiesis:  Exhibit progressive maturation.  Megakaryocytes:  Present with a spectrum of normal morphology.  Lymphocytes:  Scattered.  Plasma cells:  Rare.     Special studies:   * Iron stain performed on the aspirate smear, clot, and core biopsy highlights reduced iron stores (1/4) with no evidence of ring sideroblasts; correlation with serum iron studies is recommended.    * Reticulin stain performed on the core biopsy shows no significant increase in reticulin fibers.  0 of 3 by the  Consensus grading scheme.  * PAS highlights myeloid and megakaryocytic elements confirming a normal M:E ratio.  * Immunohistochemical stains were performed with appropriate controls and show the following results:  -  CD34 shows no increase in blasts (<5% of total cellularity) and  shows no increase in immature cells  "(<5% of total cellularity). Additionally,  highlights scattered mast cells.     CD61 highlights scattered megakaryocytes in normal distribution and quantity.      CD3 and CD20 highlight interstitially scattered T and B-cells (<10% of total cellularity). [place]-5 highlights B-cells and is negative for large B-cells. BCL-6 is negative.      highlights scattered plasma cells (<5% of total cellularity) with a polytypic kappa and lambda light chain expression by kappa and lambda in situ hybridization studies.      Flow Cytometry Analysis ([employer] # [id number]; please see separate report for further details):                           Pertinent Medical History   07/02/25: Reviewed.      Review of Systems  14 point review of systems was negative except that mentioned in HPI.        Objective   /82 (BP Location: Left arm, Patient Position: Sitting, Cuff Size: Adult)   Pulse 101   Temp 97.7 °F (36.5 °C) (Temporal)   Resp 18   Ht 5' 4\" (1.626 m)   Wt 126 kg (278 lb)   LMP 06/22/2025 (Exact Date)   SpO2 96%   BMI 47.72 kg/m²     Pain Screening:  Pain Score:   6  ECOG   0  Physical Exam  Vitals and nursing note reviewed.   Constitutional:       General: She is not in acute distress.     Appearance: Normal appearance.   HENT:      Head: Normocephalic and atraumatic.      Mouth/Throat:      Mouth: Mucous membranes are moist.      Pharynx: Oropharynx is clear.     Eyes:      General: No scleral icterus.     Extraocular Movements: Extraocular movements intact.      Conjunctiva/sclera: Conjunctivae normal.       Cardiovascular:      Rate and Rhythm: Normal rate and regular rhythm.      Pulses: Normal pulses.      Heart sounds: No murmur heard.  Pulmonary:      Effort: Pulmonary effort is normal.      Breath sounds: Normal breath sounds. No wheezing, rhonchi or rales.   Abdominal:      General: Bowel sounds are normal.      Palpations: Abdomen is soft.      Tenderness: There is no abdominal tenderness. "     Musculoskeletal:         General: No swelling or tenderness. Normal range of motion.      Cervical back: Neck supple.   Lymphadenopathy:      Cervical: No cervical adenopathy.     Skin:     General: Skin is warm and dry.      Coloration: Skin is not pale.      Findings: No bruising, erythema or rash.     Neurological:      General: No focal deficit present.      Mental Status: She is alert and oriented to person, place, and time.      Motor: No weakness.     Psychiatric:         Mood and Affect: Mood normal.         Behavior: Behavior normal.         Labs: I have reviewed the following labs:  Lab Results   Component Value Date/Time    WBC 7.60 02/11/2025 03:04 PM    RBC 4.44 02/11/2025 03:04 PM    Hemoglobin 12.6 02/11/2025 03:04 PM    Hematocrit 39.4 02/11/2025 03:04 PM    MCV 89 02/11/2025 03:04 PM    MCH 28.4 02/11/2025 03:04 PM    RDW 12.4 02/11/2025 03:04 PM    Platelets 328 02/11/2025 03:04 PM    Segmented % 68 02/11/2025 03:04 PM    Lymphocytes % 20 02/11/2025 03:04 PM    Monocytes % 7 02/11/2025 03:04 PM    Eosinophils Relative 5 02/11/2025 03:04 PM    Basophils Relative 0 02/11/2025 03:04 PM    Immature Grans % 0 02/11/2025 03:04 PM    Absolute Neutrophils 5.16 02/11/2025 03:04 PM     Lab Results   Component Value Date/Time    Potassium 3.9 02/11/2025 03:04 PM    Chloride 104 02/11/2025 03:04 PM    CO2 27 02/11/2025 03:04 PM    BUN 12 02/11/2025 03:04 PM    Creatinine 0.69 02/11/2025 03:04 PM    Calcium 8.7 02/11/2025 03:04 PM    AST 13 02/11/2025 03:04 PM    ALT 9 02/11/2025 03:04 PM    Alkaline Phosphatase 75 02/11/2025 03:04 PM    Total Protein 7.3 02/11/2025 03:04 PM    Albumin 4.1 02/11/2025 03:04 PM    Total Bilirubin 0.36 02/11/2025 03:04 PM    eGFR 123 02/11/2025 03:04 PM             Disclaimer: This document was prepared using [employer] technology. If a word or phrase is confusing, or does not make sense, this is likely due to recognition error which was not discovered during this  clinician's review. If you believe an error has occurred, please contact me through [employer] service line for [email or web address].      Follow Up    All questions were answered to the patient's satisfaction during this encounter. The patient knows the contact information for our office and knows to reach out for any relevant concerns related to this encounter. They are to call for any temperature 100.4 or higher, new symptoms including but not restricted to shaking chills, decreased appetite, nausea, vomiting, diarrhea, increased fatigue, shortness of breath or chest pain, confusion, and not feeling the strength to come to the clinic. For all other listed problems and medical diagnosis in their chart - they are managed by PCP and/or other specialists, which the patient acknowledges.     I spent 40 minutes reviewing the records (labs, clinician notes, outside records, medical history, ordering medicine/tests/procedures, monitoring of anti-neoplastic toxicities, interpreting the imaging/labs previously done) and coordination of care as well as direct time with the patient today, of which greater than 50% of the time was spent in counseling and coordination of care with the patient/family.'

## 2025-07-02 NOTE — ASSESSMENT & PLAN NOTE
Diagnosed July 2023.  S/p 6 cycles of R-CHOP.  Currently on surveillance.  No B symptoms.  Most recent CT soft tissue neck with contrast on 4/29/2025 with stable small bilateral submandibular, jugular chain and posterior triangle lymph nodes which are below the size criteria for pathologic adenopathy.  CT chest abdomen pelvis on 5/13/2025 with no acute intrathoracic or intra-abdominal/pelvic abnormalities or evidence of recurrent lymphoma.  Last scans in October 2024.  No evidence of recurrent lymphoma in the chest abdomen or pelvis.    Patient did not get her blood work done.  Advised to get blood work when she leaves the office today.  States she has an appointment scheduled with endocrinology and will be seeing them in October.      Orders:    CBC and differential; Standing    Comprehensive metabolic panel; Standing    LD,Blood; Standing    TSH, 3rd generation with Free T4 reflex; Standing

## 2025-07-03 NOTE — PROGRESS NOTES
Diagnoses and all orders for this visit:    Encounter for gynecological examination without abnormal finding  -     Liquid-based pap, screening; Future  -     Liquid-based pap, screening    Pap smear for cervical cancer screening  -     Ambulatory Referral to Obstetrics / Gynecology    Screening for STDs (sexually transmitted diseases)  -     Cancel: Chlamydia/GC amplified DNA by PCR; Future  -     Cancel: Chlamydia/GC amplified DNA by PCR; Future  -     Chlamydia/GC amplified DNA by PCR      We briefly reviewed menstrual management and contraception.  Patient was provided literature and brochures to take home and review.  Patient advised to return to the office for further discussion on either subject   Perineal hygiene reviewed   Weight bearing exercises minium of 150 mins/weekly advised.   Kegel exercises recommended daily, see AVS for instructions and recommendations  SBE encouraged, ASCCP guidelines reviewed. Condoms encouraged with all sexual activity to prevent STI's.   Gardisil vaccines recommended up to age 45  Calcium/ Vit D dietary requirements discussed,   Advised to call with any issues,  all concerns & questions addressed.   See after visit summary for further information and recommendations to the above mentioned subjects which we may or may not have covered in detail during your visit     F/U Annually and PRN      Health Maintenance:    First PAP: collected today     Gardisil: completed    Subjective    CC: Yearly Exam , New patient      Coral Artis is a 25 y.o. female here for an annual exam.   GYN hx includes: N/A  Family hx of:  No GYN cancers  Medically stable, reports no changes in medical Hx, follows with PMD  Lymphoma age 23, completed chemo     Patient's last menstrual period was 2025 (exact date).  Her menstrual cycles are regular every 28-30 days. Flow is heavy for omst of the days, lasting 5-9 days. Will take Ibuprofen to help with flow and cramping.  We discussed NSAID  "prophylactic therapy.  Had a brief discussion on menstrual management with use of some method of birth control  She denies breast concerns, abnormal vaginal discharge, vaginal itching, odor, irritation, bowel/bladder dysfunction, urinary symptoms, pelvic pain today.   She is sexually active. Monogamous relationship.    Her current method of contraception includes condoms. Denies any issues with her BCM.   She does want STD testing today.  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    Review of Systems     Constitutional: Negative for chills, fatigue, fever, headaches, visual disturbances, and unexpected weight change.   Respiratory: Negative for cough, & shortness of breath.  Cardiovascular: Negative for chest pain. .    Gastrointestinal: Negative for Abd pain, nausea & vomiting, constipation and diarrhea.   Genitourinary: Negative for difficulty urinating, dysuria, hematuria, unusual vaginal bleeding or discharge  Skin: Negative skin changes    Physical Exam     Constitutional: Alert & Oriented x3, well-developed and well-nourished. No distress.   HENT: Atraumatic, Normocephalic, Conjunctivae clear  Neck: Normal range of motion.   Pulmonary: Effort normal.   Abdominal: Soft. No tenderness or masses  Musculoskeletal: Normal ROM  Skin: Warm & Dry  Psychological: Normal mood, thought content, behavior & judgement     Breasts:   Right: tissue soft without masses, tenderness, skin changes or nipple discharge. No areas of erythema or pain. No subclavicular, axillary, pectoral adenopathy  Left:  tissue soft without masses, tenderness, skin changes or nipple discharge. No areas of erythema or pain. No subclavicular, axillary, pectoral adenopathy    Pelvic exam was performed with " patient supine, lithotomy position.      Labia: Negative rash, tenderness, lesion or injury on the right labia.              Negative rash, tenderness, lesion or injury on the left labia.   Urethral meatus:  Negative for  tenderness, inflammation or discharge.   Uterus: not deviated, enlarged, fixed or tender.   Cervix: No CMT, no discharge or friability.   Right adnexa: no mass, no tenderness and no fullness.  Left adnexa: no mass, no tenderness and no fullness.   Vagina: No erythema, tenderness, masses, or foreign body in the vagina. No signs of injury around the vagina. No unusual vaginal discharge   Perineum without lesions, signs of injury, erythema or swelling.  Inguinal Canal:        Right: No inguinal adenopathy or hernia present.        Left: No inguinal adenopathy or hernia present.                   [1] No family history on file.

## 2025-07-03 NOTE — PATIENT INSTRUCTIONS
"Prophylactic NSAID therapy for Painful or Heavy menses     Ibuprofen or Naproxen (chose 1 or the other, do not take both), Dose as noted on the box. Typically Ibuprofen dose is 600 mg, (3 tablets) every 6-8 hours. Typically Naproxen dose is 500 mg every 12 hours.  Start taking medication 2 days prior to onset of menses and continue taking through the first 3 days of menses. Make sure you take consistently this is important  You need to take with food to decrease any gastrointestinal upset effects    This is proven therapy to reduce you flow and cramping by 50 %    Life style changes that have a positive effect on painful and heavy periods are as follows   Daily physical exercise    Increase fiber, fresh fruits and vegetables in your diet    Increase daily water intake    Heating pads(do not apply directly to skin, apply over clothing or towel)   Warm Baths   Relaxation techniques, meditation, massage, yoga and mindfulness     These are all suggestion for improving your sense of frustrations with your menstrual cycle and improving your overall wellness and lifestyle     Patient Education     Intrauterine devices (IUDs)   The Basics   Written by the doctors and editors at [facility]   What is an intrauterine device? -- An intrauterine device (\"IUD\") is a type of birth control. It is a small, T-shaped device. A doctor or nurse puts an IUD in your uterus by going through your vagina and cervix (figure 1).  IUDs are made of flexible plastic and have 2 thin plastic strings that hang out of the cervix. They are very small, a little more than 1 inch (2.5 cm) in width and length.  An IUD is one of the safest, most effective methods for preventing pregnancy. It is a good choice for people, including teens, who do not want to get pregnant for at least 1 year. An IUD can also be used to prevent pregnancy if it is put in within 5 days after you have unprotected sex. This is known as \"emergency contraception.\"  You can also use " "IUDs for reasons other than birth control. For example, 1 type of IUD can be used to treat heavy, painful periods.  What are the different types of IUDs? -- There are 2 categories of IUDs available in the [place]. One type contains copper. The other type contains a hormone called \"levonorgestrel\" (figure 2):   Copper IUD - There is only 1 copper-containing IUD (brand name: Paragard). It can stay in your uterus for 10 years, or longer for some people, to prevent pregnancy. Some people who use it get heavier or longer periods than they had before getting the IUD.   Hormonal IUDs - There are 4 hormone-containing IUDs (brand names: Mirena, Liletta, Kyleena, Caridad) (figure 2). Depending on which of these you have, it can stay in your uterus for up to 8, 5, or 3 years. Many people who use hormonal IUDs have lighter, less painful periods than they had before getting the IUD. Some people stop getting a period at all, but this is not harmful. After having the IUD removed, periods usually return to normal within a month or 2.  Other types of IUDs are also available outside of the [place].  What are the benefits of using an IUD? -- The benefits of using an IUD include:   IUDs are very effective. Fewer than 1 in 100 people who use an IUD get pregnant during the first year of use.   You do not have to remember to do anything or take any birth control medicines on a regular basis.   IUDs have few side effects.   IUDs do not contain estrogen, a hormone that some people can't or don't want to take.   If you decide you want to get pregnant, you can have the IUD taken out.   If you use an IUD for several years, it costs less overall than many other types of birth control. That's because there are no costs after you have it inserted.   There is evidence that using an IUD lowers your risk of getting cervical cancer.  What are the downsides of using an IUD? -- The downsides of using an IUD include:   Unlike condoms, an IUD does not protect you " "against infections that you can catch during sex. These are called \"sexually transmitted infections\" (\"STIs\") or \"sexually transmitted diseases.\" But you and your partner(s) can use condoms to prevent spreading infections.   There is a small chance that the IUD will come out during your period. If this happens, you will need to get a new IUD. If you see your IUD in your underwear, on your pad, or in the toilet, call your doctor or nurse.   The initial cost is higher than the cost of other methods. But, there are no more costs after it is inserted.   Only a doctor or nurse can insert or remove an IUD.  You should not get an IUD if you recently had an infection that spread to your uterus and other nearby organs. This is called a \"pelvic infection.\" STIs such as chlamydia and gonorrhea can cause pelvic infections.  Which type of IUD is best for me? -- Your nurse or doctor can talk to you about the options and help you choose the right IUD for you.  A copper IUD might be a good choice if you:   Want or need to avoid hormones   Want to avoid big changes in your period, such as not having any periods or bleeding or spotting between periods   Want birth control for up to 10 years, or possibly longer  A hormonal IUD might be a good choice if you:   Have heavy, painful periods. These IUDs can make your periods lighter and less painful.   Have pelvic pain from a condition called \"endometriosis.\" These IUDs might help reduce the pain.   Want birth control for up to 8 years, depending on which device you choose  Does it hurt to have an IUD put in? -- You will likely feel some discomfort and slight cramping after the nurse or doctor puts the IUD in your uterus. People who have never given birth might feel more discomfort than people who have. The cramps generally go away within a day or 2. Over-the-counter pain medicines like ibuprofen (sample brand names: Advil, Motrin) or naproxen (sample brand name: Aleve) can help cramps go " "away faster.  After the IUD is in place, you should not be able to feel it.  Should I see a doctor or nurse? -- If you have an IUD, see your doctor or nurse right away if:   You have bad pain in your lower belly.   Your period is late or very different from normal.   You cannot feel the string of the IUD or if the string seems shorter than usual.   You think your IUD might have moved or fallen out.   You had sex with someone who has or might have an STI, or you think you have an STI.   You have an unexplained fever.  All topics are updated as new evidence becomes available and our peer review process is complete.  This topic retrieved from [employer] on: Feb 26, 2024.  Topic 73321 Version 21.0  Release: 32.2.4 - C32.56  © 2024 UpToDate, Inc. and/or its affiliates. All rights reserved.  figure 1: Female reproductive anatomy     The internal organs that make up the female reproductive system are located in the lower belly. These include the uterus, fallopian tubes, ovaries, cervix, and vagina.  The uterus has an inner lining, called the \"endometrium,\" and a thicker outer layer, called the \"myometrium.\"  Graphic 04867 Version 8.0  figure 2: IUDs     This picture shows 2 types of IUDs. There are different IUDs available. They are placed inside the uterus to help prevent pregnancy. Some hormonal IUDs can help reduce menstrual bleeding. The copper IUD can actually make menstrual bleeding heavier.  Graphic 61268 Version 15.0  Consumer Information Use and Disclaimer   Disclaimer: This generalized information is a limited summary of diagnosis, treatment, and/or medication information. It is not meant to be comprehensive and should be used as a tool to help the user understand and/or assess potential diagnostic and treatment options. It does NOT include all information about conditions, treatments, medications, side effects, or risks that may apply to a specific patient. It is not intended to be medical advice or a substitute " "for the medical advice, diagnosis, or treatment of a health care provider based on the health care provider's examination and assessment of a patient's specific and unique circumstances. Patients must speak with a health care provider for complete information about their health, medical questions, and treatment options, including any risks or benefits regarding use of medications. This information does not endorse any treatments or medications as safe, effective, or approved for treating a specific patient. UpToDate, Inc. and its affiliates disclaim any warranty or liability relating to this information or the use thereof.The use of this information is governed by the Terms of Use, available at https://www.[employer]/en/know/clinical-effectiveness-terms. 2024© UpToDate, Inc. and its affiliates and/or licensors. All rights reserved.  Copyright   © 2024 UpToDate, Inc. and/or its affiliates. All rights reserved.  Patient Education     Heavy periods   The Basics   Written by the doctors and editors at [employer]   What causes heavy periods? -- There are many different causes of heavy periods. They include:   1 of the ovaries not releasing an egg during 1 or more months   Growths in the uterus called \"fibroids\"   A bleeding disorder that prevents the blood from clotting normally   Side effects of some medicines, such as some types of birth control or \"blood thinners\"   A problem with the thyroid (a gland that makes hormones)   Cancer of the uterus  How much bleeding is abnormal? -- Signs that your periods are too heavy include:   Having to change a pad or tampon every 1 or 2 hours because it is completely soaked   Passing large lumps, or \"clots,\" of blood  Is my bleeding an emergency? -- See your doctor or go to the emergency department right away if you soak through 2 pads or tampons in 1 hour for 2 hours in a row.  If you are pregnant and have any bleeding, tell your doctor or midwife right away. Bleeding during " pregnancy can sometimes be a sign of an emergency condition.  Should I see a doctor or nurse? -- Call your doctor or nurse if you:   Are pregnant or think that you could be pregnant   Are having trouble getting pregnant   Are bothered by your bleeding, for example, if you:   Have periods that last for more than 8 days   Soak through a pad or tampon every 1 or 2 hours every time you have your period   Need to use both a pad and a tampon at the same time because you are bleeding so much   Need to change your pad or tampon during the night   Pass clots   Bleed in between periods   Get your period more or less often than once a month   Have pain and bad cramps in your lower belly before or while you are bleeding   Have bleeding after you have not had periods for at least a year, and think that you have gone through menopause   Have heavy bleeding plus any signs of low iron, such as:   Feeling weak   Feeling very tired   Having headaches   Having trouble breathing when you exercise   Feeling your heart beat too fast when you exercise  Should I have tests? -- Your doctor or nurse will decide if you need tests based on your age, symptoms, and individual situation. There are lots of tests, but you might not need any.  The most common tests doctors use to find the cause of heavy periods are:   Blood tests - Blood tests can check if you are pregnant. They can also check for low iron levels, a bleeding disorder, or other problems.   Endometrial biopsy - For this test, the doctor takes a sample of tissue from inside your uterus. The sample is viewed under a microscope to look for problems.   Pelvic ultrasound - This test uses sound waves to make a picture of your uterus, ovaries, and vagina. The pictures can show if you have fibroids or other growths.   Hysteroscopy - For this test, the doctor uses a small instrument to look inside your uterus.  How are heavy periods treated? -- It depends on what is causing your heavy periods  "and whether you want to get pregnant soon. You might not need treatment. If you do, treatments might include:   Birth control methods that contain hormones - These make your period lighter or stop your periods completely. They come as:   Pills   Skin patches   A ring that you put inside your vagina   Shots that you get every 3 months   An intrauterine device (\"IUD\") that your doctor inserts into your uterus   Medicines that help slow bleeding, such as tranexamic acid (sample brand name: Cyklokapron)   Medicines that reduce inflammation, such as ibuprofen (sample brand names: Motrin, Advil) or mefenamic acid (brand name: Ponstel)   Medicines that contain a hormone called \"progestin\" - These are taken for a week or so every few months.   Medicines that make the ovaries stop working for a short time  If you have fibroids, or if medicines haven't helped with your heavy periods, your doctor might suggest surgery. Your options depend on whether or not you might want to get pregnant in the future. They might include:   Removing fibroids or other growths   Endometrial ablation - This is a procedure that causes scarring in the inner lining of the uterus.   Uterine artery embolization - This is a procedure to block the blood vessels that supply blood to the uterus.   Hysterectomy - This is surgery to remove the uterus. After a hysterectomy, you no longer have periods at all.  All topics are updated as new evidence becomes available and our peer review process is complete.  This topic retrieved from [employer] on: Feb 26, 2024.  Topic 39136 Version 14.0  Release: 32.2.4 - C32.56  © 2024 UpToDate, Inc. and/or its affiliates. All rights reserved.  Consumer Information Use and Disclaimer   Disclaimer: This generalized information is a limited summary of diagnosis, treatment, and/or medication information. It is not meant to be comprehensive and should be used as a tool to help the user understand and/or assess potential diagnostic " and treatment options. It does NOT include all information about conditions, treatments, medications, side effects, or risks that may apply to a specific patient. It is not intended to be medical advice or a substitute for the medical advice, diagnosis, or treatment of a health care provider based on the health care provider's examination and assessment of a patient's specific and unique circumstances. Patients must speak with a health care provider for complete information about their health, medical questions, and treatment options, including any risks or benefits regarding use of medications. This information does not endorse any treatments or medications as safe, effective, or approved for treating a specific patient. UpToDate, Inc. and its affiliates disclaim any warranty or liability relating to this information or the use thereof.The use of this information is governed by the Terms of Use, available at https://www.[employer]/en/know/clinical-effectiveness-terms. 2024© UpToDate, Inc. and its affiliates and/or licensors. All rights reserved.  Copyright   © 2024 UpToDate, Inc. and/or its affiliates. All rights reserved.  Patient Education     Lowering Your Risk of Breast Cancer   About this topic   Breast cancer is a serious illness. Breast cancer is when abnormal cells grow and divide more quickly in your breast. These cells form a growth or tumor. The abnormal cells may enter nearby tissue and spread to other parts of the body. It is the type of cancer most often seen in women. Men can have breast cancer, but it is a rare condition.  General   Some things in your life may increase your risk of breast cancer. You may not be able to change some of these. Others you can control.  You are more likely to get breast cancer if you:  Have a mother, sister, or daughter who has had breast cancer  Have used hormones for menopause for more than 5 years  Have had radiation therapy to the breast or chest in the  past  Are overweight or do not exercise  Had your first menstrual period before you were 12 years old  Went through menopause after age 56  Have never been pregnant or had your first child after age 36  Have had breast cancer before  Drink alcohol in any form  Have dense breasts  Are older in age  There is no certain way to prevent breast cancer. There are things you can do to lower your chances of having breast cancer.  Keep a healthy weight. Lose weight if you are overweight. Being overweight raises your chances of having breast cancer.  Eat a healthy diet to maintain a healthy weight, such as more fruits, vegetables, and lean cuts of meat. Decrease the amount of saturated fat in your diet.  Exercise. Being active helps you keep a healthy weight.  Limit your alcohol intake or do not drink alcohol. The more alcohol you drink, the higher your risk.  Do not smoke cigarettes. Smoking can increase your risk of many types of cancer.  Breastfeed your baby. This may help protect you. The longer you breastfeed, the more protection you have.  Talk with your doctor about:  Limiting or stopping hormone therapy.  Taking certain drugs to prevent breast cancer. For women at high risk of having breast cancer, there are a few drugs that may lower your risk.  Surgery to prevent you from having breast cancer if you are very high risk.  When do I need to call the doctor?   Changes in your breasts  A lump or area in your breast that feels different  Discharge from your nipple  Skin on your breast is dimpled or indented  You have questions or concerns about your breasts  Helpful tips   Talk to your doctor about the best kind of breast cancer screening for you.  If you want to do self breast exams, have your doctor show you the right way to do them.  Tell your doctor of any abnormal finding.  Last Reviewed Date   2021-10-04  Consumer Information Use and Disclaimer   This generalized information is a limited summary of diagnosis,  treatment, and/or medication information. It is not meant to be comprehensive and should be used as a tool to help the user understand and/or assess potential diagnostic and treatment options. It does NOT include all information about conditions, treatments, medications, side effects, or risks that may apply to a specific patient. It is not intended to be medical advice or a substitute for the medical advice, diagnosis, or treatment of a health care provider based on the health care provider's examination and assessment of a patient’s specific and unique circumstances. Patients must speak with a health care provider for complete information about their health, medical questions, and treatment options, including any risks or benefits regarding use of medications. This information does not endorse any treatments or medications as safe, effective, or approved for treating a specific patient. UpToDate, Inc. and its affiliates disclaim any warranty or liability relating to this information or the use thereof. The use of this information is governed by the Terms of Use, available at https://www.[employer]/en/know/clinical-effectiveness-terms   Copyright   Copyright © 2024 UpToDate, Inc. and its affiliates and/or licensors. All rights reserved.       Perineal Hygiene      Your vaginal naturally takes care of its self, it is a self washing system, the less you mess the healthier it will be     Please do not use any anti bacterial soaps,  liquid soaps, body wash or feminine wash to the vulva, these products can cause dermitis, bacterial infections and other vulvar problems.   Your partner should avoid the same products as well to genital area.  Use only water to cleanse vulva, if you feel you need soap you may use a small amount of  Dove White Bar or Dove White Sensitive Skin Bar soap ( no liquid soap) if necessary.    Avoid the use of washcloths, exfoliating madan's, netted scrubbers, or loofa's, use your hands only to  cleanse the vulvar tissues    No scented lotions or products are advised in or near your vulva.    Use coconut oil in its solid form for moisture if needed.  No douching this may cause imbalance in your vaginal PH and further issues.    If you wear panty liners, you may apply a thin coating of Vaseline, A&D ointment or coconut oil to the vulvar tissues as a skin barrier     Wear Cotton underware, and loose fitting clothing  Use perfume-free, dye-free laundry detergent for under garments, use a second rinse cycle   Avoid fabric softeners/dryer sheets.         Over the counter probiotic to restore vaginal bharati may be helpful as well, take daily.       You may also look into Boric Acid vaginal suppositories to restore vaginal PH balance for up to 2 weeks as directed on the box. You may not use these if you are pregnant      For vaginal dryness:      You may use:     Coconut oil in solid form, not liquid (organic, pure, unscented) as needed for moisture or lubrication. ( Do not use if allergic)       Replens moisture restore external comfort gel daily ( use as directed on the box)        Replens long lasting vaginal moisturizer  ( use as directed on the box)     May try S&N Airoflo vulvar moisturizer ( found on [employer] )    May try Revaree vaginal inserts        For Vaginal Lubrication:          You may use:     Coconut oil in solid form, not liquid (organic, pure, unscented) as a lubricant or another scent-free lubricant (Astroglide, Uberlube) if needed.  Do not use coconut oil or silicone if using a condom as this may break down the integrity of the condom and cause an unplanned pregnancy              Do not use coconut oil if allergic               Replens silky smooth lubricant, premium silicone based lubricant for intercourse. ( use as directed, a small amount will provide an enhanced natural feeling)     Any premium over the counter vaginal lubricant water or silicone based. Silicone based will have more staying power  and friction relief         For Vulvar irritation/itching:        You may use Hydrocortisone 1 % over the counter to external vulvar tissues 2 x daily for 5-7 days to help with irriation and itch relief.  Patient Education     Pelvic Floor Exercises   About this topic   The pelvic floor consists of muscles and strong bands of tissues which support all of the organs in your pelvis. Some of these organs are the bladder and the small and large bowel as well as the womb and the prostate. If the muscles and tissues get weak, your organs may drop. This can lead to other problems. Your urine may leak when you laugh, sneeze, or cough. You may not be able to drain the bladder fully. You may have less problems if you do exercises to strengthen your pelvic floor and abdominal muscles.  Kegel exercises help make the muscles in the pelvic floor stronger. Anyone can do them. It is also important to make your abdominal muscles stronger. In order for these exercises to work, you must be consistent when doing them.  General   Before starting with a program, ask your doctor if you are healthy enough to do these exercises. Your doctor may have you work with a  or physical therapist to make a safe exercise program to meet your needs.  Strengthening Exercises   Kegel exercises keep your pelvic muscles firm and strong. You can do these in many different positions. Start by lying down with your knees bent and feet on the bed. Squeeze the pelvic muscles as if you are trying to stop the flow of urine. Hold these muscles for a count of 3, and then slowly relax them for a count of 3. Try to work up to squeezing for a count of 10 and slowly relaxing for a count of 10. Increase the muscle squeeze until you get to 10. When relaxing, slowly relax to a count of 10.  Breathe out when you are squeezing and breathe in when you are relaxing. Your goal is to try to do 10 Kegels 3 or more times each day. Take time to rest between sets. Be sure to  only contract your pelvic floor muscles, not your buttocks, thighs, or abdominal muscles.  Pelvic floor contractions ? There are a few ways to feel the pelvic muscles contract.  When you are passing urine, try suddenly stopping your flow of urine. Do not do this on a regular basis, but only to feel what the contraction feels like. Doing this while passing urine can lead to other problems.  Put a finger into the vagina or rectum. Contract the muscles around your finger as if you were trying to stop the flow of urine or stop the passing of gas.  Place two chairs without arms about 2 inches (5 cm) apart. Sit so you have one butt cheek on each chair. Now, try jen your pelvic floor muscles. This will help you keep from using other muscles.  Pelvic tilts ? Lie on your back with your knees bent and feet flat on the floor. Tighten your stomach muscles and press your lower back down to the floor. Try doing Kegels with this exercise when your back is flattened. Hold 3 to 5 seconds. Relax.  Straight leg raises lying down ? Lie on your back with one leg straight. Bend your other knee so the foot is flat on the bed. Keeping your leg straight, lift the leg up to the level of your other knee. Lower it back down. Repeat with the other leg.  Hip lifts ? Lie on your back with your knees bent and feet flat on the floor. Tighten your stomach muscles and lift your buttocks off the floor. Try doing Kegels when up in this position. Hold 3 to 5 seconds. Relax.  Abdominal bracing ? Do this exercise in different positions: Lying down, sitting, and standing. Tighten your stomach muscles. While keeping the stomach muscles tight, tighten your pelvic floor muscles. Now, forcefully laugh or cough to see if you were able to prevent urine from leaking.  Abdominal crunches ? Lie on your back with both knees bent. Keep your feet flat on the floor. Place your hands in one of these positions. Try starting with the first position since it is the  easiest. As you get better, use the other positions to make it harder.  Crunches with arms at sides.  Crunches with arms across chest.  Crunches with arms behind head. Be careful not to interlock your fingers behind your neck or head while doing crunches. This may add tension to your neck and cause strain.  Look at the ceiling. Tighten your belly muscles and lift your shoulders and upper back off the floor. Breathe out while you are doing this. Lower your shoulders to the floor. Breathe in while you are doing this. Relax your belly muscles all the way before starting another crunch.             What will the results be?   Less leakage of urine when you cough, sneeze, laugh, or run  Fewer strong urges to pass urine  Fewer trips to the bathroom each day  Less risk of organs, such as the uterus or bladder, dropping into the vagina  Faster recovery after childbirth or prostate surgery  Stronger core muscles  Increased sensitivity during sex  Helpful tips   You can also try doing a different kind of Kegels. Do 5 quick, strong pelvic floor contractions. Sometimes, if you have an urge to pass urine but are not near a bathroom, you can do this kind of Kegel to calm the urge.  Stay active and work out to keep your muscles strong and flexible.  Keep a healthy weight to avoid putting too much stress on your spine. Eat a healthy diet to keep your muscles healthy.  Be sure you do not hold your breath when exercising. This can raise your blood pressure. If you tend to hold your breath, try counting out loud when exercising. If any exercise bothers you, stop right away.  Try walking or cycling at an easy pace for a few minutes to warm up your muscles. Do this again after exercising.  Doing exercises before a meal may be a good way to get into a routine. A good time to do these exercises is each time you are stopped at a stop light while driving.  Exercise may be slightly uncomfortable, but you should not have sharp pains. If you  do get sharp pains, stop what you are doing. If the sharp pains continue, call your doctor.  Last Reviewed Date   2021-03-31  Consumer Information Use and Disclaimer   This generalized information is a limited summary of diagnosis, treatment, and/or medication information. It is not meant to be comprehensive and should be used as a tool to help the user understand and/or assess potential diagnostic and treatment options. It does NOT include all information about conditions, treatments, medications, side effects, or risks that may apply to a specific patient. It is not intended to be medical advice or a substitute for the medical advice, diagnosis, or treatment of a health care provider based on the health care provider's examination and assessment of a patient’s specific and unique circumstances. Patients must speak with a health care provider for complete information about their health, medical questions, and treatment options, including any risks or benefits regarding use of medications. This information does not endorse any treatments or medications as safe, effective, or approved for treating a specific patient. [employer] and its affiliates disclaim any warranty or liability relating to this information or the use thereof. The use of this information is governed by the Terms of Use, available at https://www.[employer].com/en/know/clinical-effectiveness-terms   Copyright   Copyright © 2024 [employer] and its affiliates and/or licensors. All rights reserved.

## 2025-07-08 ENCOUNTER — CONSULT (OUTPATIENT)
Dept: OBGYN CLINIC | Facility: CLINIC | Age: 25
End: 2025-07-08
Attending: INTERNAL MEDICINE
Payer: COMMERCIAL

## 2025-07-08 VITALS
DIASTOLIC BLOOD PRESSURE: 78 MMHG | HEIGHT: 64 IN | BODY MASS INDEX: 47.46 KG/M2 | SYSTOLIC BLOOD PRESSURE: 124 MMHG | WEIGHT: 278 LBS

## 2025-07-08 DIAGNOSIS — Z11.3 SCREENING FOR STDS (SEXUALLY TRANSMITTED DISEASES): ICD-10-CM

## 2025-07-08 DIAGNOSIS — Z12.4 PAP SMEAR FOR CERVICAL CANCER SCREENING: ICD-10-CM

## 2025-07-08 DIAGNOSIS — Z01.419 ENCOUNTER FOR GYNECOLOGICAL EXAMINATION WITHOUT ABNORMAL FINDING: Primary | ICD-10-CM

## 2025-07-08 PROBLEM — J30.1 SEASONAL ALLERGIC RHINITIS DUE TO POLLEN: Status: RESOLVED | Noted: 2017-11-29 | Resolved: 2025-07-08

## 2025-07-08 PROBLEM — R51.9 HEADACHE: Status: RESOLVED | Noted: 2023-08-04 | Resolved: 2025-07-08

## 2025-07-08 PROBLEM — R03.0 ELEVATED BLOOD PRESSURE READING: Status: RESOLVED | Noted: 2023-07-25 | Resolved: 2025-07-08

## 2025-07-08 PROCEDURE — 99385 PREV VISIT NEW AGE 18-39: CPT | Performed by: OBSTETRICS & GYNECOLOGY

## 2025-07-08 PROCEDURE — 87591 N.GONORRHOEAE DNA AMP PROB: CPT | Performed by: OBSTETRICS & GYNECOLOGY

## 2025-07-08 PROCEDURE — 87491 CHLMYD TRACH DNA AMP PROBE: CPT | Performed by: OBSTETRICS & GYNECOLOGY

## 2025-07-08 PROCEDURE — G0145 SCR C/V CYTO,THINLAYER,RESCR: HCPCS | Performed by: OBSTETRICS & GYNECOLOGY

## 2025-07-10 LAB
C TRACH DNA SPEC QL NAA+PROBE: NEGATIVE
N GONORRHOEA DNA SPEC QL NAA+PROBE: NEGATIVE

## 2025-07-11 LAB
LAB AP GYN PRIMARY INTERPRETATION: NORMAL
Lab: NORMAL
PATH INTERP SPEC-IMP: NORMAL

## 2025-07-14 DIAGNOSIS — N76.0 BV (BACTERIAL VAGINOSIS): Primary | ICD-10-CM

## 2025-07-14 DIAGNOSIS — B96.89 BV (BACTERIAL VAGINOSIS): Primary | ICD-10-CM

## 2025-07-14 RX ORDER — METRONIDAZOLE 7.5 MG/G
1 GEL VAGINAL
Qty: 5 G | Refills: 0 | Status: SHIPPED | OUTPATIENT
Start: 2025-07-14 | End: 2025-07-19

## 2025-07-15 ENCOUNTER — OFFICE VISIT (OUTPATIENT)
Dept: FAMILY MEDICINE CLINIC | Facility: CLINIC | Age: 25
End: 2025-07-15
Payer: COMMERCIAL

## 2025-07-15 VITALS
TEMPERATURE: 97.7 F | SYSTOLIC BLOOD PRESSURE: 126 MMHG | OXYGEN SATURATION: 98 % | HEIGHT: 64 IN | BODY MASS INDEX: 47.12 KG/M2 | HEART RATE: 116 BPM | WEIGHT: 276 LBS | DIASTOLIC BLOOD PRESSURE: 88 MMHG

## 2025-07-15 DIAGNOSIS — J30.1 SEASONAL ALLERGIC RHINITIS DUE TO POLLEN: ICD-10-CM

## 2025-07-15 DIAGNOSIS — J45.20 MILD INTERMITTENT ASTHMA WITHOUT COMPLICATION: ICD-10-CM

## 2025-07-15 DIAGNOSIS — E03.9 ACQUIRED HYPOTHYROIDISM: ICD-10-CM

## 2025-07-15 DIAGNOSIS — C85.99 LYMPHOMA OF THYROID GLAND (HCC): ICD-10-CM

## 2025-07-15 DIAGNOSIS — Z00.00 ANNUAL PHYSICAL EXAM: Primary | ICD-10-CM

## 2025-07-15 PROCEDURE — 99213 OFFICE O/P EST LOW 20 MIN: CPT | Performed by: FAMILY MEDICINE

## 2025-07-15 PROCEDURE — 99395 PREV VISIT EST AGE 18-39: CPT | Performed by: FAMILY MEDICINE

## 2025-07-30 ENCOUNTER — CONSULT (OUTPATIENT)
Dept: BARIATRICS | Facility: CLINIC | Age: 25
End: 2025-07-30
Attending: FAMILY MEDICINE
Payer: COMMERCIAL

## 2025-07-30 VITALS
HEART RATE: 100 BPM | HEIGHT: 65 IN | RESPIRATION RATE: 16 BRPM | DIASTOLIC BLOOD PRESSURE: 80 MMHG | WEIGHT: 275 LBS | BODY MASS INDEX: 45.82 KG/M2 | SYSTOLIC BLOOD PRESSURE: 128 MMHG

## 2025-07-30 DIAGNOSIS — J45.20 MILD INTERMITTENT ASTHMA WITHOUT COMPLICATION: ICD-10-CM

## 2025-07-30 DIAGNOSIS — E66.01 MORBID OBESITY (HCC): Primary | ICD-10-CM

## 2025-07-30 PROCEDURE — 99204 OFFICE O/P NEW MOD 45 MIN: CPT | Performed by: SURGERY

## 2025-08-11 ENCOUNTER — TELEPHONE (OUTPATIENT)
Age: 25
End: 2025-08-11